# Patient Record
Sex: FEMALE | Race: WHITE | NOT HISPANIC OR LATINO | Employment: OTHER | URBAN - METROPOLITAN AREA
[De-identification: names, ages, dates, MRNs, and addresses within clinical notes are randomized per-mention and may not be internally consistent; named-entity substitution may affect disease eponyms.]

---

## 2017-05-04 ENCOUNTER — TRANSCRIBE ORDERS (OUTPATIENT)
Dept: ADMINISTRATIVE | Facility: HOSPITAL | Age: 67
End: 2017-05-04

## 2017-05-04 DIAGNOSIS — N63.10 LUMP OF RIGHT BREAST: Primary | ICD-10-CM

## 2017-05-18 ENCOUNTER — APPOINTMENT (OUTPATIENT)
Dept: MAMMOGRAPHY | Facility: HOSPITAL | Age: 67
End: 2017-05-18

## 2017-05-24 ENCOUNTER — TRANSCRIBE ORDERS (OUTPATIENT)
Dept: MAMMOGRAPHY | Facility: HOSPITAL | Age: 67
End: 2017-05-24

## 2017-05-24 ENCOUNTER — HOSPITAL ENCOUNTER (OUTPATIENT)
Dept: MAMMOGRAPHY | Facility: HOSPITAL | Age: 67
Discharge: HOME | End: 2017-05-24
Admitting: INTERNAL MEDICINE

## 2017-05-24 ENCOUNTER — HOSPITAL ENCOUNTER (OUTPATIENT)
Dept: ULTRASOUND IMAGING | Facility: HOSPITAL | Age: 67
Discharge: HOME | End: 2017-05-24

## 2017-05-24 DIAGNOSIS — N63.10 LUMP OF RIGHT BREAST: ICD-10-CM

## 2017-05-24 DIAGNOSIS — R92.8 ABNORMAL MAMMOGRAM: Primary | ICD-10-CM

## 2017-05-24 PROCEDURE — G0279 TOMOSYNTHESIS, MAMMO: HCPCS

## 2017-05-24 PROCEDURE — 76641 ULTRASOUND BREAST COMPLETE: CPT

## 2017-05-24 PROCEDURE — G0204 DX MAMMO INCL CAD BI: HCPCS

## 2017-05-24 PROCEDURE — 77066 DX MAMMO INCL CAD BI: CPT | Performed by: RADIOLOGY

## 2017-05-24 PROCEDURE — 76641 ULTRASOUND BREAST COMPLETE: CPT | Performed by: RADIOLOGY

## 2017-05-24 PROCEDURE — 77062 BREAST TOMOSYNTHESIS BI: CPT | Performed by: RADIOLOGY

## 2017-05-25 ENCOUNTER — HOSPITAL ENCOUNTER (OUTPATIENT)
Dept: MAMMOGRAPHY | Facility: HOSPITAL | Age: 67
Discharge: HOME | End: 2017-05-25

## 2017-05-25 ENCOUNTER — HOSPITAL ENCOUNTER (OUTPATIENT)
Dept: ULTRASOUND IMAGING | Facility: HOSPITAL | Age: 67
Discharge: HOME | End: 2017-05-25

## 2017-05-25 ENCOUNTER — HOSPITAL ENCOUNTER (OUTPATIENT)
Dept: ULTRASOUND IMAGING | Facility: HOSPITAL | Age: 67
Discharge: HOME | End: 2017-05-25
Admitting: RADIOLOGY

## 2017-05-25 DIAGNOSIS — R92.8 ABNORMAL MAMMOGRAM: ICD-10-CM

## 2017-05-25 PROCEDURE — 10022 US GUIDED FINE NEEDLE ASPIRATION BREAST: CPT | Performed by: RADIOLOGY

## 2017-05-25 PROCEDURE — 88305 TISSUE EXAM BY PATHOLOGIST: CPT | Performed by: RADIOLOGY

## 2017-05-25 PROCEDURE — 19083 BX BREAST 1ST LESION US IMAG: CPT | Performed by: RADIOLOGY

## 2017-05-25 PROCEDURE — 88360 TUMOR IMMUNOHISTOCHEM/MANUAL: CPT | Performed by: RADIOLOGY

## 2017-05-25 PROCEDURE — 76942 ECHO GUIDE FOR BIOPSY: CPT

## 2017-05-25 PROCEDURE — 77065 DX MAMMO INCL CAD UNI: CPT | Performed by: RADIOLOGY

## 2017-05-25 PROCEDURE — 88342 IMHCHEM/IMCYTCHM 1ST ANTB: CPT | Performed by: RADIOLOGY

## 2017-05-25 RX ORDER — LIDOCAINE HYDROCHLORIDE AND EPINEPHRINE 10; 10 MG/ML; UG/ML
10 INJECTION, SOLUTION INFILTRATION; PERINEURAL ONCE
Status: COMPLETED | OUTPATIENT
Start: 2017-05-25 | End: 2017-05-25

## 2017-05-25 RX ORDER — LIDOCAINE HYDROCHLORIDE 10 MG/ML
5 INJECTION, SOLUTION INFILTRATION; PERINEURAL ONCE
Status: COMPLETED | OUTPATIENT
Start: 2017-05-25 | End: 2017-05-25

## 2017-05-25 RX ADMIN — LIDOCAINE HYDROCHLORIDE,EPINEPHRINE BITARTRATE 5 ML: 10; .01 INJECTION, SOLUTION INFILTRATION; PERINEURAL at 15:47

## 2017-05-25 RX ADMIN — LIDOCAINE HYDROCHLORIDE 5 ML: 10 INJECTION, SOLUTION INFILTRATION; PERINEURAL at 15:47

## 2017-05-25 RX ADMIN — LIDOCAINE HYDROCHLORIDE 2 ML: 10 INJECTION, SOLUTION INFILTRATION; PERINEURAL at 15:51

## 2017-05-30 LAB
LAB AP CASE REPORT: NORMAL
Lab: NORMAL
PATH REPORT.FINAL DX SPEC: NORMAL

## 2017-06-05 ENCOUNTER — TELEPHONE (OUTPATIENT)
Dept: MAMMOGRAPHY | Facility: HOSPITAL | Age: 67
End: 2017-06-05

## 2017-06-05 NOTE — TELEPHONE ENCOUNTER
06.05.17 @ 1530: PCP office notified pathology returned as cancer & patient will be notified. Pathology results and recommendation given to pt. Verbalizes understanding. Denies discomfort. Denies any signs and symptoms of infection.Patient desires Dr GHADA Kovacs for surgical consult. Patient notified of appointment with Dr Kovacs on 06.09.17 @ 3430. Told to bring photo ID, insurance cards & list of current medications. Patient was encouraged to call back with any questions or concerns. Patient verbalized understanding. Breast cancer information packet offered and accepted.

## 2017-06-05 NOTE — TELEPHONE ENCOUNTER
06.05.17 @ 1108: Pt was called on three occassions (05.31.17 @ 1705; 06.02.17 @ 1430; 06.05.17 @ 1105), messages were left each time but no calls were returned.  PCP was notified of positive path report on 05.31.17. Provider was notified on 06.05.17 @ 1108 pt has not been returning calls to notify her of path results and appt with Azucena 06.09.17 @ 1115 ( I spoke with Sarika). Pt's emergency contact was left a message for pt to return call. Dr Barroso is aware.

## 2017-06-09 ENCOUNTER — DOCUMENTATION (OUTPATIENT)
Dept: OTHER | Facility: HOSPITAL | Age: 67
End: 2017-06-09
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
## 2017-06-13 ENCOUNTER — EDUCATION (OUTPATIENT)
Dept: ONCOLOGY | Facility: HOSPITAL | Age: 67
End: 2017-06-13

## 2017-06-13 ENCOUNTER — APPOINTMENT (OUTPATIENT)
Dept: LAB | Facility: HOSPITAL | Age: 67
End: 2017-06-13

## 2017-06-13 ENCOUNTER — DOCUMENTATION (OUTPATIENT)
Dept: SOCIAL WORK | Facility: HOSPITAL | Age: 67
End: 2017-06-13

## 2017-06-13 ENCOUNTER — TELEPHONE (OUTPATIENT)
Dept: ONCOLOGY | Facility: CLINIC | Age: 67
End: 2017-06-13

## 2017-06-13 ENCOUNTER — CONSULT (OUTPATIENT)
Dept: ONCOLOGY | Facility: CLINIC | Age: 67
End: 2017-06-13

## 2017-06-13 VITALS
HEART RATE: 96 BPM | BODY MASS INDEX: 22.53 KG/M2 | HEIGHT: 64 IN | RESPIRATION RATE: 16 BRPM | SYSTOLIC BLOOD PRESSURE: 220 MMHG | WEIGHT: 132 LBS | DIASTOLIC BLOOD PRESSURE: 96 MMHG | TEMPERATURE: 97.5 F

## 2017-06-13 DIAGNOSIS — C50.811 MALIGNANT NEOPLASM OF OVERLAPPING SITES OF RIGHT FEMALE BREAST (HCC): Primary | ICD-10-CM

## 2017-06-13 LAB
ALBUMIN SERPL-MCNC: 4.7 G/DL (ref 3.2–4.8)
ALBUMIN/GLOB SERPL: 1.3 G/DL (ref 1.5–2.5)
ALP SERPL-CCNC: 77 U/L (ref 25–100)
ALT SERPL W P-5'-P-CCNC: 49 U/L (ref 7–40)
ANION GAP SERPL CALCULATED.3IONS-SCNC: 4 MMOL/L (ref 3–11)
AST SERPL-CCNC: 43 U/L (ref 0–33)
BILIRUB SERPL-MCNC: 0.4 MG/DL (ref 0.3–1.2)
BUN BLD-MCNC: 15 MG/DL (ref 9–23)
BUN/CREAT SERPL: 25 (ref 7–25)
CALCIUM SPEC-SCNC: 10 MG/DL (ref 8.7–10.4)
CHLORIDE SERPL-SCNC: 107 MMOL/L (ref 99–109)
CO2 SERPL-SCNC: 29 MMOL/L (ref 20–31)
CREAT BLD-MCNC: 0.6 MG/DL (ref 0.6–1.3)
ERYTHROCYTE [DISTWIDTH] IN BLOOD BY AUTOMATED COUNT: 12.9 % (ref 11.3–14.5)
GFR SERPL CREATININE-BSD FRML MDRD: 101 ML/MIN/1.73
GLOBULIN UR ELPH-MCNC: 3.7 GM/DL
GLUCOSE BLD-MCNC: 98 MG/DL (ref 70–100)
HCT VFR BLD AUTO: 41.7 % (ref 34.5–44)
HGB BLD-MCNC: 13.5 G/DL (ref 11.5–15.5)
LYMPHOCYTES # BLD AUTO: 1.5 10*3/MM3 (ref 0.6–4.8)
LYMPHOCYTES NFR BLD AUTO: 29.8 % (ref 24–44)
MCH RBC QN AUTO: 29 PG (ref 27–31)
MCHC RBC AUTO-ENTMCNC: 32.3 G/DL (ref 32–36)
MCV RBC AUTO: 89.8 FL (ref 80–99)
MONOCYTES # BLD AUTO: 0.3 10*3/MM3 (ref 0–1)
MONOCYTES NFR BLD AUTO: 5.8 % (ref 0–12)
NEUTROPHILS # BLD AUTO: 3.2 10*3/MM3 (ref 1.5–8.3)
NEUTROPHILS NFR BLD AUTO: 64.4 % (ref 41–71)
PLATELET # BLD AUTO: 279 10*3/MM3 (ref 150–450)
PMV BLD AUTO: 7.2 FL (ref 6–12)
POTASSIUM BLD-SCNC: 4 MMOL/L (ref 3.5–5.5)
PROT SERPL-MCNC: 8.4 G/DL (ref 5.7–8.2)
RBC # BLD AUTO: 4.65 10*6/MM3 (ref 3.89–5.14)
SODIUM BLD-SCNC: 140 MMOL/L (ref 132–146)
WBC NRBC COR # BLD: 5 10*3/MM3 (ref 3.5–10.8)

## 2017-06-13 PROCEDURE — 36415 COLL VENOUS BLD VENIPUNCTURE: CPT | Performed by: INTERNAL MEDICINE

## 2017-06-13 PROCEDURE — 85025 COMPLETE CBC W/AUTO DIFF WBC: CPT | Performed by: INTERNAL MEDICINE

## 2017-06-13 PROCEDURE — 80053 COMPREHEN METABOLIC PANEL: CPT | Performed by: INTERNAL MEDICINE

## 2017-06-13 PROCEDURE — 99205 OFFICE O/P NEW HI 60 MIN: CPT | Performed by: INTERNAL MEDICINE

## 2017-06-13 RX ORDER — DEXAMETHASONE 4 MG/1
TABLET ORAL
Qty: 12 TABLET | Refills: 5 | Status: SHIPPED | OUTPATIENT
Start: 2017-06-13 | End: 2017-06-20

## 2017-06-13 RX ORDER — ONDANSETRON HYDROCHLORIDE 8 MG/1
8 TABLET, FILM COATED ORAL 3 TIMES DAILY PRN
Qty: 30 TABLET | Refills: 5 | Status: SHIPPED | OUTPATIENT
Start: 2017-06-13 | End: 2017-06-20
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
## 2017-06-13 NOTE — PLAN OF CARE
Outpatient Infusion • [address] [facility] • [address] • [place] [id number] • [phone]      CHEMOTHERAPY EDUCATION SHEET    NAME:  Brianna Urrutia      : 1950           DATE: 17    Booklets Given: Chemotherapy and You [x]  Eating Hints [x]    Sexuality/Fertility Books []     Chemotherapy/Biotherapy Education Sheets: (list all that apply)  Docetaxel, Carboplatin, Trastuzumab, Pertuzumab                                                                                                                                                                 Chemotherapy Regimen:  Docetaxel + Carboplatin + Trastuzumab + Pertuzumab every 21 days    TOPICS EDUCATION PROVIDED EDUCATION REINFORCED COMMENTS   ANEMIA:  role of RBC, cause, s/s, ways to manage, role of transfusion [x] [] Discussed role of RBC and possible fatigue   THROMBOCYTOPENIA:  role of platelet, cause, s/s, ways to prevent bleeding, things to avoid, when to seek help [x] [] Discussed role of platelets and increased risk of bleeding/bruising and to call MD if bleeding does not stop   NEUTROPENIA:  role of WBC, cause, infection precautions, s/s of infection, when to call MD [x] [] Discussed role of WBC and increased risk of infection. Discussed importance of handwashing and to contact physician with fever of >100.4F   NUTRITION & APPETITE CHANGES:  importance of maintaining healthy diet & weight, ways to manage to improve intake, dietary consult, exercise regimen [x] [] Discussed importance of hydration (drinking 8 8oz glasses of water per day), maintaining a good nutrition, and exercise for energy    DIARRHEA:  causes, s/s of dehydration, ways to manage, dietary changes, when to call MD [x] [] Discussed possible diarrhea and availability of Imodium OTC and to call physician if >4-5 times/day   CONSTIPATION:  causes, ways to manage, dietary changes, when to call MD [x] [] Discussed possible constipation and availability of Docusate and Miralax OTC  and to call MD if don't have a bowel movement in 24 hours   NAUSEA & VOMITING:  cause, use of antiemetics, dietary changes, when to call MD [x] [] Discussed possible nausea and vomiting and use of Ondansetron after 1-2 days if needed and to call physician if vomiting >4-5 times/day   MOUTH SORES:  causes, oral care, ways to manage [x] [] Discussed possible mouth sores and how to prevent with water, salt, and baking soda rinse and possible magic mouthwash prescription if needed   ALOPECIA:  cause, ways to manage, resources [x] [] Discussed possible hair thinning, slower hair growth, and hair loss   INFERTILITY & SEXUALITY:  causes, fertility preservation options, sexuality changes, ways to manage, importance of birth control [x] [] Discussed importance of use of contraception to prevent transfer of chemo to partner   NERVOUS SYSTEM CHANGES:  causes, s/s, neuropathies, cognitive changes, ways to manage [x] [] Discussed cause and s/sx of possible peripheral neuropathy including tingling and numbing of fingers and possible prescription if needed   PAIN:  causes, ways to manage [] [] ????   SKIN & NAIL CHANGES:  cause, s/s, ways to manage [x] [] Discussed importance of use of sunscreen and possible lines forming on nails   ORGAN TOXICITIES:  cause, s/s, need for diagnostic tests, labs, when to notify MD [x] [] Discussed risk for liver, renal, and cardio toxicity but that MD will be monitoring labs.   SURVIVORSHIP:  distress, distress assessment, secondary malignancies, early/late effects, follow-up, social issues, social support [] []    HOME CARE:  use of spill kits, storing of PO chemo, how to manage bodily fluids [] []    MISCELLANEOUS:  drug interactions, administration, vesicant, et [x] [] Discussed risk for metallic taste and ototoxicity. Discussed risk for edema and  flu-like syndrome (fever, chills, muscle aches). Discussed risk for infusion-related reaction (chest pain, shortness of breath), what to do if it  occurs, and importance of pre-meds.      Referrals:        Notes:   Patient stated understanding of information and was told to contact the clinic if she had further questions.

## 2017-06-13 NOTE — TELEPHONE ENCOUNTER
Spoke with pharmacist at [facility]. Due to insurance preference, verbal order given, ok to change zofran to ODT.

## 2017-06-13 NOTE — TELEPHONE ENCOUNTER
----- Message from Luz Maria Nur sent at 6/13/2017 10:38 AM EDT -----  Regarding: kiki saldanas script  Please call [facility] pharmacy concerning this patients script  For ondansetron 8mg    The person calling said it needs to be 40 T tabs.    [facility]

## 2017-06-15 ENCOUNTER — TRANSCRIBE ORDERS (OUTPATIENT)
Dept: GENERAL RADIOLOGY | Facility: HOSPITAL | Age: 67
End: 2017-06-15

## 2017-06-15 DIAGNOSIS — C50.411 MALIGNANT NEOPLASM OF UPPER-OUTER QUADRANT OF RIGHT FEMALE BREAST (HCC): Primary | ICD-10-CM

## 2017-06-16 ENCOUNTER — HOSPITAL ENCOUNTER (OUTPATIENT)
Dept: CARDIOLOGY | Facility: HOSPITAL | Age: 67
Discharge: HOME | End: 2017-06-16
Attending: INTERNAL MEDICINE | Admitting: INTERNAL MEDICINE

## 2017-06-16 ENCOUNTER — APPOINTMENT (OUTPATIENT)
Dept: PET IMAGING | Facility: HOSPITAL | Age: 67
End: 2017-06-16
Attending: INTERNAL MEDICINE

## 2017-06-16 VITALS — WEIGHT: 132 LBS | HEIGHT: 64 IN | BODY MASS INDEX: 22.53 KG/M2

## 2017-06-16 DIAGNOSIS — C50.811 MALIGNANT NEOPLASM OF OVERLAPPING SITES OF RIGHT FEMALE BREAST (HCC): ICD-10-CM

## 2017-06-16 DIAGNOSIS — C50.811 MALIGNANT NEOPLASM OF OVERLAPPING SITES OF RIGHT FEMALE BREAST (HCC): Primary | ICD-10-CM

## 2017-06-16 LAB
BH CV ECHO MEAS - AO MAX PG (FULL): 0.61 MMHG
BH CV ECHO MEAS - AO MAX PG: 9.5 MMHG
BH CV ECHO MEAS - AO MEAN PG (FULL): 0.17 MMHG
BH CV ECHO MEAS - AO MEAN PG: 5.3 MMHG
BH CV ECHO MEAS - AO V2 MAX: 153.8 CM/SEC
BH CV ECHO MEAS - AO V2 MEAN: 109.7 CM/SEC
BH CV ECHO MEAS - AO V2 VTI: 25.8 CM
BH CV ECHO MEAS - AVA(I,A): 2.1 CM^2
BH CV ECHO MEAS - AVA(I,D): 2.1 CM^2
BH CV ECHO MEAS - AVA(V,A): 2 CM^2
BH CV ECHO MEAS - AVA(V,D): 2 CM^2
BH CV ECHO MEAS - BSA(HAYCOCK): 1.6 M^2
BH CV ECHO MEAS - BSA: 1.6 M^2
BH CV ECHO MEAS - BZI_BMI: 22.7 KILOGRAMS/M^2
BH CV ECHO MEAS - BZI_METRIC_HEIGHT: 162.6 CM
BH CV ECHO MEAS - BZI_METRIC_WEIGHT: 59.9 KG
BH CV ECHO MEAS - CONTRAST EF (2CH): 67.4 ML/M^2
BH CV ECHO MEAS - CONTRAST EF 4CH: 66 ML/M^2
BH CV ECHO MEAS - EDV(CUBED): 44.9 ML
BH CV ECHO MEAS - EDV(MOD-SP2): 46 ML
BH CV ECHO MEAS - EDV(MOD-SP4): 50 ML
BH CV ECHO MEAS - EDV(TEICH): 52.8 ML
BH CV ECHO MEAS - EF(CUBED): 87.1 %
BH CV ECHO MEAS - EF(MOD-SP2): 67.4 %
BH CV ECHO MEAS - EF(MOD-SP4): 66 %
BH CV ECHO MEAS - EF(TEICH): 81.7 %
BH CV ECHO MEAS - ESV(CUBED): 5.8 ML
BH CV ECHO MEAS - ESV(MOD-SP2): 15 ML
BH CV ECHO MEAS - ESV(MOD-SP4): 17 ML
BH CV ECHO MEAS - ESV(TEICH): 9.7 ML
BH CV ECHO MEAS - FS: 49.5 %
BH CV ECHO MEAS - IVS/LVPW: 1.1
BH CV ECHO MEAS - IVSD: 0.94 CM
BH CV ECHO MEAS - LA DIMENSION: 2.9 CM
BH CV ECHO MEAS - LAT PEAK E' VEL: 8.9 CM/SEC
BH CV ECHO MEAS - LV DIASTOLIC VOL/BSA (35-75): 30.5 ML/M^2
BH CV ECHO MEAS - LV MASS(C)D: 90.5 GRAMS
BH CV ECHO MEAS - LV MASS(C)DI: 55.2 GRAMS/M^2
BH CV ECHO MEAS - LV MAX PG: 8.9 MMHG
BH CV ECHO MEAS - LV MEAN PG: 5.1 MMHG
BH CV ECHO MEAS - LV SYSTOLIC VOL/BSA (12-30): 10.4 ML/M^2
BH CV ECHO MEAS - LV V1 MAX: 148.8 CM/SEC
BH CV ECHO MEAS - LV V1 MEAN: 107.3 CM/SEC
BH CV ECHO MEAS - LV V1 VTI: 25.8 CM
BH CV ECHO MEAS - LVIDD: 3.6 CM
BH CV ECHO MEAS - LVIDS: 1.8 CM
BH CV ECHO MEAS - LVLD AP2: 6.8 CM
BH CV ECHO MEAS - LVLD AP4: 7 CM
BH CV ECHO MEAS - LVLS AP2: 5.5 CM
BH CV ECHO MEAS - LVLS AP4: 5.9 CM
BH CV ECHO MEAS - LVOT AREA (M): 2 CM^2
BH CV ECHO MEAS - LVOT AREA: 2.1 CM^2
BH CV ECHO MEAS - LVOT DIAM: 1.6 CM
BH CV ECHO MEAS - LVPWD: 0.85 CM
BH CV ECHO MEAS - MED PEAK E' VEL: 6.6 CM/SEC
BH CV ECHO MEAS - MV A MAX VEL: 81.2 CM/SEC
BH CV ECHO MEAS - MV DEC TIME: 0.28 SEC
BH CV ECHO MEAS - MV E MAX VEL: 62 CM/SEC
BH CV ECHO MEAS - MV E/A: 0.76
BH CV ECHO MEAS - PA ACC SLOPE: 977.7 CM/SEC^2
BH CV ECHO MEAS - PA ACC TIME: 0.13 SEC
BH CV ECHO MEAS - PA MAX PG: 6.5 MMHG
BH CV ECHO MEAS - PA PR(ACCEL): 22 MMHG
BH CV ECHO MEAS - PA V2 MAX: 126.8 CM/SEC
BH CV ECHO MEAS - PULM DIAS VEL: 35.1 CM/SEC
BH CV ECHO MEAS - PULM S/D: 2.2
BH CV ECHO MEAS - PULM SYS VEL: 76.2 CM/SEC
BH CV ECHO MEAS - RVDD: 2.4 CM
BH CV ECHO MEAS - SI(CUBED): 23.9 ML/M^2
BH CV ECHO MEAS - SI(LVOT): 32.4 ML/M^2
BH CV ECHO MEAS - SI(MOD-SP2): 18.9 ML/M^2
BH CV ECHO MEAS - SI(MOD-SP4): 20.1 ML/M^2
BH CV ECHO MEAS - SI(TEICH): 26.3 ML/M^2
BH CV ECHO MEAS - SV(CUBED): 39.1 ML
BH CV ECHO MEAS - SV(LVOT): 53.2 ML
BH CV ECHO MEAS - SV(MOD-SP2): 31 ML
BH CV ECHO MEAS - SV(MOD-SP4): 33 ML
BH CV ECHO MEAS - SV(TEICH): 43.2 ML
BH CV ECHO MEAS - TAPSE (>1.6): 2.4 CM2
BH CV VAS BP LEFT ARM: NORMAL MMHG
BH CV XLRA - RV BASE: 2.6 CM
BH CV XLRA - RV LENGTH: 5.5 CM
BH CV XLRA - RV MID: 2.4 CM
BH CV XLRA - TDI S': 15.7 CM/SEC
E/E' RATIO: 10.6
LEFT ATRIUM VOLUME INDEX: 13.4 ML/M2
LEFT ATRIUM VOLUME: 22 CM3
LV EF 2D ECHO EST: 70 %

## 2017-06-16 PROCEDURE — 93306 TTE W/DOPPLER COMPLETE: CPT | Performed by: INTERNAL MEDICINE

## 2017-06-16 PROCEDURE — 93306 TTE W/DOPPLER COMPLETE: CPT

## 2017-06-19 ENCOUNTER — APPOINTMENT (OUTPATIENT)
Dept: PET IMAGING | Facility: HOSPITAL | Age: 67
End: 2017-06-19
Attending: INTERNAL MEDICINE

## 2017-06-19 ENCOUNTER — HOSPITAL ENCOUNTER (OUTPATIENT)
Dept: CT IMAGING | Facility: HOSPITAL | Age: 67
Discharge: HOME | End: 2017-06-19
Attending: INTERNAL MEDICINE | Admitting: INTERNAL MEDICINE

## 2017-06-19 DIAGNOSIS — C50.811 MALIGNANT NEOPLASM OF OVERLAPPING SITES OF RIGHT FEMALE BREAST (HCC): ICD-10-CM

## 2017-06-19 PROCEDURE — 74176 CT ABD & PELVIS W/O CONTRAST: CPT

## 2017-06-19 PROCEDURE — 71250 CT THORAX DX C-: CPT

## 2017-06-20 ENCOUNTER — OFFICE VISIT (OUTPATIENT)
Dept: ONCOLOGY | Facility: CLINIC | Age: 67
End: 2017-06-20

## 2017-06-20 ENCOUNTER — HOSPITAL ENCOUNTER (OUTPATIENT)
Dept: NUCLEAR MEDICINE | Facility: HOSPITAL | Age: 67
Discharge: HOME | End: 2017-06-20
Attending: INTERNAL MEDICINE

## 2017-06-20 ENCOUNTER — APPOINTMENT (OUTPATIENT)
Dept: PET IMAGING | Facility: HOSPITAL | Age: 67
End: 2017-06-20
Attending: INTERNAL MEDICINE

## 2017-06-20 VITALS
SYSTOLIC BLOOD PRESSURE: 192 MMHG | WEIGHT: 130 LBS | RESPIRATION RATE: 16 BRPM | TEMPERATURE: 97.6 F | HEIGHT: 64 IN | BODY MASS INDEX: 22.2 KG/M2 | DIASTOLIC BLOOD PRESSURE: 81 MMHG | HEART RATE: 96 BPM

## 2017-06-20 DIAGNOSIS — C50.811 MALIGNANT NEOPLASM OF OVERLAPPING SITES OF RIGHT FEMALE BREAST (HCC): ICD-10-CM

## 2017-06-20 DIAGNOSIS — C50.811 MALIGNANT NEOPLASM OF OVERLAPPING SITES OF RIGHT FEMALE BREAST (HCC): Primary | ICD-10-CM

## 2017-06-20 PROCEDURE — 78306 BONE IMAGING WHOLE BODY: CPT

## 2017-06-20 PROCEDURE — 0 TECHNETIUM MEDRONATE KIT: Performed by: INTERNAL MEDICINE

## 2017-06-20 PROCEDURE — 99214 OFFICE O/P EST MOD 30 MIN: CPT | Performed by: INTERNAL MEDICINE

## 2017-06-20 PROCEDURE — A9503 TC99M MEDRONATE: HCPCS | Performed by: INTERNAL MEDICINE

## 2017-06-20 RX ORDER — LOSARTAN POTASSIUM 25 MG/1
TABLET ORAL
COMMUNITY
Start: 2017-06-13 | End: 2017-07-19 | Stop reason: SDUPTHER

## 2017-06-20 RX ORDER — TC 99M MEDRONATE 20 MG/10ML
26.5 INJECTION, POWDER, LYOPHILIZED, FOR SOLUTION INTRAVENOUS
Status: COMPLETED | OUTPATIENT
Start: 2017-06-20 | End: 2017-06-20

## 2017-06-20 RX ORDER — ONDANSETRON HYDROCHLORIDE 8 MG/1
8 TABLET, FILM COATED ORAL 3 TIMES DAILY PRN
Qty: 30 TABLET | Refills: 5 | Status: SHIPPED | OUTPATIENT
Start: 2017-06-20 | End: 2017-10-10

## 2017-06-20 RX ADMIN — Medication 26.5 MILLICURIE: at 11:15

## 2017-06-20 NOTE — PROGRESS NOTES
"      PROBLEM LIST:  1. [id number] ductal carcinoma of the right breast, ER negative [place] negative HER-2 positive.   A) patient presented with 1 year of increasing mass in the right breast with pain. Biopsy on 5/25/2017 showed a grade 2 invasive ductal carcinoma, ER negative, HER-2 positive by IHC. FNA of a right axillary lymph node showed metastatic cancer.  2. Hypertension  3. Hepatitis C, untreated  4. Kidney stones    Subjective     HISTORY OF PRESENT ILLNESS:   Brianna Urrutia returns for follow-up.   She is here to review her scan results.  She has not yet completed her bone scan which she will have later today.  She says she is still feeling very uncertain about chemotherapy.  She is nervous about what will happen if she gets very sick with treatment.  She says she lives alone and really doesn't have anybody she can call to help her.  She wonders if she can add drugs one at a time so she has a chance to see how they affect her.    She reports noticing a significant improvement how she feels when she started taking blood pressure medication.    Past Medical History, Past Surgical History, Social History, Family History have been reviewed and are without significant changes except as mentioned.    Review of Systems   A comprehensive 14 point review of systems was performed and was negative except as mentioned.    Medications:  The current medication list was reviewed in the EMR    ALLERGIES:    Allergies   Allergen Reactions   • Iodinated Diagnostic Agents Other (See Comments)     Patient states she has swelling and pain of joints for days following injection   • Penicillins        Objective      BP (!) 192/81 Comment: LUE  Pulse 96  Temp 97.6 °F (36.4 °C) (Temporal Artery )   Resp 16  Ht 64\" (162.6 cm)  Wt 130 lb (59 kg)  BMI 22.31 kg/m2     Performance Status: 0    General: well appearing female in no acute distress  Neuro: alert and oriented  HEENT: sclera anicteric, oropharynx clear  Skin: no rashes, " lesions, bruising, or petechiae  Psych: mood and affect appropriate      Imaging: I personally reviewed the CT images of the chest abdomen and pelvis as well as the bone scan.  There is no evidence of distant metastatic disease.      Assessment/Plan   Brianna Urrutia is a 66 y.o. year old female with stage III HER-2 positive ER negative breast cancer who returns for follow-up.  We reviewed her scan results.  There is no clear evidence of distant metastatic disease.  I do still recommend neoadjuvant chemotherapy.  She is at this point not willing to consider multiagent chemotherapy.  We discussed an alternative treatment with Taxol, Herceptin, and Perjeta.  This can still be a treatment that leads to a response and tumor shrinkage, although it may not be quite as potent as the 4 drug regimen.  I think weekly Taxol may be better tolerated, and it is true that given her living situation and lack of social support that this regimen would be more realistic.  We will go ahead and schedule her to start this treatment next week.  I'll ask one of the pharmacists contact her by phone to review side effects of Taxol in detail.    I will plan to see her back again in one week.                  Visit time was 25 minutes, greater than 50% spent in counseling      Delicia Ibarra MD  [facility]    6/20/2017          CC:

## 2017-06-21 ENCOUNTER — TELEPHONE (OUTPATIENT)
Dept: ONCOLOGY | Facility: CLINIC | Age: 67
End: 2017-06-21

## 2017-06-21 NOTE — TELEPHONE ENCOUNTER
Called pt to let her know bone scan does not show evidence of metastatic disease.      She is planning on coming for treatment next week, but might need to move it to Wednesday.  She will let us know.

## 2017-06-27 ENCOUNTER — APPOINTMENT (OUTPATIENT)
Dept: ONCOLOGY | Facility: HOSPITAL | Age: 67
End: 2017-06-27

## 2017-06-28 ENCOUNTER — DOCUMENTATION (OUTPATIENT)
Dept: NUTRITION | Facility: HOSPITAL | Age: 67
End: 2017-06-28

## 2017-06-28 ENCOUNTER — OFFICE VISIT (OUTPATIENT)
Dept: ONCOLOGY | Facility: CLINIC | Age: 67
End: 2017-06-28

## 2017-06-28 ENCOUNTER — DOCUMENTATION (OUTPATIENT)
Dept: SOCIAL WORK | Facility: HOSPITAL | Age: 67
End: 2017-06-28

## 2017-06-28 ENCOUNTER — INFUSION (OUTPATIENT)
Dept: ONCOLOGY | Facility: HOSPITAL | Age: 67
End: 2017-06-28

## 2017-06-28 ENCOUNTER — EDUCATION (OUTPATIENT)
Dept: ONCOLOGY | Facility: HOSPITAL | Age: 67
End: 2017-06-28

## 2017-06-28 VITALS
TEMPERATURE: 97.3 F | DIASTOLIC BLOOD PRESSURE: 96 MMHG | WEIGHT: 129 LBS | BODY MASS INDEX: 22.02 KG/M2 | HEART RATE: 107 BPM | HEIGHT: 64 IN | SYSTOLIC BLOOD PRESSURE: 198 MMHG | RESPIRATION RATE: 16 BRPM

## 2017-06-28 VITALS — DIASTOLIC BLOOD PRESSURE: 78 MMHG | HEART RATE: 84 BPM | SYSTOLIC BLOOD PRESSURE: 139 MMHG

## 2017-06-28 DIAGNOSIS — C50.811 MALIGNANT NEOPLASM OF OVERLAPPING SITES OF RIGHT FEMALE BREAST (HCC): Primary | ICD-10-CM

## 2017-06-28 LAB
ALBUMIN SERPL-MCNC: 4.3 G/DL (ref 3.2–4.8)
ALBUMIN/GLOB SERPL: 1.2 G/DL (ref 1.5–2.5)
ALP SERPL-CCNC: 80 U/L (ref 25–100)
ALT SERPL W P-5'-P-CCNC: 51 U/L (ref 7–40)
ANION GAP SERPL CALCULATED.3IONS-SCNC: 8 MMOL/L (ref 3–11)
AST SERPL-CCNC: 44 U/L (ref 0–33)
BILIRUB SERPL-MCNC: 0.5 MG/DL (ref 0.3–1.2)
BUN BLD-MCNC: 10 MG/DL (ref 9–23)
BUN/CREAT SERPL: 20 (ref 7–25)
CALCIUM SPEC-SCNC: 9.5 MG/DL (ref 8.7–10.4)
CHLORIDE SERPL-SCNC: 105 MMOL/L (ref 99–109)
CO2 SERPL-SCNC: 28 MMOL/L (ref 20–31)
CREAT BLD-MCNC: 0.5 MG/DL (ref 0.6–1.3)
ERYTHROCYTE [DISTWIDTH] IN BLOOD BY AUTOMATED COUNT: 12.6 % (ref 11.3–14.5)
GFR SERPL CREATININE-BSD FRML MDRD: 124 ML/MIN/1.73
GLOBULIN UR ELPH-MCNC: 3.7 GM/DL
GLUCOSE BLD-MCNC: 98 MG/DL (ref 70–100)
HCT VFR BLD AUTO: 39.7 % (ref 34.5–44)
HGB BLD-MCNC: 12.9 G/DL (ref 11.5–15.5)
LYMPHOCYTES # BLD AUTO: 1.5 10*3/MM3 (ref 0.6–4.8)
LYMPHOCYTES NFR BLD AUTO: 29.2 % (ref 24–44)
MCH RBC QN AUTO: 29 PG (ref 27–31)
MCHC RBC AUTO-ENTMCNC: 32.5 G/DL (ref 32–36)
MCV RBC AUTO: 89.4 FL (ref 80–99)
MONOCYTES # BLD AUTO: 0.4 10*3/MM3 (ref 0–1)
MONOCYTES NFR BLD AUTO: 7.8 % (ref 0–12)
NEUTROPHILS # BLD AUTO: 3.3 10*3/MM3 (ref 1.5–8.3)
NEUTROPHILS NFR BLD AUTO: 63 % (ref 41–71)
PLATELET # BLD AUTO: 294 10*3/MM3 (ref 150–450)
PMV BLD AUTO: 7 FL (ref 6–12)
POTASSIUM BLD-SCNC: 3.6 MMOL/L (ref 3.5–5.5)
PROT SERPL-MCNC: 8 G/DL (ref 5.7–8.2)
RBC # BLD AUTO: 4.45 10*6/MM3 (ref 3.89–5.14)
SODIUM BLD-SCNC: 141 MMOL/L (ref 132–146)
WBC NRBC COR # BLD: 5.2 10*3/MM3 (ref 3.5–10.8)

## 2017-06-28 PROCEDURE — 96413 CHEMO IV INFUSION 1 HR: CPT

## 2017-06-28 PROCEDURE — 99215 OFFICE O/P EST HI 40 MIN: CPT | Performed by: INTERNAL MEDICINE

## 2017-06-28 PROCEDURE — 80053 COMPREHEN METABOLIC PANEL: CPT

## 2017-06-28 PROCEDURE — 25010000002 LORAZEPAM PER 2 MG: Performed by: INTERNAL MEDICINE

## 2017-06-28 PROCEDURE — 85025 COMPLETE CBC W/AUTO DIFF WBC: CPT

## 2017-06-28 PROCEDURE — 25010000002 TRASTUZUMAB PER 10 MG: Performed by: INTERNAL MEDICINE

## 2017-06-28 PROCEDURE — 36415 COLL VENOUS BLD VENIPUNCTURE: CPT

## 2017-06-28 PROCEDURE — 96415 CHEMO IV INFUSION ADDL HR: CPT

## 2017-06-28 PROCEDURE — 96375 TX/PRO/DX INJ NEW DRUG ADDON: CPT

## 2017-06-28 RX ORDER — LORAZEPAM 2 MG/ML
1 INJECTION INTRAMUSCULAR ONCE
Status: CANCELLED
Start: 2017-06-28 | End: 2017-06-28

## 2017-06-28 RX ORDER — SODIUM CHLORIDE 9 MG/ML
250 INJECTION, SOLUTION INTRAVENOUS ONCE
Status: CANCELLED | OUTPATIENT
Start: 2017-06-28

## 2017-06-28 RX ORDER — FAMOTIDINE 10 MG/ML
20 INJECTION, SOLUTION INTRAVENOUS ONCE
Status: CANCELLED | OUTPATIENT
Start: 2017-06-28

## 2017-06-28 RX ORDER — LOSARTAN POTASSIUM 25 MG/1
25 TABLET ORAL ONCE
Status: COMPLETED | OUTPATIENT
Start: 2017-06-28 | End: 2017-06-28

## 2017-06-28 RX ORDER — SODIUM CHLORIDE 9 MG/ML
250 INJECTION, SOLUTION INTRAVENOUS ONCE
Status: CANCELLED | OUTPATIENT
Start: 2017-07-04

## 2017-06-28 RX ORDER — SODIUM CHLORIDE 9 MG/ML
250 INJECTION, SOLUTION INTRAVENOUS ONCE
Status: COMPLETED | OUTPATIENT
Start: 2017-06-28 | End: 2017-06-28

## 2017-06-28 RX ORDER — LOSARTAN POTASSIUM 25 MG/1
25 TABLET ORAL ONCE
Status: CANCELLED
Start: 2017-06-28 | End: 2017-06-28

## 2017-06-28 RX ORDER — FAMOTIDINE 10 MG/ML
20 INJECTION, SOLUTION INTRAVENOUS ONCE
Status: DISCONTINUED | OUTPATIENT
Start: 2017-06-28 | End: 2017-06-28 | Stop reason: HOSPADM

## 2017-06-28 RX ORDER — FAMOTIDINE 10 MG/ML
20 INJECTION, SOLUTION INTRAVENOUS ONCE
Status: CANCELLED | OUTPATIENT
Start: 2017-07-04

## 2017-06-28 RX ORDER — LOSARTAN POTASSIUM 50 MG/1
50 TABLET ORAL DAILY
Qty: 30 TABLET | Refills: 1 | OUTPATIENT
Start: 2017-06-28 | End: 2017-12-12

## 2017-06-28 RX ORDER — LORAZEPAM 2 MG/ML
1 INJECTION INTRAMUSCULAR ONCE
Status: COMPLETED | OUTPATIENT
Start: 2017-06-28 | End: 2017-06-28

## 2017-06-28 RX ORDER — FAMOTIDINE 10 MG/ML
20 INJECTION, SOLUTION INTRAVENOUS ONCE
Status: CANCELLED | OUTPATIENT
Start: 2017-07-11

## 2017-06-28 RX ORDER — SODIUM CHLORIDE 9 MG/ML
250 INJECTION, SOLUTION INTRAVENOUS ONCE
Status: CANCELLED | OUTPATIENT
Start: 2017-07-11

## 2017-06-28 RX ADMIN — TRASTUZUMAB 470 MG: KIT at 11:02

## 2017-06-28 RX ADMIN — SODIUM CHLORIDE 250 ML: 9 INJECTION, SOLUTION INTRAVENOUS at 11:00

## 2017-06-28 RX ADMIN — LOSARTAN POTASSIUM 25 MG: 25 TABLET, FILM COATED ORAL at 10:54

## 2017-06-28 RX ADMIN — LORAZEPAM 1 MG: 2 INJECTION INTRAMUSCULAR; INTRAVENOUS at 10:55

## 2017-06-28 NOTE — PROGRESS NOTES
Oncology Nutrition Screening    Patient Name:  Brianna Urrutia  YOB: 1950  MRN: [id number]  Date:  06/28/17  Physician:  Dr. Ibarra    Type of Cancer Treatment:   Chemotherapy:  Taxol(D1,8,15)/Herceptin(D1)/Perjetta(D1)-every 21 days x 6 cycles    Patient Active Problem List   Diagnosis   • Malignant neoplasm of overlapping sites of right female breast       Current Outpatient Prescriptions   Medication Sig Dispense Refill   • losartan (COZAAR) 25 MG tablet      • losartan (COZAAR) 50 MG tablet Take 1 tablet by mouth Daily. 30 tablet 1   • ondansetron (ZOFRAN) 8 MG tablet Take 1 tablet by mouth 3 (Three) Times a Day As Needed for Nausea or Vomiting. 30 tablet 5   • TraMADol HCl 50 MG tablet dispersible Take 0.5 tablets by mouth As Needed.     • Unable to find 1 each 1 (One) Time. [place]       No current facility-administered medications for this visit.      Facility-Administered Medications Ordered in Other Visits   Medication Dose Route Frequency Provider Last Rate Last Dose   • dexamethasone (DECADRON) 12 mg in sodium chloride 0.9 % IVPB  12 mg Intravenous Once Delicia Ibarra MD       • diphenhydrAMINE (BENADRYL) 50 mg in sodium chloride 0.9 % 50 mL IVPB  50 mg Intravenous Once Delicia Ibarra MD       • famotidine (PEPCID) injection 20 mg  20 mg Intravenous Once Delicia Ibarra MD       • sodium chloride 0.9 % infusion 250 mL  250 mL Intravenous Once Delicia Ibarra MD       • trastuzumab (HERCEPTIN) 470 mg in sodium chloride 0.9 % 250 mL chemo IVPB  8 mg/kg (Treatment Plan Recorded) Intravenous Once Delicia Ibarra MD   470 mg at 06/28/17 1102       Glycemic Risk:   n/a    Weight:   Height: 64 inches  Weight: 129 lbs.  Usual Body Weight: same lbs.   BMI: 22.1  Normal  Weight has been stable    Oral Food Intake:  Regular Diet - No Restrictions  [place]-herbal oral supplement    Hydration Status:   How many 8 ounce glass of water of fluid do you drink per day?  She reports trying to drink more water  "recently.    Enteral Feeding:   n/a    Nutrition Symptoms:   No Problems with Eating    Activity:   Not assessed at time of visit     reports that she has been smoking Cigarettes.  She has never used smokeless tobacco. She reports that she uses illicit drugs, including Marijuana. She reports that she does not drink alcohol.    Evaluation of Nutritional Risk:   Patient is not at nutritional risk at this time, but nutritional services offered for education.  Met with patient during her initial chemotherapy infusion appointment.  Patient's chart reviewed and initial nutritional screening completed as above.  Patient reports eating a fairly healthy diet consisting of fruits, vegetables, nuts, seeds, legumes, lean meats and limited dairy intake.  She reports leading an active lifestyle but has been limited recently due to her broken foot.    Discussed the importance of good nutrition during her treatment course focusing on adequate calorie, protein, nutrient and fluid intake.  Encouraged her to be consuming smaller more frequent meals/snacks throughout the day.  Emphasized the importance of protein and its role in the diet; reviewed high protein foods; and recommended she have a protein source at each meal/snack.  Also emphasized the importance of hydration; reviewed hydrating fluid options; and recommended she have at least 64 ounces daily.  Encouraged her to continue eating a heavily plant based diet with a wide variety of fruits and vegetables, nuts, seeds, legumes, and lean meats.    Answered her questions and she voiced understanding of information discussed.  Provided and reviewed \"Nutritional Considerations in Breast Cancer\".  RD's contact information provided and encouraged her to call if questions arise.  Will follow up as needed.    Electronically signed by:  Jazmin Mueller RD  11:05 AM  "

## 2017-06-28 NOTE — PLAN OF CARE
Outpatient Infusion • [address] [facility] • [address] • [place] [id number] • [phone]      CHEMOTHERAPY EDUCATION SHEET    NAME:  Brianna Urrutia      : 1950           DATE: 17    Booklets Given: Chemotherapy and You []  Eating Hints []    Sexuality/Fertility Books []     Chemotherapy/Biotherapy Education Sheets: (list all that apply)  Pertuzumab                                                                                                                                                                Chemotherapy Regimen: Paclitaxel + Trastuzumab + Pertuzumab    **Patient specifically asked for information on Pertuzumab, no education was provided on other medications as there was discussion with Dr. Ibarra early this morning**    TOPICS EDUCATION PROVIDED EDUCATION REINFORCED COMMENTS   ANEMIA:  role of RBC, cause, s/s, ways to manage, role of transfusion [] []    THROMBOCYTOPENIA:  role of platelet, cause, s/s, ways to prevent bleeding, things to avoid, when to seek help [] []    NEUTROPENIA:  role of WBC, cause, infection precautions, s/s of infection, when to call MD [] []    NUTRITION & APPETITE CHANGES:  importance of maintaining healthy diet & weight, ways to manage to improve intake, dietary consult, exercise regimen [] []    DIARRHEA:  causes, s/s of dehydration, ways to manage, dietary changes, when to call MD [x] [] Patient inquired about diarrhea after looking at the Pertuzumab handout, told her occurrence was patient specific, but yes it's a possibility with Pertuzumab   CONSTIPATION:  causes, ways to manage, dietary changes, when to call MD [] []    NAUSEA & VOMITING:  cause, use of antiemetics, dietary changes, when to call MD [] []    MOUTH SORES:  causes, oral care, ways to manage [] []    ALOPECIA:  cause, ways to manage, resources [x] [] Patient asked if hair loss would really occur, told her it's patient specific and depends on the regimen. At the time of education she was  not sure of what treatment she wanted to receive today   INFERTILITY & SEXUALITY:  causes, fertility preservation options, sexuality changes, ways to manage, importance of birth control [] []    NERVOUS SYSTEM CHANGES:  causes, s/s, neuropathies, cognitive changes, ways to manage [] []    PAIN:  causes, ways to manage [] [] ????   SKIN & NAIL CHANGES:  cause, s/s, ways to manage [] []    ORGAN TOXICITIES:  cause, s/s, need for diagnostic tests, labs, when to notify MD [x] [] Mentioned potential for cardiotoxicity as that was the reason for the echo, patient immediately expressed concerns that she was already having heart issues. Refrained from addressing further as she began to question Pertuzumab because she didn't think she was getting that today.   SURVIVORSHIP:  distress, distress assessment, secondary malignancies, early/late effects, follow-up, social issues, social support [] []    HOME CARE:  use of spill kits, storing of PO chemo, how to manage bodily fluids [] []    MISCELLANEOUS:  drug interactions, administration, vesicant, et [] []      Referrals:        **Patient specifically asked for information on Pertuzumab, no education was provided on other medications as there was discussion with Dr. Ibarra early this morning**    Notes:   Patient was not willing to listen to education, she brought up questions as she flipped through the Pertuzumab handout from [facility]. When I arrived she didn't think she was getting Pertuzumab today. Nurse called Dr. Ibarra to come and address situation.
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
## 2017-06-28 NOTE — PROGRESS NOTES
"      PROBLEM LIST:  1. [id number] ductal carcinoma of the right breast, ER negative [place] negative HER-2 positive.   A) patient presented with 1 year of increasing mass in the right breast with pain. Biopsy on 5/25/2017 showed a grade 2 invasive ductal carcinoma, ER negative, HER-2 positive by IHC. FNA of a right axillary lymph node showed metastatic cancer.  CT c/a/p and bone scan 6/10/17 showed no evidence of metastatic disease, but did show right breast mass and bulky right axillary adenopathy.    2. Hypertension  3. Hepatitis C, untreated  4. Kidney stones    Subjective     HISTORY OF PRESENT ILLNESS:   Brianna Urrutia returns for follow-up.   She is scheduled to begin chemotherapy today.  She still has many concerns about potential side effects of chemotherapy.  She is interested in having her targeted therapy without chemotherapy.  She also remains interested in doing proton therapy to shrink the tumor.  She has been putting hemp oil and turmeric on her skin and says she can feel it sinking into the tumor and working immediately.  She thinks the tumor has decreased in size.  She has started taking a supplement called turkey tail.    Past Medical History, Past Surgical History, Social History, Family History have been reviewed and are without significant changes except as mentioned.    Review of Systems   A comprehensive 14 point review of systems was performed and was negative except as mentioned.    Medications:  The current medication list was reviewed in the EMR    ALLERGIES:    Allergies   Allergen Reactions   • Iodinated Diagnostic Agents Other (See Comments)     Patient states she has swelling and pain of joints for days following injection   • Penicillins        Objective      Temp 97.3 °F (36.3 °C) (Temporal Artery )   Resp 16  Ht 64\" (162.6 cm)  Wt 129 lb (58.5 kg)  BMI 22.14 kg/m2     Performance Status: 0    General: well appearing female in no acute distress  Neuro: alert and oriented  HEENT: sclera " anicteric, oropharynx clear  Lymphatics:No cervical or supraclavicular adenopathy. In the right axilla there is a palpable lymph node about 2 cm in size  Breast: Right breast has a firm movable mass extending over both upper quadrants and down to the nipple. There is skin involvement  Cardiovascular: regular rate and rhythm, no murmurs  Lungs: clear to auscultation bilaterally  Abdomen: soft, nontender, nondistended.  No palpable organomegaly  Extremeties: no lower extremity edema  Skin: no rashes, lesions, bruising, or petechiae  Psych: mood and affect appropriate        Assessment/Plan   Brianna Urrutia is a 66 y.o. year old female with stage III HER-2 positive ER negative breast cancer who returns for follow-up.  She has declined TCHP due to concerns of how she would tolerate chemotherapy and is scheduled to begin THP today.  I did recommend port placement but she prefers to begin with peripheral IV for now.   We discussed potential side effects of Taxol including infusion reaction, nausea, myelosuppression, fatigue, hair loss, body aches, and neuropathy.  We discussed that with the neuropathy it is occasionally necessary to reduce or omit doses if it is severe or progressive.    We had a 45 minute discussion of the rationale for giving chemotherapy in this setting.  I explained that while shrinking the tumor is a goal of treatment, the primary goal of chemotherapy is to try to prevent death from breast cancer.  Based on her tumor size and lymph node involvement, it is quite likely that there is micrometastatic disease in other parts of her body, and that this is life-threatening.  While local therapy such as surgery and radiation may remove the primary tumor which is currently painful, it does not address ultimately the risk of dying from breast cancer.  We also discussed that targeted therapies such as Herceptin and Perjeta are most effective when given in combination with chemotherapy.  While multiple  chemotherapy drugs may give us the most benefit, I think that single agent Taxol along with Herceptin and Perjeta is a reasonable compromise.    Her blood pressure remains elevated and uncontrolled.  I will give her an extra dose of 25 mg of losartan today and we will send a prescription for 50 mg of losartan daily to her pharmacy.  I recommended that she follow-up with Dr. Akbar in the near future to continue management of this.      I will plan to see her back in 2 weeks.                  Visit time was 45 minutes, greater than 50% spent in counseling      Delicia Ibarra MD  [facility] Hematology and Oncology    6/28/2017          CC:

## 2017-06-29 ENCOUNTER — TELEPHONE (OUTPATIENT)
Dept: ONCOLOGY | Facility: CLINIC | Age: 67
End: 2017-06-29

## 2017-06-29 NOTE — TELEPHONE ENCOUNTER
Patient is scheduled for a deep cleaning at her dentist office. She said they would probably recommend antibiotics after the procedure. I told her this should be fine, but I will double check with Dr Ibarra in the morning and call her back.

## 2017-06-29 NOTE — TELEPHONE ENCOUNTER
----- Message from Sergio Salcido sent at 6/29/2017 10:30 AM EDT -----  Regarding: Fred Perez question about dental procedure and chemo  Contact: [phone]  Patient called and had a question. Has dentist appointment tomorrow to do a deep pocket procedure to reduce the bacteria in her body. Patient wants to know if she can do the dental appointment and also if she can take antibiotics on top of chemo treatment.

## 2017-06-30 ENCOUNTER — TELEPHONE (OUTPATIENT)
Dept: ONCOLOGY | Facility: CLINIC | Age: 67
End: 2017-06-30

## 2017-07-05 ENCOUNTER — INFUSION (OUTPATIENT)
Dept: ONCOLOGY | Facility: HOSPITAL | Age: 67
End: 2017-07-05

## 2017-07-05 ENCOUNTER — EDUCATION (OUTPATIENT)
Dept: ONCOLOGY | Facility: HOSPITAL | Age: 67
End: 2017-07-05

## 2017-07-05 VITALS
DIASTOLIC BLOOD PRESSURE: 95 MMHG | HEART RATE: 87 BPM | SYSTOLIC BLOOD PRESSURE: 148 MMHG | HEIGHT: 64 IN | TEMPERATURE: 98.4 F | BODY MASS INDEX: 21.85 KG/M2 | RESPIRATION RATE: 16 BRPM | WEIGHT: 128 LBS

## 2017-07-05 DIAGNOSIS — C50.811 MALIGNANT NEOPLASM OF OVERLAPPING SITES OF RIGHT FEMALE BREAST (HCC): ICD-10-CM

## 2017-07-05 PROCEDURE — G0463 HOSPITAL OUTPT CLINIC VISIT: HCPCS

## 2017-07-05 NOTE — PLAN OF CARE
Outpatient Infusion • [address] [facility] • [address] • [place] [id number] • [phone]      CHEMOTHERAPY EDUCATION SHEET    NAME:  Brianna Urrutia      : 1950           DATE: 17    Booklets Given: Chemotherapy and You []  Eating Hints []    Sexuality/Fertility Books []     Chemotherapy/Biotherapy Education Sheets: (list all that apply)    Paclitaxel (another copy)                                                                                                            Chemotherapy Regimen: Plan was for Paclitaxel + Trastuzumab + Pertuzumab, however patient has only received one administration of Trastuzumab. After today's discussion she would like to receive Trastuzumab + Pertuzumab every 3 weeks       Pharmacy was consulted to speak with patient about Paclitaxel.    TOPICS EDUCATION PROVIDED EDUCATION REINFORCED COMMENTS   ANEMIA:  role of RBC, cause, s/s, ways to manage, role of transfusion [] []    THROMBOCYTOPENIA:  role of platelet, cause, s/s, ways to prevent bleeding, things to avoid, when to seek help [] []    NEUTROPENIA:  role of WBC, cause, infection precautions, s/s of infection, when to call MD [x] [] Discussed increased risk for infection with Paclitaxel    NUTRITION & APPETITE CHANGES:  importance of maintaining healthy diet & weight, ways to manage to improve intake, dietary consult, exercise regimen [] []    DIARRHEA:  causes, s/s of dehydration, ways to manage, dietary changes, when to call MD [x] [] Patient asked about side effect of Trastuzumab and Pertuzumab together, we talked about the the most common side effect is diarrhea   CONSTIPATION:  causes, ways to manage, dietary changes, when to call MD [] []    NAUSEA & VOMITING:  cause, use of antiemetics, dietary changes, when to call MD [] []    MOUTH SORES:  causes, oral care, ways to manage [] []    ALOPECIA:  cause, ways to manage, resources [x] [] Discussed the potential for hair thinning/loss with Paclitaxel. Told  patient that wouldn't be as large of a concern for Trastuzumab and Pertuzumab   INFERTILITY & SEXUALITY:  causes, fertility preservation options, sexuality changes, ways to manage, importance of birth control [] []    NERVOUS SYSTEM CHANGES:  causes, s/s, neuropathies, cognitive changes, ways to manage [x] [] Discussed potential with paclitaxel   PAIN:  causes, ways to manage [] [] ????   SKIN & NAIL CHANGES:  cause, s/s, ways to manage [] []    ORGAN TOXICITIES:  cause, s/s, need for diagnostic tests, labs, when to notify MD [] []    SURVIVORSHIP:  distress, distress assessment, secondary malignancies, early/late effects, follow-up, social issues, social support [] []    HOME CARE:  use of spill kits, storing of PO chemo, how to manage bodily fluids [] []    MISCELLANEOUS:  drug interactions, administration, vesicant, et [] []      Referrals:       Notes:   Patient wanted to know more about all her possible options. She only wants targeted therapy and was confused as to why she was scheduled to receive Paclitaxel today. Addie and ADELA spoke with her about Paclitaxel side effects, but Addie also addressed patient concerns and answered many questions. Patient has seen tv commercials and has read a lot of information online. She was asking about Trastuzumab and Lapatinib combination therapy and tumor shrinkage - we addressed that this was only in stage IV breast cancer and she is stage III. She was also asking about Ibrance (Palbociclib) which she has seen on TV and we addressed that it is for HR+ patients, which she is not. Addie also spoke about first line therapies being TCHP or THP because they're backed by clinical evidence and she explained the importance of clinical trials in treatment decisions. She talked about how Trastuzumab and Pertuzumab work in combination with one another and patient agreed as of today to proceed with Trastuzumab and Pertuzumab therapy every 3 weeks. Patient does not believe in  chemotherapy and has been taking tumeric capsules and rubbing tumeric oil onto her tumor. Also explained insurance coverage for these regimens.

## 2017-07-12 ENCOUNTER — APPOINTMENT (OUTPATIENT)
Dept: ONCOLOGY | Facility: HOSPITAL | Age: 67
End: 2017-07-12

## 2017-07-12 ENCOUNTER — OFFICE VISIT (OUTPATIENT)
Dept: ONCOLOGY | Facility: CLINIC | Age: 67
End: 2017-07-12

## 2017-07-12 ENCOUNTER — HOSPITAL ENCOUNTER (OUTPATIENT)
Dept: GENERAL RADIOLOGY | Facility: HOSPITAL | Age: 67
Discharge: HOME | End: 2017-07-12
Admitting: INTERNAL MEDICINE

## 2017-07-12 VITALS
DIASTOLIC BLOOD PRESSURE: 88 MMHG | SYSTOLIC BLOOD PRESSURE: 143 MMHG | BODY MASS INDEX: 21.68 KG/M2 | WEIGHT: 127 LBS | HEART RATE: 88 BPM | RESPIRATION RATE: 16 BRPM | TEMPERATURE: 97.4 F | HEIGHT: 64 IN

## 2017-07-12 DIAGNOSIS — C50.811 MALIGNANT NEOPLASM OF OVERLAPPING SITES OF RIGHT FEMALE BREAST (HCC): Primary | ICD-10-CM

## 2017-07-12 PROCEDURE — 73110 X-RAY EXAM OF WRIST: CPT

## 2017-07-12 PROCEDURE — 99214 OFFICE O/P EST MOD 30 MIN: CPT | Performed by: INTERNAL MEDICINE

## 2017-07-12 RX ORDER — SODIUM CHLORIDE 9 MG/ML
250 INJECTION, SOLUTION INTRAVENOUS ONCE
Status: CANCELLED | OUTPATIENT
Start: 2017-07-19

## 2017-07-12 RX ORDER — FAMOTIDINE 10 MG/ML
20 INJECTION, SOLUTION INTRAVENOUS ONCE
Status: CANCELLED | OUTPATIENT
Start: 2017-07-19

## 2017-07-12 RX ORDER — SODIUM CHLORIDE 9 MG/ML
250 INJECTION, SOLUTION INTRAVENOUS ONCE
Status: CANCELLED | OUTPATIENT
Start: 2017-07-25

## 2017-07-12 RX ORDER — FAMOTIDINE 10 MG/ML
20 INJECTION, SOLUTION INTRAVENOUS ONCE
Status: CANCELLED | OUTPATIENT
Start: 2017-08-01

## 2017-07-12 RX ORDER — SODIUM CHLORIDE 9 MG/ML
250 INJECTION, SOLUTION INTRAVENOUS ONCE
Status: CANCELLED | OUTPATIENT
Start: 2017-08-01

## 2017-07-12 RX ORDER — FAMOTIDINE 10 MG/ML
20 INJECTION, SOLUTION INTRAVENOUS ONCE
Status: CANCELLED | OUTPATIENT
Start: 2017-07-25
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
## 2017-07-13 ENCOUNTER — TELEPHONE (OUTPATIENT)
Dept: ONCOLOGY | Facility: CLINIC | Age: 67
End: 2017-07-13

## 2017-07-13 NOTE — TELEPHONE ENCOUNTER
Called and spoke with patient. Let her know the xray shows no wrist fracture. She said it is still hurting. Also let her know Dr. Ibarra spoke with Dr. Vivian Kovacs, and Dr. Kovacs's office may contact her to be seen for repeat evaluation to consider surgery.

## 2017-07-17 ENCOUNTER — APPOINTMENT (OUTPATIENT)
Dept: ONCOLOGY | Facility: HOSPITAL | Age: 67
End: 2017-07-17

## 2017-07-18 ENCOUNTER — TELEPHONE (OUTPATIENT)
Dept: ONCOLOGY | Facility: CLINIC | Age: 67
End: 2017-07-18

## 2017-07-18 NOTE — TELEPHONE ENCOUNTER
Called pt to address her questions she sent via [employer].  Discussed that immunotherapy drugs are being investiagated in breast cancer, but so far as not as effective as current standard treatments for her2 positive cancer.  Also discussed that herceptin does involve the immune system in leading to cancer cell death.  Discussed that breast surgery should remove the biopsy clip from her breast if that is causing symptoms.  She is scheduled to meet with radiation oncology to discuss cyberknife treatment, which would not be considered a standard treatment for this situation.

## 2017-07-19 ENCOUNTER — APPOINTMENT (OUTPATIENT)
Dept: ONCOLOGY | Facility: HOSPITAL | Age: 67
End: 2017-07-19

## 2017-07-19 ENCOUNTER — INFUSION (OUTPATIENT)
Dept: ONCOLOGY | Facility: HOSPITAL | Age: 67
End: 2017-07-19

## 2017-07-19 VITALS
TEMPERATURE: 98 F | WEIGHT: 128 LBS | SYSTOLIC BLOOD PRESSURE: 169 MMHG | HEART RATE: 76 BPM | DIASTOLIC BLOOD PRESSURE: 86 MMHG | HEIGHT: 64 IN | RESPIRATION RATE: 16 BRPM | BODY MASS INDEX: 21.85 KG/M2

## 2017-07-19 DIAGNOSIS — C50.811 MALIGNANT NEOPLASM OF OVERLAPPING SITES OF RIGHT FEMALE BREAST (HCC): Primary | ICD-10-CM

## 2017-07-19 LAB
ALBUMIN SERPL-MCNC: 4.4 G/DL (ref 3.2–4.8)
ALBUMIN/GLOB SERPL: 1.3 G/DL (ref 1.5–2.5)
ALP SERPL-CCNC: 80 U/L (ref 25–100)
ALT SERPL W P-5'-P-CCNC: 49 U/L (ref 7–40)
ANION GAP SERPL CALCULATED.3IONS-SCNC: 5 MMOL/L (ref 3–11)
AST SERPL-CCNC: 42 U/L (ref 0–33)
BILIRUB SERPL-MCNC: 0.6 MG/DL (ref 0.3–1.2)
BUN BLD-MCNC: 16 MG/DL (ref 9–23)
BUN/CREAT SERPL: 26.7 (ref 7–25)
CALCIUM SPEC-SCNC: 9.7 MG/DL (ref 8.7–10.4)
CHLORIDE SERPL-SCNC: 106 MMOL/L (ref 99–109)
CO2 SERPL-SCNC: 27 MMOL/L (ref 20–31)
CREAT BLD-MCNC: 0.6 MG/DL (ref 0.6–1.3)
ERYTHROCYTE [DISTWIDTH] IN BLOOD BY AUTOMATED COUNT: 12.5 % (ref 11.3–14.5)
GFR SERPL CREATININE-BSD FRML MDRD: 100 ML/MIN/1.73
GLOBULIN UR ELPH-MCNC: 3.4 GM/DL
GLUCOSE BLD-MCNC: 94 MG/DL (ref 70–100)
HCT VFR BLD AUTO: 41 % (ref 34.5–44)
HGB BLD-MCNC: 13.6 G/DL (ref 11.5–15.5)
LYMPHOCYTES # BLD AUTO: 1.4 10*3/MM3 (ref 0.6–4.8)
LYMPHOCYTES NFR BLD AUTO: 25.4 % (ref 24–44)
MCH RBC QN AUTO: 29.4 PG (ref 27–31)
MCHC RBC AUTO-ENTMCNC: 33.1 G/DL (ref 32–36)
MCV RBC AUTO: 88.8 FL (ref 80–99)
MONOCYTES # BLD AUTO: 0.3 10*3/MM3 (ref 0–1)
MONOCYTES NFR BLD AUTO: 5.3 % (ref 0–12)
NEUTROPHILS # BLD AUTO: 4 10*3/MM3 (ref 1.5–8.3)
NEUTROPHILS NFR BLD AUTO: 69.3 % (ref 41–71)
PLATELET # BLD AUTO: 290 10*3/MM3 (ref 150–450)
PMV BLD AUTO: 7.5 FL (ref 6–12)
POTASSIUM BLD-SCNC: 3.6 MMOL/L (ref 3.5–5.5)
PROT SERPL-MCNC: 7.8 G/DL (ref 5.7–8.2)
RBC # BLD AUTO: 4.62 10*6/MM3 (ref 3.89–5.14)
SODIUM BLD-SCNC: 138 MMOL/L (ref 132–146)
WBC NRBC COR # BLD: 5.7 10*3/MM3 (ref 3.5–10.8)

## 2017-07-19 PROCEDURE — 85025 COMPLETE CBC W/AUTO DIFF WBC: CPT

## 2017-07-19 PROCEDURE — 80053 COMPREHEN METABOLIC PANEL: CPT

## 2017-07-19 PROCEDURE — 36415 COLL VENOUS BLD VENIPUNCTURE: CPT

## 2017-07-19 PROCEDURE — 96413 CHEMO IV INFUSION 1 HR: CPT

## 2017-07-19 PROCEDURE — 25010000002 TRASTUZUMAB PER 10 MG: Performed by: INTERNAL MEDICINE

## 2017-07-19 RX ADMIN — TRASTUZUMAB 350 MG: 150 INJECTION, POWDER, LYOPHILIZED, FOR SOLUTION INTRAVENOUS at 10:29

## 2017-07-31 ENCOUNTER — HOSPITAL ENCOUNTER (OUTPATIENT)
Dept: RADIATION ONCOLOGY | Facility: HOSPITAL | Age: 67
Setting detail: RADIATION/ONCOLOGY SERIES
Discharge: HOME | End: 2017-07-31

## 2017-07-31 ENCOUNTER — OFFICE VISIT (OUTPATIENT)
Dept: RADIATION ONCOLOGY | Facility: HOSPITAL | Age: 67
End: 2017-07-31

## 2017-07-31 VITALS
BODY MASS INDEX: 22.64 KG/M2 | SYSTOLIC BLOOD PRESSURE: 139 MMHG | DIASTOLIC BLOOD PRESSURE: 80 MMHG | HEART RATE: 86 BPM | WEIGHT: 127.8 LBS | HEIGHT: 63 IN | TEMPERATURE: 97.7 F

## 2017-07-31 DIAGNOSIS — C50.411 MALIGNANT NEOPLASM OF UPPER-OUTER QUADRANT OF RIGHT FEMALE BREAST (HCC): Primary | ICD-10-CM

## 2017-07-31 PROCEDURE — G0463 HOSPITAL OUTPT CLINIC VISIT: HCPCS

## 2017-07-31 RX ORDER — METOPROLOL SUCCINATE 25 MG/1
TABLET, EXTENDED RELEASE ORAL
COMMUNITY
Start: 2017-07-21 | End: 2017-12-12
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
## 2017-08-02 ENCOUNTER — OFFICE VISIT (OUTPATIENT)
Dept: ONCOLOGY | Facility: CLINIC | Age: 67
End: 2017-08-02

## 2017-08-02 VITALS
HEART RATE: 93 BPM | TEMPERATURE: 97.2 F | SYSTOLIC BLOOD PRESSURE: 145 MMHG | HEIGHT: 63 IN | RESPIRATION RATE: 16 BRPM | WEIGHT: 128 LBS | BODY MASS INDEX: 22.68 KG/M2 | DIASTOLIC BLOOD PRESSURE: 78 MMHG

## 2017-08-02 DIAGNOSIS — C50.811 MALIGNANT NEOPLASM OF OVERLAPPING SITES OF RIGHT FEMALE BREAST (HCC): Primary | ICD-10-CM

## 2017-08-02 DIAGNOSIS — C50.811 MALIGNANT NEOPLASM OF OVERLAPPING SITES OF RIGHT FEMALE BREAST (HCC): ICD-10-CM

## 2017-08-02 DIAGNOSIS — C50.411 MALIGNANT NEOPLASM OF UPPER-OUTER QUADRANT OF RIGHT FEMALE BREAST (HCC): ICD-10-CM

## 2017-08-02 PROCEDURE — 99215 OFFICE O/P EST HI 40 MIN: CPT | Performed by: INTERNAL MEDICINE

## 2017-08-02 RX ORDER — SODIUM CHLORIDE 9 MG/ML
250 INJECTION, SOLUTION INTRAVENOUS ONCE
Status: CANCELLED | OUTPATIENT
Start: 2017-08-23

## 2017-08-02 RX ORDER — FAMOTIDINE 10 MG/ML
20 INJECTION, SOLUTION INTRAVENOUS ONCE
Status: CANCELLED | OUTPATIENT
Start: 2017-08-16

## 2017-08-02 RX ORDER — SODIUM CHLORIDE 9 MG/ML
250 INJECTION, SOLUTION INTRAVENOUS ONCE
Status: CANCELLED | OUTPATIENT
Start: 2017-08-09

## 2017-08-02 RX ORDER — SODIUM CHLORIDE 9 MG/ML
250 INJECTION, SOLUTION INTRAVENOUS ONCE
Status: CANCELLED | OUTPATIENT
Start: 2017-08-16

## 2017-08-02 RX ORDER — FAMOTIDINE 10 MG/ML
20 INJECTION, SOLUTION INTRAVENOUS ONCE
Status: CANCELLED | OUTPATIENT
Start: 2017-08-23

## 2017-08-02 RX ORDER — FAMOTIDINE 10 MG/ML
20 INJECTION, SOLUTION INTRAVENOUS ONCE
Status: CANCELLED | OUTPATIENT
Start: 2017-08-09

## 2017-08-02 NOTE — PROGRESS NOTES
"      PROBLEM LIST:  1. [id number] ductal carcinoma of the right breast, ER negative VT negative HER-2 positive.   A) patient presented with 1 year of increasing mass in the right breast with pain. Biopsy on 5/25/2017 showed a grade 2 invasive ductal carcinoma, ER negative, HER-2 positive by IHC. FNA of a right axillary lymph node showed metastatic cancer.  CT c/a/p and bone scan 6/10/17 showed no evidence of metastatic disease, but did show right breast mass and bulky right axillary adenopathy.    2. Hypertension  3. Hepatitis C, untreated  4. Kidney stones    Subjective     HISTORY OF PRESENT ILLNESS:   Brianna Urrutia returns for follow-up.  Since she was last here she met with Dr. Gema Edmonds to discuss radiation treatment.  She was specifically interested in CyberKnife.  Dr. Edmonds discussed CyberKnife treatment as well as the standard treatment of postoperative external beam radiation.  Mrs. Urrutia is also having discussions with Dr. Kovacs and Dr. Guillen regarding mastectomy.  There is no date for surgery set at this point.  She continues to receive Herceptin only and has refused Perjeta and Taxol.  She reports that she feels that the tumor is shrinking.    Past Medical History, Past Surgical History, Social History, Family History have been reviewed and are without significant changes except as mentioned.    Review of Systems   A comprehensive 14 point review of systems was performed and was negative except as mentioned.    Medications:  The current medication list was reviewed in the EMR    ALLERGIES:    Allergies   Allergen Reactions   • Iodinated Diagnostic Agents Other (See Comments)     Patient states she has swelling and pain of joints for days following injection   • Penicillins        Objective      /78 Comment: LUE  Pulse 93  Temp 97.2 °F (36.2 °C) (Temporal Artery )   Resp 16  Ht 62.75\" (159.4 cm)  Wt 128 lb (58.1 kg)  BMI 22.86 kg/m2     Performance Status: 0    General: well appearing female " in no acute distress  Neuro: alert and oriented  HEENT: sclera anicteric, oropharynx clear  Lymphatics:No cervical or supraclavicular adenopathy. In the right axilla there is a palpable lymph node about 2 cm in size  Breast: Right breast has a firm movable mass extending over both upper quadrants and down to the nipple. There is skin involvement.  The skin involvement has progressed since her prior exam, but there has been no significant change in the tumor size.  Extremeties: no lower extremity edema  Skin: no rashes, lesions, bruising, or petechiae  Psych: mood and affect appropriate        Assessment/Plan   Brianna Urrutia is a 67 y.o. year old female with stage III HER-2 positive ER negative breast cancer who returns for follow-up.  We had a long discussion about systemic therapy for her cancer.  I emphasized that although her cancer is high risk, it still is potentially curable and the treatment that I have recommended for her is with the goal of cure.  She has a lot of questions about other therapeutic options, specifically involving Tykerb or immunotherapy.  I explained to her that while these are interesting and potentially beneficial treatments, in her situation the best proven treatment of Herceptin, Perjeta, in combination with chemotherapy.  There are certainly treatments that have not been fully tested, but I do not think that it is appropriate to recommend or treat her with potentially inferior options.  We also discussed that many of the drugs and treatments that she is interested in are typically used in the setting of metastatic or incurable disease.  After 2 doses of Herceptin only it appears to me that her tumor has progressed, although she feels that there has been some improvement.  I have repeatedly recommended giving HER-2 targeted drugs in combination with chemotherapy, as I think this is likely to give us the best response.  She is not interested in receiving any chemotherapy treatment.   There may be clinical trials for neoadjuvant HER-2 targeted therapy at other hospitals.  She would like to be seen for a second opinion.  We will refer her to the [facility].    Hypertension: Improved.        F/u in 4 weeks.                  Visit time was 45 minutes, greater than 50% spent in counseling      Delicia Ibarra MD  [facility] Hematology and Oncology    8/2/2017          CC:

## 2017-08-09 ENCOUNTER — INFUSION (OUTPATIENT)
Dept: ONCOLOGY | Facility: HOSPITAL | Age: 67
End: 2017-08-09

## 2017-08-09 VITALS
DIASTOLIC BLOOD PRESSURE: 91 MMHG | BODY MASS INDEX: 23.21 KG/M2 | WEIGHT: 131 LBS | RESPIRATION RATE: 16 BRPM | HEART RATE: 93 BPM | SYSTOLIC BLOOD PRESSURE: 167 MMHG | HEIGHT: 63 IN | TEMPERATURE: 97.9 F

## 2017-08-09 DIAGNOSIS — C50.411 MALIGNANT NEOPLASM OF UPPER-OUTER QUADRANT OF RIGHT FEMALE BREAST (HCC): ICD-10-CM

## 2017-08-09 DIAGNOSIS — C50.811 MALIGNANT NEOPLASM OF OVERLAPPING SITES OF RIGHT FEMALE BREAST (HCC): Primary | ICD-10-CM

## 2017-08-09 LAB
ERYTHROCYTE [DISTWIDTH] IN BLOOD BY AUTOMATED COUNT: 12.2 % (ref 11.3–14.5)
HCT VFR BLD AUTO: 38 % (ref 34.5–44)
HGB BLD-MCNC: 12.4 G/DL (ref 11.5–15.5)
LYMPHOCYTES # BLD AUTO: 1.6 10*3/MM3 (ref 0.6–4.8)
LYMPHOCYTES NFR BLD AUTO: 31.4 % (ref 24–44)
MCH RBC QN AUTO: 29 PG (ref 27–31)
MCHC RBC AUTO-ENTMCNC: 32.7 G/DL (ref 32–36)
MCV RBC AUTO: 88.8 FL (ref 80–99)
MONOCYTES # BLD AUTO: 0.5 10*3/MM3 (ref 0–1)
MONOCYTES NFR BLD AUTO: 8.7 % (ref 0–12)
NEUTROPHILS # BLD AUTO: 3.1 10*3/MM3 (ref 1.5–8.3)
NEUTROPHILS NFR BLD AUTO: 59.9 % (ref 41–71)
PLATELET # BLD AUTO: 268 10*3/MM3 (ref 150–450)
PMV BLD AUTO: 7.4 FL (ref 6–12)
RBC # BLD AUTO: 4.28 10*6/MM3 (ref 3.89–5.14)
WBC NRBC COR # BLD: 5.2 10*3/MM3 (ref 3.5–10.8)

## 2017-08-09 PROCEDURE — 36415 COLL VENOUS BLD VENIPUNCTURE: CPT

## 2017-08-09 PROCEDURE — 96413 CHEMO IV INFUSION 1 HR: CPT

## 2017-08-09 PROCEDURE — 85025 COMPLETE CBC W/AUTO DIFF WBC: CPT

## 2017-08-09 PROCEDURE — 25010000002 TRASTUZUMAB PER 10 MG: Performed by: INTERNAL MEDICINE

## 2017-08-09 RX ADMIN — TRASTUZUMAB 350 MG: 150 INJECTION, POWDER, LYOPHILIZED, FOR SOLUTION INTRAVENOUS at 10:07

## 2017-08-30 ENCOUNTER — OFFICE VISIT (OUTPATIENT)
Dept: ONCOLOGY | Facility: CLINIC | Age: 67
End: 2017-08-30

## 2017-08-30 ENCOUNTER — LAB (OUTPATIENT)
Dept: LAB | Facility: HOSPITAL | Age: 67
End: 2017-08-30

## 2017-08-30 ENCOUNTER — APPOINTMENT (OUTPATIENT)
Dept: ONCOLOGY | Facility: HOSPITAL | Age: 67
End: 2017-08-30

## 2017-08-30 VITALS
HEIGHT: 63 IN | SYSTOLIC BLOOD PRESSURE: 150 MMHG | TEMPERATURE: 97.7 F | WEIGHT: 131 LBS | BODY MASS INDEX: 23.21 KG/M2 | RESPIRATION RATE: 16 BRPM | DIASTOLIC BLOOD PRESSURE: 100 MMHG | HEART RATE: 110 BPM

## 2017-08-30 DIAGNOSIS — C50.811 MALIGNANT NEOPLASM OF OVERLAPPING SITES OF RIGHT FEMALE BREAST (HCC): ICD-10-CM

## 2017-08-30 DIAGNOSIS — C50.811 MALIGNANT NEOPLASM OF OVERLAPPING SITES OF RIGHT FEMALE BREAST (HCC): Primary | ICD-10-CM

## 2017-08-30 LAB
ALBUMIN SERPL-MCNC: 4.2 G/DL (ref 3.2–4.8)
ALBUMIN/GLOB SERPL: 1.1 G/DL (ref 1.5–2.5)
ALP SERPL-CCNC: 95 U/L (ref 25–100)
ALT SERPL W P-5'-P-CCNC: 59 U/L (ref 7–40)
ANION GAP SERPL CALCULATED.3IONS-SCNC: 7 MMOL/L (ref 3–11)
AST SERPL-CCNC: 43 U/L (ref 0–33)
BILIRUB SERPL-MCNC: 0.5 MG/DL (ref 0.3–1.2)
BUN BLD-MCNC: 15 MG/DL (ref 9–23)
BUN/CREAT SERPL: 25 (ref 7–25)
CALCIUM SPEC-SCNC: 9.5 MG/DL (ref 8.7–10.4)
CHLORIDE SERPL-SCNC: 102 MMOL/L (ref 99–109)
CO2 SERPL-SCNC: 28 MMOL/L (ref 20–31)
CREAT BLD-MCNC: 0.6 MG/DL (ref 0.6–1.3)
ERYTHROCYTE [DISTWIDTH] IN BLOOD BY AUTOMATED COUNT: 12.8 % (ref 11.3–14.5)
GFR SERPL CREATININE-BSD FRML MDRD: 100 ML/MIN/1.73
GLOBULIN UR ELPH-MCNC: 3.7 GM/DL
GLUCOSE BLD-MCNC: 129 MG/DL (ref 70–100)
HCT VFR BLD AUTO: 41.8 % (ref 34.5–44)
HGB BLD-MCNC: 13.9 G/DL (ref 11.5–15.5)
LYMPHOCYTES # BLD AUTO: 2 10*3/MM3 (ref 0.6–4.8)
LYMPHOCYTES NFR BLD AUTO: 32.5 % (ref 24–44)
MCH RBC QN AUTO: 29.6 PG (ref 27–31)
MCHC RBC AUTO-ENTMCNC: 33.3 G/DL (ref 32–36)
MCV RBC AUTO: 88.8 FL (ref 80–99)
MONOCYTES # BLD AUTO: 0.4 10*3/MM3 (ref 0–1)
MONOCYTES NFR BLD AUTO: 6.7 % (ref 0–12)
NEUTROPHILS # BLD AUTO: 3.7 10*3/MM3 (ref 1.5–8.3)
NEUTROPHILS NFR BLD AUTO: 60.8 % (ref 41–71)
PLATELET # BLD AUTO: 339 10*3/MM3 (ref 150–450)
PMV BLD AUTO: 7.2 FL (ref 6–12)
POTASSIUM BLD-SCNC: 4 MMOL/L (ref 3.5–5.5)
PROT SERPL-MCNC: 7.9 G/DL (ref 5.7–8.2)
RBC # BLD AUTO: 4.7 10*6/MM3 (ref 3.89–5.14)
SODIUM BLD-SCNC: 137 MMOL/L (ref 132–146)
WBC NRBC COR # BLD: 6.1 10*3/MM3 (ref 3.5–10.8)

## 2017-08-30 PROCEDURE — 36415 COLL VENOUS BLD VENIPUNCTURE: CPT

## 2017-08-30 PROCEDURE — 80053 COMPREHEN METABOLIC PANEL: CPT | Performed by: INTERNAL MEDICINE

## 2017-08-30 PROCEDURE — 85025 COMPLETE CBC W/AUTO DIFF WBC: CPT

## 2017-08-30 PROCEDURE — 99214 OFFICE O/P EST MOD 30 MIN: CPT | Performed by: INTERNAL MEDICINE

## 2017-08-30 RX ORDER — FAMOTIDINE 10 MG/ML
20 INJECTION, SOLUTION INTRAVENOUS ONCE
Status: CANCELLED | OUTPATIENT
Start: 2017-09-13

## 2017-08-30 RX ORDER — FAMOTIDINE 10 MG/ML
20 INJECTION, SOLUTION INTRAVENOUS ONCE
Status: CANCELLED | OUTPATIENT
Start: 2017-08-30

## 2017-08-30 RX ORDER — SODIUM CHLORIDE 9 MG/ML
250 INJECTION, SOLUTION INTRAVENOUS ONCE
Status: CANCELLED | OUTPATIENT
Start: 2017-09-13

## 2017-08-30 RX ORDER — SODIUM CHLORIDE 9 MG/ML
250 INJECTION, SOLUTION INTRAVENOUS ONCE
Status: CANCELLED | OUTPATIENT
Start: 2017-08-30

## 2017-08-30 RX ORDER — SODIUM CHLORIDE 9 MG/ML
250 INJECTION, SOLUTION INTRAVENOUS ONCE
Status: CANCELLED | OUTPATIENT
Start: 2017-09-06

## 2017-08-30 RX ORDER — FAMOTIDINE 10 MG/ML
20 INJECTION, SOLUTION INTRAVENOUS ONCE
Status: CANCELLED | OUTPATIENT
Start: 2017-09-06

## 2017-08-30 NOTE — PROGRESS NOTES
"      PROBLEM LIST:  1. [id number] ductal carcinoma of the right breast, ER negative GA negative HER-2 positive.   A) patient presented with 1 year of increasing mass in the right breast with pain. Biopsy on 5/25/2017 showed a grade 2 invasive ductal carcinoma, ER negative, HER-2 positive by IHC. FNA of a right axillary lymph node showed metastatic cancer.  CT c/a/p and bone scan 6/10/17 showed no evidence of metastatic disease, but did show right breast mass and bulky right axillary adenopathy.    2. Hypertension  3. Hepatitis C, untreated  4. Kidney stones    Subjective     HISTORY OF PRESENT ILLNESS:   Brianna Urrutia returns for follow-up.  She is having a lot of pain in her right arm.  For the past few weeks she has had increasing pain and swelling.  The pain is in her wrist but radiating up to the shoulder.  She feels angry that nobody told her that this would happen.  She says that when she receives Herceptin and she can feel the tumor dying, and she thinks that the tumor has decreased in size.  She is having occasional drainage from the tumor.  She feels tired for a day or 2 after receiving Herceptin.    Past Medical History, Past Surgical History, Social History, Family History have been reviewed and are without significant changes except as mentioned.    Review of Systems   A comprehensive 14 point review of systems was performed and was negative except as mentioned.    Medications:  The current medication list was reviewed in the EMR    ALLERGIES:    Allergies   Allergen Reactions   • Iodinated Diagnostic Agents Other (See Comments)     Patient states she has swelling and pain of joints for days following injection   • Penicillins        Objective      /100 Comment: LUE  Pulse 110  Temp 97.7 °F (36.5 °C) (Temporal Artery )   Resp 16  Ht 63\" (160 cm)  Wt 131 lb (59.4 kg)  BMI 23.21 kg/m2     Performance Status: 0    General: well appearing female in no acute distress  Neuro: alert and " oriented  HEENT: sclera anicteric, oropharynx clear  Lymphatics:No cervical or supraclavicular adenopathy. Right axillary tiffany mass about 3 cm, firm  Breast: Right breast has a firm movable mass extending over both upper quadrants and down to the nipple. There is skin involvement with some new open sores.  Extremeties: no lower extremity edema  Skin: no rashes, lesions, bruising, or petechiae  Psych: mood and affect appropriate        Assessment/Plan   Brianna Urrutia is a 67 y.o. year old female with stage III HER-2 positive ER negative breast cancer who returns for follow-up.  She has received Herceptin only, and she continues to refuse any chemotherapy drugs.  She now has developed symptomatic lymphedema.  We discussed that this is related to impaired lymph drainage secondary to the malignant lymph nodes in her axilla.  I think the best way to manage this would be to try to shrink her tumor with effective treatment.  We have discussed on multiple occasions that the best proven treatment for this situation is HER-2 targeted drugs and accommodation with chemotherapy.  I reviewed with her the chemotherapy drugs that have been shown to work in this situation including Taxol, Taxotere, carboplatin, Adriamycin, and cyclophosphamide.  While there have been clinical trials using lapatinib in combination with Herceptin, this is not a standard or approved therapy and I would not recommend it for her situation.  She continues to be hopeful that she can find a doctor who will prescribe this medication for her.  I think she would benefit from a second opinion.  We had previously referred her to the [facility] at her request.  Now however she is not sure she will be able to make the trip.  I will set her up to see Dr. Rosi Torres at  for a second opinion.    Follow-up will be scheduled based on the results of that appointment.  She continues to be forward with plans for mastectomy and reconstruction.                   Visit time was 30 minutes, greater than 50% spent in counseling      Delicia Ibarra MD  [facility] Hematology and Oncology    8/30/2017          CC:

## 2017-09-01 ENCOUNTER — APPOINTMENT (OUTPATIENT)
Dept: ONCOLOGY | Facility: HOSPITAL | Age: 67
End: 2017-09-01

## 2017-09-05 ENCOUNTER — HOSPITAL ENCOUNTER (OUTPATIENT)
Dept: PHYSICAL THERAPY | Facility: HOSPITAL | Age: 67
Setting detail: THERAPIES SERIES
Discharge: HOME | End: 2017-09-05
Attending: INTERNAL MEDICINE

## 2017-09-05 DIAGNOSIS — M79.601 RIGHT ARM PAIN: Primary | ICD-10-CM

## 2017-09-05 DIAGNOSIS — C50.811 MALIGNANT NEOPLASM OF OVERLAPPING SITES OF RIGHT FEMALE BREAST (HCC): ICD-10-CM

## 2017-09-05 DIAGNOSIS — C50.411 MALIGNANT NEOPLASM OF UPPER-OUTER QUADRANT OF RIGHT FEMALE BREAST (HCC): ICD-10-CM

## 2017-09-05 PROCEDURE — G8985 CARRY GOAL STATUS: HCPCS

## 2017-09-05 PROCEDURE — 97162 PT EVAL MOD COMPLEX 30 MIN: CPT

## 2017-09-05 PROCEDURE — G8984 CARRY CURRENT STATUS: HCPCS
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
## 2017-09-06 ENCOUNTER — APPOINTMENT (OUTPATIENT)
Dept: ONCOLOGY | Facility: HOSPITAL | Age: 67
End: 2017-09-06

## 2017-09-08 ENCOUNTER — TRANSCRIBE ORDERS (OUTPATIENT)
Dept: ADMINISTRATIVE | Facility: HOSPITAL | Age: 67
End: 2017-09-08

## 2017-09-08 DIAGNOSIS — C50.411 MALIGNANT NEOPLASM OF UPPER-OUTER QUADRANT OF RIGHT FEMALE BREAST (HCC): Primary | ICD-10-CM

## 2017-09-12 ENCOUNTER — HOSPITAL ENCOUNTER (OUTPATIENT)
Dept: PHYSICAL THERAPY | Facility: HOSPITAL | Age: 67
Setting detail: THERAPIES SERIES
Discharge: HOME | End: 2017-09-12
Attending: INTERNAL MEDICINE

## 2017-09-12 ENCOUNTER — HOSPITAL ENCOUNTER (OUTPATIENT)
Dept: CT IMAGING | Facility: HOSPITAL | Age: 67
Discharge: HOME | End: 2017-09-12
Attending: SURGERY | Admitting: SURGERY

## 2017-09-12 DIAGNOSIS — C50.411 MALIGNANT NEOPLASM OF UPPER-OUTER QUADRANT OF RIGHT FEMALE BREAST (HCC): ICD-10-CM

## 2017-09-12 DIAGNOSIS — M79.601 RIGHT ARM PAIN: Primary | ICD-10-CM

## 2017-09-12 DIAGNOSIS — I89.0 LYMPHEDEMA OF RIGHT UPPER EXTREMITY: ICD-10-CM

## 2017-09-12 PROCEDURE — 71250 CT THORAX DX C-: CPT

## 2017-09-12 PROCEDURE — 97110 THERAPEUTIC EXERCISES: CPT

## 2017-09-13 NOTE — THERAPY TREATMENT NOTE
"    Outpatient Physical Therapy Lymphedema Treatment Note  [facility]     Patient Name: Brianna Urrutia  : 1950  MRN: [id number]  Today's Date: 2017      Visit Date: 2017    Visit Dx:    ICD-10-CM ICD-9-CM   1. Right arm pain M79.601 729.5   2. Lymphedema of right upper extremity I89.0 457.1       Patient Active Problem List   Diagnosis   • Malignant neoplasm of overlapping sites of right female breast   • Malignant neoplasm of upper-outer quadrant of right female breast              Lymphedema       17 1400          Subjective Comments    Subjective Comments Brianna says she has been wearing compressogrip until past couple of days starting bothering her with increased discomfort and itching.  She had consult with surgeon and may see someone else (unclear of patient request or surgeon recommendation).  She also has appt. this week at  for regarding further treatment options.  Continues to have a lot of pain in shoulder and arm.   -LF      RUE Quick Girth (cm)    Axilla 29.5 cm  -LF      Mid upper arm 26.3 cm  -LF      Elbow 24 cm  -LF      Mid forearm 25 cm  -LF      Wrist crease 16 cm  -LF      Manual Lymphatic Drainage    Manual Lymphatic Drainage Comments Reviewed diaphragmatic breathing.  Also gave suggestions for AAROM but patient stated \"I'm doing all that already\".  Discussed that MLD to RUE is contraindicated at this time until she has further treatment of her cancer.  Can treat with comfort measures including compression garment, and ROM type exercises to help with pain and maintain functional movement .  Will send referral for compression garment to [facility] to assist with getting compression garment.    -LF      Compression/Skin Care    Compression/Skin Care bandaging  -LF      Bandage Layers cotton elastic stocking- single layer (comment size)   provided 3.5\" compressogrip hand to elbow, 5\" elbow to axill  -LF      Compression/Skin Care Comments provided larger " size compressogrip to see if more comfortable while still providing mild compression.   -LF        User Key  (r) = Recorded By, (t) = Taken By, (c) = Cosigned By    Initials Name Provider Type    LESLI Elder PT Physical Therapist                  PT Assessment/Plan       09/12/17 1400       PT Assessment    Assessment Comments Limited treatment as mentioned above.  Patient in process of pursuing further cancer treatment options and MLD relative contraindication at this time.  Not very receptive to education in regards to exercise, etc. to help with pain control and improve/maintain functional ROM.   Will wait until her oncology appointment this week to determine follow-up care from P.T.  standpoint.   Could consider MLD as palliative measure to help with pain control, but without expectation of significant reduction in edema (due to impaired lymph pathways).  In meantime, referral sent to [place] [facility] to see if they can assist in getting her a compression garment.  -LF     PT Plan    PT Plan Comments cont. per above  -LF       User Key  (r) = Recorded By, (t) = Taken By, (c) = Cosigned By    Initials Name Provider Type    LESIL Elder PT Physical Therapist                     Exercises       09/12/17 1400          Subjective Comments    Subjective Comments Brianna says she has been wearing compressogrip until past couple of days starting bothering her with increased discomfort and itching.  She had consult with surgeon and may see someone else (unclear of patient request or surgeon recommendation).  She also has appt. this week at  for regarding further treatment options.  Continues to have a lot of pain in shoulder and arm.   -LF      Subjective Pain    Able to rate subjective pain? yes  -LF      Pre-Treatment Pain Level 8  -LF      Post-Treatment Pain Level 8  -LF        User Key  (r) = Recorded By, (t) = Taken By, (c) = Cosigned By    Initials Name Provider Type    LESLI Elder PT  Physical Therapist                                      Time Calculation:   Start Time: 1410  Total Timed Code Minutes- PT: 20 minute(s)     Therapy Charges for Today     Code Description Service Date Service Provider Modifiers Qty    [id number]  PT THER PROC EA 15 MIN 9/12/2017 Luann Elder, PT GP 1                    Luann Elder, PT  9/13/2017

## 2017-09-15 LAB
CYTO UR: NORMAL
LAB AP CASE REPORT: NORMAL
LAB AP CLINICAL INFORMATION: NORMAL
LAB AP DIAGNOSIS COMMENT: NORMAL
LAB AP SPECIAL STAINS: NORMAL
Lab: NORMAL
PATH REPORT.ADDENDUM SPEC: NORMAL
PATH REPORT.FINAL DX SPEC: NORMAL
PATH REPORT.GROSS SPEC: NORMAL

## 2017-09-28 ENCOUNTER — HOSPITAL ENCOUNTER (OUTPATIENT)
Dept: RADIATION ONCOLOGY | Facility: HOSPITAL | Age: 67
Setting detail: RADIATION/ONCOLOGY SERIES
End: 2017-09-28

## 2017-09-29 ENCOUNTER — APPOINTMENT (OUTPATIENT)
Dept: RADIATION ONCOLOGY | Facility: HOSPITAL | Age: 67
End: 2017-09-29

## 2017-10-02 ENCOUNTER — OFFICE VISIT (OUTPATIENT)
Dept: RADIATION ONCOLOGY | Facility: HOSPITAL | Age: 67
End: 2017-10-02

## 2017-10-02 ENCOUNTER — HOSPITAL ENCOUNTER (OUTPATIENT)
Dept: RADIATION ONCOLOGY | Facility: HOSPITAL | Age: 67
Discharge: HOME | End: 2017-10-02

## 2017-10-02 VITALS
TEMPERATURE: 98 F | OXYGEN SATURATION: 98 % | SYSTOLIC BLOOD PRESSURE: 161 MMHG | BODY MASS INDEX: 22.75 KG/M2 | DIASTOLIC BLOOD PRESSURE: 84 MMHG | RESPIRATION RATE: 16 BRPM | HEIGHT: 63 IN | WEIGHT: 128.4 LBS | HEART RATE: 95 BPM

## 2017-10-02 DIAGNOSIS — C50.411 MALIGNANT NEOPLASM OF UPPER-OUTER QUADRANT OF RIGHT BREAST IN FEMALE, ESTROGEN RECEPTOR NEGATIVE (HCC): Primary | ICD-10-CM

## 2017-10-02 DIAGNOSIS — Z17.1 MALIGNANT NEOPLASM OF UPPER-OUTER QUADRANT OF RIGHT BREAST IN FEMALE, ESTROGEN RECEPTOR NEGATIVE (HCC): Primary | ICD-10-CM

## 2017-10-02 PROCEDURE — G0463 HOSPITAL OUTPT CLINIC VISIT: HCPCS
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
## 2017-10-04 ENCOUNTER — HOSPITAL ENCOUNTER (OUTPATIENT)
Dept: RADIATION ONCOLOGY | Facility: HOSPITAL | Age: 67
Discharge: HOME | End: 2017-10-04

## 2017-10-05 ENCOUNTER — HOSPITAL ENCOUNTER (OUTPATIENT)
Dept: RADIATION ONCOLOGY | Facility: HOSPITAL | Age: 67
Discharge: HOME | End: 2017-10-05

## 2017-10-06 ENCOUNTER — HOSPITAL ENCOUNTER (OUTPATIENT)
Dept: RADIATION ONCOLOGY | Facility: HOSPITAL | Age: 67
Discharge: HOME | End: 2017-10-06

## 2017-10-09 ENCOUNTER — HOSPITAL ENCOUNTER (OUTPATIENT)
Dept: RADIATION ONCOLOGY | Facility: HOSPITAL | Age: 67
Discharge: HOME | End: 2017-10-09

## 2017-10-10 ENCOUNTER — HOSPITAL ENCOUNTER (OUTPATIENT)
Dept: RADIATION ONCOLOGY | Facility: HOSPITAL | Age: 67
Discharge: HOME | End: 2017-10-10

## 2017-10-10 ENCOUNTER — DOCUMENTATION (OUTPATIENT)
Dept: RADIATION ONCOLOGY | Facility: HOSPITAL | Age: 67
End: 2017-10-10

## 2017-10-10 VITALS — BODY MASS INDEX: 22.55 KG/M2 | WEIGHT: 127.3 LBS

## 2017-10-10 NOTE — PROGRESS NOTES
After seeing pt. and her son at status check, she seems to understand the need for chemo.  She and her son will need to see if Dr. Ibarra will see her back.  If she choses to not stay in this practice then Dr. Victoria at  might be an option, since she has seen him for a second opinion already.  I tried to contact the pt.and there was not an answer, so I left a message to contact me.

## 2017-10-10 NOTE — PROGRESS NOTES
Mrs. Urrutia was accompanied by her son today for her radiation treatment.  This is the first time he has been here with his mother.  Mrs. Urrutia has seen Dr. Ibarra, Dr. Kovacs, and  Dr. Guillen  multiple times regarding her treatment.  She eventually settled on radiotherapy after the tumor has broken through the skin and she has painful lymphedema from enlarged axillary nodes.  Her son wants her to revisit chemotherapy.  My nurse and I spent a long time with them reviewing her past appointments and recommendations.  He can return with her on a Tuesday if she can get an appointment for a second opinion with another medical oncologist.

## 2017-10-11 ENCOUNTER — HOSPITAL ENCOUNTER (OUTPATIENT)
Dept: RADIATION ONCOLOGY | Facility: HOSPITAL | Age: 67
Discharge: HOME | End: 2017-10-11

## 2017-10-12 ENCOUNTER — HOSPITAL ENCOUNTER (OUTPATIENT)
Dept: RADIATION ONCOLOGY | Facility: HOSPITAL | Age: 67
Discharge: HOME | End: 2017-10-12

## 2017-10-16 ENCOUNTER — HOSPITAL ENCOUNTER (OUTPATIENT)
Dept: RADIATION ONCOLOGY | Facility: HOSPITAL | Age: 67
Discharge: HOME | End: 2017-10-16

## 2017-10-17 ENCOUNTER — HOSPITAL ENCOUNTER (OUTPATIENT)
Dept: RADIATION ONCOLOGY | Facility: HOSPITAL | Age: 67
Discharge: HOME | End: 2017-10-17

## 2017-10-17 VITALS — BODY MASS INDEX: 22.46 KG/M2 | WEIGHT: 126.8 LBS

## 2017-10-19 ENCOUNTER — HOSPITAL ENCOUNTER (OUTPATIENT)
Dept: RADIATION ONCOLOGY | Facility: HOSPITAL | Age: 67
Discharge: HOME | End: 2017-10-19

## 2017-10-19 DIAGNOSIS — C50.411 MALIGNANT NEOPLASM OF UPPER-OUTER QUADRANT OF RIGHT BREAST IN FEMALE, ESTROGEN RECEPTOR POSITIVE (HCC): Primary | ICD-10-CM

## 2017-10-19 DIAGNOSIS — Z17.0 MALIGNANT NEOPLASM OF UPPER-OUTER QUADRANT OF RIGHT BREAST IN FEMALE, ESTROGEN RECEPTOR POSITIVE (HCC): Primary | ICD-10-CM

## 2017-10-19 NOTE — PROGRESS NOTES
Pt presented to the clinic tearful and anxious.  She had complaints of discomfort in her arm with lymphedema and her inability to do anything around her house.  She states her house is a mess because she can not do anything due to her arm.  I discussed with Dr. Edmonds and it was recommended for patient to go to physical therapy and to speak with Heidy Escalera.  I called Lianne in PT and she stated she would work the patient in tomorrow and would give her a call.  I also reached out to Heidy and she said she would be happy to see patient tomorrow after her treatment.  I explained all of this information to Ms. Urrutia and she verbalized understanding.  I also reiterated to Ms. Urrutia the importance of keeping her phone close and answering her phone to receive and appointment from PT.

## 2017-10-20 ENCOUNTER — DOCUMENTATION (OUTPATIENT)
Dept: SOCIAL WORK | Facility: HOSPITAL | Age: 67
End: 2017-10-20

## 2017-10-20 ENCOUNTER — HOSPITAL ENCOUNTER (OUTPATIENT)
Dept: RADIATION ONCOLOGY | Facility: HOSPITAL | Age: 67
Discharge: HOME | End: 2017-10-20

## 2017-10-20 NOTE — PROGRESS NOTES
SW met with pt after radiation treatment at pt request to provide assistance with home and financial needs.  Pt states that she is in a significant amount of pain from her lymphedema in her arm.  She states that it is becoming increasingly difficult to care for herself and to do her activities of daily living, cleaning, etc.  SW referred pt to [facility] for financial assistance to help her with her car taxes that she owes ($150) and [employer] for assistance with her utility bills.  SW referred pt to the program [employer], to provide help for her in her home with her cleaning duties.  SW encouraged pt to reach out to her son and her friends to ask for help also.  LORENA discussed the possibility of home health becoming involved if pt qualifies for this service.  Pt is going to call her primary doctor about this.  SW provided support to pt and will be available for ongoing support and resource needs.

## 2017-10-23 ENCOUNTER — HOSPITAL ENCOUNTER (OUTPATIENT)
Dept: RADIATION ONCOLOGY | Facility: HOSPITAL | Age: 67
Discharge: HOME | End: 2017-10-23

## 2017-10-24 ENCOUNTER — DOCUMENTATION (OUTPATIENT)
Dept: RADIATION ONCOLOGY | Facility: HOSPITAL | Age: 67
End: 2017-10-24

## 2017-10-24 ENCOUNTER — HOSPITAL ENCOUNTER (OUTPATIENT)
Dept: RADIATION ONCOLOGY | Facility: HOSPITAL | Age: 67
Discharge: HOME | End: 2017-10-24

## 2017-10-24 NOTE — PROGRESS NOTES
Ms. Urrutia was seen for status check today in Radiation Oncology.  She is having a good response to treatment.  The skin around the tumor is irritated from tape.  The tumor has decreased in size but is still oozing blood.  She has less lymphedema but still uncomfortable.  Will get x-ray of arm and shoulder if she wants.  She had a prior fall.    Will continue treatment as planned.

## 2017-10-31 ENCOUNTER — TELEPHONE (OUTPATIENT)
Dept: RADIATION ONCOLOGY | Facility: HOSPITAL | Age: 67
End: 2017-10-31

## 2017-10-31 NOTE — TELEPHONE ENCOUNTER
"Pt called and cancelled appointment again today and asked that I call her.  Pt has missed 8 treatments and 6 of those consecutively.  I called patient and she began complaining about the lymphedema in her arm.  She stated it was \"dissapating\" and that her body was unable to handle the toxins.  She stated she could not get out of the bed and that she was suffering from nausea and was taking Zofran and hadn't eaten in several days. Pt continuously to talk about s&s that were irrelevant to radiation treatments. I began to explain to patient that patient how her symptoms were not from the radiation and that it was important for her to come for treatment, she immediately cut me off and began screaming at me, \"Love, I can not get out of the bed. I feel like you do not like me! Why are you so mean to me? I demand Dr. Edmonds call me. I have a right to speak to my dr.\"  I apologized for her perception of the conversation and told her yes she did have a right to speak to Dr. Edmonds, but she needed to come to the clinic. Pt stated she would be here tomorrow.    I made Dr. Edmonds aware of the conversation.  I have also requested Heidy Escalera be present for pts appointment tomorrow.  "

## 2017-10-31 NOTE — TELEPHONE ENCOUNTER
"Returned call to Mrs. Urrutia - who has misses many treatments.  She feels like body is \"processing toxins and making her sick.\"  I'll see her tomorrow at her treatment time so if she wants treatment she can get it.  She was appreciative of the call.  "

## 2017-11-01 ENCOUNTER — HOSPITAL ENCOUNTER (OUTPATIENT)
Dept: GENERAL RADIOLOGY | Facility: HOSPITAL | Age: 67
End: 2017-11-01
Attending: RADIOLOGY

## 2017-11-01 ENCOUNTER — HOSPITAL ENCOUNTER (OUTPATIENT)
Dept: GENERAL RADIOLOGY | Facility: HOSPITAL | Age: 67
Discharge: HOME | End: 2017-11-01
Attending: RADIOLOGY | Admitting: RADIOLOGY

## 2017-11-01 ENCOUNTER — DOCUMENTATION (OUTPATIENT)
Dept: RADIATION ONCOLOGY | Facility: HOSPITAL | Age: 67
End: 2017-11-01

## 2017-11-01 ENCOUNTER — TELEPHONE (OUTPATIENT)
Dept: RADIATION ONCOLOGY | Facility: HOSPITAL | Age: 67
End: 2017-11-01

## 2017-11-01 ENCOUNTER — HOSPITAL ENCOUNTER (OUTPATIENT)
Dept: GENERAL RADIOLOGY | Facility: HOSPITAL | Age: 67
Discharge: HOME | End: 2017-11-01

## 2017-11-01 ENCOUNTER — HOSPITAL ENCOUNTER (OUTPATIENT)
Dept: RADIATION ONCOLOGY | Facility: HOSPITAL | Age: 67
Setting detail: RADIATION/ONCOLOGY SERIES
Discharge: HOME | End: 2017-11-01

## 2017-11-01 DIAGNOSIS — C50.411 MALIGNANT NEOPLASM OF UPPER-OUTER QUADRANT OF RIGHT BREAST IN FEMALE, ESTROGEN RECEPTOR NEGATIVE (HCC): Primary | ICD-10-CM
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
## 2017-11-01 DIAGNOSIS — Z17.1 MALIGNANT NEOPLASM OF UPPER-OUTER QUADRANT OF RIGHT BREAST IN FEMALE, ESTROGEN RECEPTOR NEGATIVE (HCC): Primary | ICD-10-CM

## 2017-11-01 DIAGNOSIS — C50.411 MALIGNANT NEOPLASM OF UPPER-OUTER QUADRANT OF RIGHT BREAST IN FEMALE, ESTROGEN RECEPTOR NEGATIVE (HCC): ICD-10-CM

## 2017-11-01 DIAGNOSIS — Z17.1 MALIGNANT NEOPLASM OF UPPER-OUTER QUADRANT OF RIGHT BREAST IN FEMALE, ESTROGEN RECEPTOR NEGATIVE (HCC): ICD-10-CM

## 2017-11-01 PROCEDURE — 73110 X-RAY EXAM OF WRIST: CPT

## 2017-11-01 PROCEDURE — 73060 X-RAY EXAM OF HUMERUS: CPT

## 2017-11-01 RX ORDER — PROMETHAZINE HYDROCHLORIDE 25 MG/1
12.5 TABLET ORAL EVERY 6 HOURS PRN
Qty: 30 TABLET | Refills: 0 | Status: SHIPPED | OUTPATIENT
Start: 2017-11-01 | End: 2018-06-25

## 2017-11-02 ENCOUNTER — HOSPITAL ENCOUNTER (OUTPATIENT)
Dept: RADIATION ONCOLOGY | Facility: HOSPITAL | Age: 67
Discharge: HOME | End: 2017-11-02

## 2017-11-02 ENCOUNTER — DOCUMENTATION (OUTPATIENT)
Dept: SOCIAL WORK | Facility: HOSPITAL | Age: 67
End: 2017-11-02
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
## 2017-11-02 NOTE — PROGRESS NOTES
"SW met with pt on 11/1/17 during her appointment with Dr. Edmonds to provide support and assist in identifying any obstacles for pt in regards to her compliance with her care recommendations.  Pt became very agitated and visibly upset.  Pt stated \"I feel like all of you are mad at me\" and \"I don't think you could possibly understand how it feels to be in this much pain\".  SW provided support to pt and attempted to verbalize suggestions for completion of her radiation treatments.  Pt states that she cannot lay on the table for the required amount of time.  Pt friend, Deena, accompanied her.  SW discussed various community resources that may be available for pt.  Pt declines Meals on Wheels at this time.  Pt has started receiving cleaning services and is thankful for this help.  SW encouraged pt to take one day at a time and to strongly think about her role in this in order to complete her prescribed radiation treatments.  Pt verbalized that the treatments are in fact helping her breast.  SW available for ongoing support and resource needs.   "

## 2017-11-03 ENCOUNTER — HOSPITAL ENCOUNTER (OUTPATIENT)
Dept: RADIATION ONCOLOGY | Facility: HOSPITAL | Age: 67
Discharge: HOME | End: 2017-11-03

## 2017-11-06 ENCOUNTER — HOSPITAL ENCOUNTER (OUTPATIENT)
Dept: RADIATION ONCOLOGY | Facility: HOSPITAL | Age: 67
Discharge: HOME | End: 2017-11-06

## 2017-11-07 ENCOUNTER — HOSPITAL ENCOUNTER (OUTPATIENT)
Dept: RADIATION ONCOLOGY | Facility: HOSPITAL | Age: 67
Discharge: HOME | End: 2017-11-07

## 2017-11-07 VITALS — WEIGHT: 122.1 LBS | BODY MASS INDEX: 21.63 KG/M2

## 2017-11-07 DIAGNOSIS — C50.411 MALIGNANT NEOPLASM OF UPPER-OUTER QUADRANT OF RIGHT FEMALE BREAST, UNSPECIFIED ESTROGEN RECEPTOR STATUS (HCC): Primary | ICD-10-CM

## 2017-11-08 ENCOUNTER — HOSPITAL ENCOUNTER (OUTPATIENT)
Dept: RADIATION ONCOLOGY | Facility: HOSPITAL | Age: 67
Discharge: HOME | End: 2017-11-08

## 2017-11-09 ENCOUNTER — HOSPITAL ENCOUNTER (OUTPATIENT)
Dept: RADIATION ONCOLOGY | Facility: HOSPITAL | Age: 67
Discharge: HOME | End: 2017-11-09

## 2017-11-10 ENCOUNTER — HOSPITAL ENCOUNTER (OUTPATIENT)
Dept: RADIATION ONCOLOGY | Facility: HOSPITAL | Age: 67
Discharge: HOME | End: 2017-11-10

## 2017-11-13 ENCOUNTER — HOSPITAL ENCOUNTER (OUTPATIENT)
Dept: RADIATION ONCOLOGY | Facility: HOSPITAL | Age: 67
Discharge: HOME | End: 2017-11-13

## 2017-11-14 ENCOUNTER — HOSPITAL ENCOUNTER (OUTPATIENT)
Dept: RADIATION ONCOLOGY | Facility: HOSPITAL | Age: 67
Discharge: HOME | End: 2017-11-14

## 2017-11-14 VITALS — BODY MASS INDEX: 21.7 KG/M2 | WEIGHT: 122.5 LBS

## 2017-11-15 ENCOUNTER — DOCUMENTATION (OUTPATIENT)
Dept: NUTRITION | Facility: HOSPITAL | Age: 67
End: 2017-11-15

## 2017-11-15 ENCOUNTER — HOSPITAL ENCOUNTER (OUTPATIENT)
Dept: RADIATION ONCOLOGY | Facility: HOSPITAL | Age: 67
Discharge: HOME | End: 2017-11-15

## 2017-11-15 PROCEDURE — 77336 RADIATION PHYSICS CONSULT: CPT | Performed by: RADIOLOGY

## 2017-11-15 NOTE — PROGRESS NOTES
[facility] Nutrition    Weight 122.5 lbs / approximate 5 lbs weight loss past month    Patient seen during radiation status checks.  She voices that she is doing well and denies nutritional issues.  Discussed possible fatigue with radiation treatment and focus on high protein with adequate fluid intake.  Patient shortened consultation by changing the subject and not focusing on items of discussion.  Will continue to follow as indicated.

## 2017-11-16 ENCOUNTER — HOSPITAL ENCOUNTER (OUTPATIENT)
Dept: RADIATION ONCOLOGY | Facility: HOSPITAL | Age: 67
Discharge: HOME | End: 2017-11-16

## 2017-11-17 ENCOUNTER — HOSPITAL ENCOUNTER (OUTPATIENT)
Dept: RADIATION ONCOLOGY | Facility: HOSPITAL | Age: 67
Discharge: HOME | End: 2017-11-17

## 2017-11-20 ENCOUNTER — HOSPITAL ENCOUNTER (OUTPATIENT)
Dept: RADIATION ONCOLOGY | Facility: HOSPITAL | Age: 67
Discharge: HOME | End: 2017-11-20

## 2017-11-20 PROCEDURE — 77412 RADIATION TX DELIVERY LVL 3: CPT | Performed by: RADIOLOGY

## 2017-11-20 PROCEDURE — 77334 RADIATION TREATMENT AID(S): CPT | Performed by: RADIOLOGY

## 2017-11-20 PROCEDURE — 77307 TELETHX ISODOSE PLAN CPLX: CPT | Performed by: RADIOLOGY

## 2017-11-21 ENCOUNTER — HOSPITAL ENCOUNTER (OUTPATIENT)
Dept: RADIATION ONCOLOGY | Facility: HOSPITAL | Age: 67
Discharge: HOME | End: 2017-11-21

## 2017-11-21 VITALS — WEIGHT: 125 LBS | BODY MASS INDEX: 22.14 KG/M2

## 2017-11-21 PROCEDURE — 77280 THER RAD SIMULAJ FIELD SMPL: CPT | Performed by: RADIOLOGY

## 2017-11-21 PROCEDURE — 77412 RADIATION TX DELIVERY LVL 3: CPT | Performed by: RADIOLOGY

## 2017-11-22 ENCOUNTER — HOSPITAL ENCOUNTER (OUTPATIENT)
Dept: RADIATION ONCOLOGY | Facility: HOSPITAL | Age: 67
Discharge: HOME | End: 2017-11-22

## 2017-11-22 ENCOUNTER — HOSPITAL ENCOUNTER (OUTPATIENT)
Dept: PHYSICAL THERAPY | Facility: HOSPITAL | Age: 67
Setting detail: THERAPIES SERIES
Discharge: HOME | End: 2017-11-22
Attending: INTERNAL MEDICINE

## 2017-11-22 DIAGNOSIS — M79.601 RIGHT ARM PAIN: ICD-10-CM

## 2017-11-22 DIAGNOSIS — I89.0 LYMPHEDEMA OF RIGHT UPPER EXTREMITY: Primary | ICD-10-CM

## 2017-11-22 PROCEDURE — 77336 RADIATION PHYSICS CONSULT: CPT | Performed by: RADIOLOGY

## 2017-11-22 PROCEDURE — 97164 PT RE-EVAL EST PLAN CARE: CPT | Performed by: PHYSICAL THERAPIST

## 2017-11-22 PROCEDURE — 77412 RADIATION TX DELIVERY LVL 3: CPT | Performed by: RADIOLOGY

## 2017-11-22 PROCEDURE — G8984 CARRY CURRENT STATUS: HCPCS | Performed by: PHYSICAL THERAPIST

## 2017-11-22 PROCEDURE — G8985 CARRY GOAL STATUS: HCPCS | Performed by: PHYSICAL THERAPIST
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
## 2017-11-27 ENCOUNTER — HOSPITAL ENCOUNTER (OUTPATIENT)
Dept: RADIATION ONCOLOGY | Facility: HOSPITAL | Age: 67
Discharge: HOME | End: 2017-11-27

## 2017-11-27 ENCOUNTER — HOSPITAL ENCOUNTER (OUTPATIENT)
Dept: PHYSICAL THERAPY | Facility: HOSPITAL | Age: 67
Setting detail: THERAPIES SERIES
Discharge: HOME | End: 2017-11-27
Attending: INTERNAL MEDICINE

## 2017-11-27 DIAGNOSIS — M79.601 RIGHT ARM PAIN: Primary | ICD-10-CM

## 2017-11-27 DIAGNOSIS — I89.0 LYMPHEDEMA OF RIGHT UPPER EXTREMITY: ICD-10-CM

## 2017-11-27 PROCEDURE — 97140 MANUAL THERAPY 1/> REGIONS: CPT | Performed by: PHYSICAL THERAPIST

## 2017-11-27 PROCEDURE — 77412 RADIATION TX DELIVERY LVL 3: CPT | Performed by: RADIOLOGY

## 2017-11-27 NOTE — THERAPY TREATMENT NOTE
"    Outpatient Physical Therapy Lymphedema Treatment Note  [facility]     Patient Name: Brianna Urrutia  : 1950  MRN: [id number]  Today's Date: 2017        Visit Date: 2017    Visit Dx:    ICD-10-CM ICD-9-CM   1. Right arm pain M79.601 729.5   2. Lymphedema of right upper extremity I89.0 457.1       Patient Active Problem List   Diagnosis   • Malignant neoplasm of overlapping sites of right female breast   • Malignant neoplasm of upper-outer quadrant of right female breast              Lymphedema       17 1235          Subjective Comments    Subjective Comments Pt apologized for being 5 minutes late as her ride was late. States she has been visualizing new lymph flow patterns to try to help her body better process the fluid. States she is also trying epsom salt, apple cider vinegar soaks to \"detox her lymph system.\"   -MW      Lymphedema Assessment    Lymphedema Classification --  -MW      Radiation Therapy Received --  -MW      Infections or Cellulitis? --  -MW      Subjective Pain    Able to rate subjective pain? yes  -MW      Pre-Treatment Pain Level 8  -MW      Post-Treatment Pain Level 6  -MW      Lymphedema Edema Assessment    Ptting Edema Category By severity  -MW      Pitting Edema Moderate  -MW      Edema Assessment Comment Soft edema in upper arm and hand; forearm with firm edema but softer than previous visit   -      Skin Changes/Observations    Location/Assessment Upper Quadrant  -MW      Upper Extremity Conditions right:;intact;clean  -MW      Upper Quadrant Conditions right:;dry;crust;scab(s);other (comment)   crusts/ scabs/ scaly at turmor site; rad markers   -      Upper Quadrant Color/Pigment right:;hyperpigmented;other (comment)   color changes consistent with radiation therapy  -      Upper Quadrant Skin Details erythema around turmor site; \"boost tx\"   -MW      Lymphedema Sensation    Lymphedema Sensation Reports RUE:;numbness;tingling  -      Lymphedema " "Sensation Comments \"it goes sort of numb as you massage there (elbow)   -MW      Manual Lymphatic Drainage    Manual Lymphatic Drainage initial sequence;opened regional lymph nodes;opened anastamoses;extremity treatment  -MW      Initial Sequence supraclavicular;shoulder collectors  -MW      Supraclavicular right;left  -MW      Shoulder Collectors right;left  -MW      Opened Regional Lymph Nodes inguinal  -MW      Inguinal right  -MW      Opened Anastamoses axillo-inguinal  -MW      Axillo-Inguinal right  -MW      Extremity Treatment MLD to full limb;extremity treatment focus on  -MW      MLD to Full Limb RUE   -MW      Extremity Treatment Focus On forearm, wrist & hand   -MW      Compression/Skin Care    Compression/Skin Care wrapping location;bandaging  -MW      Wrapping Location upper extremity  -MW      Wrapping Location UE right:;fingers to axilla  -MW      Wrapping Comments pt to don after radiation therapy   -MW      Bandage Layers --  -MW        User Key  (r) = Recorded By, (t) = Taken By, (c) = Cosigned By    Initials Name Provider Type    MW Na Bergeron, PT Physical Therapist                              PT Assessment/Plan       11/27/17 1235       PT Assessment    Functional Limitations Performance in self-care ADL;Limitation in home management  -MW     Impairments Pain;Impaired lymphatic circulation;Edema;Joint mobility;Muscle strength;Impaired flexibility  -MW     Assessment Comments Pt returns with only glove on, but upper arm softer and smaller, forearm also softer. Only brief MLD this visit due to schedule conflict. Cont at tolerated.   -MW     Rehab Potential Fair  -MW     Patient/caregiver participated in establishment of treatment plan and goals Yes  -MW     Patient would benefit from skilled therapy intervention Yes  -MW     PT Plan    PT Frequency 2x/week  -MW     Physical Therapy Interventions (Optional Details) manual lymphatic drainage;manual therapy techniques;ROM (Range of " Motion);strengthening;stretching;taping;patient/family education;home exercise program  -MW     PT Plan Comments Cont MLD with gentle compression; consider ASTYM and / or Kinesiotape after completion of radiation therapy as additional options for pain reduction and soft tissue remodeling.   -MW       User Key  (r) = Recorded By, (t) = Taken By, (c) = Cosigned By    Initials Name Provider Type    FRAN Bergeron PT Physical Therapist                       PT OP Goals       11/27/17 1630       Time Calculation    PT Goal Re-Cert Due Date 02/22/18  -MW       User Key  (r) = Recorded By, (t) = Taken By, (c) = Cosigned By    Initials Name Provider Type    FRAN Bergeron PT Physical Therapist                Therapy Education       11/27/17 1235          Therapy Education    Education Details cautioned pt to not do hot water soaks as hot water may increase swelling; warm soaks are ok.   -MW      Given Symptoms/condition management  -MW      Program Reinforced  -MW      How Provided Verbal  -MW      Provided to Patient  -MW      Level of Understanding Verbalized  -MW        User Key  (r) = Recorded By, (t) = Taken By, (c) = Cosigned By    Initials Name Provider Type    FRAN Bergeron PT Physical Therapist                Time Calculation:   Start Time: 1235  Total Timed Code Minutes- PT: 25 minute(s)     Therapy Charges for Today     Code Description Service Date Service Provider Modifiers Qty    [id number] HC PT MANUAL THERAPY EA 15 MIN 11/27/2017 Na Bergeron PT GP 2                    Na Bergeron PT  11/27/2017

## 2017-11-28 ENCOUNTER — HOSPITAL ENCOUNTER (OUTPATIENT)
Dept: RADIATION ONCOLOGY | Facility: HOSPITAL | Age: 67
Discharge: HOME | End: 2017-11-28

## 2017-11-28 VITALS — BODY MASS INDEX: 21.89 KG/M2 | WEIGHT: 123.6 LBS

## 2017-11-28 PROCEDURE — 77412 RADIATION TX DELIVERY LVL 3: CPT | Performed by: RADIOLOGY

## 2017-11-29 ENCOUNTER — HOSPITAL ENCOUNTER (OUTPATIENT)
Dept: RADIATION ONCOLOGY | Facility: HOSPITAL | Age: 67
Discharge: HOME | End: 2017-11-29

## 2017-11-29 PROCEDURE — 77412 RADIATION TX DELIVERY LVL 3: CPT | Performed by: RADIOLOGY

## 2017-11-30 ENCOUNTER — HOSPITAL ENCOUNTER (OUTPATIENT)
Dept: RADIATION ONCOLOGY | Facility: HOSPITAL | Age: 67
Discharge: HOME | End: 2017-11-30

## 2017-11-30 ENCOUNTER — HOSPITAL ENCOUNTER (OUTPATIENT)
Dept: PHYSICAL THERAPY | Facility: HOSPITAL | Age: 67
Setting detail: THERAPIES SERIES
Discharge: HOME | End: 2017-11-30
Attending: INTERNAL MEDICINE

## 2017-11-30 DIAGNOSIS — I89.0 LYMPHEDEMA OF RIGHT UPPER EXTREMITY: ICD-10-CM

## 2017-11-30 DIAGNOSIS — M79.601 RIGHT ARM PAIN: Primary | ICD-10-CM

## 2017-11-30 PROCEDURE — 97140 MANUAL THERAPY 1/> REGIONS: CPT | Performed by: PHYSICAL THERAPIST

## 2017-11-30 PROCEDURE — 77412 RADIATION TX DELIVERY LVL 3: CPT | Performed by: RADIOLOGY

## 2017-12-05 ENCOUNTER — HOSPITAL ENCOUNTER (OUTPATIENT)
Dept: PHYSICAL THERAPY | Facility: HOSPITAL | Age: 67
Setting detail: THERAPIES SERIES
Discharge: HOME | End: 2017-12-05
Attending: INTERNAL MEDICINE

## 2017-12-05 DIAGNOSIS — M79.601 RIGHT ARM PAIN: Primary | ICD-10-CM

## 2017-12-05 DIAGNOSIS — I89.0 LYMPHEDEMA OF RIGHT UPPER EXTREMITY: ICD-10-CM

## 2017-12-05 DIAGNOSIS — L90.5 SCAR CONDITIONS AND FIBROSIS OF SKIN: ICD-10-CM

## 2017-12-05 PROCEDURE — 97140 MANUAL THERAPY 1/> REGIONS: CPT | Performed by: PHYSICAL THERAPIST

## 2017-12-05 NOTE — THERAPY TREATMENT NOTE
Outpatient Physical Therapy Lymphedema Treatment Note  [facility]     Patient Name: Brianna Urrutia  : 1950  MRN: [id number]  Today's Date: 2017        Visit Date: 2017    Visit Dx:    ICD-10-CM ICD-9-CM   1. Right arm pain M79.601 729.5   2. Lymphedema of right upper extremity I89.0 457.1   3. Scar conditions and fibrosis of skin L90.5 709.2       Patient Active Problem List   Diagnosis   • Malignant neoplasm of overlapping sites of right female breast   • Malignant neoplasm of upper-outer quadrant of right female breast              Lymphedema       17 1400          Subjective Comments    Subjective Comments Pt reports that she thinks she lost her glove and sling; but may have taken it off when she was getting gas.   -MW      Subjective Pain    Able to rate subjective pain? yes  -MW      Pre-Treatment Pain Level 6  -MW      Post-Treatment Pain Level 7  -MW      Subjective Pain Comment posterior upper arm   -MW      Lymphedema Edema Assessment    Ptting Edema Category By severity  -MW      Pitting Edema Moderate;Mild  -MW      Edema Assessment Comment upper arm soft with mild edema, forearm with moderate firm edema, hand with soft moderate edema   -MW      Skin Changes/Observations    Location/Assessment Upper Quadrant  -MW      Upper Extremity Conditions right:;intact;clean  -MW      Upper Quadrant Conditions right:;dry;crust;scab(s);other (comment)   crusts/ scabs/ scaly at turmor site; rad markers   -MW      Upper Quadrant Color/Pigment right:;hyperpigmented;other (comment)   color changes consistent with radiation therapy  -MW      Upper Quadrant Skin Details brown/ hyperpigmented on posterior aspect as well from radiation therapy; skin dry and intact   -MW      Skin Observations Comment Skin with mild erythema in radiation fields but no open areas   -MW      Lymphedema Sensation    Lymphedema Sensation Reports --  -MW      Manual Lymphatic Drainage    Manual Lymphatic Drainage  initial sequence;opened regional lymph nodes;opened anastamoses;extremity treatment;astym  -MW      Initial Sequence supraclavicular;shoulder collectors  -MW      Supraclavicular right;left  -MW      Shoulder Collectors right;left  -MW      Opened Regional Lymph Nodes inguinal  -MW      Inguinal right  -MW      Opened Anastamoses axillo-inguinal  -MW      Axillo-Inguinal right  -MW      Extremity Treatment MLD to full limb;extremity treatment focus on  -MW      MLD to Full Limb RUE   -MW      Astym UE sequence;anterior-lateral chest;upper traps;other astym  -MW      Anterior-Lateral Chest right  -MW      Anterior-Lateral Chest Comment only working superior to radiation field: Evaluator and localizer to superior anterior lateral Rt chest wall; wrapping around into lateral trunk / axilla. Mild to moderate soft tissue disruptions in superior chest and into axilla; moderate to fine disruptions into medial upper arm. Positioned in shoulder flexion/ABD for open axilla work. Focused in central axilla then posterior aspect. Moderate soft tissue disruptions.     -MW      Upper Traps right  -MW      Upper Traps Comment Completed in LSL at end of session; fine soft tissue disruptions through out area.   -MW      UE Sequence right  -MW      UE Sequence Comment Evaluator and localizer to full arm; isolator at wrist / hand and lateral epicondyle. Mixed rough and fine disruptions throughout arm, rough along extensor mm groups.  Modified positioning for triceps and posterior axilla/ teres mm groups. Fine to rough soft tissue disruptions.    -MW      Manual Lymphatic Drainage Comments MLD post ASTYM   -MW      Compression/Skin Care    Compression/Skin Care wrapping location;bandaging  -MW      Wrapping Location upper extremity  -MW      Wrapping Location UE right:;fingers to axilla  -MW      Wrapping Comments 3.5 to hand/ forearm doubled at hand; sz 4 mid forearm to axilla, doubled on forearm   -MW      Bandage Layers cotton elastic  "stocking- single layer (comment size);cotton elastic stocking- double layer (comment size)  -      Compression/Skin Care Comments lost glove; pt to look again as no more free gloves available her in size   -        User Key  (r) = Recorded By, (t) = Taken By, (c) = Cosigned By    Initials Name Provider Type    FRAN Bergeron, PT Physical Therapist                              PT Assessment/Plan       12/05/17 1400       PT Assessment    Functional Limitations Performance in self-care ADL;Limitation in home management  -     Impairments Pain;Impaired lymphatic circulation;Edema;Joint mobility;Muscle strength;Impaired flexibility  -     Assessment Comments Pt returns with \"compression\" in place but states it is for a calf (she used it before she had PT but doesn't know where the other ones are). Skin is dry but remains intact. Extended area for ASTYM into axilla and superior chest to promote tissue remodeling and improved lymphflow. Arm softening with MLD post ASTYM. Long term management may be problematic if pt is unable to keep track of garments.   -     Rehab Potential Fair  -     Patient/caregiver participated in establishment of treatment plan and goals Yes  -     Patient would benefit from skilled therapy intervention Yes  -MW     PT Plan    PT Frequency 2x/week  -     Physical Therapy Interventions (Optional Details) manual lymphatic drainage;manual therapy techniques;ROM (Range of Motion);strengthening;stretching;taping;patient/family education;home exercise program  -     PT Plan Comments Cont MLD/ ASTYM / STM; add gentle ROM HEP   -       User Key  (r) = Recorded By, (t) = Taken By, (c) = Cosigned By    Initials Name Provider Type    FRAN Bergeron, PT Physical Therapist                                 PT OP Goals       12/05/17 1816       Time Calculation    PT Goal Re-Cert Due Date 02/22/18  -       User Key  (r) = Recorded By, (t) = Taken By, (c) = Cosigned By    Initials " Name Provider Type    FRAN Bergeron PT Physical Therapist                Therapy Education       12/05/17 1400          Therapy Education    Given Symptoms/condition management;Edema management  -MW      Program Reinforced  -MW      How Provided Verbal  -MW      Provided to Patient  -MW      Level of Understanding Verbalized  -MW        User Key  (r) = Recorded By, (t) = Taken By, (c) = Cosigned By    Initials Name Provider Type    FRAN Bergeron PT Physical Therapist                Time Calculation:   Start Time: 1400  Total Timed Code Minutes- PT: 55 minute(s)     Therapy Charges for Today     Code Description Service Date Service Provider Modifiers Qty    82246647297  PT MANUAL THERAPY EA 15 MIN 12/5/2017 Na Bergeron, PT GP 4                    Na Bergeron PT  12/5/2017

## 2017-12-07 ENCOUNTER — HOSPITAL ENCOUNTER (OUTPATIENT)
Dept: PHYSICAL THERAPY | Facility: HOSPITAL | Age: 67
Setting detail: THERAPIES SERIES
Discharge: HOME | End: 2017-12-07
Attending: INTERNAL MEDICINE

## 2017-12-07 DIAGNOSIS — M79.601 RIGHT ARM PAIN: Primary | ICD-10-CM

## 2017-12-07 DIAGNOSIS — I89.0 LYMPHEDEMA OF RIGHT UPPER EXTREMITY: ICD-10-CM

## 2017-12-07 DIAGNOSIS — L90.5 SCAR CONDITIONS AND FIBROSIS OF SKIN: ICD-10-CM

## 2017-12-07 PROCEDURE — 97140 MANUAL THERAPY 1/> REGIONS: CPT | Performed by: PHYSICAL THERAPIST

## 2017-12-07 NOTE — THERAPY TREATMENT NOTE
Outpatient Physical Therapy Lymphedema Treatment Note  [facility]     Patient Name: Brianna Urrutia  : 1950  MRN: [id number]  Today's Date: 2017        Visit Date: 2017    Visit Dx:    ICD-10-CM ICD-9-CM   1. Right arm pain M79.601 729.5   2. Lymphedema of right upper extremity I89.0 457.1   3. Scar conditions and fibrosis of skin L90.5 709.2       Patient Active Problem List   Diagnosis   • Malignant neoplasm of overlapping sites of right female breast   • Malignant neoplasm of upper-outer quadrant of right female breast              Lymphedema       17 1500          Subjective Comments    Subjective Comments Pt reports massage Monday wasn't what she was hoping for; not enough mm work on Lt body.   -MW      Subjective Pain    Able to rate subjective pain? yes  -MW      Pre-Treatment Pain Level 4  -MW      Post-Treatment Pain Level 6  -MW      Subjective Pain Comment hand/ little finger; posterior arm. Chest was burning last night.   -MW      Lymphedema Edema Assessment    Ptting Edema Category By severity  -MW      Pitting Edema Moderate;Mild  -MW      Edema Assessment Comment shoulder with trace edema; upper arm with mild, distal arm and forearm with moderate firm edema. Hand with soft mild to mod   -MW      Skin Changes/Observations    Location/Assessment Upper Quadrant  -MW      Upper Extremity Conditions right:;intact;clean  -MW      Upper Quadrant Conditions right:;dry;crust;scab(s);other (comment)   crusts/ scabs/ scaly at turmor site; rad markers   -MW      Upper Quadrant Color/Pigment right:;hyperpigmented;other (comment)   color changes consistent with radiation therapy  -MW      Upper Quadrant Skin Details Posterior aspect with dry, brown skin (typical radiation changes). Chest with brown to red/ erythemic areas in radiation field, skin very dry; central tumor area with thick dry crust   -MW      Manual Lymphatic Drainage    Manual Lymphatic Drainage initial sequence;opened  regional lymph nodes;opened anastamoses;extremity treatment;astym  -MW      Initial Sequence supraclavicular;shoulder collectors  -MW      Supraclavicular right;left  -MW      Shoulder Collectors right;left  -MW      Opened Regional Lymph Nodes inguinal  -MW      Inguinal right  -MW      Opened Anastamoses axillo-inguinal  -MW      Axillo-Inguinal right  -MW      Extremity Treatment MLD to full limb;extremity treatment focus on  -MW      MLD to Full Limb RUE   -MW      Extremity Treatment Focus On elbow/ forearm   -MW      Astym UE sequence;anterior-lateral chest;upper traps;other astym  -MW      Anterior-Lateral Chest right  -MW      Anterior-Lateral Chest Comment In supine only working superior to radiation field: Evaluator and localizer to superior anterior lateral Rt chest wall; wrapping around into lateral trunk / axilla. Mild to moderate soft tissue disruptions in superior chest and into axilla; moderate to fine disruptions into medial upper arm. Positioned in shoulder flexion/ABD for open axilla work. Focused in central axilla then posterior aspect. Moderate soft tissue disruptions.     -MW      Upper Traps right  -MW      Upper Traps Comment Initiated tx in sitting for UT: Evaluator and localizer to UT / scapular region, fine to rough soft tissue disruptions noted through out.   -MW      UE Sequence right  -MW      UE Sequence Comment Evaluator and localizer to full arm. Mixed rough and fine disruptions throughout arm, rough along extensor mm groups.  Modified positioning for triceps and posterior axilla/ teres mm groups. Fine to rough soft tissue disruptions. Also extra strokes along ulnar process with isolator.   -MW      Manual Lymphatic Drainage Comments Partial ASTYM tx; then [employer] rep in to complete intake data and run demo. Then finished ASTYM, and ended with MLD.   -MW      Compression/Skin Care    Compression/Skin Care wrapping location;bandaging  -MW      Wrapping Location upper extremity  -MW       Wrapping Location UE right:;fingers to axilla  -      Wrapping Comments pt requested to rest arm now. Will apply wraps later; left upper arm at home.   -      Bandage Layers cotton elastic stocking- single layer (comment size);cotton elastic stocking- double layer (comment size)  -        User Key  (r) = Recorded By, (t) = Taken By, (c) = Cosigned By    Initials Name Provider Type    FRAN Bergeron PT Physical Therapist                              PT Assessment/Plan       12/07/17 1500       PT Assessment    Functional Limitations Performance in self-care ADL;Limitation in home management  -     Impairments Pain;Impaired lymphatic circulation;Edema;Joint mobility;Muscle strength;Impaired flexibility  -     Assessment Comments Pt returns with only hand and forearm compression in place; unable to find glove and could not get upper arm compression in place. Expect home lymph pump to assist with edema reduction to be able to progress to definative compression sleeve and glove, but difficult to make progress as pt limited in ability to keep compression in place and to replace for skin care. Pain seems to be decreasing. MD agreeable to OT consult, will set up OT when consult received/ as schedule allows.   -     Rehab Potential Fair  -     Patient/caregiver participated in establishment of treatment plan and goals Yes  -     Patient would benefit from skilled therapy intervention Yes  -MW     PT Plan    PT Frequency 2x/week  -     Physical Therapy Interventions (Optional Details) manual lymphatic drainage;manual therapy techniques;ROM (Range of Motion);strengthening;stretching;taping;patient/family education;home exercise program  -     PT Plan Comments Cont MLD/ ASTYM / STM; add gentle ROM HEP   -       User Key  (r) = Recorded By, (t) = Taken By, (c) = Cosigned By    Initials Name Provider Type    FRAN Bergeron PT Physical Therapist                                 PT OP Goals        12/07/17 1801       Time Calculation    PT Goal Re-Cert Due Date 02/22/18  -MW       User Key  (r) = Recorded By, (t) = Taken By, (c) = Cosigned By    Initials Name Provider Type    FRAN Bergeron PT Physical Therapist                Therapy Education       12/07/17 1500          Therapy Education    Given Symptoms/condition management;Edema management  -MW      Program Reinforced  -MW      How Provided Verbal  -MW      Provided to Patient  -MW      Level of Understanding Verbalized  -MW        User Key  (r) = Recorded By, (t) = Taken By, (c) = Cosigned By    Initials Name Provider Type    FRAN Bergeron PT Physical Therapist                Time Calculation:   Start Time: 1500  Total Timed Code Minutes- PT: 60 minute(s)     Therapy Charges for Today     Code Description Service Date Service Provider Modifiers Qty    10410688158 HC PT MANUAL THERAPY EA 15 MIN 12/7/2017 Na Bergeron, MAT GP 4                    Na Bergeron PT  12/7/2017

## 2017-12-11 ENCOUNTER — APPOINTMENT (OUTPATIENT)
Dept: GENERAL RADIOLOGY | Facility: HOSPITAL | Age: 67
End: 2017-12-11

## 2017-12-11 ENCOUNTER — HOSPITAL ENCOUNTER (EMERGENCY)
Facility: HOSPITAL | Age: 67
Discharge: HOME | End: 2017-12-11
Attending: EMERGENCY MEDICINE | Admitting: EMERGENCY MEDICINE

## 2017-12-11 ENCOUNTER — HOSPITAL ENCOUNTER (OUTPATIENT)
Dept: PHYSICAL THERAPY | Facility: HOSPITAL | Age: 67
Setting detail: THERAPIES SERIES
Discharge: HOME | End: 2017-12-11
Attending: INTERNAL MEDICINE

## 2017-12-11 VITALS
SYSTOLIC BLOOD PRESSURE: 138 MMHG | DIASTOLIC BLOOD PRESSURE: 94 MMHG | HEIGHT: 64 IN | BODY MASS INDEX: 20.49 KG/M2 | HEART RATE: 89 BPM | RESPIRATION RATE: 16 BRPM | OXYGEN SATURATION: 97 % | WEIGHT: 120 LBS | TEMPERATURE: 98.5 F

## 2017-12-11 DIAGNOSIS — M79.601 RIGHT ARM PAIN: Primary | ICD-10-CM

## 2017-12-11 DIAGNOSIS — E87.6 HYPOKALEMIA: Primary | ICD-10-CM

## 2017-12-11 DIAGNOSIS — L90.5 SCAR CONDITIONS AND FIBROSIS OF SKIN: ICD-10-CM

## 2017-12-11 DIAGNOSIS — I89.0 LYMPHEDEMA OF RIGHT UPPER EXTREMITY: ICD-10-CM

## 2017-12-11 DIAGNOSIS — C50.811 MALIGNANT NEOPLASM OF OVERLAPPING SITES OF RIGHT FEMALE BREAST, UNSPECIFIED ESTROGEN RECEPTOR STATUS (HCC): ICD-10-CM

## 2017-12-11 DIAGNOSIS — I10 ESSENTIAL HYPERTENSION: ICD-10-CM

## 2017-12-11 DIAGNOSIS — S20.212A RIB CONTUSION, LEFT, INITIAL ENCOUNTER: ICD-10-CM

## 2017-12-11 DIAGNOSIS — I89.0 LYMPHEDEMA OF ARM: ICD-10-CM

## 2017-12-11 DIAGNOSIS — R53.83 OTHER FATIGUE: ICD-10-CM

## 2017-12-11 LAB
ALBUMIN SERPL-MCNC: 4.2 G/DL (ref 3.2–4.8)
ALBUMIN/GLOB SERPL: 1.3 G/DL (ref 1.5–2.5)
ALP SERPL-CCNC: 67 U/L (ref 25–100)
ALT SERPL W P-5'-P-CCNC: 45 U/L (ref 7–40)
ANION GAP SERPL CALCULATED.3IONS-SCNC: 8 MMOL/L (ref 3–11)
AST SERPL-CCNC: 40 U/L (ref 0–33)
BACTERIA UR QL AUTO: ABNORMAL /HPF
BASOPHILS # BLD AUTO: 0.01 10*3/MM3 (ref 0–0.2)
BASOPHILS NFR BLD AUTO: 0.3 % (ref 0–1)
BILIRUB SERPL-MCNC: 0.6 MG/DL (ref 0.3–1.2)
BILIRUB UR QL STRIP: NEGATIVE
BNP SERPL-MCNC: 58 PG/ML (ref 0–100)
BUN BLD-MCNC: 9 MG/DL (ref 9–23)
BUN/CREAT SERPL: 15 (ref 7–25)
CALCIUM SPEC-SCNC: 9 MG/DL (ref 8.7–10.4)
CHLORIDE SERPL-SCNC: 101 MMOL/L (ref 99–109)
CLARITY UR: ABNORMAL
CO2 SERPL-SCNC: 30 MMOL/L (ref 20–31)
COLOR UR: YELLOW
CREAT BLD-MCNC: 0.6 MG/DL (ref 0.6–1.3)
DEPRECATED RDW RBC AUTO: 41.7 FL (ref 37–54)
EOSINOPHIL # BLD AUTO: 0.05 10*3/MM3 (ref 0–0.3)
EOSINOPHIL NFR BLD AUTO: 1.4 % (ref 0–3)
ERYTHROCYTE [DISTWIDTH] IN BLOOD BY AUTOMATED COUNT: 12.8 % (ref 11.3–14.5)
GFR SERPL CREATININE-BSD FRML MDRD: 100 ML/MIN/1.73
GLOBULIN UR ELPH-MCNC: 3.2 GM/DL
GLUCOSE BLD-MCNC: 85 MG/DL (ref 70–100)
GLUCOSE UR STRIP-MCNC: NEGATIVE MG/DL
HCT VFR BLD AUTO: 39.7 % (ref 34.5–44)
HGB BLD-MCNC: 13.2 G/DL (ref 11.5–15.5)
HGB UR QL STRIP.AUTO: NEGATIVE
HOLD SPECIMEN: NORMAL
HOLD SPECIMEN: NORMAL
HYALINE CASTS UR QL AUTO: ABNORMAL /LPF
IMM GRANULOCYTES # BLD: 0.01 10*3/MM3 (ref 0–0.03)
IMM GRANULOCYTES NFR BLD: 0.3 % (ref 0–0.6)
KETONES UR QL STRIP: NEGATIVE
LEUKOCYTE ESTERASE UR QL STRIP.AUTO: NEGATIVE
LYMPHOCYTES # BLD AUTO: 1.08 10*3/MM3 (ref 0.6–4.8)
LYMPHOCYTES NFR BLD AUTO: 29.3 % (ref 24–44)
MAGNESIUM SERPL-MCNC: 1.9 MG/DL (ref 1.3–2.7)
MCH RBC QN AUTO: 29.9 PG (ref 27–31)
MCHC RBC AUTO-ENTMCNC: 33.2 G/DL (ref 32–36)
MCV RBC AUTO: 89.8 FL (ref 80–99)
MONOCYTES # BLD AUTO: 0.39 10*3/MM3 (ref 0–1)
MONOCYTES NFR BLD AUTO: 10.6 % (ref 0–12)
NEUTROPHILS # BLD AUTO: 2.14 10*3/MM3 (ref 1.5–8.3)
NEUTROPHILS NFR BLD AUTO: 58.1 % (ref 41–71)
NITRITE UR QL STRIP: NEGATIVE
PH UR STRIP.AUTO: 7 [PH] (ref 5–8)
PLATELET # BLD AUTO: 197 10*3/MM3 (ref 150–450)
PMV BLD AUTO: 8.8 FL (ref 6–12)
POTASSIUM BLD-SCNC: 3.2 MMOL/L (ref 3.5–5.5)
PROT SERPL-MCNC: 7.4 G/DL (ref 5.7–8.2)
PROT UR QL STRIP: NEGATIVE
RBC # BLD AUTO: 4.42 10*6/MM3 (ref 3.89–5.14)
RBC # UR: ABNORMAL /HPF
REF LAB TEST METHOD: ABNORMAL
SODIUM BLD-SCNC: 139 MMOL/L (ref 132–146)
SP GR UR STRIP: 1.01 (ref 1–1.03)
SQUAMOUS #/AREA URNS HPF: ABNORMAL /HPF
TROPONIN I SERPL-MCNC: 0 NG/ML (ref 0–0.07)
TROPONIN I SERPL-MCNC: 0 NG/ML (ref 0–0.07)
UROBILINOGEN UR QL STRIP: ABNORMAL
WBC NRBC COR # BLD: 3.68 10*3/MM3 (ref 3.5–10.8)
WBC UR QL AUTO: ABNORMAL /HPF
WHOLE BLOOD HOLD SPECIMEN: NORMAL
WHOLE BLOOD HOLD SPECIMEN: NORMAL

## 2017-12-11 PROCEDURE — 97140 MANUAL THERAPY 1/> REGIONS: CPT | Performed by: PHYSICAL THERAPIST

## 2017-12-11 PROCEDURE — 81001 URINALYSIS AUTO W/SCOPE: CPT

## 2017-12-11 PROCEDURE — 85025 COMPLETE CBC W/AUTO DIFF WBC: CPT

## 2017-12-11 PROCEDURE — 71100 X-RAY EXAM RIBS UNI 2 VIEWS: CPT

## 2017-12-11 PROCEDURE — 80053 COMPREHEN METABOLIC PANEL: CPT

## 2017-12-11 PROCEDURE — 99284 EMERGENCY DEPT VISIT MOD MDM: CPT

## 2017-12-11 PROCEDURE — 93005 ELECTROCARDIOGRAM TRACING: CPT | Performed by: PHYSICIAN ASSISTANT

## 2017-12-11 PROCEDURE — 83880 ASSAY OF NATRIURETIC PEPTIDE: CPT | Performed by: PHYSICIAN ASSISTANT

## 2017-12-11 PROCEDURE — 71010 HC CHEST PA OR AP: CPT

## 2017-12-11 PROCEDURE — 84484 ASSAY OF TROPONIN QUANT: CPT

## 2017-12-11 PROCEDURE — 93005 ELECTROCARDIOGRAM TRACING: CPT

## 2017-12-11 PROCEDURE — 83735 ASSAY OF MAGNESIUM: CPT

## 2017-12-11 RX ORDER — SODIUM CHLORIDE 0.9 % (FLUSH) 0.9 %
10 SYRINGE (ML) INJECTION AS NEEDED
Status: DISCONTINUED | OUTPATIENT
Start: 2017-12-11 | End: 2017-12-11 | Stop reason: HOSPADM

## 2017-12-11 RX ORDER — POTASSIUM CHLORIDE 750 MG/1
20 CAPSULE, EXTENDED RELEASE ORAL ONCE
Status: COMPLETED | OUTPATIENT
Start: 2017-12-11 | End: 2017-12-11

## 2017-12-11 RX ADMIN — POTASSIUM CHLORIDE 20 MEQ: 750 CAPSULE, EXTENDED RELEASE ORAL at 16:11

## 2017-12-11 NOTE — PROGRESS NOTES
Continued Stay Note  [facility]     Patient Name: Brianna Urrutia  MRN: [id number]  Today's Date: 12/11/2017    Admit Date: 12/11/2017          Discharge Plan       12/11/17 3209    Case Management/Social Work Plan    Plan home    Patient/Family In Agreement With Plan yes    Additional Comments CM spoke with pt at bedside per request. Pt had asked about food resources and SW has provided information to CM and CM delivered to pt's bedside. While discussing home meal options and resources with pt she also reported she is having trouble paying her electtric and rent bills this month. Per  instructions CM instructed pt to contact  the [employer] or [employer] [facility] for assistance with bills. Pt verbalized understanding and reports she will call them. Pt denies further needs at this time.              Discharge Codes     None            Yanelis Ballesteros
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
## 2017-12-11 NOTE — THERAPY TREATMENT NOTE
Outpatient Physical Therapy Lymphedema Treatment Note  [facility]     Patient Name: Brianna Urrutia  : 1950  MRN: [id number]  Today's Date: 2017        Visit Date: 2017    Visit Dx:    ICD-10-CM ICD-9-CM   1. Right arm pain M79.601 729.5   2. Lymphedema of right upper extremity I89.0 457.1   3. Scar conditions and fibrosis of skin L90.5 709.2       Patient Active Problem List   Diagnosis   • Malignant neoplasm of overlapping sites of right female breast   • Malignant neoplasm of upper-outer quadrant of right female breast              Lymphedema       17 1100          Subjective Comments    Subjective Comments Pt reports she hasn't been feeling well, noted that her diastolic BP numbers have been in the 90's for about 4 days; she states she has been taking her BP meds as prescribed. States generally doesn't feel well. Asking to go to the ED after her PT treatment   -MW      Subjective Pain    Able to rate subjective pain? yes  -MW      Pre-Treatment Pain Level 5  -MW      Post-Treatment Pain Level 5  -MW      Subjective Pain Comment posterior Rt arm   -MW      Lymphedema Edema Assessment    Ptting Edema Category By severity  -MW      Pitting Edema Moderate;Mild  -MW      Edema Assessment Comment mild to moderate edema in axilla and lateral trunk adjacent to breast; mild to moderate edema in upper arm, moderate firm edema in forearm.   -MW      Skin Changes/Observations    Location/Assessment Upper Quadrant  -MW      Upper Extremity Conditions right:;intact;clean  -MW      Upper Quadrant Conditions right:;dry;crust;other (comment)   scaly at turmor site;scabs absent; dry flaky skin   -MW      Upper Quadrant Color/Pigment right:;hyperpigmented;other (comment)   color changes consistent with radiation therapy  -MW      Upper Quadrant Skin Details Posterior aspect with dry, brown skin (typical radiation changes). Chest with brown to red/ erythemic areas in radiation field, skin very dry;  central tumor area with thick dry crust   -MW      Skin Observations Comment erythema at turmor site less bright; healing   -MW      Manual Lymphatic Drainage    Manual Lymphatic Drainage initial sequence;opened regional lymph nodes;opened anastamoses;extremity treatment;astym  -MW      Initial Sequence supraclavicular;shoulder collectors  -MW      Supraclavicular right;left  -MW      Shoulder Collectors right;left  -MW      Opened Regional Lymph Nodes inguinal  -MW      Inguinal right  -MW      Opened Anastamoses axillo-inguinal  -MW      Axillo-Inguinal right  -MW      Extremity Treatment MLD to full limb;extremity treatment focus on  -MW      MLD to Full Limb RUE  -MW      Extremity Treatment Focus On forearm, wrist, hand   -MW      Astym UE sequence;anterior-lateral chest;upper traps;other astym  -MW      Anterior-Lateral Chest right  -MW      Anterior-Lateral Chest Comment In supine working superior to radiation field: Evaluator and localizer to superior anterior lateral Rt chest wall; wrapping around into lateral trunk / axilla. Mild to moderate course soft tissue disruptions in superior chest. Positioned in shoulder flexion/ABD for open axilla work. Moderate fine to course soft tissue disruptions in axilla, mild finer disruptions in medial upper arm.     -MW      Upper Traps right  -MW      Upper Traps Comment Initiated tx in sitting for UT: Evaluator and localizer to UT / scapular region wrapping into posterior-lateral trunk; fine to rough soft tissue disruptions noted through out.   -MW      UE Sequence right  -MW      UE Sequence Comment Evaluator and localizer to full arm. Mixed course and fine disruptions throughout arm, more course along extensor mm groups and in forearm.  Modified positioning for triceps and posterior axilla/ teres mm groups. Fine to course soft tissue disruptions in posterior upperarm.  -MW      Manual Lymphatic Drainage Comments MLD post ASTYM / STM   -MW      Compression/Skin Care     Compression/Skin Care wrapping location;bandaging  -      Wrapping Location upper extremity  -MW      Wrapping Location UE right:;fingers to axilla  -MW      Wrapping Comments 3.5 to hand/ forearm doubled at hand; sz 4 mid forearm to axilla, doubled on forearm   -MW      Bandage Layers cotton elastic stocking- single layer (comment size);cotton elastic stocking- double layer (comment size)  -        User Key  (r) = Recorded By, (t) = Taken By, (c) = Cosigned By    Initials Name Provider Type    FRAN Bergeron, PT Physical Therapist                              PT Assessment/Plan       12/11/17 1100       PT Assessment    Functional Limitations Performance in self-care ADL;Limitation in home management  -     Impairments Pain;Impaired lymphatic circulation;Edema;Joint mobility;Muscle strength;Impaired flexibility  -     Assessment Comments Pt returns with full arm compressogrip in place; but still with moderate to severe edema in forearm. Gradual improvement in chest wall skin with decrease in erythema and less dry scabs/ crusts. Arm softer post treatment, but needs to transition to more compression but difficult as pt lives alone. Concerned about diastolic BP (checked in sitting and supine with both diastolics >90mmHg) so assisted pt to ED for further evaluation/ care.   -MW     Rehab Potential Fair  -MW     Patient/caregiver participated in establishment of treatment plan and goals Yes  -MW     Patient would benefit from skilled therapy intervention Yes  -MW     PT Plan    PT Frequency 2x/week  -     Physical Therapy Interventions (Optional Details) manual lymphatic drainage;manual therapy techniques;ROM (Range of Motion);strengthening;stretching;taping;patient/family education;home exercise program  -     PT Plan Comments Cont MLD/ ASTYM / STM; add gentle ROM HEP   -       User Key  (r) = Recorded By, (t) = Taken By, (c) = Cosigned By    Initials Name Provider Type    FRAN Bergeron, PT  Physical Therapist                           PT OP Goals       12/11/17 1647       Time Calculation    PT Goal Re-Cert Due Date 02/22/18  -MW       User Key  (r) = Recorded By, (t) = Taken By, (c) = Cosigned By    Initials Name Provider Type    FRAN Bergeron PT Physical Therapist                Therapy Education       12/11/17 1100          Therapy Education    Education Details Cont compression use as tolerated; several hours on with 1 hour off is better than nothing for many hours.   -MW      Given Symptoms/condition management;Edema management  -MW      Program Reinforced  -MW      How Provided Verbal  -MW      Provided to Patient  -MW      Level of Understanding Verbalized  -MW        User Key  (r) = Recorded By, (t) = Taken By, (c) = Cosigned By    Initials Name Provider Type    FRAN Bergeron PT Physical Therapist                Time Calculation:   Start Time: 1100  Total Timed Code Minutes- PT: 55 minute(s)     Therapy Charges for Today     Code Description Service Date Service Provider Modifiers Qty    98742820877 HC PT MANUAL THERAPY EA 15 MIN 12/11/2017 Na Bergeron, PT GP 4                    Na Bergeron PT  12/11/2017

## 2017-12-11 NOTE — DISCHARGE INSTRUCTIONS
Increase your dietary potassium intake.  Start taking your blood pressure medication at bedtime.  Continue to check a.m. blood pressures for the next week.  Follow-up with Dr. Akbar for recheck this week - call tomorrow to schedule appointment.  Return to the emergency department immediately if any change or worsening of symptoms.

## 2017-12-14 ENCOUNTER — HOSPITAL ENCOUNTER (OUTPATIENT)
Dept: PHYSICAL THERAPY | Facility: HOSPITAL | Age: 67
Setting detail: THERAPIES SERIES
Discharge: HOME | End: 2017-12-14
Attending: INTERNAL MEDICINE

## 2017-12-14 DIAGNOSIS — L90.5 SCAR CONDITIONS AND FIBROSIS OF SKIN: ICD-10-CM

## 2017-12-14 DIAGNOSIS — M79.601 RIGHT ARM PAIN: Primary | ICD-10-CM

## 2017-12-14 DIAGNOSIS — I89.0 LYMPHEDEMA OF RIGHT UPPER EXTREMITY: ICD-10-CM

## 2017-12-14 PROCEDURE — G8985 CARRY GOAL STATUS: HCPCS | Performed by: PHYSICAL THERAPIST

## 2017-12-14 PROCEDURE — 97140 MANUAL THERAPY 1/> REGIONS: CPT | Performed by: PHYSICAL THERAPIST

## 2017-12-14 PROCEDURE — G8984 CARRY CURRENT STATUS: HCPCS | Performed by: PHYSICAL THERAPIST

## 2017-12-14 NOTE — THERAPY PROGRESS REPORT/RE-CERT
Outpatient Physical Therapy Lymphedema Progress Note  [facility]     Patient Name: Brianna Urrutia  : 1950  MRN: [id number]  Today's Date: 2017        Visit Date: 2017    Visit Dx:    ICD-10-CM ICD-9-CM   1. Right arm pain M79.601 729.5   2. Lymphedema of right upper extremity I89.0 457.1   3. Scar conditions and fibrosis of skin L90.5 709.2       Patient Active Problem List   Diagnosis   • Malignant neoplasm of overlapping sites of right female breast   • Malignant neoplasm of upper-outer quadrant of right female breast              Lymphedema       17 1100             Subjective Comments Pt reports her breast area was more sore after last visit and is still sore. Also unsure of glove options; as she cannot find the original one.   -MW       Able to rate subjective pain? yes  -MW    Pre-Treatment Pain Level 5  -MW    Post-Treatment Pain Level 5  -MW    Subjective Pain Comment Rt hand, wrist, arm   -MW       Ptting Edema Category By severity  -MW    Pitting Edema Moderate;Mild  -MW    Edema Assessment Comment mild to moderate edema in axilla and lateral trunk adjacent to breast; mild to moderate edema in upper arm, moderate firm edema in forearm.   -MW       Location/Assessment Upper Quadrant  -MW    Upper Extremity Conditions right:;intact;clean  -MW    Upper Quadrant Conditions right:;dry;crust;other (comment)   scaly at turmor site; dry flaky skin   -MW    Upper Quadrant Color/Pigment right:;hyperpigmented;other (comment)   color changes consistent with radiation therapy  -MW    Upper Quadrant Skin Details Posterior aspect with color changes consistent with radiation, but much less dry than last visit. Chest with brown to red/ erythemic areas in radiation field, skin dry and flaky; central tumor area with min to mod dry crust.  -MW       Measurement Type(s) Quick Girth  -MW    Quick Girth Areas Upper extremities   pt arrived with bare RUE; compression off > 12 hours   -MW        Axilla 27.8 cm  -MW    Mid upper arm 27.3 cm  -MW    Elbow 25.5 cm  -MW    Mid forearm 22.8 cm  -MW    Wrist crease 17 cm  -MW    Web space 18.8 cm  -MW    Met-heads 18.2 cm  -MW       Manual Lymphatic Drainage initial sequence;opened regional lymph nodes;opened anastamoses;extremity treatment;astym  -MW    Initial Sequence supraclavicular;shoulder collectors  -MW    Supraclavicular right;left  -MW    Shoulder Collectors right;left  -MW    Opened Regional Lymph Nodes inguinal;axillary  -MW    Axillary left  -MW    Inguinal right  -MW    Opened Anastamoses axillo-inguinal;anterior axillo-axillary  -MW    Anterior Axillo-Axillary moving Rt to left   -MW    Axillo-Inguinal right  -MW    Extremity Treatment MLD to full limb;extremity treatment focus on  -MW    MLD to Full Limb RUE   -MW    Extremity Treatment Focus On medial upper arm, elbow, forearm and hand   -MW    Astym UE sequence;anterior-lateral chest;upper traps;other astym  -MW    Anterior-Lateral Chest right  -MW    Anterior-Lateral Chest Comment In supine working superior to radiation field: Evaluator and localizer to superior anterior lateral Rt chest wall. Mild to moderate course soft tissue disruptions in superior chest. Positioned in shoulder flexion/ABD for open axilla work. Moderate fine to course soft tissue disruptions in axilla, mild finer disruptions in medial upper arm.     -MW    Upper Traps right  -MW    Upper Traps Comment Initiated tx in sitting for UT: Evaluator and localizer to UT / scapular region wrapping into posterior-lateral trunk; fine soft tissue disruptions noted through out; tender posterior axillary fold.   -MW    UE Sequence right  -MW    UE Sequence Comment Evaluator and localizer to full arm. Mixed course and fine disruptions throughout arm, more course along extensor mm groups and in forearm.  Tender medial forearm and upperarm.   -MW    Manual Lymphatic Drainage Comments MLD post ASTYM / STM   -MW       Compression/Skin Care  wrapping location;bandaging  -    Wrapping Location upper extremity  -MW    Wrapping Location UE right:;fingers to axilla  -    Wrapping Comments KT medical glove; sz 4 compressogrip wrist to axilla, doubled on forearm   -MW    Bandage Layers cotton elastic stocking- single layer (comment size);cotton elastic stocking- double layer (comment size)  -      User Key  (r) = Recorded By, (t) = Taken By, (c) = Cosigned By    Initials Name Provider Type     Na Bergeron, PT Physical Therapist                              PT Assessment/Plan       12/14/17 1100          Functional Limitations Performance in self-care ADL;Limitation in home management  -MW    Impairments Pain;Impaired lymphatic circulation;Edema;Joint mobility;Muscle strength;Impaired flexibility  -    Assessment Comments Pt has been seen for 6 PT visits of MLD and ASTYM to Rt upper quarter for treatment of her 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 She continues to report RUE pain, numbness and tingling. She continues to display limited ROM due to pain and swelling as well as significant edema in the RT elbow, forearm and hand. She has difficulty with self MLD and use of compression due to limited support (lives alone) and limited tolerance to compression. Expect improvement in function with continued PT and as she continues to recover from her radiation therapy.   -MW    Rehab Potential Fair  -MW    Patient/caregiver participated in establishment of treatment plan and goals Yes  -MW    Patient would benefit from skilled therapy intervention Yes  -MW       PT Frequency 2x/week  -MW    Predicted Duration of Therapy Intervention (days/wks) 8 visits   -MW    Planned CPT's? PT MANUAL THERAPY EA 15 MIN: 86313;PT THER PROC EA 15 MIN: 08142  -MW    Physical Therapy Interventions (Optional Details) manual lymphatic drainage;manual therapy techniques;ROM (Range of Motion);strengthening;stretching;taping;patient/family  "education;home exercise program  -    PT Plan Comments Cont MLD/ ASTYM / STM; add gentle ROM HEP   -MW      User Key  (r) = Recorded By, (t) = Taken By, (c) = Cosigned By    Initials Name Provider Type    FRAN Bergeron PT Physical Therapist                           PT OP Goals       12/14/17 1100    PT Short Term Goals    STG Date to Achieve 12/20/17  -MW    STG 1 Patient to report 25% improvement in RUE pain  -MW    STG 1 Progress Partially Met  -MW    STG 2 RUE circumferential measurement reduction by >/= 2 cm to promote decrease in pain and improved function.   -MW    STG 2 Progress Ongoing  -MW    STG 3 Pt independent with basic home lymph massage to promote fluid movement and decrease in pain.   -MW    STG 3 Progress Ongoing;Partially Met  -MW    STG 3 Progress Comments Able to perform some self massage but then Lt hand \"goes numb\"   -MW    Long Term Goals    LTG 1 Patient able to actively raise  degrees or better to improve ability to complete daily activities including fixing her hair  -MW    LTG 1 Progress Ongoing  -MW    LTG 2 Patient to be measured and fit with compression sleeve  -    LTG 2 Progress Ongoing  -MW    LTG 3 Improved DASH score by 15 points to demonstrate improved function  /return to PLOF.   -MW    LTG 3 Progress Ongoing  -    Time Calculation    PT Goal Re-Cert Due Date 02/22/18  -      User Key  (r) = Recorded By, (t) = Taken By, (c) = Cosigned By    Initials Name Provider Type    FRAN Bergeron PT Physical Therapist          Therapy Education  Education Details: Compression on as much as possible; sling only when out in public places/ prn.   Given: Symptoms/condition management, Edema management  Program: Reinforced  How Provided: Verbal  Provided to: Patient  Level of Understanding: Verbalized    Outcome Measure Options: Disabilities of the Arm, Shoulder, and Hand (DASH)  DASH  Open a tight or new jar.: Unable  Write: Unable  Turn a key: Unable  Prepare a " meal: Severe Difficulty  Push open a heavy door: Severe Difficulty  Place an object on a shelf above your head: Unable  Do heavy household chores (e.g., wash walls, wash floors): Unable  Garden or do yard work: Unable  Make a bed: Unable  Carry a shopping bag or briefcase: Unable  Carry a heavy object (over 10 lbs): Unable  Change a lightbulb overhead: Unable  Wash or blow dry your hair: Severe Difficulty  Wash your back: Unable  Put on a pullover sweater: Severe Difficulty  Use a knife to cut food: Unable  Recreational activities in which require little effort (e.g., cardplaying, knitting, etc.): Unable  Recreational activities in which you take some force or impact through your arm, should or hand (e.g. golf, hammering, tennis, etc.): Unable  Recreational Activities in which you move your arm freely (e.g., frisbee, badminton, etc.): Unable  Manage transportation needs (getting from one place to another): Moderate Difficulty  During the past week, to what extent has your arm, shoulder, or hand problem interfered with your normal social activites with family, friends, neighbors or groups?: Quite a bit  During the past week, were you limited in your work or other regular daily activities as a result of your arm, shoulder or hand problem?: Unable  Arm, Shoulder, or hand pain: Extreme  Arm, shoulder or hand pain when you performed any specific activity: Extreme  Tingling (pins and needles) in your arm, shoulder, or hand: Extreme  Weakness in your arm, shoulder or hand: Extreme  Stiffness in your arm, shoulder or hand: Extreme  During the past week, how much difficulty have you had sleeping because of the pain in your arm, shoulder or hand?: Severe Difficulty  I feel less capable, less confident or less useful because of my arm, shoulder or hand problem: Agree  DASH Sum : 136  Number of Questions Answered: 29  DASH Score: 92.24         Time Calculation:   Start Time: 1100  Total Timed Code Minutes- PT: 55 minute(s)      Therapy Charges for Today     Code Description Service Date Service Provider Modifiers Qty    72892884244 HC PT CARRY MOV HAND OBJ CURRENT 12/14/2017 Na Bergeron PT GP, CM 1    19872292510 HC PT CARRY MOV HAND OBJ PROJECTED 12/14/2017 Na Bergeron PT GP, CL 1    60919433484 HC PT MANUAL THERAPY EA 15 MIN 12/14/2017 Na Bergeron PT GP 4          PT G-Codes  PT Professional Judgement Used?: Yes (Due to pain and swelling, as well as weakness in ; pt remains severe limited in RUE function. An OT consult has been requested to assist with hand function. )  Outcome Measure Options: Disabilities of the Arm, Shoulder, and Hand (DASH)  Score: 136 or 92% impaired  Functional Limitation: Carrying, moving and handling objects  Carrying, Moving and Handling Objects Current Status (): At least 80 percent but less than 100 percent impaired, limited or restricted  Carrying, Moving and Handling Objects Goal Status (): At least 60 percent but less than 80 percent impaired, limited or restricted         Na Bergeron PT  12/14/2017

## 2017-12-19 ENCOUNTER — HOSPITAL ENCOUNTER (OUTPATIENT)
Dept: PHYSICAL THERAPY | Facility: HOSPITAL | Age: 67
Setting detail: THERAPIES SERIES
Discharge: HOME | End: 2017-12-19
Attending: INTERNAL MEDICINE

## 2017-12-19 DIAGNOSIS — L90.5 SCAR CONDITIONS AND FIBROSIS OF SKIN: ICD-10-CM

## 2017-12-19 DIAGNOSIS — C50.811 MALIGNANT NEOPLASM OF OVERLAPPING SITES OF RIGHT FEMALE BREAST, UNSPECIFIED ESTROGEN RECEPTOR STATUS (HCC): Primary | ICD-10-CM

## 2017-12-19 DIAGNOSIS — I89.0 LYMPHEDEMA OF RIGHT UPPER EXTREMITY: ICD-10-CM

## 2017-12-19 DIAGNOSIS — M79.601 RIGHT ARM PAIN: Primary | ICD-10-CM

## 2017-12-19 PROCEDURE — 97140 MANUAL THERAPY 1/> REGIONS: CPT | Performed by: PHYSICAL THERAPIST

## 2017-12-19 NOTE — THERAPY TREATMENT NOTE
"    Outpatient Physical Therapy Lymphedema Treatment Note  [facility]     Patient Name: Brianna Urrutia  : 1950  MRN: [id number]  Today's Date: 2017        Visit Date: 2017    Visit Dx:    ICD-10-CM ICD-9-CM   1. Right arm pain M79.601 729.5   2. Lymphedema of right upper extremity I89.0 457.1   3. Scar conditions and fibrosis of skin L90.5 709.2       Patient Active Problem List   Diagnosis   • Malignant neoplasm of overlapping sites of right female breast   • Malignant neoplasm of upper-outer quadrant of right female breast              Lymphedema       17 1210          Subjective Comments    Subjective Comments Pt reports that she saw her chiropracter this morning for an adjustment; hips and lt shoulder feel better.   -MW      Subjective Pain    Able to rate subjective pain? yes  -MW      Pre-Treatment Pain Level 4  -MW      Post-Treatment Pain Level 4  -MW      Subjective Pain Comment Rt pinky finger, posterior upper arm / axilla   -MW      Lymphedema Edema Assessment    Ptting Edema Category By severity  -MW      Pitting Edema Moderate  -MW      Edema Assessment Comment firm edema elbow/ soft in upper arm and hand; mild soft in lateral trunk/ ribs   -MW      Skin Changes/Observations    Location/Assessment Upper Quadrant  -MW      Upper Extremity Conditions right:;intact;clean  -MW      Upper Quadrant Conditions right:;dry  -MW      Upper Quadrant Color/Pigment right:;hyperpigmented;other (comment)   color changes consistent with radiation therapy  -MW      Upper Quadrant Skin Details Rt posterior shoulder brown/ color changes consistent with rad therapy also dry; chest dry intact; scabs/ crusts have sloughed off of tumor site - pink new epithelial tissue at original turmor site   -MW      Skin Observations Comment only wearing glove as \"shirt sleeve is too tight to also wear sleeve\"  -MW      Lymphedema Measurements    Measurement Type(s) --  -MW      Quick Girth Areas --  -MW      " Manual Lymphatic Drainage    Manual Lymphatic Drainage initial sequence;opened regional lymph nodes;opened anastamoses;extremity treatment;astym  -MW      Initial Sequence supraclavicular;shoulder collectors  -MW      Supraclavicular right;left  -MW      Shoulder Collectors right;left  -MW      Opened Regional Lymph Nodes inguinal;axillary  -MW      Axillary left  -MW      Inguinal right  -MW      Opened Anastamoses axillo-inguinal;anterior axillo-axillary  -MW      Anterior Axillo-Axillary moving Rt to left   -MW      Axillo-Inguinal right  -MW      Extremity Treatment MLD to full limb;extremity treatment focus on  -MW      MLD to Full Limb RUE   -MW      Extremity Treatment Focus On elbow/ forearm fullness; reworked AIA and AAA several times   -MW      Astym UE sequence;anterior-lateral chest;upper traps;other astym  -MW      Anterior-Lateral Chest right  -MW      Anterior-Lateral Chest Comment In supine working superior to radiation field: Evaluator and localizer to superior anterior lateral Rt chest wall. Mild to moderate course soft tissue disruptions in superior chest. Positioned in shoulder flexion/ABD for open axilla work. Moderate fine to course soft tissue disruptions in axilla, mild finer disruptions in medial upper arm.      -MW      Upper Traps right  -MW      Upper Traps Comment Initiated tx in sitting for UT: Evaluator and localizer to UT / scapular region wrapping into posterior-lateral trunk; minimal fine soft tissue disruptions noted through out.   -MW      UE Sequence right  -MW      UE Sequence Comment Evaluator and localizer to full arm. Mixed course and fine disruptions throughout arm, more course along extensor mm groups and in forearm.  Tender medial forearm and upperarm.   -MW      Manual Lymphatic Drainage Comments MLD post ASTYM / STM   -MW      Compression/Skin Care    Compression/Skin Care wrapping location;bandaging  -MW      Wrapping Location upper extremity  -MW      Wrapping Location  UE right:;hand  -MW      Wrapping Comments KT medical glove only  -MW      Bandage Layers --  -MW        User Key  (r) = Recorded By, (t) = Taken By, (c) = Cosigned By    Initials Name Provider Type    FRAN Bergeron, PT Physical Therapist                              PT Assessment/Plan       12/19/17 1210       PT Assessment    Functional Limitations Performance in self-care ADL;Limitation in home management  -MW     Impairments Pain;Impaired lymphatic circulation;Edema;Joint mobility;Muscle strength;Impaired flexibility  -MW     Assessment Comments Pt returns with no UE compression in place but arm seems stable. Less soft tissue disruptions in chest; skin at original turmor site continues to heal well. Added HEP for shoulder ROM to decrease scar contraction.   -MW     Rehab Potential Fair  -MW     Patient/caregiver participated in establishment of treatment plan and goals Yes  -MW     Patient would benefit from skilled therapy intervention Yes  -MW     PT Plan    PT Frequency 2x/week  -MW     Physical Therapy Interventions (Optional Details) manual lymphatic drainage;manual therapy techniques;ROM (Range of Motion);strengthening;stretching;taping;patient/family education;home exercise program  -MW     PT Plan Comments Cont MLD/ ASTYM / STM; cont to progress ROM HEP   -MW       User Key  (r) = Recorded By, (t) = Taken By, (c) = Cosigned By    Initials Name Provider Type    FRAN Bergeron, PT Physical Therapist                     Exercises       12/19/17 1210          Subjective Comments    Subjective Comments Pt reports that she saw her chiropracter this morning for an adjustment; hips and lt shoulder feel better.   -MW      Subjective Pain    Able to rate subjective pain? yes  -MW      Pre-Treatment Pain Level 4  -MW      Post-Treatment Pain Level 4  -MW      Subjective Pain Comment Rt pinky finger, posterior upper arm / axilla   -MW      Exercise 1    Exercise Name 1 AAROM windowpane flexion   -MW       Cueing 1 Demo;Verbal;Tactile  -MW      Reps 1 3  -MW      Additional Comments supine; encouraged pt to try in sitting or standing at home   -MW      Exercise 2    Exercise Name 2 AAROM shoulder horizontal ABD/ADD (rock the baby)  -MW      Cueing 2 Demo;Verbal  -MW      Reps 2 3  -MW      Additional Comments supine   -MW        User Key  (r) = Recorded By, (t) = Taken By, (c) = Cosigned By    Initials Name Provider Type    FRAN Bergeron PT Physical Therapist                       Manual Rx (last 36 hours)      Manual Treatments       12/19/17 1210          Manual Rx 1    Manual Rx 1 Location Rt chest / axilla   -MW      Manual Rx 1 Type STM, tissue bending, and fascial stretches   -MW      Manual Rx 1 Grade very gentle   -MW      Manual Rx 1 Duration 8  -MW        User Key  (r) = Recorded By, (t) = Taken By, (c) = Cosigned By    Initials Name Provider Type    FRAN Bergeron PT Physical Therapist                PT OP Goals       12/19/17 1652       Time Calculation    PT Goal Re-Cert Due Date 02/22/18  -MW       User Key  (r) = Recorded By, (t) = Taken By, (c) = Cosigned By    Initials Name Provider Type    FRAN Bergeron PT Physical Therapist          Therapy Education  Education Details: AAROM   Given: HEP, Symptoms/condition management  Program: Progressed  How Provided: Verbal, Demonstration  Provided to: Patient  Level of Understanding: Verbalized, Teach back education performed              Time Calculation:   Start Time: 1210  Total Timed Code Minutes- PT: 50 minute(s)     Therapy Charges for Today     Code Description Service Date Service Provider Modifiers Qty    [id number] HC PT MANUAL THERAPY EA 15 MIN 12/19/2017 Na Bergeron PT GP 3                    Na Bergeron PT  12/19/2017

## 2017-12-20 ENCOUNTER — OFFICE VISIT (OUTPATIENT)
Dept: PSYCHIATRY | Facility: CLINIC | Age: 67
End: 2017-12-20

## 2017-12-20 DIAGNOSIS — F41.9 ANXIETY DISORDER, UNSPECIFIED TYPE: Primary | ICD-10-CM

## 2017-12-20 DIAGNOSIS — F51.05 INSOMNIA DUE TO MENTAL CONDITION: ICD-10-CM

## 2017-12-20 PROCEDURE — 90792 PSYCH DIAG EVAL W/MED SRVCS: CPT | Performed by: NURSE PRACTITIONER

## 2017-12-20 RX ORDER — MIRTAZAPINE 15 MG/1
TABLET, FILM COATED ORAL
Qty: 30 TABLET | Refills: 2 | Status: SHIPPED | OUTPATIENT
Start: 2017-12-20 | End: 2018-02-28 | Stop reason: SDDI
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
## 2017-12-28 ENCOUNTER — HOSPITAL ENCOUNTER (OUTPATIENT)
Dept: PHYSICAL THERAPY | Facility: HOSPITAL | Age: 67
Setting detail: THERAPIES SERIES
Discharge: HOME | End: 2017-12-28
Attending: INTERNAL MEDICINE

## 2017-12-28 ENCOUNTER — HOSPITAL ENCOUNTER (OUTPATIENT)
Dept: OCCUPATIONAL THERAPY | Facility: HOSPITAL | Age: 67
Setting detail: THERAPIES SERIES
Discharge: HOME | End: 2017-12-28
Attending: INTERNAL MEDICINE

## 2017-12-28 DIAGNOSIS — I89.0 LYMPHEDEMA OF RIGHT UPPER EXTREMITY: ICD-10-CM

## 2017-12-28 DIAGNOSIS — Z74.09 IMPAIRED MOBILITY AND ADLS: Primary | ICD-10-CM

## 2017-12-28 DIAGNOSIS — M25.611 DECREASED ROM OF RIGHT SHOULDER: ICD-10-CM

## 2017-12-28 DIAGNOSIS — Z78.9 IMPAIRED MOBILITY AND ADLS: Primary | ICD-10-CM

## 2017-12-28 DIAGNOSIS — M79.601 RIGHT ARM PAIN: Primary | ICD-10-CM

## 2017-12-28 DIAGNOSIS — R29.898 DECREASED GRIP STRENGTH OF RIGHT HAND: ICD-10-CM

## 2017-12-28 DIAGNOSIS — L90.5 SCAR CONDITIONS AND FIBROSIS OF SKIN: ICD-10-CM

## 2017-12-28 DIAGNOSIS — M25.621 DECREASED ROM OF RIGHT ELBOW: ICD-10-CM

## 2017-12-28 DIAGNOSIS — R27.8 DECREASED COORDINATION: ICD-10-CM

## 2017-12-28 PROCEDURE — 97166 OT EVAL MOD COMPLEX 45 MIN: CPT | Performed by: OCCUPATIONAL THERAPIST

## 2017-12-28 PROCEDURE — G8984 CARRY CURRENT STATUS: HCPCS | Performed by: OCCUPATIONAL THERAPIST

## 2017-12-28 PROCEDURE — 97140 MANUAL THERAPY 1/> REGIONS: CPT | Performed by: PHYSICAL THERAPIST

## 2017-12-28 PROCEDURE — G8985 CARRY GOAL STATUS: HCPCS | Performed by: OCCUPATIONAL THERAPIST
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
## 2017-12-28 NOTE — THERAPY TREATMENT NOTE
Outpatient Physical Therapy Lymphedema Treatment Note  [facility]     Patient Name: Brianna Urrutia  : 1950  MRN: [id number]  Today's Date: 2017        Visit Date: 2017    Visit Dx:    ICD-10-CM ICD-9-CM   1. Right arm pain M79.601 729.5   2. Lymphedema of right upper extremity I89.0 457.1   3. Scar conditions and fibrosis of skin L90.5 709.2       Patient Active Problem List   Diagnosis   • Malignant neoplasm of overlapping sites of right female breast   • Malignant neoplasm of upper-outer quadrant of right female breast              Lymphedema       17 0810          Subjective Comments    Subjective Comments Pt reports driving a lot over  holiday; arm more sore and tired as she had to bring her luggage out of her 2nd floor apartment; as well as in and out of the car.   -MW      Subjective Pain    Able to rate subjective pain? yes  -MW      Pre-Treatment Pain Level 5  -MW      Post-Treatment Pain Level 4  -MW      Subjective Pain Comment elbow, wrist, pinky finger   -MW      Lymphedema Edema Assessment    Ptting Edema Category By severity  -MW      Pitting Edema Moderate  -MW      Edema Assessment Comment edema soft in upper arm, firm around elbow   -MW      Skin Changes/Observations    Location/Assessment Upper Quadrant  -MW      Upper Extremity Conditions right:;intact;clean  -MW      Upper Quadrant Conditions right:;other (comment)   intact; radiation area smooth, pink   -MW      Upper Quadrant Color/Pigment right:;hyperpigmented;other (comment)   color changes consistent with radiation therapy  -MW      Upper Quadrant Skin Details Posterior shoulder/ scapular area intact, only trace evidence of prior radiation therapy. Chest area healed; smooth all residual crusts have sloughed off. Scar area is pink/ red and firm but no heat or evidence of infection.   -MW      Skin Observations Comment only wearing glove.   -MW      Lymphedema Measurements    Measurement Type(s) Quick  Girth  -MW      Quick Girth Areas Upper extremities  -MW      RUE Quick Girth (cm)    Axilla 28.2 cm  -MW      Mid upper arm 27.5 cm  -MW      Elbow 25.7 cm  -MW      Mid forearm 24.3 cm  -MW      Wrist crease 16.5 cm  -MW      Web space 18.4 cm  -MW      Met-heads 17.8 cm  -MW      Manual Lymphatic Drainage    Manual Lymphatic Drainage initial sequence;opened regional lymph nodes;opened anastamoses;extremity treatment;astym  -MW      Initial Sequence supraclavicular;shoulder collectors  -MW      Supraclavicular right;left  -MW      Shoulder Collectors right;left  -MW      Opened Regional Lymph Nodes inguinal;axillary  -MW      Axillary left  -MW      Inguinal right  -MW      Opened Anastamoses axillo-inguinal;anterior axillo-axillary  -MW      Anterior Axillo-Axillary moving Rt to left   -MW      Axillo-Inguinal right  -MW      Extremity Treatment MLD to full limb;extremity treatment focus on  -MW      MLD to Full Limb RUE  -MW      Extremity Treatment Focus On elbow/ forearm fullness; reworked AIA and AAA several times   -MW      Astym UE sequence;anterior-lateral chest;upper traps;other astym  -MW      Anterior-Lateral Chest right  -MW      Anterior-Lateral Chest Comment In supine working superior to radiation field: Evaluator and localizer to superior anterior lateral Rt chest wall. Mild course to fine soft tissue disruptions in superior chest. Positioned in shoulder flexion/ABD for open axilla work. Moderate to minimal fine soft tissue disruptions in axilla, mild finer disruptions in medial upper arm.      -MW      Upper Traps right  -MW      Upper Traps Comment Initiated tx in sitting for UT: Evaluator and localizer to UT / scapular region wrapping into posterior-lateral trunk; minimal fine soft tissue disruptions noted through out. Extra strokes at teres mm area/ posterior axilla.   -MW      UE Sequence right  -MW      UE Sequence Comment Evaluator and localizer to full arm. Mixed course and fine disruptions  throughout ar; min course to fine disruptions along extensor mm groups and in forearm.   -MW      Manual Lymphatic Drainage Comments MLD post ASTYM / STM   -MW      Compression/Skin Care    Compression/Skin Care wrapping location;bandaging  -MW      Wrapping Location upper extremity  -      Wrapping Location UE right:;hand  -MW      Compression/Skin Care Comments no compression post PT as has OT eval at 9am  -        User Key  (r) = Recorded By, (t) = Taken By, (c) = Cosigned By    Initials Name Provider Type    FRAN Bergeron PT Physical Therapist                              PT Assessment/Plan       12/28/17 0810       PT Assessment    Functional Limitations Performance in self-care ADL;Limitation in home management  -     Impairments Pain;Impaired lymphatic circulation;Edema;Joint mobility;Muscle strength;Impaired flexibility  -     Assessment Comments Pt reports using her BioTab pump over the holiday weekend; feels good. Arm measurements stable as pt has been pumping but not use compression. Skin healing well post radiation therapy. Less soft tissue disruptions noted during ASTYM this visit. Pt beginning OT this date; will collaborate/ coordinate HEP with OT as needed.   -MW     Rehab Potential Fair  -MW     Patient/caregiver participated in establishment of treatment plan and goals Yes  -MW     Patient would benefit from skilled therapy intervention Yes  -MW     PT Plan    PT Frequency 2x/week  -MW     Physical Therapy Interventions (Optional Details) manual lymphatic drainage;manual therapy techniques;ROM (Range of Motion);strengthening;stretching;taping;patient/family education;home exercise program  -     PT Plan Comments Cont MLD/ ASTYM / STM; cont to progress ROM HEP   -       User Key  (r) = Recorded By, (t) = Taken By, (c) = Cosigned By    Initials Name Provider Type    FRAN Bergeron, PT Physical Therapist                Manual Rx (last 36 hours)      Manual Treatments        12/28/17 0810          Manual Rx 1    Manual Rx 1 Location Rt chest / axilla   -MW      Manual Rx 1 Type STM, tissue bending, and fascial stretches   -MW      Manual Rx 1 Grade very gentle   -MW      Manual Rx 1 Duration 8  -MW        User Key  (r) = Recorded By, (t) = Taken By, (c) = Cosigned By    Initials Name Provider Type    FRAN Bergeron PT Physical Therapist                PT OP Goals       12/28/17 1316       Time Calculation    PT Goal Re-Cert Due Date 02/22/18  -MW       User Key  (r) = Recorded By, (t) = Taken By, (c) = Cosigned By    Initials Name Provider Type    FRAN Bergeron PT Physical Therapist                         Time Calculation:   Start Time: 0810  Total Timed Code Minutes- PT: 50 minute(s)     Therapy Charges for Today     Code Description Service Date Service Provider Modifiers Qty    25724530096 HC PT MANUAL THERAPY EA 15 MIN 12/28/2017 Na Bergeron, PT GP 3                    Na Bergeron PT  12/28/2017

## 2018-01-01 ENCOUNTER — HOSPITAL ENCOUNTER (OUTPATIENT)
Dept: PHYSICAL THERAPY | Facility: HOSPITAL | Age: 68
Setting detail: THERAPIES SERIES
Discharge: HOME | End: 2018-08-23
Attending: INTERNAL MEDICINE

## 2018-01-01 ENCOUNTER — OFFICE VISIT (OUTPATIENT)
Dept: FAMILY MEDICINE CLINIC | Facility: CLINIC | Age: 68
End: 2018-01-01

## 2018-01-01 ENCOUNTER — HOSPITAL ENCOUNTER (OUTPATIENT)
Dept: PHYSICAL THERAPY | Facility: HOSPITAL | Age: 68
Setting detail: THERAPIES SERIES
Discharge: HOME | End: 2018-10-29
Attending: INTERNAL MEDICINE

## 2018-01-01 ENCOUNTER — APPOINTMENT (OUTPATIENT)
Dept: PHYSICAL THERAPY | Facility: HOSPITAL | Age: 68
End: 2018-01-01
Attending: INTERNAL MEDICINE

## 2018-01-01 ENCOUNTER — HOSPITAL ENCOUNTER (OUTPATIENT)
Dept: PHYSICAL THERAPY | Facility: HOSPITAL | Age: 68
Setting detail: THERAPIES SERIES
Discharge: HOME | End: 2018-11-12
Attending: INTERNAL MEDICINE

## 2018-01-01 ENCOUNTER — OFFICE VISIT (OUTPATIENT)
Dept: RADIATION ONCOLOGY | Facility: HOSPITAL | Age: 68
End: 2018-01-01

## 2018-01-01 ENCOUNTER — HOSPITAL ENCOUNTER (OUTPATIENT)
Dept: RADIATION ONCOLOGY | Facility: HOSPITAL | Age: 68
Discharge: HOME | End: 2018-10-03

## 2018-01-01 ENCOUNTER — TELEPHONE (OUTPATIENT)
Dept: FAMILY MEDICINE CLINIC | Facility: CLINIC | Age: 68
End: 2018-01-01

## 2018-01-01 ENCOUNTER — HOSPITAL ENCOUNTER (OUTPATIENT)
Dept: PHYSICAL THERAPY | Facility: HOSPITAL | Age: 68
Setting detail: THERAPIES SERIES
Discharge: HOME | End: 2018-11-19
Attending: INTERNAL MEDICINE

## 2018-01-01 ENCOUNTER — APPOINTMENT (OUTPATIENT)
Dept: MRI IMAGING | Facility: HOSPITAL | Age: 68
End: 2018-01-01
Attending: RADIOLOGY

## 2018-01-01 ENCOUNTER — HOSPITAL ENCOUNTER (OUTPATIENT)
Dept: PHYSICAL THERAPY | Facility: HOSPITAL | Age: 68
Setting detail: THERAPIES SERIES
Discharge: HOME | End: 2018-12-20
Attending: INTERNAL MEDICINE

## 2018-01-01 ENCOUNTER — HOSPITAL ENCOUNTER (OUTPATIENT)
Dept: RADIATION ONCOLOGY | Facility: HOSPITAL | Age: 68
Discharge: HOME | End: 2018-10-09

## 2018-01-01 ENCOUNTER — HOSPITAL ENCOUNTER (OUTPATIENT)
Dept: RADIATION ONCOLOGY | Facility: HOSPITAL | Age: 68
Discharge: HOME | End: 2018-10-18

## 2018-01-01 ENCOUNTER — HOSPITAL ENCOUNTER (OUTPATIENT)
Dept: RADIATION ONCOLOGY | Facility: HOSPITAL | Age: 68
Discharge: HOME | End: 2018-09-27

## 2018-01-01 ENCOUNTER — HOSPITAL ENCOUNTER (OUTPATIENT)
Dept: RADIATION ONCOLOGY | Facility: HOSPITAL | Age: 68
Discharge: HOME | End: 2018-10-17

## 2018-01-01 ENCOUNTER — DOCUMENTATION (OUTPATIENT)
Dept: SOCIAL WORK | Facility: HOSPITAL | Age: 68
End: 2018-01-01

## 2018-01-01 ENCOUNTER — HOSPITAL ENCOUNTER (OUTPATIENT)
Dept: RADIATION ONCOLOGY | Facility: HOSPITAL | Age: 68
Setting detail: RADIATION/ONCOLOGY SERIES
Discharge: HOME | End: 2018-11-07

## 2018-01-01 ENCOUNTER — HOSPITAL ENCOUNTER (OUTPATIENT)
Dept: RADIATION ONCOLOGY | Facility: HOSPITAL | Age: 68
Discharge: HOME | End: 2018-10-02

## 2018-01-01 ENCOUNTER — DOCUMENTATION (OUTPATIENT)
Dept: RADIATION ONCOLOGY | Facility: HOSPITAL | Age: 68
End: 2018-01-01

## 2018-01-01 ENCOUNTER — OFFICE VISIT (OUTPATIENT)
Dept: NEUROSURGERY | Facility: CLINIC | Age: 68
End: 2018-01-01

## 2018-01-01 ENCOUNTER — TELEPHONE (OUTPATIENT)
Dept: GASTROENTEROLOGY | Facility: CLINIC | Age: 68
End: 2018-01-01

## 2018-01-01 ENCOUNTER — HOSPITAL ENCOUNTER (OUTPATIENT)
Dept: PHYSICAL THERAPY | Facility: HOSPITAL | Age: 68
Setting detail: THERAPIES SERIES
Discharge: HOME | End: 2018-08-16
Attending: INTERNAL MEDICINE

## 2018-01-01 ENCOUNTER — HOSPITAL ENCOUNTER (OUTPATIENT)
Dept: NUCLEAR MEDICINE | Facility: HOSPITAL | Age: 68
Discharge: HOME | End: 2018-09-18
Attending: NEUROLOGICAL SURGERY

## 2018-01-01 ENCOUNTER — OFFICE VISIT (OUTPATIENT)
Dept: GASTROENTEROLOGY | Facility: CLINIC | Age: 68
End: 2018-01-01

## 2018-01-01 ENCOUNTER — HOSPITAL ENCOUNTER (OUTPATIENT)
Dept: PHYSICAL THERAPY | Facility: HOSPITAL | Age: 68
Setting detail: THERAPIES SERIES
Discharge: HOME | End: 2018-10-25
Attending: INTERNAL MEDICINE

## 2018-01-01 ENCOUNTER — DOCUMENTATION (OUTPATIENT)
Dept: NEUROSURGERY | Facility: CLINIC | Age: 68
End: 2018-01-01

## 2018-01-01 ENCOUNTER — HOSPITAL ENCOUNTER (OUTPATIENT)
Dept: ULTRASOUND IMAGING | Facility: HOSPITAL | Age: 68
Discharge: HOME | End: 2018-09-12
Admitting: NURSE PRACTITIONER

## 2018-01-01 ENCOUNTER — LAB (OUTPATIENT)
Dept: LAB | Facility: HOSPITAL | Age: 68
End: 2018-01-01

## 2018-01-01 ENCOUNTER — OFFICE VISIT (OUTPATIENT)
Dept: ORTHOPEDIC SURGERY | Facility: CLINIC | Age: 68
End: 2018-01-01

## 2018-01-01 ENCOUNTER — HOSPITAL ENCOUNTER (OUTPATIENT)
Dept: RADIATION ONCOLOGY | Facility: HOSPITAL | Age: 68
Discharge: HOME | End: 2018-10-04

## 2018-01-01 ENCOUNTER — HOSPITAL ENCOUNTER (OUTPATIENT)
Dept: RADIATION ONCOLOGY | Facility: HOSPITAL | Age: 68
Discharge: HOME | End: 2018-09-28

## 2018-01-01 ENCOUNTER — APPOINTMENT (OUTPATIENT)
Dept: GENERAL RADIOLOGY | Facility: HOSPITAL | Age: 68
End: 2018-01-01

## 2018-01-01 ENCOUNTER — HOSPITAL ENCOUNTER (EMERGENCY)
Facility: HOSPITAL | Age: 68
Discharge: HOME | End: 2018-11-23
Attending: EMERGENCY MEDICINE | Admitting: EMERGENCY MEDICINE

## 2018-01-01 ENCOUNTER — HOSPITAL ENCOUNTER (OUTPATIENT)
Dept: RADIATION ONCOLOGY | Facility: HOSPITAL | Age: 68
Discharge: HOME | End: 2018-09-12

## 2018-01-01 ENCOUNTER — HOSPITAL ENCOUNTER (OUTPATIENT)
Dept: PHYSICAL THERAPY | Facility: HOSPITAL | Age: 68
Setting detail: THERAPIES SERIES
Discharge: HOME | End: 2018-09-11
Attending: INTERNAL MEDICINE

## 2018-01-01 ENCOUNTER — HOSPITAL ENCOUNTER (OUTPATIENT)
Dept: PHYSICAL THERAPY | Facility: HOSPITAL | Age: 68
Setting detail: THERAPIES SERIES
Discharge: HOME | End: 2018-09-20
Attending: INTERNAL MEDICINE

## 2018-01-01 ENCOUNTER — HOSPITAL ENCOUNTER (OUTPATIENT)
Dept: RADIATION ONCOLOGY | Facility: HOSPITAL | Age: 68
Discharge: HOME | End: 2018-09-25

## 2018-01-01 ENCOUNTER — HOSPITAL ENCOUNTER (OUTPATIENT)
Dept: PHYSICAL THERAPY | Facility: HOSPITAL | Age: 68
Setting detail: THERAPIES SERIES
Discharge: HOME | End: 2018-09-18
Attending: INTERNAL MEDICINE

## 2018-01-01 ENCOUNTER — DOCUMENTATION (OUTPATIENT)
Dept: PHYSICAL THERAPY | Facility: HOSPITAL | Age: 68
End: 2018-01-01

## 2018-01-01 ENCOUNTER — HOSPITAL ENCOUNTER (OUTPATIENT)
Dept: RADIATION ONCOLOGY | Facility: HOSPITAL | Age: 68
Discharge: HOME | End: 2018-09-26

## 2018-01-01 ENCOUNTER — HOSPITAL ENCOUNTER (OUTPATIENT)
Dept: PHYSICAL THERAPY | Facility: HOSPITAL | Age: 68
Setting detail: THERAPIES SERIES
Discharge: HOME | End: 2018-09-13
Attending: INTERNAL MEDICINE

## 2018-01-01 ENCOUNTER — HOSPITAL ENCOUNTER (OUTPATIENT)
Dept: PHYSICAL THERAPY | Facility: HOSPITAL | Age: 68
Setting detail: THERAPIES SERIES
Discharge: HOME | End: 2018-10-18
Attending: INTERNAL MEDICINE

## 2018-01-01 ENCOUNTER — TELEPHONE (OUTPATIENT)
Dept: RADIATION ONCOLOGY | Facility: HOSPITAL | Age: 68
End: 2018-01-01

## 2018-01-01 ENCOUNTER — TELEPHONE (OUTPATIENT)
Dept: SOCIAL WORK | Facility: HOSPITAL | Age: 68
End: 2018-01-01

## 2018-01-01 ENCOUNTER — HOSPITAL ENCOUNTER (OUTPATIENT)
Dept: PHYSICAL THERAPY | Facility: HOSPITAL | Age: 68
Setting detail: THERAPIES SERIES
Discharge: HOME | End: 2018-08-30
Attending: INTERNAL MEDICINE

## 2018-01-01 ENCOUNTER — HOSPITAL ENCOUNTER (OUTPATIENT)
Dept: RADIATION ONCOLOGY | Facility: HOSPITAL | Age: 68
Setting detail: RADIATION/ONCOLOGY SERIES
Discharge: HOME | End: 2018-10-01

## 2018-01-01 ENCOUNTER — HOSPITAL ENCOUNTER (OUTPATIENT)
Dept: RADIATION ONCOLOGY | Facility: HOSPITAL | Age: 68
Setting detail: RADIATION/ONCOLOGY SERIES
Discharge: HOME | End: 2018-09-11

## 2018-01-01 ENCOUNTER — HOSPITAL ENCOUNTER (OUTPATIENT)
Dept: RADIATION ONCOLOGY | Facility: HOSPITAL | Age: 68
Discharge: HOME | End: 2018-09-24

## 2018-01-01 ENCOUNTER — HOSPITAL ENCOUNTER (OUTPATIENT)
Dept: PHYSICAL THERAPY | Facility: HOSPITAL | Age: 68
Setting detail: THERAPIES SERIES
Discharge: HOME | End: 2018-11-08
Attending: INTERNAL MEDICINE

## 2018-01-01 ENCOUNTER — HOSPITAL ENCOUNTER (OUTPATIENT)
Dept: MRI IMAGING | Facility: HOSPITAL | Age: 68
Discharge: HOME | End: 2018-09-06
Attending: NEUROLOGICAL SURGERY | Admitting: NEUROLOGICAL SURGERY

## 2018-01-01 ENCOUNTER — HOSPITAL ENCOUNTER (EMERGENCY)
Facility: HOSPITAL | Age: 68
Discharge: HOME | End: 2018-11-21
Attending: EMERGENCY MEDICINE | Admitting: EMERGENCY MEDICINE

## 2018-01-01 ENCOUNTER — HOSPITAL ENCOUNTER (OUTPATIENT)
Dept: RADIATION ONCOLOGY | Facility: HOSPITAL | Age: 68
Discharge: HOME | End: 2018-10-01

## 2018-01-01 ENCOUNTER — HOSPITAL ENCOUNTER (OUTPATIENT)
Dept: PHYSICAL THERAPY | Facility: HOSPITAL | Age: 68
Setting detail: THERAPIES SERIES
Discharge: HOME | End: 2018-08-20
Attending: INTERNAL MEDICINE

## 2018-01-01 VITALS
HEIGHT: 64 IN | TEMPERATURE: 97.7 F | WEIGHT: 116.4 LBS | SYSTOLIC BLOOD PRESSURE: 160 MMHG | DIASTOLIC BLOOD PRESSURE: 100 MMHG | BODY MASS INDEX: 19.87 KG/M2

## 2018-01-01 VITALS
HEIGHT: 64 IN | WEIGHT: 116 LBS | HEART RATE: 94 BPM | OXYGEN SATURATION: 98 % | TEMPERATURE: 99 F | SYSTOLIC BLOOD PRESSURE: 178 MMHG | BODY MASS INDEX: 19.81 KG/M2 | DIASTOLIC BLOOD PRESSURE: 83 MMHG | RESPIRATION RATE: 18 BRPM

## 2018-01-01 VITALS
BODY MASS INDEX: 19.46 KG/M2 | WEIGHT: 114 LBS | TEMPERATURE: 97 F | HEIGHT: 64 IN | SYSTOLIC BLOOD PRESSURE: 152 MMHG | DIASTOLIC BLOOD PRESSURE: 88 MMHG

## 2018-01-01 VITALS
HEART RATE: 101 BPM | SYSTOLIC BLOOD PRESSURE: 144 MMHG | DIASTOLIC BLOOD PRESSURE: 68 MMHG | BODY MASS INDEX: 20.32 KG/M2 | WEIGHT: 119 LBS | HEIGHT: 64 IN | TEMPERATURE: 98.3 F | OXYGEN SATURATION: 99 %

## 2018-01-01 VITALS
RESPIRATION RATE: 16 BRPM | WEIGHT: 113.6 LBS | BODY MASS INDEX: 19.39 KG/M2 | TEMPERATURE: 98.8 F | DIASTOLIC BLOOD PRESSURE: 76 MMHG | HEART RATE: 116 BPM | HEIGHT: 64 IN | SYSTOLIC BLOOD PRESSURE: 144 MMHG

## 2018-01-01 VITALS
WEIGHT: 115 LBS | BODY MASS INDEX: 19.63 KG/M2 | SYSTOLIC BLOOD PRESSURE: 134 MMHG | HEIGHT: 64 IN | TEMPERATURE: 97.8 F | DIASTOLIC BLOOD PRESSURE: 76 MMHG | HEART RATE: 89 BPM | OXYGEN SATURATION: 99 %

## 2018-01-01 VITALS
TEMPERATURE: 97.3 F | DIASTOLIC BLOOD PRESSURE: 66 MMHG | WEIGHT: 115.5 LBS | HEART RATE: 103 BPM | HEIGHT: 64 IN | OXYGEN SATURATION: 97 % | SYSTOLIC BLOOD PRESSURE: 116 MMHG | RESPIRATION RATE: 18 BRPM | BODY MASS INDEX: 19.72 KG/M2

## 2018-01-01 VITALS
WEIGHT: 120 LBS | HEART RATE: 108 BPM | DIASTOLIC BLOOD PRESSURE: 62 MMHG | SYSTOLIC BLOOD PRESSURE: 107 MMHG | HEIGHT: 64 IN | TEMPERATURE: 98.7 F | BODY MASS INDEX: 20.49 KG/M2 | RESPIRATION RATE: 18 BRPM | OXYGEN SATURATION: 97 %

## 2018-01-01 VITALS — WEIGHT: 119.05 LBS | HEART RATE: 105 BPM | OXYGEN SATURATION: 99 % | HEIGHT: 64 IN | BODY MASS INDEX: 20.32 KG/M2

## 2018-01-01 VITALS
BODY MASS INDEX: 19.81 KG/M2 | WEIGHT: 116 LBS | SYSTOLIC BLOOD PRESSURE: 138 MMHG | DIASTOLIC BLOOD PRESSURE: 76 MMHG | OXYGEN SATURATION: 99 % | HEIGHT: 64 IN | HEART RATE: 89 BPM | TEMPERATURE: 98.1 F

## 2018-01-01 VITALS — HEIGHT: 64 IN | HEART RATE: 97 BPM | WEIGHT: 121.25 LBS | BODY MASS INDEX: 20.7 KG/M2 | OXYGEN SATURATION: 99 %

## 2018-01-01 VITALS — BODY MASS INDEX: 20.7 KG/M2 | WEIGHT: 121.25 LBS | HEIGHT: 64 IN | HEART RATE: 96 BPM | OXYGEN SATURATION: 99 %

## 2018-01-01 VITALS
WEIGHT: 117.6 LBS | HEART RATE: 110 BPM | DIASTOLIC BLOOD PRESSURE: 81 MMHG | BODY MASS INDEX: 20.08 KG/M2 | RESPIRATION RATE: 16 BRPM | SYSTOLIC BLOOD PRESSURE: 134 MMHG | TEMPERATURE: 98.2 F | HEIGHT: 64 IN

## 2018-01-01 VITALS — WEIGHT: 112.6 LBS | BODY MASS INDEX: 19.32 KG/M2

## 2018-01-01 VITALS — BODY MASS INDEX: 19.33 KG/M2 | WEIGHT: 112.7 LBS

## 2018-01-01 DIAGNOSIS — L90.5 SCAR CONDITIONS AND FIBROSIS OF SKIN: Primary | ICD-10-CM

## 2018-01-01 DIAGNOSIS — Z92.3 HISTORY OF RADIATION THERAPY: Primary | ICD-10-CM

## 2018-01-01 DIAGNOSIS — Z92.3 HISTORY OF RADIATION THERAPY: ICD-10-CM

## 2018-01-01 DIAGNOSIS — M79.601 RIGHT ARM PAIN: ICD-10-CM

## 2018-01-01 DIAGNOSIS — Z51.81 ENCOUNTER FOR THERAPEUTIC DRUG MONITORING: ICD-10-CM

## 2018-01-01 DIAGNOSIS — I89.0 LYMPHEDEMA OF RIGHT UPPER EXTREMITY: ICD-10-CM

## 2018-01-01 DIAGNOSIS — N64.4 MASTODYNIA OF RIGHT BREAST: ICD-10-CM

## 2018-01-01 DIAGNOSIS — B18.2 HEP C W/O COMA, CHRONIC (HCC): Primary | ICD-10-CM

## 2018-01-01 DIAGNOSIS — M25.511 ACUTE PAIN OF RIGHT SHOULDER: ICD-10-CM
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
## 2018-01-01 DIAGNOSIS — I89.0 LYMPHEDEMA OF RIGHT ARM: ICD-10-CM

## 2018-01-01 DIAGNOSIS — S80.01XA CONTUSION OF RIGHT KNEE, INITIAL ENCOUNTER: ICD-10-CM

## 2018-01-01 DIAGNOSIS — Z11.59 NEED FOR HEPATITIS B SCREENING TEST: ICD-10-CM

## 2018-01-01 DIAGNOSIS — G62.9 NEUROPATHY: ICD-10-CM

## 2018-01-01 DIAGNOSIS — C50.811 MALIGNANT NEOPLASM OF OVERLAPPING SITES OF RIGHT FEMALE BREAST, UNSPECIFIED ESTROGEN RECEPTOR STATUS (HCC): Primary | ICD-10-CM

## 2018-01-01 DIAGNOSIS — G89.29 CHRONIC PAIN OF RIGHT UPPER EXTREMITY: Primary | ICD-10-CM

## 2018-01-01 DIAGNOSIS — G54.0 BRACHIAL PLEXOPATHY: ICD-10-CM

## 2018-01-01 DIAGNOSIS — S42.214D CLOSED NONDISPLACED FRACTURE OF SURGICAL NECK OF RIGHT HUMERUS WITH ROUTINE HEALING, UNSPECIFIED FRACTURE MORPHOLOGY, SUBSEQUENT ENCOUNTER: ICD-10-CM

## 2018-01-01 DIAGNOSIS — M25.511 ACUTE PAIN OF RIGHT SHOULDER: Primary | ICD-10-CM

## 2018-01-01 DIAGNOSIS — Z09 FRACTURE FOLLOW-UP: Primary | ICD-10-CM

## 2018-01-01 DIAGNOSIS — G89.4 CHRONIC PAIN SYNDROME: ICD-10-CM

## 2018-01-01 DIAGNOSIS — C50.811 MALIGNANT NEOPLASM OF OVERLAPPING SITES OF RIGHT FEMALE BREAST, UNSPECIFIED ESTROGEN RECEPTOR STATUS (HCC): ICD-10-CM

## 2018-01-01 DIAGNOSIS — S42.211G CLOSED DISPLACED FRACTURE OF SURGICAL NECK OF RIGHT HUMERUS WITH DELAYED HEALING, UNSPECIFIED FRACTURE MORPHOLOGY, SUBSEQUENT ENCOUNTER: Primary | ICD-10-CM

## 2018-01-01 DIAGNOSIS — S42.291D CLOSED 3-PART FRACTURE OF PROXIMAL END OF RIGHT HUMERUS WITH ROUTINE HEALING, SUBSEQUENT ENCOUNTER: ICD-10-CM

## 2018-01-01 DIAGNOSIS — M54.2 CERVICALGIA: ICD-10-CM

## 2018-01-01 DIAGNOSIS — B18.2 HEP C W/O COMA, CHRONIC (HCC): ICD-10-CM

## 2018-01-01 DIAGNOSIS — Z11.4 ENCOUNTER FOR SCREENING FOR HIV: ICD-10-CM

## 2018-01-01 DIAGNOSIS — M50.30 DEGENERATIVE DISC DISEASE, CERVICAL: ICD-10-CM

## 2018-01-01 DIAGNOSIS — H81.10 BENIGN PAROXYSMAL POSITIONAL VERTIGO, UNSPECIFIED LATERALITY: ICD-10-CM

## 2018-01-01 DIAGNOSIS — F11.90 CHRONIC, CONTINUOUS USE OF OPIOIDS: ICD-10-CM

## 2018-01-01 DIAGNOSIS — C50.411 MALIGNANT NEOPLASM OF UPPER-OUTER QUADRANT OF RIGHT FEMALE BREAST, UNSPECIFIED ESTROGEN RECEPTOR STATUS (HCC): ICD-10-CM

## 2018-01-01 DIAGNOSIS — S42.291A CLOSED 3-PART FRACTURE OF PROXIMAL END OF RIGHT HUMERUS, INITIAL ENCOUNTER: Primary | ICD-10-CM

## 2018-01-01 DIAGNOSIS — S42.214A CLOSED NONDISPLACED FRACTURE OF SURGICAL NECK OF RIGHT HUMERUS, UNSPECIFIED FRACTURE MORPHOLOGY, INITIAL ENCOUNTER: ICD-10-CM

## 2018-01-01 DIAGNOSIS — C79.51 BONE METASTASES: Primary | ICD-10-CM

## 2018-01-01 DIAGNOSIS — M50.30 DDD (DEGENERATIVE DISC DISEASE), CERVICAL: ICD-10-CM

## 2018-01-01 DIAGNOSIS — C79.51 MALIGNANT NEOPLASM METASTATIC TO BONE (HCC): Primary | ICD-10-CM

## 2018-01-01 DIAGNOSIS — R74.8 LIVER ENZYME ELEVATION: ICD-10-CM

## 2018-01-01 DIAGNOSIS — Z76.0 MEDICATION REFILL: Primary | ICD-10-CM

## 2018-01-01 DIAGNOSIS — I10 ESSENTIAL HYPERTENSION: ICD-10-CM

## 2018-01-01 DIAGNOSIS — M79.601 PAIN OF RIGHT UPPER EXTREMITY: ICD-10-CM

## 2018-01-01 DIAGNOSIS — F41.1 GAD (GENERALIZED ANXIETY DISORDER): ICD-10-CM

## 2018-01-01 DIAGNOSIS — C79.51 BONE METASTASES: ICD-10-CM

## 2018-01-01 DIAGNOSIS — I89.0 LYMPHEDEMA OF RIGHT UPPER EXTREMITY: Primary | ICD-10-CM

## 2018-01-01 DIAGNOSIS — B37.0 ORAL CANDIDIASIS: ICD-10-CM

## 2018-01-01 DIAGNOSIS — M79.601 CHRONIC PAIN OF RIGHT UPPER EXTREMITY: Primary | ICD-10-CM

## 2018-01-01 LAB
AMPHET+METHAMPHET UR QL: NEGATIVE
AMPHETAMINES UR QL: NEGATIVE
BARBITURATES UR QL SCN: NEGATIVE
BENZODIAZ UR QL SCN: NEGATIVE
BUPRENORPHINE SERPL-MCNC: NEGATIVE NG/ML
CANNABINOIDS SERPL QL: POSITIVE
COCAINE UR QL: NEGATIVE
HAV AB SER QL IA: NEGATIVE
HAV IGM SERPL QL IA: NORMAL
HBV CORE AB SER DONR QL IA: NEGATIVE
HBV SURFACE AB SER RIA-ACNC: NORMAL
HBV SURFACE AG SERPL QL IA: NORMAL
HCV GENTYP SERPL NAA+PROBE: NORMAL
HIV1+2 AB SER QL: NORMAL
Lab: NORMAL
METHADONE UR QL SCN: NEGATIVE
OPIATES UR QL: NEGATIVE
OXYCODONE UR QL SCN: NEGATIVE
PCP UR QL SCN: NEGATIVE
PROPOXYPH UR QL: NEGATIVE
TRICYCLICS UR QL SCN: NEGATIVE

## 2018-01-01 PROCEDURE — 99213 OFFICE O/P EST LOW 20 MIN: CPT | Performed by: PHYSICIAN ASSISTANT

## 2018-01-01 PROCEDURE — 77387 GUIDANCE FOR RADJ TX DLVR: CPT | Performed by: RADIOLOGY

## 2018-01-01 PROCEDURE — 97162 PT EVAL MOD COMPLEX 30 MIN: CPT

## 2018-01-01 PROCEDURE — A9503 TC99M MEDRONATE: HCPCS | Performed by: NEUROLOGICAL SURGERY

## 2018-01-01 PROCEDURE — 97140 MANUAL THERAPY 1/> REGIONS: CPT | Performed by: PHYSICAL THERAPIST

## 2018-01-01 PROCEDURE — 97140 MANUAL THERAPY 1/> REGIONS: CPT

## 2018-01-01 PROCEDURE — 0 TECHNETIUM MEDRONATE KIT: Performed by: NEUROLOGICAL SURGERY

## 2018-01-01 PROCEDURE — 77412 RADIATION TX DELIVERY LVL 3: CPT | Performed by: RADIOLOGY

## 2018-01-01 PROCEDURE — 77370 RADIATION PHYSICS CONSULT: CPT | Performed by: RADIOLOGY

## 2018-01-01 PROCEDURE — 87340 HEPATITIS B SURFACE AG IA: CPT

## 2018-01-01 PROCEDURE — G0463 HOSPITAL OUTPT CLINIC VISIT: HCPCS

## 2018-01-01 PROCEDURE — G8985 CARRY GOAL STATUS: HCPCS

## 2018-01-01 PROCEDURE — 80306 DRUG TEST PRSMV INSTRMNT: CPT

## 2018-01-01 PROCEDURE — 99213 OFFICE O/P EST LOW 20 MIN: CPT | Performed by: NEUROLOGICAL SURGERY

## 2018-01-01 PROCEDURE — 96374 THER/PROPH/DIAG INJ IV PUSH: CPT

## 2018-01-01 PROCEDURE — 25010000002 KETOROLAC TROMETHAMINE PER 15 MG: Performed by: EMERGENCY MEDICINE

## 2018-01-01 PROCEDURE — G8984 CARRY CURRENT STATUS: HCPCS | Performed by: PHYSICAL THERAPIST

## 2018-01-01 PROCEDURE — G8985 CARRY GOAL STATUS: HCPCS | Performed by: PHYSICAL THERAPIST

## 2018-01-01 PROCEDURE — 77307 TELETHX ISODOSE PLAN CPLX: CPT | Performed by: RADIOLOGY

## 2018-01-01 PROCEDURE — 77334 RADIATION TREATMENT AID(S): CPT | Performed by: RADIOLOGY

## 2018-01-01 PROCEDURE — 76705 ECHO EXAM OF ABDOMEN: CPT

## 2018-01-01 PROCEDURE — 99283 EMERGENCY DEPT VISIT LOW MDM: CPT

## 2018-01-01 PROCEDURE — 99284 EMERGENCY DEPT VISIT MOD MDM: CPT

## 2018-01-01 PROCEDURE — 77336 RADIATION PHYSICS CONSULT: CPT | Performed by: RADIOLOGY

## 2018-01-01 PROCEDURE — 86706 HEP B SURFACE ANTIBODY: CPT

## 2018-01-01 PROCEDURE — 99204 OFFICE O/P NEW MOD 45 MIN: CPT | Performed by: ORTHOPAEDIC SURGERY

## 2018-01-01 PROCEDURE — 77290 THER RAD SIMULAJ FIELD CPLX: CPT | Performed by: RADIOLOGY

## 2018-01-01 PROCEDURE — 77280 THER RAD SIMULAJ FIELD SMPL: CPT | Performed by: RADIOLOGY

## 2018-01-01 PROCEDURE — 99214 OFFICE O/P EST MOD 30 MIN: CPT | Performed by: NURSE PRACTITIONER

## 2018-01-01 PROCEDURE — 99214 OFFICE O/P EST MOD 30 MIN: CPT | Performed by: PHYSICIAN ASSISTANT

## 2018-01-01 PROCEDURE — G8984 CARRY CURRENT STATUS: HCPCS

## 2018-01-01 PROCEDURE — 86709 HEPATITIS A IGM ANTIBODY: CPT

## 2018-01-01 PROCEDURE — 87902 NFCT AGT GNTYP ALYS HEP C: CPT

## 2018-01-01 PROCEDURE — 72141 MRI NECK SPINE W/O DYE: CPT

## 2018-01-01 PROCEDURE — 73030 X-RAY EXAM OF SHOULDER: CPT

## 2018-01-01 PROCEDURE — 86704 HEP B CORE ANTIBODY TOTAL: CPT

## 2018-01-01 PROCEDURE — 78306 BONE IMAGING WHOLE BODY: CPT

## 2018-01-01 PROCEDURE — G0432 EIA HIV-1/HIV-2 SCREEN: HCPCS

## 2018-01-01 PROCEDURE — 86708 HEPATITIS A ANTIBODY: CPT

## 2018-01-01 PROCEDURE — 36415 COLL VENOUS BLD VENIPUNCTURE: CPT

## 2018-01-01 PROCEDURE — 73560 X-RAY EXAM OF KNEE 1 OR 2: CPT

## 2018-01-01 RX ORDER — HYDROCODONE BITARTRATE AND ACETAMINOPHEN 7.5; 325 MG/1; MG/1
1 TABLET ORAL EVERY 8 HOURS PRN
Qty: 60 TABLET | Refills: 0 | OUTPATIENT
Start: 2018-01-01 | End: 2018-01-01 | Stop reason: HOSPADM

## 2018-01-01 RX ORDER — OXYCODONE AND ACETAMINOPHEN 10; 325 MG/1; MG/1
1 TABLET ORAL EVERY 6 HOURS PRN
Qty: 12 TABLET | Refills: 0 | OUTPATIENT
Start: 2018-01-01 | End: 2018-01-01

## 2018-01-01 RX ORDER — HYDROCODONE BITARTRATE AND ACETAMINOPHEN 7.5; 325 MG/1; MG/1
1 TABLET ORAL ONCE
Status: DISCONTINUED | OUTPATIENT
Start: 2018-01-01 | End: 2018-01-01

## 2018-01-01 RX ORDER — KETOROLAC TROMETHAMINE 15 MG/ML
10 INJECTION, SOLUTION INTRAMUSCULAR; INTRAVENOUS ONCE
Status: COMPLETED | OUTPATIENT
Start: 2018-01-01 | End: 2018-01-01

## 2018-01-01 RX ORDER — ONDANSETRON 8 MG/1
8 TABLET, ORALLY DISINTEGRATING ORAL EVERY 8 HOURS PRN
Qty: 12 TABLET | Refills: 2 | Status: SHIPPED | OUTPATIENT
Start: 2018-01-01 | End: 2018-01-01 | Stop reason: SDUPTHER

## 2018-01-01 RX ORDER — HYDROCODONE BITARTRATE AND ACETAMINOPHEN 7.5; 325 MG/1; MG/1
1 TABLET ORAL EVERY 6 HOURS PRN
COMMUNITY
End: 2019-01-01

## 2018-01-01 RX ORDER — OXYCODONE AND ACETAMINOPHEN 10; 325 MG/1; MG/1
1 TABLET ORAL EVERY 6 HOURS PRN
Qty: 12 TABLET | Refills: 0 | Status: SHIPPED | OUTPATIENT
Start: 2018-01-01 | End: 2018-01-01

## 2018-01-01 RX ORDER — NAPROXEN 375 MG/1
375 TABLET ORAL 2 TIMES DAILY PRN
Qty: 20 TABLET | Refills: 0 | OUTPATIENT
Start: 2018-01-01 | End: 2019-01-01

## 2018-01-01 RX ORDER — NITROFURANTOIN 25; 75 MG/1; MG/1
100 CAPSULE ORAL 2 TIMES DAILY
Qty: 14 CAPSULE | Refills: 0 | Status: SHIPPED | OUTPATIENT
Start: 2018-01-01 | End: 2018-01-01 | Stop reason: ALTCHOICE

## 2018-01-01 RX ORDER — TRAMADOL HYDROCHLORIDE 50 MG/1
TABLET ORAL
COMMUNITY
Start: 2018-01-01 | End: 2018-01-01 | Stop reason: HOSPADM

## 2018-01-01 RX ORDER — MECLIZINE HYDROCHLORIDE 25 MG/1
25 TABLET ORAL 3 TIMES DAILY PRN
Qty: 15 TABLET | Refills: 1 | Status: SHIPPED | OUTPATIENT
Start: 2018-01-01 | End: 2018-01-01

## 2018-01-01 RX ORDER — LOSARTAN POTASSIUM 100 MG/1
100 TABLET ORAL DAILY
Qty: 30 TABLET | Refills: 5 | Status: SHIPPED | OUTPATIENT
Start: 2018-01-01 | End: 2018-01-01 | Stop reason: SDUPTHER

## 2018-01-01 RX ORDER — OXYCODONE AND ACETAMINOPHEN 10; 325 MG/1; MG/1
1 TABLET ORAL ONCE
Status: COMPLETED | OUTPATIENT
Start: 2018-01-01 | End: 2018-01-01

## 2018-01-01 RX ORDER — ONDANSETRON 8 MG/1
8 TABLET, ORALLY DISINTEGRATING ORAL EVERY 8 HOURS PRN
Qty: 21 TABLET | Refills: 11 | Status: SHIPPED | OUTPATIENT
Start: 2018-01-01

## 2018-01-01 RX ORDER — LOSARTAN POTASSIUM 25 MG/1
25 TABLET ORAL DAILY
Qty: 30 TABLET | Refills: 5 | Status: SHIPPED | OUTPATIENT
Start: 2018-01-01 | End: 2018-01-01 | Stop reason: DRUGHIGH

## 2018-01-01 RX ORDER — TC 99M MEDRONATE 20 MG/10ML
26.1 INJECTION, POWDER, LYOPHILIZED, FOR SOLUTION INTRAVENOUS
Status: COMPLETED | OUTPATIENT
Start: 2018-01-01 | End: 2018-01-01

## 2018-01-01 RX ORDER — LOSARTAN POTASSIUM 100 MG/1
100 TABLET ORAL DAILY
Qty: 30 TABLET | Refills: 5 | Status: SHIPPED | OUTPATIENT
Start: 2018-01-01

## 2018-01-01 RX ADMIN — OXYCODONE HYDROCHLORIDE AND ACETAMINOPHEN 1 TABLET: 10; 325 TABLET ORAL at 18:18

## 2018-01-01 RX ADMIN — KETOROLAC TROMETHAMINE 10 MG: 15 INJECTION, SOLUTION INTRAMUSCULAR; INTRAVENOUS at 18:18

## 2018-01-01 RX ADMIN — Medication 26.1 MILLICURIE: at 09:00

## 2018-01-02 ENCOUNTER — HOSPITAL ENCOUNTER (OUTPATIENT)
Dept: PHYSICAL THERAPY | Facility: HOSPITAL | Age: 68
Setting detail: THERAPIES SERIES
Discharge: HOME | End: 2018-01-02
Attending: INTERNAL MEDICINE

## 2018-01-02 ENCOUNTER — HOSPITAL ENCOUNTER (OUTPATIENT)
Dept: OCCUPATIONAL THERAPY | Facility: HOSPITAL | Age: 68
Setting detail: THERAPIES SERIES
Discharge: HOME | End: 2018-01-02
Attending: INTERNAL MEDICINE

## 2018-01-02 DIAGNOSIS — M25.611 DECREASED ROM OF RIGHT SHOULDER: ICD-10-CM

## 2018-01-02 DIAGNOSIS — I89.0 LYMPHEDEMA OF RIGHT UPPER EXTREMITY: ICD-10-CM

## 2018-01-02 DIAGNOSIS — L90.5 SCAR CONDITIONS AND FIBROSIS OF SKIN: ICD-10-CM

## 2018-01-02 DIAGNOSIS — M25.621 DECREASED ROM OF RIGHT ELBOW: ICD-10-CM

## 2018-01-02 DIAGNOSIS — M79.601 RIGHT ARM PAIN: Primary | ICD-10-CM

## 2018-01-02 DIAGNOSIS — R29.898 DECREASED GRIP STRENGTH OF RIGHT HAND: ICD-10-CM

## 2018-01-02 DIAGNOSIS — R27.8 DECREASED COORDINATION: ICD-10-CM

## 2018-01-02 DIAGNOSIS — Z78.9 IMPAIRED MOBILITY AND ADLS: Primary | ICD-10-CM

## 2018-01-02 DIAGNOSIS — Z74.09 IMPAIRED MOBILITY AND ADLS: Primary | ICD-10-CM

## 2018-01-02 PROCEDURE — 97530 THERAPEUTIC ACTIVITIES: CPT | Performed by: OCCUPATIONAL THERAPIST

## 2018-01-02 PROCEDURE — 97140 MANUAL THERAPY 1/> REGIONS: CPT | Performed by: PHYSICAL THERAPIST

## 2018-01-02 NOTE — THERAPY TREATMENT NOTE
"    Outpatient Physical Therapy Lymphedema Treatment Note  [facility]     Patient Name: Brianna Urrutia  : 1950  MRN: [id number]  Today's Date: 2018        Visit Date: 2018    Visit Dx:    ICD-10-CM ICD-9-CM   1. Right arm pain M79.601 729.5   2. Lymphedema of right upper extremity I89.0 457.1   3. Scar conditions and fibrosis of skin L90.5 709.2       Patient Active Problem List   Diagnosis   • Malignant neoplasm of overlapping sites of right female breast   • Malignant neoplasm of upper-outer quadrant of right female breast              Lymphedema       18 1000          Subjective Comments    Subjective Comments Pt reports arm was doing well over the weekend; but when she slammed the door the glass started to fall and she caught it and may have aggrevated her arm. Not sure where the glove is again.   -MW      Subjective Pain    Able to rate subjective pain? yes  -MW      Pre-Treatment Pain Level 7  -MW      Post-Treatment Pain Level 5  -MW      Subjective Pain Comment RUE; some areas \"numb\" after ASTYM and MLD   -MW      Lymphedema Edema Assessment    Ptting Edema Category By severity  -MW      Pitting Edema Moderate  -MW      Skin Changes/Observations    Location/Assessment Upper Quadrant  -MW      Upper Extremity Conditions right:;intact;clean  -MW      Upper Quadrant Conditions right:;other (comment)   intact; radiation area smooth, pink   -MW      Upper Quadrant Color/Pigment right:;hyperpigmented;other (comment)   color changes consistent with radiation therapy  -MW      Skin Observations Comment no compression   -MW      Lymphedema Measurements    Measurement Type(s) --  -MW      Quick Girth Areas --  -MW      Manual Lymphatic Drainage    Manual Lymphatic Drainage initial sequence;opened regional lymph nodes;opened anastamoses;extremity treatment;astym  -MW      Initial Sequence supraclavicular;shoulder collectors  -MW      Supraclavicular right;left  -MW      Shoulder Collectors " right;left  -MW      Opened Regional Lymph Nodes inguinal;axillary  -MW      Axillary left  -MW      Inguinal right  -MW      Opened Anastamoses axillo-inguinal;anterior axillo-axillary  -MW      Anterior Axillo-Axillary moving Rt to left   -MW      Axillo-Inguinal right  -MW      Extremity Treatment MLD to full limb;extremity treatment focus on  -MW      MLD to Full Limb RUE   -MW      Astym UE sequence;anterior-lateral chest;upper traps;other astym  -MW      Anterior-Lateral Chest right  -MW      Anterior-Lateral Chest Comment In supine working superior to radiation field: Evaluator and localizer to superior anterior lateral Rt chest wall. Mild course to fine soft tissue disruptions in superior chest. Worked into lateral trunk ribs/ adjacent to breast with localizer and isolator. Moderate fine to course soft tissue disruptions.   -MW      Upper Traps right  -MW      Upper Traps Comment Initiated tx in sitting for UT: Evaluator and localizer to UT / scapular region wrapping into posterior-lateral trunk; minimal fine soft tissue disruptions noted through out. Extra strokes at teres mm area/ posterior axilla.   -MW      UE Sequence right  -MW      UE Sequence Comment Evaluator and localizer to full arm; isolator in hand. Mixed course and fine disruptions throughout; min course to fine disruptions along extensor mm groups and in forearm.   -MW      Manual Lymphatic Drainage Comments MLD post ASTYM / STM   -MW      Compression/Skin Care    Compression/Skin Care wrapping location;bandaging  -MW      Wrapping Location upper extremity  -MW      Wrapping Location UE right:;hand to axilla  -MW      Bandage Layers cotton elastic stocking- single layer (comment size);cotton elastic stocking- double layer (comment size)   size 3 wrist/ hand, size 4 arm; both doubled distally   -MW        User Key  (r) = Recorded By, (t) = Taken By, (c) = Cosigned By    Initials Name Provider Type    FRAN Bergeron, PT Physical Therapist  "                             PT Assessment/Plan       01/02/18 1100       PT Assessment    Functional Limitations Performance in self-care ADL;Limitation in home management  -     Impairments Pain;Impaired lymphatic circulation;Edema;Joint mobility;Muscle strength;Impaired flexibility  -     Assessment Comments RUE with moderate edema throughout as pt has been without compression for more than 24 hours; glove is lost \"in the house.\" Edema softening and decreasing with MLD; reissued compressogrip for hand and arm. Soft tissue disruptions decreasing with ASTYM; less course areas.   -     Rehab Potential Fair  -     Patient/caregiver participated in establishment of treatment plan and goals Yes  -MW     Patient would benefit from skilled therapy intervention Yes  -MW     PT Plan    PT Frequency 2x/week  -     Physical Therapy Interventions (Optional Details) manual lymphatic drainage;manual therapy techniques;ROM (Range of Motion);strengthening;stretching;taping;patient/family education;home exercise program  -     PT Plan Comments Cont MLD/ ASTYM / STM; cont to progress ROM HEP   -       User Key  (r) = Recorded By, (t) = Taken By, (c) = Cosigned By    Initials Name Provider Type    FRAN Bergeron, PT Physical Therapist                             PT OP Goals       01/02/18 1300       Time Calculation    PT Goal Re-Cert Due Date 02/22/18  -       User Key  (r) = Recorded By, (t) = Taken By, (c) = Cosigned By    Initials Name Provider Type    FRAN Bergeron, PT Physical Therapist          Therapy Education  Education Details: Cont to pump 1-2 x daily; compression on as much as possible   Given: HEP, Symptoms/condition management, Edema management  Program: Progressed  How Provided: Verbal  Provided to: Patient  Level of Understanding: Verbalized              Time Calculation:   Start Time: 1100  Total Timed Code Minutes- PT: 55 minute(s)     Therapy Charges for Today     Code Description " Service Date Service Provider Modifiers Qty    [id number]  PT MANUAL THERAPY EA 15 MIN 1/2/2018 Na Bergeron, PT GP 4                    Na Bergeron, PT  1/2/2018

## 2018-01-02 NOTE — THERAPY TREATMENT NOTE
"Outpatient Occupational Therapy Hand Treatment Note  [facility]     Patient Name: Brianna Urrutia  : 1950  MRN: [id number]  Today's Date: 2018         Visit Date: 2018  Patient Active Problem List   Diagnosis   • Malignant neoplasm of overlapping sites of right female breast   • Malignant neoplasm of upper-outer quadrant of right female breast         Visit Dx:    ICD-10-CM ICD-9-CM   1. Impaired mobility and ADLs Z74.09 799.89   2. Decreased  strength of right hand R29.898 729.89   3. Decreased ROM of right elbow M25.621 719.52   4. Decreased ROM of right shoulder M25.611 719.51   5. Decreased coordination R27.9 781.3                   Lymphedema       18 1000          Subjective Comments    Subjective Comments Pt reports arm was doing well over the weekend; but when she slammed the door the glass started to fall and she caught it and may have aggrevated her arm. Not sure where the glove is again.   -MW      Subjective Pain    Able to rate subjective pain? yes  -MW      Pre-Treatment Pain Level 7  -MW      Post-Treatment Pain Level 5  -MW      Subjective Pain Comment RUE; some areas \"numb\" after ASTYM and MLD   -MW      Lymphedema Edema Assessment    Ptting Edema Category By severity  -MW      Pitting Edema Moderate  -MW      Skin Changes/Observations    Location/Assessment Upper Quadrant  -MW      Upper Extremity Conditions right:;intact;clean  -MW      Upper Quadrant Conditions right:;other (comment)   intact; radiation area smooth, pink   -MW      Upper Quadrant Color/Pigment right:;hyperpigmented;other (comment)   color changes consistent with radiation therapy  -MW      Skin Observations Comment no compression   -MW      Lymphedema Measurements    Measurement Type(s) --  -MW      Quick Girth Areas --  -MW      Manual Lymphatic Drainage    Manual Lymphatic Drainage initial sequence;opened regional lymph nodes;opened anastamoses;extremity treatment;astym  -MW      Initial Sequence " supraclavicular;shoulder collectors  -MW      Supraclavicular right;left  -MW      Shoulder Collectors right;left  -MW      Opened Regional Lymph Nodes inguinal;axillary  -MW      Axillary left  -MW      Inguinal right  -MW      Opened Anastamoses axillo-inguinal;anterior axillo-axillary  -MW      Anterior Axillo-Axillary moving Rt to left   -MW      Axillo-Inguinal right  -MW      Extremity Treatment MLD to full limb;extremity treatment focus on  -MW      MLD to Full Limb RUE   -MW      Astym UE sequence;anterior-lateral chest;upper traps;other astym  -MW      Anterior-Lateral Chest right  -MW      Anterior-Lateral Chest Comment In supine working superior to radiation field: Evaluator and localizer to superior anterior lateral Rt chest wall. Mild course to fine soft tissue disruptions in superior chest. Worked into lateral trunk ribs/ adjacent to breast with localizer and isolator. Moderate fine to course soft tissue disruptions.   -MW      Upper Traps right  -MW      Upper Traps Comment Initiated tx in sitting for UT: Evaluator and localizer to UT / scapular region wrapping into posterior-lateral trunk; minimal fine soft tissue disruptions noted through out. Extra strokes at teres mm area/ posterior axilla.   -MW      UE Sequence right  -MW      UE Sequence Comment Evaluator and localizer to full arm; isolator in hand. Mixed course and fine disruptions throughout; min course to fine disruptions along extensor mm groups and in forearm.   -MW      Manual Lymphatic Drainage Comments MLD post ASTYM / STM   -MW      Compression/Skin Care    Compression/Skin Care wrapping location;bandaging  -MW      Wrapping Location upper extremity  -MW      Wrapping Location UE right:;hand to axilla  -MW      Bandage Layers cotton elastic stocking- single layer (comment size);cotton elastic stocking- double layer (comment size)   size 3 wrist/ hand, size 4 arm; both doubled distally   -MW        User Key  (r) = Recorded By, (t) =  "Taken By, (c) = Cosigned By    Initials Name Provider Type    FRAN Bergeron, PT Physical Therapist                  OT Assessment/Plan       01/02/18 1714       OT Assessment    Assessment Comments Pt reports moderate to significant pain with all RUE movement despite gentle ROM with avoiding strengthening tasks at this time. No re-positioning techniques improved pain. Pt able to take rest breaks then continue with exercises. Pt with improved digit mobility, now able to complete opposition of digits I-III and nearly to IV. Improved tolerance to shoulder flexion also.   -ST     OT Plan    OT Plan Comments continue with skilled intervention 2x/week as tolerated by pt   -ST       User Key  (r) = Recorded By, (t) = Taken By, (c) = Cosigned By    Initials Name Provider Type    ST Eva Waldron, OTR Occupational Therapist                    OT Exercises       01/02/18 1100          Subjective Comments    Subjective Comments Pt states, \"I'm hurting pretty bad today...I tried doing a jigsaw puzzle yesterday and maybe that made it wose..but look at me moving these fingers\"  -ST      Subjective Pain    Able to rate subjective pain? yes  -ST      Pre-Treatment Pain Level 6  -ST      Post-Treatment Pain Level 8  -ST      Exercise 1    Exercise Name 1 refer to written HEPs for reference   -ST      Exercise 2    Exercise Name 2 scap retractions and elevation/depression  -ST      Sets 2 1  -ST      Reps 2 10  -ST      Exercise 3    Exercise Name 3 R shoulder pendulum exercises, counterclockwise, clockwise, forward/backward   -ST      Sets 3 2  -ST      Reps 3 10  -ST      Exercise 4    Exercise Name 4 shoulder f/e on wash cloth supported on table surface, gentle stretch  -ST      Sets 4 1  -ST      Reps 4 10  -ST      Exercise 5    Exercise Name 5 wrist f/e  -ST      Sets 5 2  -ST      Reps 5 10  -ST      Exercise 6    Exercise Name 6 pronation/supination  -ST      Sets 6 2  -ST      Reps 6 10  -ST      Exercise 7    " Exercise Name 7 elbow f/e  -ST      Sets 7 2  -ST      Reps 7 10  -ST      Exercise 8    Exercise Name 8 retrieving varying sized small objects from flat surface using digits I/II, I/III, I/IV R hand for improving pincer grasp; unable to complete with digit V d/t weakness   -ST      Exercise 9    Exercise Name 9 several therapeutic rest breaks d/t pain b/t variations   -ST        User Key  (r) = Recorded By, (t) = Taken By, (c) = Cosigned By    Initials Name Provider Type    ST Eva Waldron, OTR Occupational Therapist                    OT Goals       01/02/18 1720 01/02/18 1000    OT Short Term Goals    STG Date to Achieve  01/25/18  -ST    STG 1  Pt will be min A with bilateral hand strengthening HEP by 4 wks to improve functional engagement in daily tasks.  -ST    STG 1 Progress  New  -ST    STG 2  Pt will be min A with bilateral FMC HEP by 4 wks to improve functional engagement in daily tasks.   -ST    STG 2 Progress  New  -ST    STG 3  Pt will be min A with RUE ROM HEP's by 4 wks to improve functional engagement in daily tasks.  -ST    STG 3 Progress  New  -ST    Long Term Goals    LTG Date to Achieve  02/22/18  -ST    LTG 1  Pt will improve R shoulder flexion by 5 degrees by d/c to improve performance in ADL/IADL tasks.  -ST    LTG 1 Progress  New  -ST    LTG 2  Pt will improve ROM in each digit by 5 degrees by d/c to increase independence in daily ADL/IADL performance tasks.   -ST    LTG 2 Progress  New  -ST    LTG 3  Pt will increase R Box and Blocks score by 5 block by d/c to demonstrate increased FMC accuracy and speed for ADL/IADL performance.  -ST    LTG 3 Progress  New  -ST    Time Calculation    OT Goal Re-Cert Due Date 03/22/18  -ST       User Key  (r) = Recorded By, (t) = Taken By, (c) = Cosigned By    Initials Name Provider Type    ADRIANA Porter Occupational Therapist          Therapy Education  Given: HEP  Program: New  How Provided: Verbal, Demonstration, Written  Provided to:  Patient  Level of Understanding: Teach back education performed, Demonstrated, Verbalized               Time Calculation:   OT Start Time: 1000  Total Timed Code Minutes- OT: 55 minute(s)     Therapy Charges for Today     Code Description Service Date Service Provider Modifiers Qty    [id number]  OT THERAPEUTIC ACT EA 15 MIN 1/2/2018 Eva Waldron, OTR GO 4                  Eva Waldron, OTR  1/2/2018

## 2018-01-04 ENCOUNTER — HOSPITAL ENCOUNTER (OUTPATIENT)
Dept: OCCUPATIONAL THERAPY | Facility: HOSPITAL | Age: 68
Setting detail: THERAPIES SERIES
Discharge: HOME | End: 2018-01-04
Attending: INTERNAL MEDICINE

## 2018-01-04 ENCOUNTER — OFFICE VISIT (OUTPATIENT)
Dept: RADIATION ONCOLOGY | Facility: HOSPITAL | Age: 68
End: 2018-01-04

## 2018-01-04 ENCOUNTER — HOSPITAL ENCOUNTER (OUTPATIENT)
Dept: RADIATION ONCOLOGY | Facility: HOSPITAL | Age: 68
Setting detail: RADIATION/ONCOLOGY SERIES
Discharge: HOME | End: 2018-01-04

## 2018-01-04 ENCOUNTER — HOSPITAL ENCOUNTER (OUTPATIENT)
Dept: PHYSICAL THERAPY | Facility: HOSPITAL | Age: 68
Setting detail: THERAPIES SERIES
Discharge: HOME | End: 2018-01-04
Attending: INTERNAL MEDICINE

## 2018-01-04 VITALS
HEIGHT: 64 IN | WEIGHT: 123 LBS | BODY MASS INDEX: 21 KG/M2 | DIASTOLIC BLOOD PRESSURE: 85 MMHG | TEMPERATURE: 98 F | RESPIRATION RATE: 16 BRPM | SYSTOLIC BLOOD PRESSURE: 166 MMHG | HEART RATE: 90 BPM

## 2018-01-04 DIAGNOSIS — Z74.09 IMPAIRED MOBILITY AND ADLS: Primary | ICD-10-CM

## 2018-01-04 DIAGNOSIS — I89.0 LYMPHEDEMA OF RIGHT UPPER EXTREMITY: ICD-10-CM

## 2018-01-04 DIAGNOSIS — M79.601 RIGHT ARM PAIN: Primary | ICD-10-CM

## 2018-01-04 DIAGNOSIS — L90.5 SCAR CONDITIONS AND FIBROSIS OF SKIN: ICD-10-CM

## 2018-01-04 DIAGNOSIS — M25.611 DECREASED ROM OF RIGHT SHOULDER: ICD-10-CM

## 2018-01-04 DIAGNOSIS — M25.621 DECREASED ROM OF RIGHT ELBOW: ICD-10-CM

## 2018-01-04 DIAGNOSIS — C50.811 MALIGNANT NEOPLASM OF OVERLAPPING SITES OF RIGHT FEMALE BREAST, UNSPECIFIED ESTROGEN RECEPTOR STATUS (HCC): Primary | ICD-10-CM

## 2018-01-04 DIAGNOSIS — R27.8 DECREASED COORDINATION: ICD-10-CM

## 2018-01-04 DIAGNOSIS — R29.898 DECREASED GRIP STRENGTH OF RIGHT HAND: ICD-10-CM

## 2018-01-04 DIAGNOSIS — Z78.9 IMPAIRED MOBILITY AND ADLS: Primary | ICD-10-CM

## 2018-01-04 PROCEDURE — 97140 MANUAL THERAPY 1/> REGIONS: CPT | Performed by: PHYSICAL THERAPIST

## 2018-01-04 PROCEDURE — G0463 HOSPITAL OUTPT CLINIC VISIT: HCPCS

## 2018-01-04 PROCEDURE — 97530 THERAPEUTIC ACTIVITIES: CPT | Performed by: OCCUPATIONAL THERAPIST

## 2018-01-04 NOTE — THERAPY TREATMENT NOTE
Outpatient Physical Therapy Lymphedema Treatment Note  [facility]     Patient Name: Brianna Urrutia  : 1950  MRN: [id number]  Today's Date: 2018        Visit Date: 2018    Visit Dx:    ICD-10-CM ICD-9-CM   1. Right arm pain M79.601 729.5   2. Lymphedema of right upper extremity I89.0 457.1   3. Scar conditions and fibrosis of skin L90.5 709.2       Patient Active Problem List   Diagnosis   • Malignant neoplasm of overlapping sites of right female breast   • Malignant neoplasm of upper-outer quadrant of right female breast              Lymphedema       18 1100          Subjective Comments    Subjective Comments Pt reports arm was all rippled from wrap sliding down, etc. Wants to get a sleeve and glove from [place] [facility].   -MW      Subjective Pain    Able to rate subjective pain? yes  -MW      Pre-Treatment Pain Level 7  -MW      Post-Treatment Pain Level 6  -MW      Subjective Pain Comment breast/ axilla; elbow and hand   -MW      Lymphedema Edema Assessment    Ptting Edema Category By severity  -MW      Pitting Edema Moderate  -MW      Edema Assessment Comment edema firm proximal and distal to elbow, soft at wrist and hand.   -MW      Skin Changes/Observations    Location/Assessment Upper Quadrant  -MW      Upper Extremity Conditions right:;intact;clean  -MW      Upper Quadrant Conditions right:;other (comment)   intact; radiation area smooth, pink   -MW      Upper Quadrant Color/Pigment right:;hyperpigmented;other (comment)   color changes consistent with radiation therapy  -MW      Upper Quadrant Skin Details Tumor scar pink/ red, firm; s/s of overt infection.  -MW      Manual Lymphatic Drainage    Manual Lymphatic Drainage initial sequence;opened regional lymph nodes;opened anastamoses;extremity treatment;astym  -MW      Initial Sequence supraclavicular;shoulder collectors  -MW      Supraclavicular right;left  -MW      Shoulder Collectors right;left  -MW      Opened Regional Lymph  Nodes inguinal  -MW      Axillary --  -MW      Inguinal right  -MW      Opened Anastamoses axillo-inguinal;anterior axillo-axillary  -MW      Axillo-Inguinal right  -MW      Extremity Treatment MLD to full limb;extremity treatment focus on  -MW      MLD to Full Limb RUE   -MW      Extremity Treatment Focus On elbow/ forearm fullness; reworked AIA and AAA several times   -MW      Astym UE sequence;anterior-lateral chest;upper traps;other astym  -MW      Anterior-Lateral Chest right  -MW      Anterior-Lateral Chest Comment In supine working superior to radiation field: Evaluator and localizer to superior anterior lateral Rt chest wall. Mild course to fine soft tissue disruptions in superior chest. Worked into lateral trunk ribs/ adjacent to breast with localizer and isolator. Moderate fine to course soft tissue disruptions.   -MW      Upper Traps right  -MW      Upper Traps Comment Finished tx in sitting for UT: Evaluator and localizer to UT / scapular region wrapping into posterior-lateral trunk; minimal fine soft tissue disruptions noted through out; slightly rougher at posterior axillar fold. Extra strokes at teres mm area/ posterior axilla.   -MW      UE Sequence right  -MW      UE Sequence Comment Evaluator and localizer to full arm. Mixed course and fine disruptions throughout; especially along extensor mm groups also tender there.   -MW      Manual Lymphatic Drainage Comments MLD post ASTYM / STM   -MW      Compression/Skin Care    Compression/Skin Care wrapping location;bandaging  -MW      Wrapping Location upper extremity  -MW      Wrapping Location UE right:;hand to axilla  -MW      Bandage Layers cotton elastic stocking- single layer (comment size);cotton elastic stocking- double layer (comment size)   size 3 wrist/ hand, size 4 arm; sz 4 doubled distally   -MW      Compression/Skin Care Comments faxed measurements to [place] [facility] to check availability of arm sleeve / glove for pt   -MW        User Key   (r) = Recorded By, (t) = Taken By, (c) = Cosigned By    Initials Name Provider Type    FRAN Bergeron PT Physical Therapist                              PT Assessment/Plan       01/04/18 1100       PT Assessment    Functional Limitations Performance in self-care ADL;Limitation in home management  -     Impairments Pain;Impaired lymphatic circulation;Edema;Joint mobility;Muscle strength;Impaired flexibility  -     Assessment Comments RUE with firm edema, stable to slightly increased as pt was not wearing arm compression since yesterday. Pt hopeful to get a sleeve and glove today, [id number] arm is not yet reduced as pt is unable to consistently keep compression in place. Agreed to sleeve placement to see if she is able to wear sleeve daily and decrease arm edema with pump and sleeve use. Cont ASTYM and MLD for soft tissue mobility and pain management.   -MW     Rehab Potential Fair  -MW     Patient/caregiver participated in establishment of treatment plan and goals Yes  -MW     Patient would benefit from skilled therapy intervention Yes  -MW     PT Plan    PT Frequency 2x/week  -     Physical Therapy Interventions (Optional Details) manual lymphatic drainage;manual therapy techniques;ROM (Range of Motion);strengthening;stretching;taping;patient/family education;home exercise program  -     PT Plan Comments Cont MLD/ ASTYM / STM; cont to progress ROM HEP; ck measurements for sleeve effectiveness   -       User Key  (r) = Recorded By, (t) = Taken By, (c) = Cosigned By    Initials Name Provider Type    FRAN Bergeron PT Physical Therapist                      Manual Rx (last 36 hours)      Manual Treatments       01/04/18 1100          Manual Rx 1    Manual Rx 1 Location Rt chest / axilla and posterior axillary fold   -MW      Manual Rx 1 Type STM, tissue bending, and fascial stretches   -      Manual Rx 1 Grade very gentle   -MW      Manual Rx 1 Duration 8  -MW        User Key  (r) = Recorded By,  (t) = Taken By, (c) = Cosigned By    Initials Name Provider Type    FRAN Bergeron, PT Physical Therapist                PT OP Goals       01/04/18 1545       Time Calculation    PT Goal Re-Cert Due Date 02/22/18  -       User Key  (r) = Recorded By, (t) = Taken By, (c) = Cosigned By    Initials Name Provider Type    FRAN Bergeron PT Physical Therapist          Therapy Education  Education Details: Compression sleeve for day time use; keep track of sleeve and glove. Cont to pump daily to BID.   Given: Edema management, Symptoms/condition management  Program: Progressed  How Provided: Verbal  Provided to: Patient  Level of Understanding: Verbalized              Time Calculation:   Start Time: 1100  Total Timed Code Minutes- PT: 45 minute(s)     Therapy Charges for Today     Code Description Service Date Service Provider Modifiers Qty    [id number] HC PT MANUAL THERAPY EA 15 MIN 1/4/2018 Na Bergeron, PT GP 3                    Na Bergeron PT  1/4/2018
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
## 2018-01-04 NOTE — THERAPY TREATMENT NOTE
"Outpatient Occupational Therapy Hand Treatment Note  [facility]     Patient Name: Brianna Urrutia  : 1950  MRN: [id number]  Today's Date: 2018         Visit Date: 2018  Patient Active Problem List   Diagnosis   • Malignant neoplasm of overlapping sites of right female breast   • Malignant neoplasm of upper-outer quadrant of right female breast         Visit Dx:    ICD-10-CM ICD-9-CM   1. Impaired mobility and ADLs Z74.09 799.89   2. Decreased  strength of right hand R29.898 729.89   3. Decreased ROM of right elbow M25.621 719.52   4. Decreased ROM of right shoulder M25.611 719.51   5. Decreased coordination R27.9 781.3                         OT Assessment/Plan       18 1525       OT Assessment    Assessment Comments Pt continues to have pain with all movements despite varying positions. Pt with increased lympedema in hand with compressogrip wrap. PT to address during lymph session. Pt did however exhibit improve grasp and dexterity as seen with HW and manipulating tweezers   -ST     OT Plan    OT Plan Comments continue with POC as est.   -ST       User Key  (r) = Recorded By, (t) = Taken By, (c) = Cosigned By    Initials Name Provider Type    ST Eva Waldron, OTR Occupational Therapist                    OT Exercises       18 1000          Subjective Comments    Subjective Comments Pt states, \"I just have so much going on in my personal life....then this arm, I mean, what am I supposed to do?\"  -ST      Subjective Pain    Able to rate subjective pain? yes  -ST      Pre-Treatment Pain Level 8  -ST      Post-Treatment Pain Level 8  -ST      Exercise 1    Exercise Name 1 completed individual digit and entire hand squeeze/release on ball to improve grasp/ for fxnl tasks  -ST      Sets 1 2  -ST      Reps 1 10  -ST      Exercise 2    Exercise Name 2 shoulder f/e on table surface with scap retractions  -ST      Sets 2 2  -ST      Reps 2 10  -ST      Exercise 3    Exercise Name 3 " grasp using pad-pad on tweezers to  and place pieces of rice in container--pt able to complete 5 pieces before fatigue set in and dropping tweezers   -ST      Exercise 4    Exercise Name 4 HW'ing activity using lg barrel pen w/ modified grasp pattern to person signing and writing name   -ST      Exercise 5    Exercise Name 5 pronation/supination alternating with wrist f/e  -ST      Sets 5 4  -ST      Reps 5 10  -ST        User Key  (r) = Recorded By, (t) = Taken By, (c) = Cosigned By    Initials Name Provider Type    ST Eva Waldron, OTR Occupational Therapist                    OT Goals       01/04/18 1530 01/04/18 1000    OT Short Term Goals    STG Date to Achieve  01/25/18  -ST    STG 1  Pt will be min A with bilateral hand strengthening HEP by 4 wks to improve functional engagement in daily tasks.  -ST    STG 1 Progress  New  -ST    STG 2  Pt will be min A with bilateral FMC HEP by 4 wks to improve functional engagement in daily tasks.   -ST    STG 2 Progress  New  -ST    STG 3  Pt will be min A with RUE ROM HEP's by 4 wks to improve functional engagement in daily tasks.  -ST    STG 3 Progress  New  -ST    Long Term Goals    LTG Date to Achieve  02/22/18  -ST    LTG 1  Pt will improve R shoulder flexion by 5 degrees by d/c to improve performance in ADL/IADL tasks.  -ST    LTG 1 Progress  New  -ST    LTG 2  Pt will improve ROM in each digit by 5 degrees by d/c to increase independence in daily ADL/IADL performance tasks.   -ST    LTG 2 Progress  New  -ST    LTG 3  Pt will increase R Box and Blocks score by 5 block by d/c to demonstrate increased FMC accuracy and speed for ADL/IADL performance.  -ST    LTG 3 Progress  New  -ST    Time Calculation    OT Goal Re-Cert Due Date 03/22/18  -ST       User Key  (r) = Recorded By, (t) = Taken By, (c) = Cosigned By    Initials Name Provider Type    ADRIANA Porter Occupational Therapist          Therapy Education  Given: HEP  Program: Reinforced  How  Provided: Verbal, Demonstration  Provided to: Patient  Level of Understanding: Teach back education performed, Verbalized, Demonstrated               Time Calculation:   OT Start Time: 1000  Total Timed Code Minutes- OT: 53 minute(s)     Therapy Charges for Today     Code Description Service Date Service Provider Modifiers Qty    17146545941  OT THERAPEUTIC ACT EA 15 MIN 1/4/2018 Eva Waldron, OTR GO 4                  Eva Waldron OTR  1/4/2018

## 2018-01-08 ENCOUNTER — HOSPITAL ENCOUNTER (OUTPATIENT)
Dept: PHYSICAL THERAPY | Facility: HOSPITAL | Age: 68
Setting detail: THERAPIES SERIES
Discharge: HOME | End: 2018-01-08
Attending: INTERNAL MEDICINE

## 2018-01-08 DIAGNOSIS — L90.5 SCAR CONDITIONS AND FIBROSIS OF SKIN: ICD-10-CM

## 2018-01-08 DIAGNOSIS — I89.0 LYMPHEDEMA OF RIGHT UPPER EXTREMITY: ICD-10-CM

## 2018-01-08 DIAGNOSIS — M79.601 RIGHT ARM PAIN: Primary | ICD-10-CM

## 2018-01-08 PROCEDURE — 97140 MANUAL THERAPY 1/> REGIONS: CPT | Performed by: PHYSICAL THERAPIST

## 2018-01-08 NOTE — THERAPY TREATMENT NOTE
Outpatient Physical Therapy Lymphedema Treatment Note  [facility]     Patient Name: Brianna Urrutia  : 1950  MRN: [id number]  Today's Date: 2018        Visit Date: 2018    Visit Dx:    ICD-10-CM ICD-9-CM   1. Right arm pain M79.601 729.5   2. Lymphedema of right upper extremity I89.0 457.1   3. Scar conditions and fibrosis of skin L90.5 709.2       Patient Active Problem List   Diagnosis   • Malignant neoplasm of overlapping sites of right female breast   • Malignant neoplasm of upper-outer quadrant of right female breast              Lymphedema       18 1305             Subjective Comments Reports rough night due to dog being sick, but overall arm feels better with sleeve and glove. Trying to get help with utilitiy bills   -MW       Able to rate subjective pain? yes  -MW    Pre-Treatment Pain Level 6  -MW    Post-Treatment Pain Level 5  -MW    Subjective Pain Comment pin point area in superior breast; pinky finger   -MW       Ptting Edema Category By severity  -MW    Pitting Edema Moderate  -MW    Edema Assessment Comment edema softer with sleeve in place; still firm around elbow   -MW       Location/Assessment Upper Quadrant  -MW    Upper Extremity Conditions right:;intact;clean  -MW    Upper Quadrant Conditions right:;other (comment)   intact; radiation area smooth, pink   -MW    Upper Quadrant Color/Pigment right:;hyperpigmented;other (comment)   color changes consistent with radiation therapy  -MW    Upper Quadrant Skin Details Tumor scar pink/ purple; no erythema or s/s of infx   -MW       Manual Lymphatic Drainage initial sequence;opened regional lymph nodes;opened anastamoses;extremity treatment;astym  -MW    Initial Sequence supraclavicular;shoulder collectors  -MW    Supraclavicular right;left  -MW    Shoulder Collectors right;left  -MW    Opened Regional Lymph Nodes inguinal  -MW    Inguinal right  -MW    Opened Anastamoses axillo-inguinal;anterior axillo-axillary  -MW     Anterior Axillo-Axillary moving Rt to left   -MW    Axillo-Inguinal right  -MW    Extremity Treatment MLD to full limb;extremity treatment focus on  -MW    MLD to Full Limb RUE  -MW    Extremity Treatment Focus On elbow/ forearm fullness; reworked AIA and AAA several times   -MW    Astym UE sequence;anterior-lateral chest;upper traps;other astym  -MW    Anterior-Lateral Chest right  -MW    Anterior-Lateral Chest Comment In supine working superior to radiation field: Evaluator and localizer to superior anterior lateral Rt chest wall. Min fine soft tissue disruptions in superior chest. Continued ASTYM into lateral trunk ribs/ adjacent to breast with localizer and isolator. Moderate fine soft tissue disruptions.   -MW    Upper Traps right  -MW    Upper Traps Comment Initiated tx in sitting for UT: Evaluator and localizer to UT / scapular region wrapping into posterior-lateral trunk; minimal fine soft tissue disruptions noted through out. Extra strokes at teres mm area/ posterior axilla. Also worked deltoid and triceps area in sitting using Evaluator and localizer. Min fine soft tissue disruptions throughout triceps.    -MW    UE Sequence right  -MW    UE Sequence Comment Evaluator and localizer to full arm. Mixed course and fine disruptions throughout; especially along extensor mm groups. Extra strokes in hand, and around elbow / radial head areas.   -MW    Manual Lymphatic Drainage Comments MLD post ASTYM / STM   -MW       Compression/Skin Care wrapping location;bandaging  -MW    Wrapping Location upper extremity  -MW    Wrapping Location UE right:;hand to axilla  -MW    Wrapping Comments donned arm sleeve and glove   -MW    Bandage Layers --  -MW      User Key  (r) = Recorded By, (t) = Taken By, (c) = Cosigned By    Initials Name Provider Type    FRAN Bergeron, PT Physical Therapist                              PT Assessment/Plan       01/08/18 1305       PT Assessment    Functional Limitations Performance in  self-care ADL;Limitation in home management  -     Impairments Pain;Impaired lymphatic circulation;Edema;Joint mobility;Muscle strength;Impaired flexibility  -     Assessment Comments RUE with mild improvement in tissue texture of forearm with compression sleeve use. Chest/ breast tissue continues to recover from radiation therapy (posterior trunk WNL); however, pt notes new area of tenderness (area less than 2 mm of palpable firm tissue that is tender, Dr. Edmonds aware). ASTYM continues to indicate soft tissue disruptions through out chest, axilla and UE, though slowly decreasing in roughness.   -     Rehab Potential Fair  -MW     Patient/caregiver participated in establishment of treatment plan and goals Yes  -MW     Patient would benefit from skilled therapy intervention Yes  -MW     PT Plan    PT Frequency 2x/week  -     Physical Therapy Interventions (Optional Details) manual lymphatic drainage;manual therapy techniques;ROM (Range of Motion);strengthening;stretching;taping;patient/family education;home exercise program  -     PT Plan Comments Cont ASTYM / STM, MLD; ck arm measurement next visit. Progress HEP as able.   -       User Key  (r) = Recorded By, (t) = Taken By, (c) = Cosigned By    Initials Name Provider Type    FRAN Bergeron, PT Physical Therapist                       PT OP Goals       01/08/18 1441       Time Calculation    PT Goal Re-Cert Due Date 02/22/18  -       User Key  (r) = Recorded By, (t) = Taken By, (c) = Cosigned By    Initials Name Provider Type    FRAN Bergeron, PT Physical Therapist          Therapy Education  Education Details: Cont Juzo arm sleeve during day; consider light weight sleeves for nighttime support.   Given: Edema management, Symptoms/condition management  Program: Progressed  How Provided: Verbal  Provided to: Patient  Level of Understanding: Verbalized              Time Calculation:   Start Time: 1305  Total Timed Code Minutes- PT: 55  minute(s)     Therapy Charges for Today     Code Description Service Date Service Provider Modifiers Qty    64792335186 HC PT MANUAL THERAPY EA 15 MIN 1/8/2018 Na Bergeron, PT GP 4                    Na Bergeron, PT  1/8/2018

## 2018-01-09 ENCOUNTER — TELEPHONE (OUTPATIENT)
Dept: SOCIAL WORK | Facility: HOSPITAL | Age: 68
End: 2018-01-09

## 2018-01-10 ENCOUNTER — OFFICE VISIT (OUTPATIENT)
Dept: PSYCHIATRY | Facility: CLINIC | Age: 68
End: 2018-01-10

## 2018-01-10 VITALS — HEART RATE: 85 BPM | DIASTOLIC BLOOD PRESSURE: 85 MMHG | SYSTOLIC BLOOD PRESSURE: 130 MMHG

## 2018-01-10 DIAGNOSIS — F51.05 INSOMNIA DUE TO MENTAL CONDITION: ICD-10-CM

## 2018-01-10 DIAGNOSIS — F41.1 GENERALIZED ANXIETY DISORDER: Primary | ICD-10-CM

## 2018-01-10 DIAGNOSIS — F90.2 ATTENTION DEFICIT HYPERACTIVITY DISORDER, COMBINED TYPE: ICD-10-CM

## 2018-01-10 PROCEDURE — 90838 PSYTX W PT W E/M 60 MIN: CPT | Performed by: NURSE PRACTITIONER

## 2018-01-10 PROCEDURE — 99213 OFFICE O/P EST LOW 20 MIN: CPT | Performed by: NURSE PRACTITIONER

## 2018-01-10 RX ORDER — METHYLPHENIDATE HYDROCHLORIDE 10 MG/1
TABLET ORAL
Qty: 60 TABLET | Refills: 0
Start: 2018-01-10 | End: 2018-01-17
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
## 2018-01-11 ENCOUNTER — HOSPITAL ENCOUNTER (OUTPATIENT)
Dept: PHYSICAL THERAPY | Facility: HOSPITAL | Age: 68
Setting detail: THERAPIES SERIES
Discharge: HOME | End: 2018-01-11
Attending: INTERNAL MEDICINE

## 2018-01-11 ENCOUNTER — HOSPITAL ENCOUNTER (OUTPATIENT)
Dept: OCCUPATIONAL THERAPY | Facility: HOSPITAL | Age: 68
Setting detail: THERAPIES SERIES
Discharge: HOME | End: 2018-01-11
Attending: INTERNAL MEDICINE

## 2018-01-11 DIAGNOSIS — M25.611 DECREASED ROM OF RIGHT SHOULDER: ICD-10-CM

## 2018-01-11 DIAGNOSIS — Z74.09 IMPAIRED MOBILITY AND ADLS: Primary | ICD-10-CM

## 2018-01-11 DIAGNOSIS — R29.898 DECREASED GRIP STRENGTH OF RIGHT HAND: ICD-10-CM

## 2018-01-11 DIAGNOSIS — I89.0 LYMPHEDEMA OF RIGHT UPPER EXTREMITY: ICD-10-CM

## 2018-01-11 DIAGNOSIS — R27.8 DECREASED COORDINATION: ICD-10-CM

## 2018-01-11 DIAGNOSIS — L90.5 SCAR CONDITIONS AND FIBROSIS OF SKIN: ICD-10-CM

## 2018-01-11 DIAGNOSIS — M25.621 DECREASED ROM OF RIGHT ELBOW: ICD-10-CM

## 2018-01-11 DIAGNOSIS — M79.601 RIGHT ARM PAIN: Primary | ICD-10-CM

## 2018-01-11 DIAGNOSIS — Z78.9 IMPAIRED MOBILITY AND ADLS: Primary | ICD-10-CM

## 2018-01-11 PROCEDURE — 97535 SELF CARE MNGMENT TRAINING: CPT | Performed by: OCCUPATIONAL THERAPIST

## 2018-01-11 PROCEDURE — 97530 THERAPEUTIC ACTIVITIES: CPT | Performed by: OCCUPATIONAL THERAPIST

## 2018-01-11 PROCEDURE — G8985 CARRY GOAL STATUS: HCPCS | Performed by: PHYSICAL THERAPIST

## 2018-01-11 PROCEDURE — G8984 CARRY CURRENT STATUS: HCPCS | Performed by: PHYSICAL THERAPIST

## 2018-01-11 PROCEDURE — 97140 MANUAL THERAPY 1/> REGIONS: CPT | Performed by: PHYSICAL THERAPIST

## 2018-01-11 NOTE — THERAPY PROGRESS REPORT/RE-CERT
Outpatient Physical Therapy Lymphedema Progress Note  [facility]     Patient Name: Brianna Urrutia  : 1950  MRN: [id number]  Today's Date: 2018        Visit Date: 2018    Visit Dx:    ICD-10-CM ICD-9-CM   1. Right arm pain M79.601 729.5   2. Lymphedema of right upper extremity I89.0 457.1   3. Scar conditions and fibrosis of skin L90.5 709.2       Patient Active Problem List   Diagnosis   • Malignant neoplasm of overlapping sites of right female breast   • Malignant neoplasm of upper-outer quadrant of right female breast              Lymphedema       18 1100             Subjective Comments Pt reports very sleepy today; took tramadol during OT due to increased pain. Some better now  -MW       Able to rate subjective pain? yes  -MW    Pre-Treatment Pain Level 6  -MW    Post-Treatment Pain Level 5  -MW    Subjective Pain Comment pinky finger   -MW       Ptting Edema Category By severity  -MW    Pitting Edema Moderate  -MW       Location/Assessment Upper Quadrant  -MW    Upper Extremity Conditions right:;intact;clean  -MW    Upper Quadrant Conditions right:;other (comment)   intact; radiation area smooth, pink   -MW    Upper Quadrant Color/Pigment right:;hyperpigmented;other (comment)   color changes consistent with radiation therapy  -MW       Measurement Type(s) Quick Girth  -MW    Quick Girth Areas Upper extremities  -MW       Axilla 28 cm  -MW    Mid upper arm 27.4 cm  -MW    Elbow 25.4 cm  -MW    Mid forearm 23.1 cm  -MW    Wrist crease 16.6 cm  -MW    Web space 17.8 cm  -MW    Met-heads 17.8 cm  -MW       Manual Lymphatic Drainage initial sequence;opened regional lymph nodes;opened anastamoses;extremity treatment;astym  -MW    Initial Sequence supraclavicular;shoulder collectors  -MW    Supraclavicular right;left  -MW    Shoulder Collectors right;left  -MW    Opened Regional Lymph Nodes inguinal  -MW    Inguinal right  -MW    Opened Anastamoses axillo-inguinal;anterior axillo-axillary   -MW    Anterior Axillo-Axillary moving Rt to left   -MW    Axillo-Inguinal right  -MW    Extremity Treatment MLD to full limb;extremity treatment focus on  -MW    MLD to Full Limb RUE  -MW    Extremity Treatment Focus On elbow, medial arm   -MW    Astym UE sequence;anterior-lateral chest;upper traps;other astym  -MW    Anterior-Lateral Chest right  -MW    Anterior-Lateral Chest Comment In supine working superior to radiation field: Evaluator and localizer to superior anterior lateral Rt chest wall. Min fine soft tissue disruptions in superior chest. Continued ASTYM into lateral trunk ribs/ adjacent to breast with localizer and isolator. Moderate fine soft tissue disruptions.   -MW    Upper Traps right  -MW    Upper Traps Comment Initiated tx in sitting for UT: Evaluator and localizer to UT / scapular region wrapping into posterior-lateral trunk; minimal fine soft tissue disruptions noted through out. Extra strokes at teres mm area/ posterior axilla. Also worked deltoid and triceps area in sitting using Evaluator and localizer. Min fine soft tissue disruptions throughout triceps.    -MW    UE Sequence right  -MW    UE Sequence Comment Evaluator and localizer to full arm. Mixed course and fine disruptions throughout; especially along extensor mm groups. Extra strokes in hand, and around elbow / radial head areas.   -MW    Manual Lymphatic Drainage Comments MLD post ASTYM / STM   -MW       Compression/Skin Care wrapping location;bandaging  -MW    Wrapping Location upper extremity  -MW    Wrapping Location UE right:;hand to axilla  -MW    Wrapping Comments donned arm sleeve and glove   -MW      User Key  (r) = Recorded By, (t) = Taken By, (c) = Cosigned By    Initials Name Provider Type    FRAN Bergeron PT Physical Therapist                  PT Ortho       01/11/18 1100    Myotomal Screen- Upper Quarter Clearing     Right:;2 (Poor)   Right hand: able to close hand 25% better, lacks approx 10%   -MW    Right  "Shoulder    Flexion AROM Deficit 0-80; Passively to 155, resting on pillow in supine   -      User Key  (r) = Recorded By, (t) = Taken By, (c) = Cosigned By    Initials Name Provider Type    MW Na Bergeron, PT Physical Therapist                        PT Assessment/Plan       01/11/18 1100       PT Assessment    Functional Limitations Performance in self-care ADL;Limitation in home management  -     Impairments Pain;Impaired lymphatic circulation;Edema;Joint mobility;Muscle strength;Impaired flexibility  -     Assessment Comments Pt has been seen for 11 PT visits of MLD and ASTYM to Rt upper quarter for treatment of her secondary lymphedema s/p radiation therapy to BrCA. She continues to report RUE pain, numbness and tingling. She demonstrates improved shoulder, forearm, wrist and hand ROM, but remains limited overall. She reports able to fold clothes and do a bit more cooking; but then has reports of increased swelling and pain. Her skin continues to recover well from radiation therapy, with no visible changes on posterior trunk, axilla with faint \"tan\", tumor site with intact skin but thick pink/red / purple scarring. Soft tissue disruptions continue to decrease throughout Rt upper quarter. She has been fitting with a compression sleeve and glove per her request though the edema remains greater than 2 cm at forearm and upper arm areas. She has been issued a home compression pump to also assist with edema management. Cont PT POC to continue to progress towards ROM and functional goals.   -MW     Rehab Potential Fair  -MW     Patient/caregiver participated in establishment of treatment plan and goals Yes  -MW     Patient would benefit from skilled therapy intervention Yes  -MW     PT Plan    PT Frequency 2x/week  -MW     Predicted Duration of Therapy Intervention (days/wks) 4 weeks/ 8 visits   -MW     Planned CPT's? PT MANUAL THERAPY EA 15 MIN: 98732;PT THER PROC EA 15 MIN: 81203  -MW     Physical Therapy " Interventions (Optional Details) manual lymphatic drainage;manual therapy techniques;ROM (Range of Motion);strengthening;stretching;taping;patient/family education;home exercise program  -MW     PT Plan Comments Cont ASTYM/ STM, MLD; complete DASH next visit. Review HEP    -MW       User Key  (r) = Recorded By, (t) = Taken By, (c) = Cosigned By    Initials Name Provider Type    FRAN Bergeron PT Physical Therapist                           PT OP Goals       01/11/18 1100    PT Short Term Goals    STG Date to Achieve 12/20/17  -MW    STG 1 Patient to report 25% improvement in RUE pain  -MW    STG 1 Progress Partially Met  -MW    STG 1 Progress Comments As pain decreases pt increases activity and has continued pain.   -MW    STG 2 RUE circumferential measurement reduction by >/= 2 cm to promote decrease in pain and improved function.   -MW    STG 2 Progress Ongoing  -MW    STG 3 Pt independent with basic home lymph massage to promote fluid movement and decrease in pain.   -MW    STG 3 Progress Met  -MW    STG 3 Progress Comments has BioTab pump; reports uses daily or prn   -MW    Long Term Goals    LTG 1 Patient able to actively raise  degrees or better to improve ability to complete daily activities including fixing her hair  -MW    LTG 1 Progress Ongoing;Progressing  -MW    LTG 2 Patient to be measured and fit with compression sleeve  -MW    LTG 2 Progress Met  -MW    LTG 3 Improved DASH score by 15 points to demonstrate improved function  /return to PLOF.   -MW    LTG 3 Progress Ongoing;Progressing  -MW    LTG 3 Progress Comments Also recieving OT services for improved hand function.   -MW    Time Calculation    PT Goal Re-Cert Due Date 02/22/18  -MW      User Key  (r) = Recorded By, (t) = Taken By, (c) = Cosigned By    Initials Name Provider Type    FRAN Bergeron PT Physical Therapist          Therapy Education  Education Details: Cont self care; compression pump daily, sleeve and glove as many  hours as possible   Given: Edema management, Symptoms/condition management  Program: Reinforced  How Provided: Verbal  Provided to: Patient  Level of Understanding: Verbalized              Time Calculation:   Start Time: 1100  Total Timed Code Minutes- PT: 60 minute(s)     Therapy Charges for Today     Code Description Service Date Service Provider Modifiers Qty    57529146854 HC PT CARRY MOV HAND OBJ CURRENT 1/11/2018 Na Bergeron, PT GP, CL 1    04296924166 HC PT CARRY MOV HAND OBJ PROJECTED 1/11/2018 Na Bergeron, PT GP, CL 1    [id number] HC PT MANUAL THERAPY EA 15 MIN 1/11/2018 Na Bergeron, PT GP 4          PT G-Codes  PT Professional Judgement Used?: Yes (Pt reports increased functional activities at home. )  Functional Limitation: Carrying, moving and handling objects  Carrying, Moving and Handling Objects Current Status (): At least 60 percent but less than 80 percent impaired, limited or restricted  Carrying, Moving and Handling Objects Goal Status (): At least 60 percent but less than 80 percent impaired, limited or restricted         Na Bergeron, PT  1/11/2018

## 2018-01-11 NOTE — THERAPY TREATMENT NOTE
Outpatient Occupational Therapy Hand Treatment Note   [place]     Patient Name: Brianna Urrutia  : 1950  MRN: [id number]  Today's Date: 2018         Visit Date: 2018  Patient Active Problem List   Diagnosis   • Malignant neoplasm of overlapping sites of right female breast   • Malignant neoplasm of upper-outer quadrant of right female breast         Visit Dx:    ICD-10-CM ICD-9-CM   1. Impaired mobility and ADLs Z74.09 799.89   2. Decreased  strength of right hand R29.898 729.89   3. Decreased ROM of right elbow M25.621 719.52   4. Decreased ROM of right shoulder M25.611 719.51   5. Decreased coordination R27.9 781.3                   Lymphedema       18 1100          Subjective Comments    Subjective Comments Pt reports very sleepy today; took tramadol during OT due to increased pain. Some better now  -MW      Subjective Pain    Able to rate subjective pain? yes  -MW      Pre-Treatment Pain Level 6  -MW      Post-Treatment Pain Level 5  -MW      Subjective Pain Comment pinky finger   -MW      Lymphedema Edema Assessment    Ptting Edema Category By severity  -MW      Pitting Edema Moderate  -MW      Skin Changes/Observations    Location/Assessment Upper Quadrant  -MW      Upper Extremity Conditions right:;intact;clean  -MW      Upper Quadrant Conditions right:;other (comment)   intact; radiation area smooth, pink   -MW      Upper Quadrant Color/Pigment right:;hyperpigmented;other (comment)   color changes consistent with radiation therapy  -MW      Lymphedema Measurements    Measurement Type(s) Quick Girth  -MW      Quick Girth Areas Upper extremities  -MW      RUE Quick Girth (cm)    Axilla 28 cm  -MW      Mid upper arm 27.4 cm  -MW      Elbow 25.4 cm  -MW      Mid forearm 23.1 cm  -MW      Wrist crease 16.6 cm  -MW      Web space 17.8 cm  -MW      Met-heads 17.8 cm  -MW      Manual Lymphatic Drainage    Manual Lymphatic Drainage initial sequence;opened regional lymph nodes;opened  anastamoses;extremity treatment;astym  -MW      Initial Sequence supraclavicular;shoulder collectors  -MW      Supraclavicular right;left  -MW      Shoulder Collectors right;left  -MW      Opened Regional Lymph Nodes inguinal  -MW      Inguinal right  -MW      Opened Anastamoses axillo-inguinal;anterior axillo-axillary  -MW      Anterior Axillo-Axillary moving Rt to left   -MW      Axillo-Inguinal right  -MW      Extremity Treatment MLD to full limb;extremity treatment focus on  -MW      MLD to Full Limb RUE  -MW      Extremity Treatment Focus On elbow, medial arm   -MW      Astym UE sequence;anterior-lateral chest;upper traps;other astym  -MW      Anterior-Lateral Chest right  -MW      Anterior-Lateral Chest Comment In supine working superior to radiation field: Evaluator and localizer to superior anterior lateral Rt chest wall. Min fine soft tissue disruptions in superior chest. Continued ASTYM into lateral trunk ribs/ adjacent to breast with localizer and isolator. Moderate fine soft tissue disruptions.   -MW      Upper Traps right  -MW      Upper Traps Comment Initiated tx in sitting for UT: Evaluator and localizer to UT / scapular region wrapping into posterior-lateral trunk; minimal fine soft tissue disruptions noted through out. Extra strokes at teres mm area/ posterior axilla. Also worked deltoid and triceps area in sitting using Evaluator and localizer. Min fine soft tissue disruptions throughout triceps.    -MW      UE Sequence right  -MW      UE Sequence Comment Evaluator and localizer to full arm. Mixed course and fine disruptions throughout; especially along extensor mm groups. Extra strokes in hand, and around elbow / radial head areas.   -MW      Manual Lymphatic Drainage Comments MLD post ASTYM / STM   -MW      Compression/Skin Care    Compression/Skin Care wrapping location;bandaging  -MW      Wrapping Location upper extremity  -MW      Wrapping Location UE right:;hand to axilla  -MW      Wrapping  "Comments donned arm sleeve and glove   -        User Key  (r) = Recorded By, (t) = Taken By, (c) = Cosigned By    Initials Name Provider Type    FRAN Bergeron, PT Physical Therapist                  OT Assessment/Plan       01/11/18 1600 01/11/18 1100    OT Assessment    Assessment Comments Pt continues to have pain from neck to fingertips, changing area depending on task. Therapeutic rest breaks required to finish activities. Improved ability to complete dynamic grasp patterns as seen with tweezer work and manipulating resistive bands. Pt with extreme weakness throughout hand, however keith. in digits IV and V.   -ST     OT Plan    Predicted Duration of Therapy Intervention (days/wks)  4 weeks/ 8 visits   -    OT Plan Comments continue POC as est.   -ST       User Key  (r) = Recorded By, (t) = Taken By, (c) = Cosigned By    Initials Name Provider Type     Eva Waldron, OTR Occupational Therapist    FRAN Bergeron, PT Physical Therapist                    OT Exercises       01/11/18 1005          Subjective Comments    Subjective Comments Pt states, \"I folded a lot of laundry the other day....now I'm paying for it\"  -ST      Subjective Pain    Able to rate subjective pain? yes  -ST      Pre-Treatment Pain Level 6  -ST      Post-Treatment Pain Level 8  -ST      Exercise 1    Exercise Name 1 wrist f/e, pronation/supination, elbow f/e  -ST      Sets 1 2  -ST      Reps 1 10  -ST      Exercise 2    Exercise Name 2 shoulder f/e on table surface with scap retractions  -ST      Sets 2 2  -ST      Reps 2 10  -ST      Exercise 3    Exercise Name 3 neck stretches laterally to R/L to aid in decrease tension in trap  -ST      Sets 3 2  -ST      Reps 3 10  -ST      Exercise 4    Exercise Name 4 digit extension from flat table surface, individually  -ST      Sets 4 1  -ST      Reps 4 10  -ST      Exercise 5    Exercise Name 5 digit abd/add, individually, hand flat on table (requiring PROM/AAROM then able to " progress to AROM)  -ST      Sets 5 1  -ST      Reps 5 10  -ST      Exercise 6    Exercise Name 6 using tweezers to address dynamic grasp to place/retrieve and manipulate resistive bands on pegs using R hand  -ST      Time (Minutes) 6 10  -ST      Exercise 7    Exercise Name 7 using spoon, first with lateral grasp then with dynamic grasp to retrieve varying sized items from bin and place on plate to address coordination, dexterity, and accuracy in relation to feeding and HW  -ST      Time (Minutes) 7 10  -ST      Exercise 8    Exercise Name 8 NuStep, no resistance to address gentle shoulder and elbow f/e  -ST      Time (Minutes) 8 3  -ST        User Key  (r) = Recorded By, (t) = Taken By, (c) = Cosigned By    Initials Name Provider Type    ADRIANA Porter Occupational Therapist                    OT Goals       01/11/18 1556       Time Calculation    OT Goal Re-Cert Due Date 03/22/18  -ST       User Key  (r) = Recorded By, (t) = Taken By, (c) = Cosigned By    Initials Name Provider Type    ADRIANA Porter Occupational Therapist          Therapy Education  Given: Symptoms/condition management  Program: Progressed  How Provided: Verbal, Demonstration  Provided to: Patient  Level of Understanding: Teach back education performed, Verbalized, Demonstrated               Time Calculation:   OT Start Time: 1005  Total Timed Code Minutes- OT: 53 minute(s)     Therapy Charges for Today     Code Description Service Date Service Provider Modifiers Qty    79997311875  OT THERAPEUTIC ACT EA 15 MIN 1/11/2018 ADRIANA Ayala GO 3    [id number]  OT SELF CARE/MGMT/TRAIN EA 15 MIN 1/11/2018 ADRIANA Ayala GO 1                  ADRIANA Saldana  1/11/2018

## 2018-01-16 ENCOUNTER — HOSPITAL ENCOUNTER (OUTPATIENT)
Dept: PHYSICAL THERAPY | Facility: HOSPITAL | Age: 68
Setting detail: THERAPIES SERIES
Discharge: HOME | End: 2018-01-16
Attending: INTERNAL MEDICINE

## 2018-01-16 ENCOUNTER — HOSPITAL ENCOUNTER (OUTPATIENT)
Dept: OCCUPATIONAL THERAPY | Facility: HOSPITAL | Age: 68
Discharge: HOME | End: 2018-01-16

## 2018-01-16 ENCOUNTER — TELEPHONE (OUTPATIENT)
Dept: SOCIAL WORK | Facility: HOSPITAL | Age: 68
End: 2018-01-16

## 2018-01-16 DIAGNOSIS — M79.601 RIGHT ARM PAIN: Primary | ICD-10-CM

## 2018-01-16 DIAGNOSIS — M25.611 DECREASED ROM OF RIGHT SHOULDER: ICD-10-CM

## 2018-01-16 DIAGNOSIS — L90.5 SCAR CONDITIONS AND FIBROSIS OF SKIN: ICD-10-CM

## 2018-01-16 DIAGNOSIS — I89.0 LYMPHEDEMA OF RIGHT UPPER EXTREMITY: ICD-10-CM

## 2018-01-16 DIAGNOSIS — Z74.09 IMPAIRED MOBILITY AND ADLS: Primary | ICD-10-CM

## 2018-01-16 DIAGNOSIS — R27.8 DECREASED COORDINATION: ICD-10-CM

## 2018-01-16 DIAGNOSIS — M25.621 DECREASED ROM OF RIGHT ELBOW: ICD-10-CM

## 2018-01-16 DIAGNOSIS — Z78.9 IMPAIRED MOBILITY AND ADLS: Primary | ICD-10-CM

## 2018-01-16 DIAGNOSIS — R29.898 DECREASED GRIP STRENGTH OF RIGHT HAND: ICD-10-CM

## 2018-01-16 PROCEDURE — 97140 MANUAL THERAPY 1/> REGIONS: CPT | Performed by: PHYSICAL THERAPIST

## 2018-01-16 PROCEDURE — 97530 THERAPEUTIC ACTIVITIES: CPT | Performed by: OCCUPATIONAL THERAPIST

## 2018-01-16 PROCEDURE — 97140 MANUAL THERAPY 1/> REGIONS: CPT | Performed by: OCCUPATIONAL THERAPIST

## 2018-01-16 NOTE — THERAPY TREATMENT NOTE
"Outpatient Occupational Therapy Hand Treatment Note  [facility]     Patient Name: Brianna Urrutia  : 1950  MRN: [id number]  Today's Date: 2018         Visit Date: 2018  Patient Active Problem List   Diagnosis   • Malignant neoplasm of overlapping sites of right female breast   • Malignant neoplasm of upper-outer quadrant of right female breast         Visit Dx:    ICD-10-CM ICD-9-CM   1. Impaired mobility and ADLs Z74.09 799.89   2. Decreased  strength of right hand R29.898 729.89   3. Decreased ROM of right elbow M25.621 719.52   4. Decreased ROM of right shoulder M25.611 719.51   5. Decreased coordination R27.9 781.3                         OT Assessment/Plan       18 1151       OT Assessment    Assessment Comments Pt with improved pain and tension in axilla region s/p stretching and palpatations with mobilizing, with increased ROM in all jts  -ST     OT Plan    OT Plan Comments continue POC as est.   -ST       User Key  (r) = Recorded By, (t) = Taken By, (c) = Cosigned By    Initials Name Provider Type    ST Eva Waldron, OTR Occupational Therapist                    OT Exercises       18 1012          Subjective Comments    Subjective Comments Pt states, \"I've been trying to use my right arm more in every day activities\"  -ST      Subjective Pain    Able to rate subjective pain? yes  -ST      Pre-Treatment Pain Level 4  -ST      Post-Treatment Pain Level 5  -ST      Exercise 1    Exercise Name 1 wrist f/e, pronation/supination, elbow f/e  -ST      Sets 1 2  -ST      Reps 1 10  -ST      Exercise 2    Exercise Name 2 shoulder f/e, abd/adduction on table surface with scap retractions and right arm with sustained stretch slightly off table surface; OT providing gentle palpatations   -ST      Exercise 3    Exercise Name 3 neck stretches laterally to R/L to aid in decrease tension in trap  -ST      Sets 3 2  -ST      Reps 3 10  -ST      Exercise 4    Exercise Name 4 digit " opposition, able to complete to 1st-4th digits  -ST      Sets 4 1  -ST      Reps 4 10  -ST      Exercise 5    Exercise Name 5 shoulder elevation/depression, focus on depression with additional squeeze   -ST      Sets 5 1  -ST      Reps 5 10  -ST      Exercise 6    Exercise Name 6 NuStep for warm-up and stretching of elbow and shoulder f/e, 5 mins, 0 resistance   -ST      Time (Minutes) 6 5  -ST        User Key  (r) = Recorded By, (t) = Taken By, (c) = Cosigned By    Initials Name Provider Type    ST Eva Waldron, OTR Occupational Therapist                    OT Goals       01/16/18 1127 01/16/18 1012    OT Short Term Goals    STG Date to Achieve  01/25/18  -ST    STG 1  Pt will be min A with bilateral hand strengthening HEP by 4 wks to improve functional engagement in daily tasks.  -ST    STG 1 Progress  New  -ST    STG 2  Pt will be min A with bilateral FMC HEP by 4 wks to improve functional engagement in daily tasks.   -ST    STG 2 Progress  New  -ST    STG 3  Pt will be min A with RUE ROM HEP's by 4 wks to improve functional engagement in daily tasks.  -ST    STG 3 Progress  New  -ST    Long Term Goals    LTG Date to Achieve  02/22/18  -ST    LTG 1  Pt will improve R shoulder flexion by 5 degrees by d/c to improve performance in ADL/IADL tasks.  -ST    LTG 1 Progress  New  -ST    LTG 2  Pt will improve ROM in each digit by 5 degrees by d/c to increase independence in daily ADL/IADL performance tasks.   -ST    LTG 2 Progress  New  -ST    LTG 3  Pt will increase R Box and Blocks score by 5 block by d/c to demonstrate increased FMC accuracy and speed for ADL/IADL performance.  -ST    LTG 3 Progress  New  -ST    Time Calculation    OT Goal Re-Cert Due Date 03/22/18  -ST       User Key  (r) = Recorded By, (t) = Taken By, (c) = Cosigned By    Initials Name Provider Type    ST Eva Waldron OTR Occupational Therapist          Therapy Education  Given: HEP  Program: New  How Provided: Verbal, Demonstration,  Written  Provided to: Patient  Level of Understanding: Teach back education performed, Verbalized, Demonstrated               Time Calculation:   OT Start Time: 1012  Total Timed Code Minutes- OT: 48 minute(s)     Therapy Charges for Today     Code Description Service Date Service Provider Modifiers Qty    48397808023  OT MANUAL THERAPY EA 15 MIN 1/16/2018 Eva Waldron OTR GO 1    [id number]  OT THERAPEUTIC ACT EA 15 MIN 1/16/2018 ADRIANA Ayala GO 2                  ADRIANA Saldana  1/16/2018

## 2018-01-16 NOTE — TELEPHONE ENCOUNTER
SW received notification from [facility] in [employer] that they will pay $100 towards pt electric bill. This will help pt have enough money to pay the balance of it.  SW available for ongoing support and resource needs.

## 2018-01-16 NOTE — THERAPY TREATMENT NOTE
"    Outpatient Physical Therapy Lymphedema Treatment Note  [facility]     Patient Name: Brianna Urrutia  : 1950  MRN: [id number]  Today's Date: 2018        Visit Date: 2018    Visit Dx:    ICD-10-CM ICD-9-CM   1. Right arm pain M79.601 729.5   2. Lymphedema of right upper extremity I89.0 457.1   3. Scar conditions and fibrosis of skin L90.5 709.2       Patient Active Problem List   Diagnosis   • Malignant neoplasm of overlapping sites of right female breast   • Malignant neoplasm of upper-outer quadrant of right female breast              Lymphedema       18 1100             Subjective Comments Pt reports OT got this ready for you; worked in this area (anterior shoulder / axillary fold). Couldn't get the arm sleeve on this morning as it was \"too tight\" so wore \"compression sleeve\" sports sleeve.   -MW       Able to rate subjective pain? yes  -MW    Pre-Treatment Pain Level 6  -MW    Post-Treatment Pain Level 5  -MW    Subjective Pain Comment anterior shoulder/ axilla; pinky finger/ elbow   -MW       Ptting Edema Category By severity  -MW    Pitting Edema Moderate  -MW       Location/Assessment Upper Quadrant  -MW    Upper Extremity Conditions right:;intact;clean  -MW    Upper Quadrant Conditions right:;other (comment);intact;clean  -MW    Upper Quadrant Color/Pigment right:;hyperpigmented;other (comment);red;purple   scar tight; purple/ red/ pink   -MW       Measurement Type(s) --  -MW    Quick Girth Areas --  -MW       Manual Lymphatic Drainage initial sequence;opened regional lymph nodes;opened anastamoses;extremity treatment;astym  -MW    Initial Sequence supraclavicular;shoulder collectors  -MW    Supraclavicular right;left  -MW    Shoulder Collectors right;left  -MW    Opened Regional Lymph Nodes inguinal  -MW    Inguinal right  -MW    Opened Anastamoses axillo-inguinal;anterior axillo-axillary  -MW    Anterior Axillo-Axillary moving Rt to left   -MW    Axillo-Inguinal right  -MW    " Extremity Treatment MLD to full limb;extremity treatment focus on  -MW    MLD to Full Limb RUE  -MW    Extremity Treatment Focus On elbow; axilla and lateral trunk   -MW    Astym UE sequence;anterior-lateral chest;upper traps;other astym  -MW    Anterior-Lateral Chest right  -MW    Anterior-Lateral Chest Comment In supine working superior to radiation field: Evaluator and localizer to superior anterior lateral Rt chest wall. Min fine soft tissue disruptions in superior chest. Continued ASTYM into lateral trunk ribs/ adjacent to breast with localizer and isolator. Moderate fine soft tissue disruptions.   -MW    Upper Traps right  -MW    Upper Traps Comment Initiated tx in sitting for UT: Evaluator and localizer to UT / scapular region wrapping into posterior-lateral trunk; minimal fine soft tissue disruptions noted through out. Extra strokes at teres mm area/ posterior axilla. Also worked deltoid and triceps area in sitting using Evaluator and localizer. Min fine soft tissue disruptions throughout triceps. Tender over posterior axilla and into triceps.   -MW    UE Sequence right  -MW    UE Sequence Comment Evaluator and localizer to full arm. Mixed course and fine disruptions throughout; especially along extensor mm groups. Extra strokes around elbow / radial head area, and wrist.   -MW    Manual Lymphatic Drainage Comments MLD post ASTYM / STM   -MW       Compression/Skin Care wrapping location;bandaging  -MW    Wrapping Location upper extremity  -MW    Wrapping Location UE right:;hand to axilla  -MW    Wrapping Comments assisted pt to don sports sleeve (forgot Juzo sleeve) and glove   -MW      User Key  (r) = Recorded By, (t) = Taken By, (c) = Cosigned By    Initials Name Provider Type    MW Na Bergeron, PT Physical Therapist                              PT Assessment/Plan       01/16/18 1100       PT Assessment    Functional Limitations Performance in self-care ADL;Limitation in home management  -MW      "Impairments Pain;Impaired lymphatic circulation;Edema;Joint mobility;Muscle strength;Impaired flexibility  -MW     Assessment Comments Pt reports increased use of RUE or \"using it more but gently\" over the weekend. Arm with firm edema especially around elbow region; moderate softening with MLD but still with significant congestion. Overall soft tissue disruptions seem to be smoothing out.   -MW     Rehab Potential Fair  -MW     Patient/caregiver participated in establishment of treatment plan and goals Yes  -MW     Patient would benefit from skilled therapy intervention Yes  -MW     PT Plan    PT Frequency 2x/week  -MW     Physical Therapy Interventions (Optional Details) manual lymphatic drainage;manual therapy techniques;ROM (Range of Motion);strengthening;stretching;taping;patient/family education;home exercise program  -MW     PT Plan Comments Cont ASTYM/ STM, MLD; complete DASH next visit.  -MW       User Key  (r) = Recorded By, (t) = Taken By, (c) = Cosigned By    Initials Name Provider Type    FRAN Bergeron, PT Physical Therapist                     Exercises       01/16/18 1100          Subjective Comments    Subjective Comments Pt reports OT got this ready for you; worked in this area (anterior shoulder / axillary fold). Couldn't get the arm sleeve on this morning as it was \"too tight\" so wore \"compression sleeve\" sports sleeve.   -MW      Subjective Pain    Able to rate subjective pain? yes  -MW      Pre-Treatment Pain Level 6  -MW      Post-Treatment Pain Level 5  -MW      Subjective Pain Comment anterior shoulder/ axilla; pinky finger/ elbow   -MW      Exercise 1    Exercise Name 1 RT median nerve tension  -MW      Cueing 1 Tactile;Verbal  -MW      Reps 1 2  -MW      Additional Comments supine; various angle very gentle   -MW        User Key  (r) = Recorded By, (t) = Taken By, (c) = Cosigned By    Initials Name Provider Type    FRAN Bergeron, PT Physical Therapist                       Manual " Rx (last 36 hours)      Manual Treatments       01/16/18 1100          Manual Rx 1    Manual Rx 1 Location Rt chest / axilla and posterior axillary fold   -MW      Manual Rx 1 Type STM, tissue bending, and fascial stretches   -MW      Manual Rx 1 Grade very gentle   -MW      Manual Rx 1 Duration 8  -MW        User Key  (r) = Recorded By, (t) = Taken By, (c) = Cosigned By    Initials Name Provider Type    FRAN Bergeron PT Physical Therapist                PT OP Goals       01/16/18 1543       Time Calculation    PT Goal Re-Cert Due Date 02/22/18  -MW       User Key  (r) = Recorded By, (t) = Taken By, (c) = Cosigned By    Initials Name Provider Type    FRAN Bergeron PT Physical Therapist          Therapy Education  Education Details: continue pump use, compression sleeve use especially after pumping to ease donning  Given: Edema management, Symptoms/condition management  Program: Reinforced  How Provided: Verbal  Provided to: Patient  Level of Understanding: Verbalized              Time Calculation:   Start Time: 1100  Total Timed Code Minutes- PT: 55 minute(s)     Therapy Charges for Today     Code Description Service Date Service Provider Modifiers Qty    69409197149  PT MANUAL THERAPY EA 15 MIN 1/16/2018 Na Bergeron, PT GP 4                    Na Bergeron PT  1/16/2018

## 2018-01-17 ENCOUNTER — OFFICE VISIT (OUTPATIENT)
Dept: PSYCHIATRY | Facility: CLINIC | Age: 68
End: 2018-01-17

## 2018-01-17 DIAGNOSIS — F39 MOOD DISORDER (HCC): ICD-10-CM

## 2018-01-17 DIAGNOSIS — F51.05 INSOMNIA DUE TO MENTAL CONDITION: ICD-10-CM

## 2018-01-17 DIAGNOSIS — F41.1 GENERALIZED ANXIETY DISORDER: Primary | ICD-10-CM

## 2018-01-17 PROCEDURE — 99213 OFFICE O/P EST LOW 20 MIN: CPT | Performed by: NURSE PRACTITIONER

## 2018-01-17 PROCEDURE — 90836 PSYTX W PT W E/M 45 MIN: CPT | Performed by: NURSE PRACTITIONER

## 2018-01-17 RX ORDER — LOSARTAN POTASSIUM 25 MG/1
25 TABLET ORAL DAILY
COMMUNITY
End: 2018-01-01 | Stop reason: SDUPTHER

## 2018-01-17 NOTE — PROGRESS NOTES
"    Reagan Urrutia is a 67 y.o. female who is here today for medication management follow up.    Chief Complaint: Diagnoses and all orders for this visit:    Generalized anxiety disorder    Insomnia due to mental condition    Mood disorder        History of Present Illness Patient presents by herself for therapy and f/u med management. She discussed her living arrangements, financial strain she is trying to get used to. Different residents in the apt building she lives in and is in role as den mother or \"mother earth\". She feels physically stronger, reports being mentally calmer, and feels more focused. She is sleeping without medications, she is cooking and spending time with \"Lucas and Joe\" two men in her building platonic relationships. She has a dog. Patient states last year was very difficult for her and she sees a light now with strengthening and adjusting to her poverty. She reports she is learning humility. She feels safe, she has heat and electricity but couldn't afford her phone bill. 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 Patient is calm discussing it. She reports getting a lot out of coming to therapy \"because I can just talk out my worries and concerns\". She reports not taking the ritalin because she decided she didn't want  to be on medication however she did fill it. Asked pt to bring next visit for disposal. She is sleeping without mirtazapine but likes idea of having it in case she can't sleep. Denies racing thoughts, however she still has tangential thinking.  Denies adverse effects from medications. Still has pain from lymphedema in right arm hand, wears pressure sleeve. Denies depression. She reports having a grateful heart.     The following portions of the patient's history were reviewed and updated as appropriate: allergies, current medications, " past family history, past medical history, past social history, past surgical history and problem list.    Review of Systemsdenies fever, cough, s/s’s of infection, denies GI/ problems, denies new medical issues     Objective   Physical Exam  There were no vitals taken for this visit.    Allergies   Allergen Reactions   • Iodinated Diagnostic Agents Other (See Comments)     Patient states she has swelling and pain of joints for days following injection   • Penicillins        Current Medications:   Current Outpatient Prescriptions   Medication Sig Dispense Refill   • losartan (COZAAR) 25 MG tablet Take 25 mg by mouth Daily.     • mirtazapine (REMERON) 15 MG tablet Take 1/2-1 tablet as needed for sleep 30 tablet 2   • promethazine (PHENERGAN) 25 MG tablet Take 0.5 tablets by mouth Every 6 (Six) Hours As Needed for Nausea or Vomiting. 30 tablet 0   • TraMADol HCl 50 MG tablet dispersible Take 0.5 tablets by mouth As Needed.     • Unable to find 1 each 1 (One) Time. [place]       No current facility-administered medications for this visit.      Appearance: appropriate  Hygiene:   good  Cooperation:  Cooperative  Eye Contact:  Good  Psychomotor Behavior:  Appropriate  Mood:  within normal limits  Affect:  Appropriate  Hopelessness: Denies  Speech:  Normal  Thought Process:  tangential   Thought Content:  Normal  Suicidal:  None  Homicidal:  None  Hallucinations:  None  Delusion:  None  Memory:  Intact  Orientation:  Person, Place, Time and Situation  Reliability:  fair  Insight:  Fair  Judgement:  Fair  Impulse Control:  Fair  Estimated Intelligence: average range   Physical/Medical Issues:  No     MONICA REVIEWED on 12/6/2017 NO RED FLAGS      Assessment/Plan   Diagnoses and all orders for this visit:    Generalized anxiety disorder    Insomnia due to mental condition    Mood disorder    med management 15 minutes, 45 minutes face to face therapy     Patient calmer today, less stress in life, reports improved  sleep, focus and concentration, she still has tangential processing, no delusions observed or paranoia or AVH.     Discontinue ritalin not using, didn't try it. Discussed bringing bottle in next visit in two weeks  Not utilizing mirtazapine, she reports sleeping well with out it but likes knowing it is there if she has difficulty.   Appetite improvement  Wants to be off all medication if possible other than B/P medication Losartan     Allowed patient to freely discuss issues without interruption or judgment. Provided safe, confidential environment to facilitate the development of positive therapeutic relationship and encourage open, honest communication. Assisted patient in identifying risk factors which would indicate the need for higher level of care including thoughts to harm self or others and/or self-harming behavior and encouraged patient to contact this office, call 911, or present to the nearest emergency room should any of these events occur. Discussed crisis intervention services and means to access.  Patient adamantly and convincingly denies current suicidal or homicidal ideation or perceptual disturbance.  ·     We discussed risks, benefits, and side effects of the above medications and the patient was agreeable with the plan. Patient was educated on the importance of compliance with treatment and follow-up appointments.   Controlled substance prescriptions are either  printed off for patient, telephoned in  or ordered through [facility] by this provider  Instructed to call for questions or concerns and return early if necessary. Crisis plan reviewed including going to the Emergency department.    Return in about 2 weeks (around 1/31/2018).         Please note that portions of this note were completed with a voice recognition program.  Efforts were made to edit dictation, but occasionally words are mistranscribed.

## 2018-01-22 ENCOUNTER — HOSPITAL ENCOUNTER (OUTPATIENT)
Dept: PHYSICAL THERAPY | Facility: HOSPITAL | Age: 68
Setting detail: THERAPIES SERIES
Discharge: HOME | End: 2018-01-22
Attending: INTERNAL MEDICINE

## 2018-01-22 ENCOUNTER — HOSPITAL ENCOUNTER (OUTPATIENT)
Dept: OCCUPATIONAL THERAPY | Facility: HOSPITAL | Age: 68
Setting detail: THERAPIES SERIES
Discharge: HOME | End: 2018-01-22
Attending: INTERNAL MEDICINE

## 2018-01-22 DIAGNOSIS — R27.8 DECREASED COORDINATION: ICD-10-CM

## 2018-01-22 DIAGNOSIS — L90.5 SCAR CONDITIONS AND FIBROSIS OF SKIN: ICD-10-CM

## 2018-01-22 DIAGNOSIS — Z78.9 IMPAIRED MOBILITY AND ADLS: Primary | ICD-10-CM

## 2018-01-22 DIAGNOSIS — M25.621 DECREASED ROM OF RIGHT ELBOW: ICD-10-CM

## 2018-01-22 DIAGNOSIS — R29.898 DECREASED GRIP STRENGTH OF RIGHT HAND: ICD-10-CM

## 2018-01-22 DIAGNOSIS — M25.611 DECREASED ROM OF RIGHT SHOULDER: ICD-10-CM

## 2018-01-22 DIAGNOSIS — M79.601 RIGHT ARM PAIN: Primary | ICD-10-CM

## 2018-01-22 DIAGNOSIS — I89.0 LYMPHEDEMA OF RIGHT UPPER EXTREMITY: ICD-10-CM

## 2018-01-22 DIAGNOSIS — Z74.09 IMPAIRED MOBILITY AND ADLS: Primary | ICD-10-CM

## 2018-01-22 PROCEDURE — 97140 MANUAL THERAPY 1/> REGIONS: CPT | Performed by: PHYSICAL THERAPIST

## 2018-01-22 PROCEDURE — 97530 THERAPEUTIC ACTIVITIES: CPT | Performed by: OCCUPATIONAL THERAPIST

## 2018-01-22 PROCEDURE — G8984 CARRY CURRENT STATUS: HCPCS | Performed by: OCCUPATIONAL THERAPIST

## 2018-01-22 PROCEDURE — G8985 CARRY GOAL STATUS: HCPCS | Performed by: OCCUPATIONAL THERAPIST
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
## 2018-01-23 NOTE — THERAPY TREATMENT NOTE
Outpatient Physical Therapy Lymphedema Treatment Note  [facility]     Patient Name: Brianna Urrutia  : 1950  MRN: [id number]  Today's Date: 2018        Visit Date: 2018    Visit Dx:    ICD-10-CM ICD-9-CM   1. Right arm pain M79.601 729.5   2. Lymphedema of right upper extremity I89.0 457.1   3. Scar conditions and fibrosis of skin L90.5 709.2       Patient Active Problem List   Diagnosis   • Malignant neoplasm of overlapping sites of right female breast   • Malignant neoplasm of upper-outer quadrant of right female breast              Lymphedema       18 1115          Subjective Comments    Subjective Comments Pt complains of tightness and pain across chest; feels like she's been lifting heavy weights.   -MW      Subjective Pain    Able to rate subjective pain? yes  -MW      Pre-Treatment Pain Level 7  -MW      Post-Treatment Pain Level 7  -MW      Subjective Pain Comment chest; pinky, wrist and elbow  -MW      Lymphedema Edema Assessment    Ptting Edema Category By severity  -MW      Pitting Edema Moderate  -MW      Edema Assessment Comment arm softer; hand visibly decreased edema   -MW      Skin Changes/Observations    Location/Assessment Upper Quadrant  -MW      Upper Extremity Conditions right:;intact;clean  -MW      Upper Quadrant Conditions right:;other (comment);intact;clean  -MW      Upper Quadrant Color/Pigment right:;hyperpigmented;other (comment);red   scar tight; red/ pink   -MW      Manual Lymphatic Drainage    Manual Lymphatic Drainage initial sequence;opened regional lymph nodes;opened anastamoses;extremity treatment;astym  -MW      Initial Sequence supraclavicular;shoulder collectors  -MW      Supraclavicular right;left  -MW      Shoulder Collectors right;left  -MW      Opened Regional Lymph Nodes inguinal  -MW      Inguinal right  -MW      Opened Anastamoses axillo-inguinal;anterior axillo-axillary  -MW      Anterior Axillo-Axillary moving Rt to left   -MW       Axillo-Inguinal right  -MW      Extremity Treatment MLD to full limb;extremity treatment focus on  -MW      MLD to Full Limb RUE   -MW      Astym UE sequence;anterior-lateral chest;upper traps;other astym  -MW      Anterior-Lateral Chest right  -MW      Anterior-Lateral Chest Comment In supine working superior to radiation field: Evaluator and localizer to superior anterior lateral Rt chest wall. Min fine soft tissue disruptions in superior chest. Continued ASTYM into lateral trunk ribs/ adjacent to breast with localizer and isolator. Moderate fine soft tissue disruptions. Continued ASTYM over sternum with localizer. Also extra strokes around / star burst at tumor scar.   -MW      Upper Traps right  -MW      Upper Traps Comment Initiated tx in sitting for UT: Evaluator and localizer to UT / scapular region wrapping into posterior-lateral trunk; minimal fine soft tissue disruptions noted through out. Extra strokes at teres mm area/ posterior axilla. Also worked deltoid and triceps area in sitting using Evaluator and localizer. Min fine soft tissue disruptions throughout triceps.    -MW      UE Sequence right  -MW      UE Sequence Comment Evaluator and localizer to full arm. Mixed course and fine disruptions throughout; especially along extensor mm groups. Extra strokes around elbow / radial head area, and wrist.   -MW      Manual Lymphatic Drainage Comments MLD post ASTYM / STM   -MW      Compression/Skin Care    Compression/Skin Care wrapping location;bandaging  -MW      Wrapping Location upper extremity  -MW      Wrapping Location UE right:;hand to axilla  -MW      Wrapping Comments assisted pt to don arm sleeve and glove   -MW      Compression/Skin Care Comments kinesiotape to chest with anchor at Lt and 2 fingers over Rt chest; Kinesiotape over posterior aspect same set up. Half width KT to pinky with anchor at wrist.   -MW        User Key  (r) = Recorded By, (t) = Taken By, (c) = Cosigned By    Initials Name  Provider Type     Na Bergeron, PT Physical Therapist             Hand Therapy (last 24 hours)      Hand Eval       01/22/18 1000          Right Hand Strength - Pinch (lbs)    Lateral 2 lbs  -ST      Left Hand Strength - Pinch (lbs)    Lateral 17 lbs  -ST        User Key  (r) = Recorded By, (t) = Taken By, (c) = Cosigned By    Initials Name Provider Type    ST Velasquez ROSITA Waldron, OTR Occupational Therapist                          PT Assessment/Plan       01/22/18 1115    PT Assessment    Functional Limitations Performance in self-care ADL;Limitation in home management  -MW    Impairments Pain;Impaired lymphatic circulation;Edema;Joint mobility;Muscle strength;Impaired flexibility  -MW    Assessment Comments RUE lymphedema is softer but pt with increased reports of pain in chest/ trunk. Added kinesiotape to attempt to decrease pain and improve fluid movement.   -MW    Rehab Potential Fair  -MW    Patient/caregiver participated in establishment of treatment plan and goals Yes  -MW    Patient would benefit from skilled therapy intervention Yes  -MW    PT Plan    PT Frequency 2x/week  -MW    Predicted Duration of Therapy Intervention (days/wks)     Physical Therapy Interventions (Optional Details) manual lymphatic drainage;manual therapy techniques;ROM (Range of Motion);strengthening;stretching;taping;patient/family education;home exercise program  -MW    PT Plan Comments Cont ASTYM/ STM; MLD. progress HEp as tolerated   -      User Key  (r) = Recorded By, (t) = Taken By, (c) = Cosigned By    Initials Name Provider Type    ST Eva Waldron OTR Occupational Therapist    FRAN Bergeron, PT Physical Therapist                     Exercises       01/22/18 1115          Subjective Comments    Subjective Comments Pt complains of tightness and pain across chest; feels like she's been lifting heavy weights.   -MW      Subjective Pain    Able to rate subjective pain? yes  -MW      Pre-Treatment Pain Level 7  -MW       Post-Treatment Pain Level 7  -MW      Subjective Pain Comment chest; pinky, wrist and elbow  -MW        User Key  (r) = Recorded By, (t) = Taken By, (c) = Cosigned By    Initials Name Provider Type    FRAN Bergeron PT Physical Therapist                              PT OP Goals       01/22/18 2011       Time Calculation    PT Goal Re-Cert Due Date 02/22/18  -       User Key  (r) = Recorded By, (t) = Taken By, (c) = Cosigned By    Initials Name Provider Type    FRAN Bergeron PT Physical Therapist          Therapy Education  Education Details: Use and care of Kinesiotape   Given: Edema management, Symptoms/condition management  Program: Progressed  How Provided: Verbal  Provided to: Patient  Level of Understanding: Verbalized              Time Calculation:   Start Time: 1115  Total Timed Code Minutes- PT: 60 minute(s)     Therapy Charges for Today     Code Description Service Date Service Provider Modifiers Qty    09228087966 HC PT MANUAL THERAPY EA 15 MIN 1/22/2018 Na Bergeron, PT GP 4                    Na Bergeron PT  1/22/2018

## 2018-01-24 ENCOUNTER — TELEPHONE (OUTPATIENT)
Dept: RADIATION ONCOLOGY | Facility: HOSPITAL | Age: 68
End: 2018-01-24

## 2018-01-24 NOTE — TELEPHONE ENCOUNTER
I returned the call from Ms. Urrutia.  She feels she has bruised her sternum and wants a chest x-ray.  She has been seeing a chiropractor.  She feels like it is a pulled pec muscle and unrelated to our treatment.  She said the lymphedema has greatly improved.    I recommended that she call her primary care physician, Dr. Akbar.  Ms. Urrutia is going to do that and was appreciative of the call.

## 2018-01-25 ENCOUNTER — HOSPITAL ENCOUNTER (OUTPATIENT)
Dept: PHYSICAL THERAPY | Facility: HOSPITAL | Age: 68
Setting detail: THERAPIES SERIES
Discharge: HOME | End: 2018-01-25
Attending: INTERNAL MEDICINE

## 2018-01-25 DIAGNOSIS — I89.0 LYMPHEDEMA OF RIGHT UPPER EXTREMITY: ICD-10-CM

## 2018-01-25 DIAGNOSIS — M79.601 RIGHT ARM PAIN: Primary | ICD-10-CM

## 2018-01-25 DIAGNOSIS — L90.5 SCAR CONDITIONS AND FIBROSIS OF SKIN: ICD-10-CM

## 2018-01-25 PROCEDURE — 97140 MANUAL THERAPY 1/> REGIONS: CPT | Performed by: PHYSICAL THERAPIST

## 2018-01-25 NOTE — THERAPY TREATMENT NOTE
Outpatient Physical Therapy Lymphedema Treatment Note  [facility]     Patient Name: Brianna Urrutia  : 1950  MRN: [id number]  Today's Date: 2018        Visit Date: 2018    Visit Dx:    ICD-10-CM ICD-9-CM   1. Right arm pain M79.601 729.5   2. Lymphedema of right upper extremity I89.0 457.1   3. Scar conditions and fibrosis of skin L90.5 709.2       Patient Active Problem List   Diagnosis   • Malignant neoplasm of overlapping sites of right female breast   • Malignant neoplasm of upper-outer quadrant of right female breast              Lymphedema       18 1110             Subjective Comments Pt with complaints of intense pain in sternum with moving from sitting to laying and back to sitting. Has call into PCP to ask for advice, or xray, etc to figure out pain as it limits her sleep.   -MW       Able to rate subjective pain? yes  -MW    Pre-Treatment Pain Level 7  -MW    Post-Treatment Pain Level 7  -MW    Subjective Pain Comment chest (mid sternal), Rt elbow, wrist, and pinky   -MW       Ptting Edema Category By severity  -MW    Pitting Edema Moderate  -MW    Edema Assessment Comment arm / forearm and hand edema decreased   -MW       Location/Assessment Upper Quadrant  -MW    Upper Extremity Conditions right:;intact;clean  -MW    Upper Quadrant Conditions right:;other (comment);intact;clean  -MW    Upper Quadrant Color/Pigment right:;hyperpigmented;other (comment);red   scar tight; red/ pink   -MW       Manual Lymphatic Drainage initial sequence;opened regional lymph nodes;opened anastamoses;extremity treatment;astym  -MW    Initial Sequence supraclavicular;shoulder collectors  -MW    Supraclavicular right;left  -MW    Shoulder Collectors right;left  -MW    Opened Regional Lymph Nodes inguinal  -MW    Inguinal right  -MW    Opened Anastamoses axillo-inguinal;anterior axillo-axillary  -MW    Anterior Axillo-Axillary moving Rt to left   -MW    Axillo-Inguinal right  -MW    Extremity  Treatment MLD to full limb;extremity treatment focus on  -MW    MLD to Full Limb RUE   -MW    Astym UE sequence;anterior-lateral chest;upper traps;other astym  -MW    Anterior-Lateral Chest right  -MW    Anterior-Lateral Chest Comment In supine working around radiation/ tumor scar: Evaluator and localizer to superior anterior lateral Rt chest wall. Min fine soft tissue disruptions in superior chest. Continued ASTYM into lateral trunk ribs/ adjacent to breast with localizer and isolator. Moderate fine soft tissue disruptions. Continued ASTYM over sternum with localizer. Also extra strokes around / star burst at tumor scar.   -MW    Upper Traps right  -MW    Upper Traps Comment Initiated tx in sitting for UT: Evaluator and localizer to UT / scapular region wrapping into posterior-lateral trunk; minimal fine soft tissue disruptions noted through out. Extra strokes at teres mm area/ posterior axilla. Also worked deltoid and triceps area in sitting using Evaluator and localizer. Min fine soft tissue disruptions throughout triceps.    -MW    UE Sequence right  -MW    UE Sequence Comment Evaluator and localizer to full arm. Mixed course and fine disruptions throughout; especially along extensor mm groups. Extra strokes around elbow / radial head area, and wrist.   -MW    Manual Lymphatic Drainage Comments MLD post ASTYM / STM   -MW       Compression/Skin Care wrapping location;bandaging  -MW    Wrapping Location upper extremity  -MW    Wrapping Location UE right:;hand to axilla  -MW    Wrapping Comments assisted pt to don arm sleeve and glove   -MW    Compression/Skin Care Comments pt unsure tape was helpful   -MW      User Key  (r) = Recorded By, (t) = Taken By, (c) = Cosigned By    Initials Name Provider Type    FRAN Bergeron, PT Physical Therapist                              PT Assessment/Plan       01/25/18 1110       PT Assessment    Functional Limitations Performance in self-care ADL;Limitation in home  management  -     Impairments Pain;Impaired lymphatic circulation;Edema;Joint mobility;Muscle strength;Impaired flexibility  -     Assessment Comments Pt in tears about pain in sternum with getting in and out of bed; encouraged her to follow up with PCP. Overall soft tissue disruptions are less rough and edema is softer/ less fullness in arm and hand. Cont ASTYM/ STM, MLD and pt education.   -     PT Plan    PT Frequency 2x/week  -     Physical Therapy Interventions (Optional Details) manual lymphatic drainage;manual therapy techniques;ROM (Range of Motion);strengthening;stretching;taping;patient/family education;home exercise program  -     PT Plan Comments Cont ASTYM/ STM, MLD; pt to follow up regarding sternal pain  -       User Key  (r) = Recorded By, (t) = Taken By, (c) = Cosigned By    Initials Name Provider Type    FRAN Bergeron PT Physical Therapist                        Manual Rx (last 36 hours)      Manual Treatments       01/25/18 1110          Manual Rx 1    Manual Rx 1 Location Rt chest / axilla and posterior axillary fold   -MW      Manual Rx 1 Type STM, tissue bending, and fascial stretches   -MW      Manual Rx 1 Grade very gentle   -MW      Manual Rx 1 Duration 5  -MW        User Key  (r) = Recorded By, (t) = Taken By, (c) = Cosigned By    Initials Name Provider Type    FRAN Bergeron PT Physical Therapist                PT OP Goals       01/25/18 1708       Time Calculation    PT Goal Re-Cert Due Date 02/22/18  -       User Key  (r) = Recorded By, (t) = Taken By, (c) = Cosigned By    Initials Name Provider Type    FRAN Bergeron PT Physical Therapist          Therapy Education  Education Details: Encouraged pt to follow up with PCP about sternal pain; may be costocondrytis or could be related to radiation therapy  Given: Pain management, Symptoms/condition management  Program: Reinforced  How Provided: Verbal  Provided to: Patient  Level of Understanding:  Verbalized              Time Calculation:   Start Time: 1110  Total Timed Code Minutes- PT: 50 minute(s)     Therapy Charges for Today     Code Description Service Date Service Provider Modifiers Qty    56261486301 HC PT MANUAL THERAPY EA 15 MIN 1/25/2018 Na Bergeron, PT GP 3                    Na Bergeron, PT  1/25/2018

## 2018-01-29 ENCOUNTER — HOSPITAL ENCOUNTER (OUTPATIENT)
Dept: PHYSICAL THERAPY | Facility: HOSPITAL | Age: 68
Setting detail: THERAPIES SERIES
Discharge: HOME | End: 2018-01-29
Attending: INTERNAL MEDICINE

## 2018-01-29 ENCOUNTER — HOSPITAL ENCOUNTER (OUTPATIENT)
Dept: OCCUPATIONAL THERAPY | Facility: HOSPITAL | Age: 68
Setting detail: THERAPIES SERIES
Discharge: HOME | End: 2018-01-29
Attending: INTERNAL MEDICINE

## 2018-01-29 DIAGNOSIS — R27.8 DECREASED COORDINATION: ICD-10-CM

## 2018-01-29 DIAGNOSIS — L90.5 SCAR CONDITIONS AND FIBROSIS OF SKIN: ICD-10-CM

## 2018-01-29 DIAGNOSIS — I89.0 LYMPHEDEMA OF RIGHT UPPER EXTREMITY: ICD-10-CM

## 2018-01-29 DIAGNOSIS — M25.611 DECREASED ROM OF RIGHT SHOULDER: ICD-10-CM

## 2018-01-29 DIAGNOSIS — Z78.9 IMPAIRED MOBILITY AND ADLS: Primary | ICD-10-CM

## 2018-01-29 DIAGNOSIS — R29.898 DECREASED GRIP STRENGTH OF RIGHT HAND: ICD-10-CM

## 2018-01-29 DIAGNOSIS — Z74.09 IMPAIRED MOBILITY AND ADLS: Primary | ICD-10-CM

## 2018-01-29 DIAGNOSIS — M79.601 RIGHT ARM PAIN: Primary | ICD-10-CM

## 2018-01-29 DIAGNOSIS — M25.621 DECREASED ROM OF RIGHT ELBOW: ICD-10-CM

## 2018-01-29 PROCEDURE — 97112 NEUROMUSCULAR REEDUCATION: CPT | Performed by: OCCUPATIONAL THERAPIST

## 2018-01-29 PROCEDURE — 97140 MANUAL THERAPY 1/> REGIONS: CPT | Performed by: PHYSICAL THERAPIST

## 2018-01-29 PROCEDURE — 97530 THERAPEUTIC ACTIVITIES: CPT | Performed by: OCCUPATIONAL THERAPIST

## 2018-01-29 NOTE — THERAPY TREATMENT NOTE
Outpatient Physical Therapy Lymphedema Treatment Note  [facility]     Patient Name: Brianna Urrutia  : 1950  MRN: [id number]  Today's Date: 2018        Visit Date: 2018    Visit Dx:    ICD-10-CM ICD-9-CM   1. Right arm pain M79.601 729.5   2. Lymphedema of right upper extremity I89.0 457.1   3. 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   -MW       Able to rate subjective pain? yes  -MW    Pre-Treatment Pain Level 7  -MW    Post-Treatment Pain Level 7  -MW    Subjective Pain Comment mid sternum, elbow, wrist/ pinky   -MW       Ptting Edema Category By severity  -MW    Pitting Edema Moderate;Mild  -MW    Edema Assessment Comment hand edema continues to decrease; forearm softer but still full   -MW       Location/Assessment Upper Quadrant  -MW    Upper Extremity Conditions right:;intact;clean  -MW    Upper Quadrant Conditions right:;other (comment);intact;clean  -MW    Upper Quadrant Color/Pigment right:;hyperpigmented;other (comment);red   scar tight; red/ pink   -MW       Manual Lymphatic Drainage initial sequence;opened regional lymph nodes;opened anastamoses;extremity treatment;astym  -MW    Initial Sequence supraclavicular;shoulder collectors  -MW    Supraclavicular right;left  -MW    Shoulder Collectors right;left  -MW    Opened Regional Lymph Nodes inguinal  -MW    Inguinal right  -MW    Opened Anastamoses axillo-inguinal;anterior axillo-axillary  -MW    Anterior Axillo-Axillary moving Rt to left   -MW    Axillo-Inguinal right  -MW    Extremity Treatment MLD to full limb;extremity treatment focus on  -MW    MLD to  Full Limb RUE  -MW    Astym UE sequence;anterior-lateral chest;upper traps;other astym  -MW    Anterior-Lateral Chest right  -MW    Anterior-Lateral Chest Comment In supine: Evaluator and localizer to superior anterior lateral Rt chest wall. Min fine soft tissue disruptions in superior chest. Continued ASTYM into lateral trunk ribs/ adjacent to breast with localizer and isolator. Moderate fine soft tissue disruptions. Continued ASTYM over sternum with localizer. Also extra strokes around / star burst at tumor scar.   -MW    Upper Traps right  -MW    Upper Traps Comment Initiated tx in sitting for UT: Evaluator and localizer to UT / scapular region wrapping into posterior-lateral trunk; minimal fine soft tissue disruptions noted through out. Extra strokes at teres mm area/ posterior axilla. Also worked deltoid and triceps area in sitting using Evaluator and localizer. Min fine soft tissue disruptions throughout triceps.  Fullness noted posterior axillary fold.   -MW    UE Sequence right  -MW    UE Sequence Comment Evaluator and localizer to full arm. Mixed course and fine disruptions throughout; especially along extensor mm groups. Extra strokes around elbow / radial head area, and wrist.   -MW    Manual Lymphatic Drainage Comments MLD post ASTYM / STM   -MW       Compression/Skin Care wrapping location;bandaging  -MW    Wrapping Location upper extremity  -MW    Wrapping Location UE right:;hand to axilla  -MW    Wrapping Comments assisted pt to don arm sleeve and glove   -MW    Compression/Skin Care Comments Applied Kinesiotape x 4. Half width to pinky/ unlar aspect of wrist with anchor proximal. Second tape - anchor at proximal triceps with 2 fingers passing along olecranon ending in mid forearm. Third tape - half width along mid sternum with anchor proximal sternum, ending proximal to xyphoid process. Fourth - anchor lateral Lt chest with 2 fingers over Rt chest superior to turmor scar.   -MW      User Key  (r) =  Recorded By, (t) = Taken By, (c) = Cosigned By    Initials Name Provider Type    FRAN Bergeron, PT Physical Therapist                              PT Assessment/Plan       01/29/18 1105       PT Assessment    Functional Limitations Performance in self-care ADL;Limitation in home management  -     Impairments Pain;Impaired lymphatic circulation;Edema;Joint mobility;Muscle strength;Impaired flexibility  -     Assessment Comments Overall edema softer and less full in hand. Soft tissue disruptions smoother across chest and in axilla however pt continues to c/o pain. She has self diagnosed ulnar nerve entrapment from previous injury to Rt elbow; has pains in 5th digit which may be consistent with her self diagnosis; however lymphedema reduction remains primary focus of PT. Added Kinesiotape to assist with fluid movement and pain management.   -MW     Rehab Potential Fair  -MW     Patient/caregiver participated in establishment of treatment plan and goals Yes  -MW     Patient would benefit from skilled therapy intervention Yes  -MW     PT Plan    PT Frequency 2x/week  -MW     Physical Therapy Interventions (Optional Details) manual lymphatic drainage;manual therapy techniques;ROM (Range of Motion);strengthening;stretching;taping;patient/family education;home exercise program  -     PT Plan Comments Cont ASTYM/ STM, MLD; pt to follow up regarding sternal pain  -       User Key  (r) = Recorded By, (t) = Taken By, (c) = Cosigned By    Initials Name Provider Type    FRAN Bergeron, PT Physical Therapist                     Exercises       01/29/18 1105          Subjective Comments    Subjective Comments Pt reports she has figured out her pinky; she has ulnar nerve entrapment from her elbow injury about 6 months ago.   -MW      Subjective Pain    Able to rate subjective pain? yes  -MW      Pre-Treatment Pain Level 7  -MW      Post-Treatment Pain Level 7  -MW      Subjective Pain Comment mid sternum, elbow,  wrist/ pinky   -MW        User Key  (r) = Recorded By, (t) = Taken By, (c) = Cosigned By    Initials Name Provider Type    FRAN Bergeron PT Physical Therapist                       Manual Rx (last 36 hours)      Manual Treatments       01/29/18 1105          Manual Rx 1    Manual Rx 1 Location Rt chest / axilla and posterior axillary fold   -MW      Manual Rx 1 Type STM, tissue bending, and fascial stretches   -MW      Manual Rx 1 Grade very gentle   -MW      Manual Rx 1 Duration 5  -MW        User Key  (r) = Recorded By, (t) = Taken By, (c) = Cosigned By    Initials Name Provider Type    FRAN Bergeron PT Physical Therapist                PT OP Goals       01/29/18 1743       Time Calculation    PT Goal Re-Cert Due Date 02/22/18  -MW       User Key  (r) = Recorded By, (t) = Taken By, (c) = Cosigned By    Initials Name Provider Type    FRAN Bergeron PT Physical Therapist          Therapy Education  Education Details: Ck with MD regarding sternal pain.   Given: Pain management, Symptoms/condition management  Program: Reinforced  How Provided: Verbal  Provided to: Patient  Level of Understanding: Verbalized              Time Calculation:   Start Time: 1105  Total Timed Code Minutes- PT: 60 minute(s)     Therapy Charges for Today     Code Description Service Date Service Provider Modifiers Qty    51898286856 HC PT MANUAL THERAPY EA 15 MIN 1/29/2018 Na Bergeron PT GP 4                    Na Bergeron PT  1/29/2018

## 2018-01-29 NOTE — THERAPY TREATMENT NOTE
"Outpatient Occupational Therapy Hand Treatment Note  [facility]     Patient Name: Brianna Urrutia  : 1950  MRN: [id number]  Today's Date: 2018         Visit Date: 2018  Patient Active Problem List   Diagnosis   • Malignant neoplasm of overlapping sites of right female breast   • Malignant neoplasm of upper-outer quadrant of right female breast         Visit Dx:    ICD-10-CM ICD-9-CM   1. Impaired mobility and ADLs Z74.09 799.89   2. Decreased  strength of right hand R29.898 729.89   3. Decreased ROM of right shoulder M25.611 719.51   4. Decreased ROM of right elbow M25.621 719.52   5. Decreased coordination R27.9 781.3                         OT Assessment/Plan       18 1615       OT Assessment    Assessment Comments Pt progressing with dexterity R hand as demonstrated with increased difficulty of tasks this date; pt c/o symptoms of nerve pain that are indicitive of ulnar nerve impingement. Introduced nerve glides and eduction completed on nerve protection.   -ST     OT Plan    OT Plan Comments continue POC as est.   -ST       User Key  (r) = Recorded By, (t) = Taken By, (c) = Cosigned By    Initials Name Provider Type     Eva Waldron, OTR Occupational Therapist                    OT Exercises       18 0938          Subjective Comments    Subjective Comments pt states, \"I have to get ahold of my drPrudencio because I swear the last time I went to my chiroprator, I think he ripped my cartilage apart on my chest....it hurts so bad across my chest\"  -ST      Subjective Pain    Able to rate subjective pain? yes  -ST      Pre-Treatment Pain Level 7  -ST      Post-Treatment Pain Level 7  -ST      Exercise 1    Exercise Name 1 NuStep for controlled and gentle shoulder and elbow f/e;  bilaterally; no resistance   -ST      Time (Minutes) 1 7  -ST      Exercise 2    Exercise Name 2 completed CTS nerve glides R hand  -ST      Sets 2 2  -ST      Reps 2 5  -ST      Exercise 3    Exercise " Name 3 completed prayer stretch then progressed to forward movement for rotation   -ST      Sets 3 2  -ST      Reps 3 5  -ST      Exercise 4    Exercise Name 4 ulnar nerve glides; see written HEP for details (2 separate exercises)  -ST      Sets 4 2  -ST      Reps 4 5  -ST      Exercise 5    Exercise Name 5 theraputty, medium soft, retrieving 10 small items using pad to pad grasp   -ST      Exercise 6    Exercise Name 6 completing FMC grasp using tweezers to retrieve and place varying sized and shaped items; then graded to using pad to pad (shapes including circular, flat and round with no rough surface to grasp)  -ST        User Key  (r) = Recorded By, (t) = Taken By, (c) = Cosigned By    Initials Name Provider Type     Eva Waldron OTR Occupational Therapist                    OT Goals       01/29/18 1616 01/29/18 0955    OT Short Term Goals    STG Date to Achieve  01/25/18  -ST    STG 1  Pt will be min A with bilateral hand strengthening HEP by 4 wks to improve functional engagement in daily tasks.  -ST    STG 1 Progress  Ongoing  -ST    STG 2  Pt will be min A with bilateral FMC HEP by 4 wks to improve functional engagement in daily tasks.   -ST    STG 2 Progress  Ongoing  -ST    STG 3  Pt will be min A with RUE ROM HEP's by 4 wks to improve functional engagement in daily tasks.  -ST    STG 3 Progress  Ongoing  -ST    Long Term Goals    LTG Date to Achieve  02/22/18  -ST    LTG 1  Pt will improve R shoulder flexion by 5 degrees by d/c to improve performance in ADL/IADL tasks.  -ST    LTG 1 Progress  Ongoing  -ST    LTG 2  Pt will improve ROM in each digit by 5 degrees by d/c to increase independence in daily ADL/IADL performance tasks.   -ST    LTG 2 Progress  Partially Met  -ST    LTG 3  Pt will increase R Box and Blocks score by 5 block by d/c to demonstrate increased FMC accuracy and speed for ADL/IADL performance.  -ST    LTG 3 Progress  Progressing  -ST    Time Calculation    OT Goal Re-Cert Due Date  03/22/18  -       User Key  (r) = Recorded By, (t) = Taken By, (c) = Cosigned By    Initials Name Provider Type     ADRIANA Ayala Occupational Therapist          Therapy Education  Given: HEP  Program: New  How Provided: Verbal, Demonstration, Written  Provided to: Patient  Level of Understanding: Teach back education performed, Verbalized, Demonstrated               Time Calculation:   OT Start Time: 0955  Total Timed Code Minutes- OT: 53 minute(s)     Therapy Charges for Today     Code Description Service Date Service Provider Modifiers Qty    76994983097 HC OT THERAPEUTIC ACT EA 15 MIN 1/29/2018 Eva Waldron OTR GO 2    [id number]  OT NEUROMUSC RE EDUCATION EA 15 MIN 1/29/2018 ADRIANA Ayala GO 2                  ADRIANA Saldana  1/29/2018

## 2018-01-31 ENCOUNTER — OFFICE VISIT (OUTPATIENT)
Dept: PSYCHIATRY | Facility: CLINIC | Age: 68
End: 2018-01-31

## 2018-01-31 DIAGNOSIS — F41.1 GENERALIZED ANXIETY DISORDER: Primary | ICD-10-CM

## 2018-01-31 DIAGNOSIS — F51.05 INSOMNIA DUE TO MENTAL CONDITION: ICD-10-CM

## 2018-01-31 DIAGNOSIS — F39 MOOD DISORDER (HCC): ICD-10-CM

## 2018-01-31 PROCEDURE — 90837 PSYTX W PT 60 MINUTES: CPT | Performed by: NURSE PRACTITIONER

## 2018-02-01 ENCOUNTER — HOSPITAL ENCOUNTER (OUTPATIENT)
Dept: OCCUPATIONAL THERAPY | Facility: HOSPITAL | Age: 68
Setting detail: THERAPIES SERIES
Discharge: HOME | End: 2018-02-01
Attending: INTERNAL MEDICINE

## 2018-02-01 ENCOUNTER — HOSPITAL ENCOUNTER (OUTPATIENT)
Dept: PHYSICAL THERAPY | Facility: HOSPITAL | Age: 68
Setting detail: THERAPIES SERIES
Discharge: HOME | End: 2018-02-01
Attending: INTERNAL MEDICINE

## 2018-02-01 DIAGNOSIS — Z74.09 IMPAIRED MOBILITY AND ADLS: Primary | ICD-10-CM

## 2018-02-01 DIAGNOSIS — M25.611 DECREASED ROM OF RIGHT SHOULDER: ICD-10-CM

## 2018-02-01 DIAGNOSIS — M79.601 RIGHT ARM PAIN: ICD-10-CM

## 2018-02-01 DIAGNOSIS — Z78.9 IMPAIRED MOBILITY AND ADLS: Primary | ICD-10-CM

## 2018-02-01 DIAGNOSIS — I89.0 LYMPHEDEMA OF RIGHT UPPER EXTREMITY: Primary | ICD-10-CM

## 2018-02-01 DIAGNOSIS — M25.621 DECREASED ROM OF RIGHT ELBOW: ICD-10-CM

## 2018-02-01 DIAGNOSIS — R29.898 DECREASED GRIP STRENGTH OF RIGHT HAND: ICD-10-CM

## 2018-02-01 DIAGNOSIS — L90.5 SCAR CONDITIONS AND FIBROSIS OF SKIN: ICD-10-CM

## 2018-02-01 DIAGNOSIS — R27.8 DECREASED COORDINATION: ICD-10-CM

## 2018-02-01 PROCEDURE — 97124 MASSAGE THERAPY: CPT | Performed by: OCCUPATIONAL THERAPIST

## 2018-02-01 PROCEDURE — 97112 NEUROMUSCULAR REEDUCATION: CPT | Performed by: OCCUPATIONAL THERAPIST

## 2018-02-01 PROCEDURE — 97140 MANUAL THERAPY 1/> REGIONS: CPT | Performed by: PHYSICAL THERAPIST

## 2018-02-01 PROCEDURE — 97530 THERAPEUTIC ACTIVITIES: CPT | Performed by: OCCUPATIONAL THERAPIST

## 2018-02-01 NOTE — THERAPY TREATMENT NOTE
"Outpatient Occupational Therapy Hand Treatment Note  [facility]     Patient Name: Brianna Urrutia  : 1950  MRN: [id number]  Today's Date: 2018         Visit Date: 2018  Patient Active Problem List   Diagnosis   • Malignant neoplasm of overlapping sites of right female breast   • Malignant neoplasm of upper-outer quadrant of right female breast         Visit Dx:    ICD-10-CM ICD-9-CM   1. Impaired mobility and ADLs Z74.09 799.89   2. Decreased  strength of right hand R29.898 729.89   3. Decreased ROM of right shoulder M25.611 719.51   4. Decreased ROM of right elbow M25.621 719.52   5. Decreased coordination R27.9 781.3                         OT Assessment/Plan       18 1416       OT Assessment    Assessment Comments Pt with decreased dexterity and digit strength when using putty when activating correct muscles by keeping elbow at side vs using compensatory strategies for weakness. However once practiced, pt able to address correct position and felt relief in trap and pec region. Pt continues to c/o nerve pain in elbow and axilla   -ST     OT Plan    OT Plan Comments continue POC as est; recommended to pt that she seek MD advice for nerve pain and nerve conduction test would be helpful  -ST       User Key  (r) = Recorded By, (t) = Taken By, (c) = Cosigned By    Initials Name Provider Type    ST Eva Waldron OTR Occupational Therapist                    OT Exercises       18 1000          Subjective Comments    Subjective Comments Pt states, \"I saw the [facility]  and she told me that I have inflammation across my chest from the intercostal muscles...but nothing is fx'ed\"  -ST      Subjective Pain    Able to rate subjective pain? yes  -ST      Pre-Treatment Pain Level 5  -ST      Post-Treatment Pain Level 4  -ST      Exercise 1    Exercise Name 1 NuStep for controlled and gentle shoulder and elbow f/e;  bilaterally; no resistance   -ST      Time (Minutes) 1 7  -ST      Exercise " 2    Exercise Name 2 completed CTS nerve glides R hand  -ST      Sets 2 2  -ST      Reps 2 5  -ST      Exercise 3    Exercise Name 3 manual resistance and mobilizing over elbow on both ulnar and radial sides using tongue depressor while also completing gentle PROM-wrist f/e, pronation/supination, elbow f/e  -ST      Time (Minutes) 3 10  -ST      Exercise 4    Exercise Name 4 ulnar nerve glides; see written HEP for details (2 separate exercises)  -ST      Sets 4 2  -ST      Reps 4 5  -ST      Exercise 5    Exercise Name 5 theraputty, medium soft, retrieving 10 small items using pad to pad grasp, completed first using digits I/II, then I/III  -ST      Exercise 6    Exercise Name 6 IR/ER AROM using mirror for symmetry and addressing compensatory movements and muscle contractions, cuing for keeping elbow at side   -ST      Sets 6 2  -ST      Reps 6 10  -ST      Exercise 7    Exercise Name 7 pad to pad and lateral grasp patterns using putty with focus on elbow in neutral position vs allowing compensatory motion to take over  -ST      Time (Minutes) 7 8  -ST        User Key  (r) = Recorded By, (t) = Taken By, (c) = Cosigned By    Initials Name Provider Type     Eva Waldron, OTR Occupational Therapist                    OT Goals       02/01/18 1419 02/01/18 1000    OT Short Term Goals    STG Date to Achieve  01/25/18  -ST    STG 1  Pt will be min A with bilateral hand strengthening HEP by 4 wks to improve functional engagement in daily tasks.  -ST    STG 1 Progress  Ongoing  -ST    STG 2  Pt will be min A with bilateral FMC HEP by 4 wks to improve functional engagement in daily tasks.   -ST    STG 2 Progress  Ongoing  -ST    STG 3  Pt will be min A with RUE ROM HEP's by 4 wks to improve functional engagement in daily tasks.  -ST    STG 3 Progress  Ongoing  -ST    Long Term Goals    LTG Date to Achieve  02/22/18  -ST    LTG 1  Pt will improve R shoulder flexion by 5 degrees by d/c to improve performance in ADL/IADL  tasks.  -ST    LTG 1 Progress  Ongoing  -ST    LTG 2  Pt will improve ROM in each digit by 5 degrees by d/c to increase independence in daily ADL/IADL performance tasks.   -ST    LTG 2 Progress  Partially Met  -    LTG 3  Pt will increase R Box and Blocks score by 5 block by d/c to demonstrate increased FMC accuracy and speed for ADL/IADL performance.  -    LTG 3 Progress  Progressing  -    Time Calculation    OT Goal Re-Cert Due Date 03/22/18  -       User Key  (r) = Recorded By, (t) = Taken By, (c) = Cosigned By    Initials Name Provider Type     Eva Waldron OTFLO Occupational Therapist          Therapy Education  Given: HEP, Symptoms/condition management, Posture/body mechanics  Program: Reinforced  How Provided: Verbal, Demonstration  Provided to: Patient  Level of Understanding: Teach back education performed, Verbalized, Demonstrated               Time Calculation:   OT Start Time: 1000  Total Timed Code Minutes- OT: 53 minute(s)     Therapy Charges for Today     Code Description Service Date Service Provider Modifiers Qty    97479464122  OT THER MASSAGE- PER 15 MIN 2/1/2018 Eva Waldron, OTR  1    [id number]  OT NEUROMUSC RE EDUCATION EA 15 MIN 2/1/2018 Eva Waldron OTR GO 1    [id number]  OT THERAPEUTIC ACT EA 15 MIN 2/1/2018 Eva Waldron, OTR GO 2                  Eva Waldron OTR  2/1/2018

## 2018-02-01 NOTE — THERAPY TREATMENT NOTE
Outpatient Physical Therapy Lymphedema Treatment Note  [facility]     Patient Name: Brianna Urrutia  : 1950  MRN: [id number]  Today's Date: 2018        Visit Date: 2018    Visit Dx:    ICD-10-CM ICD-9-CM   1. Lymphedema of right upper extremity I89.0 457.1   2. Scar conditions and fibrosis of skin L90.5 709.2   3. Right arm pain M79.601 729.5       Patient Active Problem List   Diagnosis   • Malignant neoplasm of overlapping sites of right female breast   • Malignant neoplasm of upper-outer quadrant of right female breast              Lymphedema       18 1100             Subjective Comments Pt reports PCP uziel hasn't called her back but was seen at [facility] on Tuesday; diagnosed with costochondritis. Given antinflammatory meds. Some better but still sore.   -MW       Able to rate subjective pain? yes  -MW    Pre-Treatment Pain Level 6  -MW    Post-Treatment Pain Level 6  -MW    Subjective Pain Comment sternum; pink, elbow  -MW       Ptting Edema Category By severity  -MW    Pitting Edema Moderate;Mild  -MW    Edema Assessment Comment Upper arm softer, less full; forearm with persistent fullness but does not wear sleeve for OT tx prior to PT tx   -MW       Location/Assessment Upper Quadrant  -MW    Upper Extremity Conditions right:;intact;clean  -MW    Upper Quadrant Conditions right:;other (comment);intact;clean  -MW    Upper Quadrant Color/Pigment right:;hyperpigmented;other (comment);red   scar tight; red/ pink   -MW    Skin Observations Comment scar decreasing in size   -MW       Manual Lymphatic Drainage initial sequence;opened regional lymph nodes;opened anastamoses;extremity treatment;astym  -MW    Initial Sequence supraclavicular;shoulder collectors  -MW    Supraclavicular right;left  -MW    Shoulder Collectors right;left  -MW    Opened Regional Lymph Nodes inguinal  -MW    Inguinal right  -MW    Opened Anastamoses axillo-inguinal;anterior axillo-axillary;posterior axillo-axillary  -MW     Anterior Axillo-Axillary moving Rt to left   -MW    Posterior Axillo-Axillary moving Rt to left   -MW    Axillo-Inguinal right  -MW    Extremity Treatment MLD to full limb;extremity treatment focus on  -MW    MLD to Full Limb RUE   -MW    Extremity Treatment Focus On elbow, forearm hand   -MW    Astym UE sequence;anterior-lateral chest;upper traps;other astym  -MW    Anterior-Lateral Chest right  -MW    Anterior-Lateral Chest Comment In supine: Evaluator and localizer to superior anterior lateral Rt chest wall. Min fine soft tissue disruptions in superior chest. Continued ASTYM into lateral trunk ribs/ adjacent to breast with localizer and isolator. Moderate fine soft tissue disruptions. Continued ASTYM over sternum with localizer. Also extra strokes around scar; star burst at tumor scar.   -MW    Upper Traps right  -MW    Upper Traps Comment Initiated tx in sitting for UT: Evaluator and localizer to UT / scapular region wrapping into posterior-lateral trunk; minimal fine soft tissue disruptions noted through out. Extra strokes at teres mm area/ posterior axilla. Also worked deltoid and triceps area in sitting using Evaluator and localizer. Min fine soft tissue disruptions throughout triceps.  Fullness noted posterior axillary fold.   -MW    UE Sequence right  -MW    UE Sequence Comment Evaluator and localizer to full arm. Mixed course and fine disruptions throughout; especially along extensor mm groups. Extra strokes around elbow / radial head area, and wrist. Tender along medial arm/ forearm  -MW    Manual Lymphatic Drainage Comments MLD post ASTYM / STM   -MW       Compression/Skin Care wrapping location;bandaging  -MW    Wrapping Location upper extremity  -MW    Wrapping Location UE right:;hand to axilla  -MW    Wrapping Comments assisted pt to don arm sleeve   -MW    Compression/Skin Care Comments Applied Kinesiotape x 3. Half width to pinky/ unlar aspect of wrist with anchor proximal; reinforced end of tape  at nail bed. Second tape - anchor at proximal triceps with 2 fingers passing along olecranon ending in mid forearm. Third tape - anchor in medial forearm with 2 fingers to wrist to drain wrist.   -      User Key  (r) = Recorded By, (t) = Taken By, (c) = Cosigned By    Initials Name Provider Type    FRAN Bergeron PT Physical Therapist                              PT Assessment/Plan       02/01/18 1100       PT Assessment    Functional Limitations Performance in self-care ADL;Limitation in home management  -     Impairments Pain;Impaired lymphatic circulation;Edema;Joint mobility;Muscle strength;Impaired flexibility  -     Assessment Comments Pt reports some relief with Kinesiotape along posterior arm/ ulnar groove, so this tape was replaced as pt removed it during OT. Overall less soft tissue disruptions in scapular region, except for posterior axillary fold. Expect improved fluid reduction and pain with additional areas of Kinesiotape.   -MW     Rehab Potential Fair  -MW     Patient/caregiver participated in establishment of treatment plan and goals Yes  -MW     Patient would benefit from skilled therapy intervention Yes  -MW     PT Plan    PT Frequency 2x/week  -     Physical Therapy Interventions (Optional Details) manual lymphatic drainage;manual therapy techniques;ROM (Range of Motion);strengthening;stretching;taping;patient/family education;home exercise program  -     PT Plan Comments Cont ASTYM/ STM, MLD; trial of nerve glides / slides for Median and ulnar nerves   -       User Key  (r) = Recorded By, (t) = Taken By, (c) = Cosigned By    Initials Name Provider Type    FRAN Bergeron PT Physical Therapist                      Manual Rx (last 36 hours)      Manual Treatments       02/01/18 1100          Manual Rx 1    Manual Rx 1 Location Rt chest / axilla and posterior axillary fold   -      Manual Rx 1 Type STM, tissue bending, and fascial stretches   -      Manual Rx 1 Grade very  gentle   -MW      Manual Rx 1 Duration 5  -MW        User Key  (r) = Recorded By, (t) = Taken By, (c) = Cosigned By    Initials Name Provider Type    FRAN Bergeron PT Physical Therapist                PT OP Goals       02/01/18 1436       Time Calculation    PT Goal Re-Cert Due Date 02/22/18  -MW       User Key  (r) = Recorded By, (t) = Taken By, (c) = Cosigned By    Initials Name Provider Type    FRAN Bergeron PT Physical Therapist          Therapy Education  Education Details: Cont self care; pump and sleeve use.   Given: Pain management, Symptoms/condition management  Program: Reinforced  How Provided: Verbal  Provided to: Patient  Level of Understanding: Verbalized              Time Calculation:   Start Time: 1100  Total Timed Code Minutes- PT: 60 minute(s)     Therapy Charges for Today     Code Description Service Date Service Provider Modifiers Qty    95522343600 HC PT MANUAL THERAPY EA 15 MIN 2/1/2018 Na Bergeron, PT GP 4                    Na Bergeron PT  2/1/2018

## 2018-02-05 ENCOUNTER — HOSPITAL ENCOUNTER (OUTPATIENT)
Dept: PHYSICAL THERAPY | Facility: HOSPITAL | Age: 68
Setting detail: THERAPIES SERIES
Discharge: HOME | End: 2018-02-05
Attending: INTERNAL MEDICINE

## 2018-02-05 DIAGNOSIS — I89.0 LYMPHEDEMA OF RIGHT UPPER EXTREMITY: ICD-10-CM

## 2018-02-05 DIAGNOSIS — L90.5 SCAR CONDITIONS AND FIBROSIS OF SKIN: Primary | ICD-10-CM

## 2018-02-05 DIAGNOSIS — M79.601 RIGHT ARM PAIN: ICD-10-CM

## 2018-02-05 PROCEDURE — 97140 MANUAL THERAPY 1/> REGIONS: CPT | Performed by: PHYSICAL THERAPIST

## 2018-02-05 NOTE — THERAPY TREATMENT NOTE
Outpatient Physical Therapy Lymphedema Treatment Note  [facility]     Patient Name: Brianna Urrutia  : 1950  MRN: [id number]  Today's Date: 2018        Visit Date: 2018    Visit Dx:    ICD-10-CM ICD-9-CM   1. Scar conditions and fibrosis of skin L90.5 709.2   2. Lymphedema of right upper extremity I89.0 457.1   3. Right arm pain M79.601 729.5       Patient Active Problem List   Diagnosis   • Malignant neoplasm of overlapping sites of right female breast   • Malignant neoplasm of upper-outer quadrant of right female breast              Lymphedema       18 1045             Subjective Comments Pt reports sternum feels much better. Arm also is softer and some better, but still having pain in pinky, wrist, arm. Had pain shoot from pinky to left side of neck over the weekend.   -MW       Able to rate subjective pain? yes  -MW    Pre-Treatment Pain Level 5  -MW    Post-Treatment Pain Level 5  -MW    Subjective Pain Comment pinky, wrist, arm   -MW       Ptting Edema Category By severity  -MW    Pitting Edema Moderate;Mild  -MW    Edema Assessment Comment hand mild, upper arm mild, wrist -forearm elbow moderate but softer than previously   -MW       Location/Assessment Upper Quadrant  -MW    Upper Extremity Conditions right:;intact;clean  -MW    Upper Quadrant Conditions right:;other (comment);intact;clean  -MW    Upper Quadrant Color/Pigment right:;hyperpigmented;other (comment);red   scar tight; red/ pink   -MW    Skin Observations Comment area of scar fibrosis continues to loosen and soften   -MW       Manual Lymphatic Drainage initial sequence;opened regional lymph nodes;opened anastamoses;extremity treatment;astym  -MW    Initial Sequence supraclavicular;shoulder collectors  -MW    Supraclavicular right;left  -MW    Shoulder Collectors right;left  -MW    Opened Regional Lymph Nodes inguinal  -MW    Axillary left  -MW    Inguinal right  -MW    Opened Anastamoses axillo-inguinal;anterior  axillo-axillary;posterior axillo-axillary  -MW    Anterior Axillo-Axillary moving Rt to left   -MW    Posterior Axillo-Axillary moving Rt to left   -MW    Axillo-Inguinal right  -MW    Extremity Treatment MLD to full limb;extremity treatment focus on  -MW    MLD to Full Limb RUE   -MW    Extremity Treatment Focus On elbow, forearm   -MW    Astym UE sequence;anterior-lateral chest;upper traps;other astym  -MW    Anterior-Lateral Chest right  -MW    Anterior-Lateral Chest Comment In supine: Evaluator and localizer to superior and lateral Rt chest wall. Min fine soft tissue disruptions in superior chest. Continued ASTYM into lateral trunk ribs/ adjacent to breast with localizer and isolator. Moderate fine soft tissue disruptions. Continued ASTYM over sternum with localizer. Also extra strokes around scar; star burst at tumor scar.   -MW    Upper Traps right  -MW    Upper Traps Comment Initiated tx in sitting for UT: Evaluator and localizer to UT / scapular region wrapping into posterior-lateral trunk; minimal fine soft tissue disruptions noted through out. Extra strokes at teres mm area/ posterior axilla. Also worked deltoid and triceps area in sitting using Evaluator and localizer. Min fine soft tissue disruptions throughout triceps.  Less fullness noted posterior axillary fold.   -MW    UE Sequence right  -MW    UE Sequence Comment Evaluator and localizer to full arm. Mixed course and fine disruptions throughout; especially along extensor mm groups. Extra strokes around elbow / radial head area, ulnar groove and wrist.   -MW    Manual Lymphatic Drainage Comments MLD post ASTYM / STM   -MW       Compression/Skin Care wrapping location;bandaging  -MW    Wrapping Location upper extremity  -MW    Wrapping Location UE right:;hand to axilla  -MW    Wrapping Comments assisted pt to don arm sleeve   -MW    Compression/Skin Care Comments Applied Kinesiotape x 3. Half width to pinky/ unlar aspect of wrist with anchor proximal;  reinforced end of tape at nail bed. Second tape - anchor at proximal triceps with 2 fingers passing along olecranon ending in mid forearm. Third tape - anchor in lateral forearm/ over extensor mm group with 2 fingers to wrist to drain wrist.   -      User Key  (r) = Recorded By, (t) = Taken By, (c) = Cosigned By    Initials Name Provider Type    FRAN Bergeron, PT Physical Therapist                              PT Assessment/Plan       02/05/18 1045       PT Assessment    Functional Limitations Performance in self-care ADL;Limitation in home management  -     Impairments Pain;Impaired lymphatic circulation;Edema;Joint mobility;Muscle strength;Impaired flexibility  -     Assessment Comments Pt continues to report improvement in ulnar nerve area with Kinesiotape placement. Forearm softer but still with significant pitting edema. Shoulder ROM nearly full but c/o pain. Tumor site scar decreasing in size and thickness, also more mobile within breast tissue.   -     Rehab Potential Good  -MW     Patient/caregiver participated in establishment of treatment plan and goals Yes  -MW     Patient would benefit from skilled therapy intervention Yes  -MW     PT Plan    PT Frequency 2x/week  -     Physical Therapy Interventions (Optional Details) manual lymphatic drainage;manual therapy techniques;ROM (Range of Motion);strengthening;stretching;taping;patient/family education;home exercise program  -     PT Plan Comments Cont ASTYM/ STM, MLD; cont nerve glides / slides for Median and ulnar nerves   -       User Key  (r) = Recorded By, (t) = Taken By, (c) = Cosigned By    Initials Name Provider Type    FRAN Bergeron, PT Physical Therapist                     Exercises       02/05/18 1045          Subjective Comments    Subjective Comments Pt reports sternum feels much better. Arm also is softer and some better, but still having pain in pinky, wrist, arm. Had pain shoot from pinky to left side of neck over  the weekend.   -MW      Subjective Pain    Able to rate subjective pain? yes  -MW      Pre-Treatment Pain Level 5  -MW      Post-Treatment Pain Level 5  -MW      Subjective Pain Comment pinky, wrist, arm   -MW      Exercise 1    Exercise Name 1 Rt ulnar nerve flossing   -MW      Cueing 1 Tactile  -MW      Reps 1 2  -MW      Additional Comments moving through wrist flex/ ext  and pronation / supination   -MW        User Key  (r) = Recorded By, (t) = Taken By, (c) = Cosigned By    Initials Name Provider Type    FRAN Bergeron PT Physical Therapist                       Manual Rx (last 36 hours)      Manual Treatments       02/05/18 1045          Manual Rx 1    Manual Rx 1 Location Rt chest, including tumor site scar, axilla and posterior axillary fold   -MW      Manual Rx 1 Type STM, tissue bending, and fascial stretches   -MW      Manual Rx 1 Grade very gentle to moderate   -MW      Manual Rx 1 Duration 8  -MW        User Key  (r) = Recorded By, (t) = Taken By, (c) = Cosigned By    Initials Name Provider Type    FRAN Bergeron PT Physical Therapist                PT OP Goals       02/05/18 1628       Time Calculation    PT Goal Re-Cert Due Date 02/22/18  -MW       User Key  (r) = Recorded By, (t) = Taken By, (c) = Cosigned By    Initials Name Provider Type    FRAN Bergeron PT Physical Therapist          Therapy Education  Education Details: Kinesiotape use and care; gentle removal and Ok to shower.   Given: Pain management, Symptoms/condition management  Program: Reinforced  How Provided: Verbal  Provided to: Patient  Level of Understanding: Verbalized              Time Calculation:   Start Time: 1045  Total Timed Code Minutes- PT: 65 minute(s)     Therapy Charges for Today     Code Description Service Date Service Provider Modifiers Qty    69797377175  PT MANUAL THERAPY EA 15 MIN 2/5/2018 Na Bergeron, PT GP 4                    Na Bergeron PT  2/5/2018

## 2018-02-06 ENCOUNTER — TELEPHONE (OUTPATIENT)
Dept: SOCIAL WORK | Facility: HOSPITAL | Age: 68
End: 2018-02-06

## 2018-02-06 NOTE — TELEPHONE ENCOUNTER
"SW received a phone call from pt asking for further assistance with her cleaning needs and financial assistance for her rent.  Pt states that she is \"always behind $300\" in her rent.  SW explained to pt that all resources that are known have been explored and given to pt over the past year.  Pt has been given multiple resources for financial assistance.  SW did reapply for Cleaning [employer] on behalf of pt.  LORENA informed her that this service is restricted to two visits for women who are in active cancer treatment, which she is not at this time.  LORENA referred pt to Vocational Rehabilitation for assistance in job training and placement.  This may be able to help pt earn enough money so that she is able to pay her monthly rent.    "

## 2018-02-08 ENCOUNTER — APPOINTMENT (OUTPATIENT)
Dept: OCCUPATIONAL THERAPY | Facility: HOSPITAL | Age: 68
End: 2018-02-08
Attending: INTERNAL MEDICINE

## 2018-02-08 ENCOUNTER — HOSPITAL ENCOUNTER (OUTPATIENT)
Dept: PHYSICAL THERAPY | Facility: HOSPITAL | Age: 68
Setting detail: THERAPIES SERIES
Discharge: HOME | End: 2018-02-08
Attending: INTERNAL MEDICINE

## 2018-02-08 DIAGNOSIS — L90.5 SCAR CONDITIONS AND FIBROSIS OF SKIN: Primary | ICD-10-CM

## 2018-02-08 DIAGNOSIS — I89.0 LYMPHEDEMA OF RIGHT UPPER EXTREMITY: ICD-10-CM

## 2018-02-08 DIAGNOSIS — M79.601 RIGHT ARM PAIN: ICD-10-CM

## 2018-02-08 PROCEDURE — G8984 CARRY CURRENT STATUS: HCPCS | Performed by: PHYSICAL THERAPIST

## 2018-02-08 PROCEDURE — G8985 CARRY GOAL STATUS: HCPCS | Performed by: PHYSICAL THERAPIST

## 2018-02-08 PROCEDURE — 97140 MANUAL THERAPY 1/> REGIONS: CPT | Performed by: PHYSICAL THERAPIST

## 2018-02-08 NOTE — THERAPY PROGRESS REPORT/RE-CERT
Outpatient Physical Therapy Lymphedema Progress Note  [facility]     Patient Name: Brianna Urrutia  : 1950  MRN: [id number]  Today's Date: 2018        Visit Date: 2018    Visit Dx:    ICD-10-CM ICD-9-CM   1. Scar conditions and fibrosis of skin L90.5 709.2   2. Lymphedema of right upper extremity I89.0 457.1   3. Right arm pain M79.601 729.5       Patient Active Problem List   Diagnosis   • Malignant neoplasm of overlapping sites of right female breast   • Malignant neoplasm of upper-outer quadrant of right female breast              Lymphedema       18 1100             Subjective Comments Pt reports unable to put compression sleeve or glove on today due to increased pain, swelling and limited function of Lt hand. Also KT makes it more difficult to pull on sleeve.   -MW       Able to rate subjective pain? yes  -MW    Pre-Treatment Pain Level 5  -MW    Post-Treatment Pain Level 7  -MW    Subjective Pain Comment Rt & Lt hands; post chest (slipped at edge of treatment table, caught herself with Lt hand)   -MW       Ptting Edema Category By severity  -MW    Pitting Edema Moderate;Mild;Severe  -MW    Edema Assessment Comment hand mild, upper arm moderate, elbow/ forearm with persistent firm edema. Forearm slighlty softer but tissues remain packed. moderate fullness Rt lateral trunk / posterior axilla.   -MW       Location/Assessment Upper Quadrant  -MW    Upper Extremity Conditions right:;intact;clean  -MW    Upper Quadrant Conditions right:;other (comment);intact;clean  -MW    Upper Quadrant Color/Pigment right:;hyperpigmented;other (comment);red   scar tight; red/ pink   -MW    Upper Quadrant Skin Details moderate fullness Rt lateral trunk / posterior axilla   -MW    Skin Observations Comment area of scar fibrosis continues to loosen and soften   -MW       Measurement Type(s) Quick Girth  -MW    Quick Girth Areas Upper extremities  -MW       Axilla 28.5 cm  -MW    Mid upper arm 28.5 cm  -MW     Elbow 26.5 cm  -MW    Mid forearm 22.5 cm  -MW    Wrist crease 16.8 cm  -MW    Web space 18.4 cm  -MW    Met-heads 18.2 cm  -MW       Manual Lymphatic Drainage initial sequence;opened regional lymph nodes;opened anastamoses;extremity treatment;astym  -MW    Initial Sequence supraclavicular;shoulder collectors  -MW    Supraclavicular right;left  -MW    Shoulder Collectors right;left  -MW    Opened Regional Lymph Nodes inguinal  -MW    Axillary left  -MW    Inguinal right  -MW    Opened Anastamoses axillo-inguinal;anterior axillo-axillary;posterior axillo-axillary  -MW    Anterior Axillo-Axillary moving Rt to left   -MW    Posterior Axillo-Axillary moving Rt to left   -MW    Axillo-Inguinal right  -MW    Extremity Treatment MLD to full limb;extremity treatment focus on  -MW    MLD to Full Limb RUE   -MW    Extremity Treatment Focus On elbow, forearm, hand   -MW    Astym UE sequence;anterior-lateral chest;upper traps;other astym  -MW    Anterior-Lateral Chest right  -MW    Anterior-Lateral Chest Comment In supine: Evaluator and localizer to superior and lateral Rt chest wall. Min fine soft tissue disruptions in superior chest. Continued ASTYM into lateral trunk ribs/ adjacent to breast with localizer and isolator. Moderate fine soft tissue disruptions. Continued ASTYM over sternum with localizer. Also extra strokes around scar; star burst at tumor scar.   -MW    Upper Traps right  -MW    Upper Traps Comment Initiated tx in LT SL for RT UT: Evaluator and localizer to UT / scapular region wrapping into posterior-lateral trunk; minimal fine soft tissue disruptions noted through out. Extra strokes at teres mm area/ posterior axilla. Also worked deltoid and triceps area using Evaluator and localizer. Min fine soft tissue disruptions throughout triceps.  Less fullness noted posterior axillary fold.   -MW    UE Sequence right  -MW    Manual Lymphatic Drainage Comments MLD post ASTYM / STM   -MW       Compression/Skin Care  wrapping location;bandaging  -MW    Wrapping Location upper extremity  -MW    Wrapping Location UE right:;hand to axilla  -MW    Wrapping Comments assisted pt to don arm sleeve and glove   -MW    Compression/Skin Care Comments Held Kinesiotape as pt having difficulty donning compression sleeve with extra friction from KT   -MW      User Key  (r) = Recorded By, (t) = Taken By, (c) = Cosigned By    Initials Name Provider Type    MW Na Bergeron, PT Physical Therapist                PT Assessment/Plan       02/08/18 1100       PT Assessment    Functional Limitations Performance in self-care ADL;Limitation in home management  -MW     Impairments Pain;Impaired lymphatic circulation;Edema;Joint mobility;Muscle strength;Impaired flexibility  -MW     Assessment Comments Pt continues to make gradual progress towards goals but has been limited by pain, numbness and tingling in RT UE and hand, but also by progression of LT hand numbness and tingling. Pt lives alone but has consistently used her current lymph pump at least 2 hours daily, as well as wearing her 20-30 mmHg compression sleeve as much as she is able to don it. ASTYM and STM assisting to loosen soft tissue restrictions to improve ROM and function.  Lymphedema is persistent with severe edema in forearm and moderate in trunk and axilla in spite of pt's compliance with self care. Pt remains severely impaired with DASH score indicating 89% impaired (improved from 95% impaired in November). Recommend continued PT; pursue pump upgrade to enhance lymphedema management of her axilla, trunk and breast area.   -MW     Rehab Potential Good  -MW     Patient/caregiver participated in establishment of treatment plan and goals Yes  -MW     Patient would benefit from skilled therapy intervention Yes  -MW     PT Plan    PT Frequency 2x/week  -MW     Predicted Duration of Therapy Intervention (days/wks) 12 weeks   -MW     Planned CPT's? PT MANUAL THERAPY EA 15 MIN: 58663;PT THER  PROC EA 15 MIN: 35035  -MW     Physical Therapy Interventions (Optional Details) manual lymphatic drainage;manual therapy techniques;ROM (Range of Motion);strengthening;stretching;taping;patient/family education;home exercise program  -MW     PT Plan Comments Cont ASTYM/ STM, MLD; cont nerve glides / slides for Median and ulnar nerves; progress HEP / coordinate with OT.   -MW       User Key  (r) = Recorded By, (t) = Taken By, (c) = Cosigned By    Initials Name Provider Type    FRAN Bergeron, MAT Physical Therapist                 Exercises       02/08/18 1100          Subjective Comments    Subjective Comments Pt reports unable to put compression sleeve or glove on today due to increased pain, swelling and limited function of Lt hand. Also KT makes it more difficult to pull on sleeve.   -MW      Subjective Pain    Able to rate subjective pain? yes  -MW      Pre-Treatment Pain Level 5  -MW      Post-Treatment Pain Level 7  -MW      Subjective Pain Comment Rt & Lt hands; post chest (slipped at edge of treatment table, caught herself with Lt hand)   -MW      Exercise 1    Exercise Name 1 Rt ulnar nerve flossing   -MW      Cueing 1 Tactile;Verbal  -MW      Reps 1 3  -MW      Additional Comments moving through wrist flex/ ext  and pronation / supination   -MW      Exercise 2    Exercise Name 2 RT median nerve flossing   -MW      Cueing 2 Tactile;Verbal  -MW      Reps 2 3  -MW        User Key  (r) = Recorded By, (t) = Taken By, (c) = Cosigned By    Initials Name Provider Type    FRAN Bergeron PT Physical Therapist             Manual Rx (last 36 hours)      Manual Treatments       02/08/18 1100          Manual Rx 1    Manual Rx 1 Location Rt chest, including tumor site scar, axilla and posterior axillary fold   -MW      Manual Rx 1 Type STM, tissue bending, and fascial stretches   -MW      Manual Rx 1 Grade very gentle to moderate   -MW      Manual Rx 1 Duration 8  -MW        User Key  (r) = Recorded By, (t) =  Taken By, (c) = Cosigned By    Initials Name Provider Type    FRAN Bergeron PT Physical Therapist                PT OP Goals       02/08/18 1100    PT Short Term Goals    STG Date to Achieve 12/20/17  -MW    STG 1 Patient to report 25% improvement in RUE pain  -MW    STG 1 Progress Partially Met  -MW    STG 2 RUE circumferential measurement reduction by >/= 2 cm to promote decrease in pain and improved function.   -MW    STG 2 Progress Ongoing  -MW    STG 3 Pt independent with basic home lymph massage to promote fluid movement and decrease in pain.   -MW    STG 3 Progress Met  -MW    STG 3 Progress Comments Using lymph pump daily at least 2 hours; self trunk massage as able (Lt hand pain, N/T)   -MW    Long Term Goals    LTG 1 Patient able to actively raise  degrees or better to improve ability to complete daily activities including fixing her hair  -MW    LTG 1 Progress Met  -MW    LTG 2 Patient to be measured and fit with compression sleeve  -MW    LTG 2 Progress Met  -MW    LTG 3 Improved DASH score by 15 points to demonstrate improved function  /return to PLOF.   -MW    LTG 3 Progress Ongoing;Progressing  -MW    LTG 3 Progress Comments Improved a total of 7 points since November   -MW    Time Calculation    PT Goal Re-Cert Due Date       User Key  (r) = Recorded By, (t) = Taken By, (c) = Cosigned By    Initials Name Provider Type    FRAN Bergeron PT Physical Therapist          Therapy Education  Education Details: Cont Pump use 2 hours daily (); cont HEP for ROM, self massage as able; low salt diet with proteins and veggies.   Given: Pain management, Symptoms/condition management  Program: Reinforced  How Provided: Verbal  Provided to: Patient  Level of Understanding: Verbalized    Outcome Measure Options: Disabilities of the Arm, Shoulder, and Hand (DASH)  DASH  Open a tight or new jar.: Unable  Write: Unable  Turn a key: Unable  Prepare a meal: Severe Difficulty  Push open a heavy  door: Unable  Place an object on a shelf above your head: Severe Difficulty  Do heavy household chores (e.g., wash walls, wash floors): Severe Difficulty  Garden or do yard work: Unable  Make a bed: Severe Difficulty  Carry a shopping bag or briefcase: Unable  Carry a heavy object (over 10 lbs): Unable  Change a lightbulb overhead: Severe Difficulty  Wash or blow dry your hair: Severe Difficulty  Wash your back: Unable  Put on a pullover sweater: Severe Difficulty  Use a knife to cut food: Unable  Recreational activities in which require little effort (e.g., cardplaying, knitting, etc.): Severe Difficulty  Recreational activities in which you take some force or impact through your arm, should or hand (e.g. golf, hammering, tennis, etc.): Unable  Recreational Activities in which you move your arm freely (e.g., frisbee, badminton, etc.): Unable  Manage transportation needs (getting from one place to another): Severe Difficulty  During the past week, to what extent has your arm, shoulder, or hand problem interfered with your normal social activites with family, friends, neighbors or groups?: Quite a bit  During the past week, were you limited in your work or other regular daily activities as a result of your arm, shoulder or hand problem?: Very limited  Arm, Shoulder, or hand pain: Extreme  Arm, shoulder or hand pain when you performed any specific activity: Extreme  Tingling (pins and needles) in your arm, shoulder, or hand: Extreme  Weakness in your arm, shoulder or hand: Extreme  Stiffness in your arm, shoulder or hand: Extreme  During the past week, how much difficulty have you had sleeping because of the pain in your arm, shoulder or hand?: Moderate Difficiculty  I feel less capable, less confident or less useful because of my arm, shoulder or hand problem: Strongly Agree  DASH Sum : 132  Number of Questions Answered: 29  DASH Score: 88.79         Time Calculation:   Start Time: 1100  Total Timed Code Minutes- PT:  60 minute(s)     Therapy Charges for Today     Code Description Service Date Service Provider Modifiers Qty    19064634383 HC PT CARRY MOV HAND OBJ CURRENT 2/8/2018 Na Bergeron, PT GP, CM 1    20009620788 HC PT CARRY MOV HAND OBJ PROJECTED 2/8/2018 Na Bergeron, PT GP, CL 1    [id number] HC PT MANUAL THERAPY EA 15 MIN 2/8/2018 Na Bergeron PT GP 4          PT G-Codes  PT Professional Judgement Used?: Yes  Outcome Measure Options: Disabilities of the Arm, Shoulder, and Hand (DASH)  Score: 132 or 89% impaired   Functional Limitation: Carrying, moving and handling objects  Carrying, Moving and Handling Objects Current Status (): At least 80 percent but less than 100 percent impaired, limited or restricted  Carrying, Moving and Handling Objects Goal Status (): At least 60 percent but less than 80 percent impaired, limited or restricted         Na Bergeron, PT  2/8/2018

## 2018-02-12 ENCOUNTER — HOSPITAL ENCOUNTER (OUTPATIENT)
Dept: PHYSICAL THERAPY | Facility: HOSPITAL | Age: 68
Setting detail: THERAPIES SERIES
Discharge: HOME | End: 2018-02-12
Attending: INTERNAL MEDICINE

## 2018-02-12 ENCOUNTER — HOSPITAL ENCOUNTER (OUTPATIENT)
Dept: OCCUPATIONAL THERAPY | Facility: HOSPITAL | Age: 68
Setting detail: THERAPIES SERIES
Discharge: HOME | End: 2018-02-12
Attending: INTERNAL MEDICINE

## 2018-02-12 DIAGNOSIS — R27.8 DECREASED COORDINATION: ICD-10-CM

## 2018-02-12 DIAGNOSIS — Z74.09 IMPAIRED MOBILITY AND ADLS: Primary | ICD-10-CM

## 2018-02-12 DIAGNOSIS — M25.611 DECREASED ROM OF RIGHT SHOULDER: ICD-10-CM

## 2018-02-12 DIAGNOSIS — R29.898 DECREASED GRIP STRENGTH OF RIGHT HAND: ICD-10-CM

## 2018-02-12 DIAGNOSIS — L90.5 SCAR CONDITIONS AND FIBROSIS OF SKIN: Primary | ICD-10-CM

## 2018-02-12 DIAGNOSIS — M79.601 RIGHT ARM PAIN: ICD-10-CM

## 2018-02-12 DIAGNOSIS — I89.0 LYMPHEDEMA OF RIGHT UPPER EXTREMITY: ICD-10-CM

## 2018-02-12 DIAGNOSIS — Z78.9 IMPAIRED MOBILITY AND ADLS: Primary | ICD-10-CM

## 2018-02-12 DIAGNOSIS — M25.621 DECREASED ROM OF RIGHT ELBOW: ICD-10-CM

## 2018-02-12 PROCEDURE — 97112 NEUROMUSCULAR REEDUCATION: CPT | Performed by: OCCUPATIONAL THERAPIST

## 2018-02-12 PROCEDURE — G8984 CARRY CURRENT STATUS: HCPCS | Performed by: OCCUPATIONAL THERAPIST

## 2018-02-12 PROCEDURE — G8986 CARRY D/C STATUS: HCPCS | Performed by: OCCUPATIONAL THERAPIST

## 2018-02-12 PROCEDURE — 97530 THERAPEUTIC ACTIVITIES: CPT | Performed by: OCCUPATIONAL THERAPIST

## 2018-02-12 PROCEDURE — 97140 MANUAL THERAPY 1/> REGIONS: CPT | Performed by: PHYSICAL THERAPIST

## 2018-02-12 PROCEDURE — G8985 CARRY GOAL STATUS: HCPCS | Performed by: OCCUPATIONAL THERAPIST
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
## 2018-02-14 ENCOUNTER — OFFICE VISIT (OUTPATIENT)
Dept: PSYCHIATRY | Facility: CLINIC | Age: 68
End: 2018-02-14

## 2018-02-14 DIAGNOSIS — F51.05 INSOMNIA DUE TO MENTAL CONDITION: ICD-10-CM

## 2018-02-14 DIAGNOSIS — F41.1 GENERALIZED ANXIETY DISORDER: Primary | ICD-10-CM

## 2018-02-14 PROCEDURE — 90837 PSYTX W PT 60 MINUTES: CPT | Performed by: NURSE PRACTITIONER
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
## 2018-02-15 ENCOUNTER — HOSPITAL ENCOUNTER (OUTPATIENT)
Dept: OCCUPATIONAL THERAPY | Facility: HOSPITAL | Age: 68
Setting detail: THERAPIES SERIES
End: 2018-02-15
Attending: INTERNAL MEDICINE

## 2018-02-15 ENCOUNTER — HOSPITAL ENCOUNTER (OUTPATIENT)
Dept: PHYSICAL THERAPY | Facility: HOSPITAL | Age: 68
Setting detail: THERAPIES SERIES
Discharge: HOME | End: 2018-02-15
Attending: INTERNAL MEDICINE

## 2018-02-15 DIAGNOSIS — L90.5 SCAR CONDITIONS AND FIBROSIS OF SKIN: Primary | ICD-10-CM

## 2018-02-15 DIAGNOSIS — M79.601 RIGHT ARM PAIN: ICD-10-CM

## 2018-02-15 DIAGNOSIS — I89.0 LYMPHEDEMA OF RIGHT UPPER EXTREMITY: ICD-10-CM

## 2018-02-15 PROCEDURE — 97140 MANUAL THERAPY 1/> REGIONS: CPT | Performed by: PHYSICAL THERAPIST

## 2018-02-15 NOTE — THERAPY TREATMENT NOTE
"    Outpatient Physical Therapy Lymphedema Treatment Note  [facility]     Patient Name: Brianna Urrutia  : 1950  MRN: [id number]  Today's Date: 2/15/2018        Visit Date: 02/15/2018    Visit Dx:    ICD-10-CM ICD-9-CM   1. Scar conditions and fibrosis of skin L90.5 709.2   2. Lymphedema of right upper extremity I89.0 457.1   3. Right arm pain M79.601 729.5       Patient Active Problem List   Diagnosis   • Malignant neoplasm of overlapping sites of right female breast   • Malignant neoplasm of upper-outer quadrant of right female breast              Lymphedema       02/15/18 1100             Subjective Comments Pt reports has new PCP and has appt on . Reports continued N/T and weakness LT hand; \"felt normal when I woke up but then when I leaned over to check in the dog, the fingers started to tingle again.\"   -MW       Able to rate subjective pain? yes  -MW    Pre-Treatment Pain Level 5  -MW    Post-Treatment Pain Level 5  -MW    Subjective Pain Comment RT pinky; Lt elbow area   -MW       Ptting Edema Category By severity  -MW    Pitting Edema Moderate;Mild;Severe  -MW    Edema Assessment Comment Mild edema in lateral trunk/ axilla; moderate around elbow; softer but still packed.   -MW       Location/Assessment Upper Quadrant  -MW    Upper Extremity Conditions right:;intact;clean  -MW    Upper Quadrant Conditions right:;other (comment);intact;clean  -MW    Upper Quadrant Color/Pigment right:;hyperpigmented;other (comment);red   scar tight; red/ pink   -MW       Manual Lymphatic Drainage initial sequence;opened regional lymph nodes;opened anastamoses;extremity treatment;astym  -MW    Initial Sequence supraclavicular;shoulder collectors  -MW    Supraclavicular right;left  -MW    Shoulder Collectors right;left  -MW    Opened Regional Lymph Nodes inguinal  -MW    Axillary left  -MW    Inguinal right  -MW    Opened Anastamoses axillo-inguinal;anterior axillo-axillary;posterior axillo-axillary  -MW    " Anterior Axillo-Axillary moving Rt to left   -MW    Posterior Axillo-Axillary moving Rt to left   -MW    Axillo-Inguinal right  -MW    Extremity Treatment MLD to full limb;extremity treatment focus on  -MW    MLD to Full Limb RUE  -MW    Extremity Treatment Focus On elbow, forearm, hand, fingers   -MW    Astym UE sequence;anterior-lateral chest;upper traps;other astym  -MW    Anterior-Lateral Chest right  -MW    Anterior-Lateral Chest Comment In supine: Evaluator and localizer to superior and lateral Rt chest wall. Min fine soft tissue disruptions in superior chest. Continued ASTYM into lateral trunk ribs/ adjacent to breast with localizer and isolator. Moderate fine soft tissue disruptions. Continued ASTYM over sternum with localizer. Also extra strokes around scar; star burst at tumor scar.   -MW    Upper Traps right  -MW    Upper Traps Comment Initiated tx in LT SL for RT UT: Evaluator and localizer to UT / scapular region wrapping into posterior-lateral trunk; minimal fine soft tissue disruptions noted through out. Extra strokes at teres mm area/ posterior axilla. Also worked deltoid and triceps area using Evaluator and localizer. Min fine soft tissue disruptions throughout triceps.    -MW    UE Sequence right  -MW    Manual Lymphatic Drainage Comments MLD post ASTYM / STM   -MW       Compression/Skin Care wrapping location;bandaging  -MW    Wrapping Location upper extremity  -MW    Wrapping Location UE right:;hand to axilla  -MW    Wrapping Comments assisted pt to don arm sleeve    -MW      User Key  (r) = Recorded By, (t) = Taken By, (c) = Cosigned By    Initials Name Provider Type    MW Na Bergeron, PT Physical Therapist                              PT Assessment/Plan       02/15/18 1100       PT Assessment    Functional Limitations Performance in self-care ADL;Limitation in home management  -MW     Impairments Pain;Impaired lymphatic circulation;Edema;Joint mobility;Muscle strength;Impaired flexibility   -MW     Assessment Comments RUE with gradual improvement of lymphedema and smoothing of soft tissue disruptions, but has presistent pain. LUE pain, N/T reported to change with neck position; recommended pt to monitor symptoms and seek medical care of condition worsens.   -MW     Rehab Potential Fair  -MW     Patient/caregiver participated in establishment of treatment plan and goals Yes  -MW     Patient would benefit from skilled therapy intervention Yes  -MW     PT Plan    PT Frequency 2x/week  -     Physical Therapy Interventions (Optional Details) manual lymphatic drainage;manual therapy techniques;ROM (Range of Motion);strengthening;stretching;taping;patient/family education;home exercise program  -     PT Plan Comments Cont ASTYM/ STM, MLD; cont nerve glides / slides for Median and ulnar nerves; progress HEP as coleen.   -       User Key  (r) = Recorded By, (t) = Taken By, (c) = Cosigned By    Initials Name Provider Type    FRAN Bergeron, PT Physical Therapist                       PT OP Goals       02/15/18 1626       Time Calculation    PT Goal Re-Cert Due Date 05/03/18  -       User Key  (r) = Recorded By, (t) = Taken By, (c) = Cosigned By    Initials Name Provider Type    FRAN Bergeron, PT Physical Therapist          Therapy Education  Education Details: Cont pump, sleeve use; pt to monitor N/T with LT hand and neck positions.seek immediate assistance if Lt UE worsens.   Given: Pain management, Symptoms/condition management  Program: Reinforced  How Provided: Verbal  Provided to: Patient  Level of Understanding: Verbalized              Time Calculation:   Start Time: 1100  Total Timed Code Minutes- PT: 55 minute(s)     Therapy Charges for Today     Code Description Service Date Service Provider Modifiers Qty    85617506796 HC PT MANUAL THERAPY EA 15 MIN 2/15/2018 Na Bergeron, PT GP 4                    Na Bergeron, PT  2/15/2018

## 2018-02-19 ENCOUNTER — HOSPITAL ENCOUNTER (OUTPATIENT)
Dept: PHYSICAL THERAPY | Facility: HOSPITAL | Age: 68
Setting detail: THERAPIES SERIES
Discharge: HOME | End: 2018-02-19
Attending: INTERNAL MEDICINE

## 2018-02-19 DIAGNOSIS — L90.5 SCAR CONDITIONS AND FIBROSIS OF SKIN: Primary | ICD-10-CM

## 2018-02-19 DIAGNOSIS — I89.0 LYMPHEDEMA OF RIGHT UPPER EXTREMITY: ICD-10-CM

## 2018-02-19 DIAGNOSIS — M79.601 RIGHT ARM PAIN: ICD-10-CM

## 2018-02-19 PROCEDURE — 97140 MANUAL THERAPY 1/> REGIONS: CPT | Performed by: PHYSICAL THERAPIST

## 2018-02-19 NOTE — THERAPY TREATMENT NOTE
Outpatient Physical Therapy Lymphedema Treatment Note  [facility]     Patient Name: Brianna Urrutia  : 1950  MRN: [id number]  Today's Date: 2018        Visit Date: 2018    Visit Dx:    ICD-10-CM ICD-9-CM   1. Scar conditions and fibrosis of skin L90.5 709.2   2. Lymphedema of right upper extremity I89.0 457.1   3. Right arm pain M79.601 729.5       Patient Active Problem List   Diagnosis   • Malignant neoplasm of overlapping sites of right female breast   • Malignant neoplasm of upper-outer quadrant of right female breast              Lymphedema       18 1100             Subjective Comments Pt reports she has gained 6 lbs since the end of her radiation therapy, but still thinner than before tx. Also reports mopping the floor this weekend, so she is a little sore; Lt hand with constant tingling in thumb, index and middle fingers.   -MW       Able to rate subjective pain? yes  -MW    Pre-Treatment Pain Level 5  -MW    Post-Treatment Pain Level 5  -MW    Subjective Pain Comment chest; Rt pinky, wrist.   -MW       Ptting Edema Category By severity  -MW    Pitting Edema Moderate;Mild  -MW    Edema Assessment Comment moderate to severe fullness RT lateral trunk/ lateral breast area; mild to moderate in upper arm, moderate in forearm (softer but still firm in deeper tissues).   -MW       Location/Assessment Upper Quadrant  -MW    Upper Extremity Conditions right:;intact;clean  -MW    Upper Quadrant Conditions right:;other (comment);intact;clean  -MW    Upper Quadrant Color/Pigment right:;hyperpigmented;other (comment);red   scar fading red, pink, hypopigmented   -MW       Manual Lymphatic Drainage initial sequence;opened regional lymph nodes;opened anastamoses;extremity treatment;astym  -MW    Initial Sequence supraclavicular;shoulder collectors  -MW    Supraclavicular right;left  -MW    Shoulder Collectors right;left  -MW    Opened Regional Lymph Nodes inguinal  -MW    Axillary left  -MW     Inguinal right  -MW    Opened Anastamoses axillo-inguinal;anterior axillo-axillary;posterior axillo-axillary  -MW    Anterior Axillo-Axillary moving Rt to left   -MW    Posterior Axillo-Axillary moving Rt to left   -MW    Axillo-Inguinal right  -MW    Extremity Treatment MLD to full limb;extremity treatment focus on  -MW    MLD to Full Limb RUE   -MW    Extremity Treatment Focus On lateral trunk, elbow and forearm   -MW    Astym UE sequence;anterior-lateral chest;upper traps;other astym  -MW    Anterior-Lateral Chest right  -MW    Anterior-Lateral Chest Comment In supine: Evaluator and localizer to superior and lateral Rt chest wall. Min fine soft tissue disruptions in superior chest. Continued ASTYM into lateral trunk ribs/ adjacent to breast with localizer and isolator. Moderate fine soft tissue disruptions. Continued ASTYM over sternum with localizer. Also extra strokes around scar; star burst at tumor scar.   -MW    Upper Traps right  -MW    Upper Traps Comment Initiated tx in sitting for RT UT: Evaluator and localizer to UT / scapular region wrapping into posterior-lateral trunk; minimal fine soft tissue disruptions noted through out. Extra strokes at teres mm area/ posterior axilla. Also worked deltoid and triceps area using Evaluator and localizer. Min fine soft tissue disruptions throughout triceps.    -MW    UE Sequence right  -MW    Manual Lymphatic Drainage Comments MLD post ASTYM / STM   -MW       Compression/Skin Care wrapping location;bandaging  -MW    Wrapping Location upper extremity  -MW    Wrapping Location UE right:;hand to axilla  -MW    Wrapping Comments assisted pt to don arm sleeve    -MW      User Key  (r) = Recorded By, (t) = Taken By, (c) = Cosigned By    Initials Name Provider Type    FRAN Bergeron, PT Physical Therapist                              PT Assessment/Plan       02/19/18 1100       PT Assessment    Functional Limitations Performance in self-care ADL;Limitation in home  management  -MW     Impairments Pain;Impaired lymphatic circulation;Edema;Joint mobility;Muscle strength;Impaired flexibility  -     Assessment Comments More fullness in Rt lateral trunk/ breast area this date; less full post treatment. Continued decrease in soft tissue disruptions with ASTYM; tumor scar continues to soften and decrease in size. Forearm softer though still with significant pitting edema. LUE symptoms appear to be stable; has PCP appt on 2/28.   -MW     Rehab Potential Fair  -MW     Patient/caregiver participated in establishment of treatment plan and goals Yes  -MW     Patient would benefit from skilled therapy intervention Yes  -MW     PT Plan    PT Frequency 2x/week  -MW     Physical Therapy Interventions (Optional Details) manual lymphatic drainage;manual therapy techniques;ROM (Range of Motion);strengthening;stretching;taping;patient/family education;home exercise program  -     PT Plan Comments Cont ASTYM/ STM, MLD; cont nerve glides / slides for Median and ulnar nerves; progress HEP as coleen.   -MW       User Key  (r) = Recorded By, (t) = Taken By, (c) = Cosigned By    Initials Name Provider Type    FRAN Bergeron PT Physical Therapist                  Manual Rx (last 36 hours)      Manual Treatments       02/19/18 1100          Manual Rx 1    Manual Rx 1 Location Rt chest, including tumor site scar, axilla and posterior axillary fold   -MW      Manual Rx 1 Type STM, tissue bending, and fascial stretches   -MW      Manual Rx 1 Grade gentle to moderate   -MW      Manual Rx 1 Duration 8  -MW        User Key  (r) = Recorded By, (t) = Taken By, (c) = Cosigned By    Initials Name Provider Type    FRAN Bergeron PT Physical Therapist                PT OP Goals       02/19/18 1616       Time Calculation    PT Goal Re-Cert Due Date 05/03/18  -MW       User Key  (r) = Recorded By, (t) = Taken By, (c) = Cosigned By    Initials Name Provider Type    FRAN Bergeron PT Physical  Therapist                         Time Calculation:   Start Time: 1100  Total Timed Code Minutes- PT: 60 minute(s)     Therapy Charges for Today     Code Description Service Date Service Provider Modifiers Qty    71355310727 HC PT MANUAL THERAPY EA 15 MIN 2/19/2018 Na Bergeron, PT GP 4                    Na Bergeron, PT  2/19/2018

## 2018-02-22 ENCOUNTER — HOSPITAL ENCOUNTER (OUTPATIENT)
Dept: PHYSICAL THERAPY | Facility: HOSPITAL | Age: 68
Setting detail: THERAPIES SERIES
Discharge: HOME | End: 2018-02-22
Attending: INTERNAL MEDICINE

## 2018-02-22 DIAGNOSIS — L90.5 SCAR CONDITIONS AND FIBROSIS OF SKIN: Primary | ICD-10-CM

## 2018-02-22 DIAGNOSIS — M79.601 RIGHT ARM PAIN: ICD-10-CM

## 2018-02-22 DIAGNOSIS — I89.0 LYMPHEDEMA OF RIGHT UPPER EXTREMITY: ICD-10-CM

## 2018-02-22 PROCEDURE — 97140 MANUAL THERAPY 1/> REGIONS: CPT | Performed by: PHYSICAL THERAPIST

## 2018-02-22 NOTE — THERAPY TREATMENT NOTE
"    Outpatient Physical Therapy Lymphedema Treatment Note   [place]     Patient Name: Brianna Urrutia  : 1950  MRN: [id number]  Today's Date: 2018        Visit Date: 2018    Visit Dx:    ICD-10-CM ICD-9-CM   1. Scar conditions and fibrosis of skin L90.5 709.2   2. Lymphedema of right upper extremity I89.0 457.1   3. Right arm pain M79.601 729.5       Patient Active Problem List   Diagnosis   • Malignant neoplasm of overlapping sites of right female breast   • Malignant neoplasm of upper-outer quadrant of right female breast              Lymphedema       18 1100             Subjective Comments Pt reports she would like to take the tape off; maybe it was \"too much flow\" Notes sorenss / pain in Rt shoulder. Pinky hurts or is numb    -MW       Able to rate subjective pain? yes  -MW    Pre-Treatment Pain Level 5  -MW    Post-Treatment Pain Level 5  -MW    Subjective Pain Comment shoulder (point tender anteriorly),elbow and pinky  -MW       Ptting Edema Category By severity  -MW    Pitting Edema Moderate;Mild  -MW    Edema Assessment Comment moderate in lateral trunk; mild upper arm, moderate elbow with moderate to severe in forearm; softer on surface but still has packed edema as well   -MW       Location/Assessment Upper Quadrant  -MW    Upper Extremity Conditions right:;intact;clean  -MW    Upper Quadrant Conditions right:;other (comment);intact;clean  -MW    Upper Quadrant Color/Pigment right:;hyperpigmented;other (comment);red   scar fading red, pink, hypopigmented   -MW       Manual Lymphatic Drainage initial sequence;opened regional lymph nodes;opened anastamoses;extremity treatment;astym  -MW    Initial Sequence supraclavicular;shoulder collectors  -MW    Supraclavicular right;left  -MW    Shoulder Collectors right;left  -MW    Opened Regional Lymph Nodes inguinal  -MW    Axillary left  -MW    Inguinal right  -MW    Opened Anastamoses axillo-inguinal;anterior axillo-axillary;posterior " axillo-axillary  -MW    Anterior Axillo-Axillary moving Rt to left   -MW    Posterior Axillo-Axillary moving Rt to left   -MW    Axillo-Inguinal right  -MW    Extremity Treatment MLD to full limb;extremity treatment focus on  -MW    MLD to Full Limb RUE   -MW    Extremity Treatment Focus On lateral trunk, elbow and forearm   -MW    Astym UE sequence;anterior-lateral chest;upper traps;other astym  -MW    Anterior-Lateral Chest right  -MW    Anterior-Lateral Chest Comment In supine: Evaluator and localizer to superior and lateral Rt chest wall. Min fine soft tissue disruptions in superior chest. Continued ASTYM into lateral trunk ribs/ adjacent to breast with localizer and isolator. Moderate fine soft tissue disruptions. Continued ASTYM over sternum with localizer. Also extra strokes around scar; star burst at tumor scar.   -MW    Upper Traps right  -MW    Upper Traps Comment Initiated tx in sitting for RT UT: Evaluator and localizer to UT / scapular region wrapping into posterior-lateral trunk; minimal fine soft tissue disruptions noted through out. Extra strokes at teres mm area/ posterior axilla. Also worked deltoid and triceps area using Evaluator and localizer. Min fine soft tissue disruptions throughout triceps. Repositioned in LSL for additional strokes to posterior shoulder and lateral trunk.   -MW    UE Sequence right  -MW    Manual Lymphatic Drainage Comments MLD post ASTYM / STM   -MW       Compression/Skin Care wrapping location;bandaging  -MW    Wrapping Location upper extremity  -MW    Wrapping Location UE right:;hand to axilla  -MW    Wrapping Comments assisted pt to don arm sleeve    -MW    Compression/Skin Care Comments cast padding to proximal band of arm sleeve per pt request   -      User Key  (r) = Recorded By, (t) = Taken By, (c) = Cosigned By    Initials Name Provider Type    MW Na Bergeron, PT Physical Therapist                              PT Assessment/Plan       02/22/18 1100       PT  "Assessment    Functional Limitations Performance in self-care ADL;Limitation in home management  -MW     Impairments Pain;Impaired lymphatic circulation;Edema;Joint mobility;Muscle strength;Impaired flexibility  -MW     Assessment Comments RT lateral trunk fullness improved but still present. RUE edema softer and less full in upper arm; elbow and proximal forearm with persistent pitting and packed edema; pt lives alone and has little opportunity for RUE to full rest. Continue to note decrease in soft tissue disruptions in Rt UT, chest and UE however, pt with continued c/o pain in these area.   -MW     Rehab Potential Fair  -MW     Patient/caregiver participated in establishment of treatment plan and goals Yes  -MW     Patient would benefit from skilled therapy intervention Yes  -MW     PT Plan    PT Frequency 2x/week  -MW     Physical Therapy Interventions (Optional Details) manual lymphatic drainage;manual therapy techniques;ROM (Range of Motion);strengthening;stretching;taping;patient/family education;home exercise program  -MW     PT Plan Comments Cont ASTYM/ STM, MLD; cont nerve glides / slides for Median and ulnar nerves; progress HEP as coleen.   -MW       User Key  (r) = Recorded By, (t) = Taken By, (c) = Cosigned By    Initials Name Provider Type    MW Na Bergeron, PT Physical Therapist                     Exercises       02/22/18 1100          Subjective Comments    Subjective Comments Pt reports she would like to take the tape off; maybe it was \"too much flow\" Notes sorenss / pain in Rt shoulder. Pinky hurts or is numb    -MW      Subjective Pain    Able to rate subjective pain? yes  -MW      Pre-Treatment Pain Level 5  -MW      Post-Treatment Pain Level 5  -MW      Subjective Pain Comment shoulder (point tender anteriorly),elbow and pinky  -MW      Exercise 1    Exercise Name 1 RT median nerve stretch   -MW      Cueing 1 Tactile;Verbal  -MW      Reps 1 1  -MW        User Key  (r) = Recorded By, (t) = " Taken By, (c) = Cosigned By    Initials Name Provider Type    FRAN Bergeron PT Physical Therapist                       Manual Rx (last 36 hours)      Manual Treatments       02/22/18 1100          Manual Rx 1    Manual Rx 1 Location Rt chest, including tumor site scar, axilla and posterior axillary fold   -MW      Manual Rx 1 Type STM, tissue bending, and fascial stretches   -MW      Manual Rx 1 Grade gentle to moderate   -MW      Manual Rx 1 Duration 8  -MW      Manual Rx 2    Manual Rx 2 Location Rt UT and leveator scapulae   -MW      Manual Rx 2 Type STM/ trigger point release   -MW      Manual Rx 2 Grade min to moderate pressures and tissue bending  -MW      Manual Rx 2 Duration 5  -MW        User Key  (r) = Recorded By, (t) = Taken By, (c) = Cosigned By    Initials Name Provider Type    FRAN Bergeron PT Physical Therapist                PT OP Goals       02/22/18 1624       Time Calculation    PT Goal Re-Cert Due Date 05/03/18  -MW       User Key  (r) = Recorded By, (t) = Taken By, (c) = Cosigned By    Initials Name Provider Type    FRAN Bergeron PT Physical Therapist                         Time Calculation:   Start Time: 1100  Total Timed Code Minutes- PT: 60 minute(s)     Therapy Charges for Today     Code Description Service Date Service Provider Modifiers Qty    24574541282 HC PT MANUAL THERAPY EA 15 MIN 2/22/2018 Na Bergeron, PT GP 4                    Na Bergeron PT  2/22/2018

## 2018-02-26 ENCOUNTER — HOSPITAL ENCOUNTER (OUTPATIENT)
Dept: PHYSICAL THERAPY | Facility: HOSPITAL | Age: 68
Setting detail: THERAPIES SERIES
Discharge: HOME | End: 2018-02-26
Attending: INTERNAL MEDICINE

## 2018-02-26 DIAGNOSIS — M79.601 RIGHT ARM PAIN: ICD-10-CM

## 2018-02-26 DIAGNOSIS — L90.5 SCAR CONDITIONS AND FIBROSIS OF SKIN: Primary | ICD-10-CM

## 2018-02-26 DIAGNOSIS — I89.0 LYMPHEDEMA OF RIGHT UPPER EXTREMITY: ICD-10-CM

## 2018-02-26 PROCEDURE — 97140 MANUAL THERAPY 1/> REGIONS: CPT | Performed by: PHYSICAL THERAPIST

## 2018-02-26 NOTE — THERAPY TREATMENT NOTE
Outpatient Physical Therapy Lymphedema Treatment Note  [facility]     Patient Name: Brianna Urrutia  : 1950  MRN: [id number]  Today's Date: 2018        Visit Date: 2018    Visit Dx:    ICD-10-CM ICD-9-CM   1. Scar conditions and fibrosis of skin L90.5 709.2   2. Lymphedema of right upper extremity I89.0 457.1   3. Right arm pain M79.601 729.5       Patient Active Problem List   Diagnosis   • Malignant neoplasm of overlapping sites of right female breast   • Malignant neoplasm of upper-outer quadrant of right female breast              Lymphedema       18 1105             Subjective Comments Pt reports went to chircpracter for her neck; worked out a way to not put pressure on her chest. Then went to yoga class Friday evening; easy floor class   -MW       Able to rate subjective pain? yes  -MW    Pre-Treatment Pain Level 5  -MW    Post-Treatment Pain Level 5  -MW    Subjective Pain Comment medial upper arm, elbow and wrist RT   -MW       Ptting Edema Category By severity  -MW    Pitting Edema Moderate;Mild  -MW    Edema Assessment Comment moderate in lateral trunk; mild upper arm, moderate elbow with moderate to severe in forearm; softer on surface but still has packed edema in forearm/ elbow   -MW       Location/Assessment Upper Quadrant  -MW    Upper Extremity Conditions right:;intact;clean  -MW    Upper Quadrant Conditions right:;other (comment);intact;clean  -MW    Upper Quadrant Color/Pigment right:;hyperpigmented;other (comment);red   scar fading red, pink, hypopigmented   -MW       Manual Lymphatic Drainage initial sequence;opened regional lymph nodes;opened anastamoses;extremity treatment;astym  -MW    Initial Sequence supraclavicular;shoulder collectors  -MW    Supraclavicular right;left  -MW    Shoulder Collectors right;left  -MW    Opened Regional Lymph Nodes inguinal  -MW    Axillary left  -MW    Inguinal right  -MW    Opened Anastamoses axillo-inguinal;anterior  axillo-axillary;posterior axillo-axillary  -MW    Anterior Axillo-Axillary moving Rt to left   -MW    Posterior Axillo-Axillary moving Rt to left   -MW    Axillo-Inguinal right  -MW    Extremity Treatment MLD to full limb;extremity treatment focus on  -MW    MLD to Full Limb RUE  -MW    Extremity Treatment Focus On lateral trunk, axilla, elbow and forearm   -MW    Astym UE sequence;anterior-lateral chest;upper traps;other astym  -MW    Anterior-Lateral Chest right  -MW    Anterior-Lateral Chest Comment In supine: Evaluator and localizer to superior and lateral Rt chest wall. Min fine soft tissue disruptions in superior chest. Continued ASTYM into lateral trunk ribs/ adjacent to breast with localizer and isolator. Moderate fine soft tissue disruptions. Continued ASTYM over sternum with localizer. Also extra strokes around scar; star burst at tumor scar.   -MW    Upper Traps right  -MW    Upper Traps Comment Initiated tx in sitting for RT UT: Evaluator and localizer to UT / scapular region wrapping into posterior-lateral trunk; minimal fine soft tissue disruptions noted through out. Extra strokes at teres mm area/ posterior axilla. Also worked deltoid and triceps area using Evaluator and localizer. Min fine soft tissue disruptions throughout triceps.   -MW    UE Sequence right  -MW    UE Sequence Comment Evaluator and localizer to full arm. Mixed course and fine disruptions throughout; especially along extensor mm groups. Extra strokes around elbow / radial head area, ulnar groove and wrist.   -MW    Manual Lymphatic Drainage Comments MLD post ASTYM / STM   -MW       Compression/Skin Care wrapping location;bandaging  -MW    Wrapping Location upper extremity  -MW    Wrapping Location UE right:;hand to axilla  -MW    Wrapping Comments assisted pt to don arm sleeve    -MW      User Key  (r) = Recorded By, (t) = Taken By, (c) = Cosigned By    Initials Name Provider Type    FRAN Bergeron, PT Physical Therapist                               PT Assessment/Plan       02/26/18 1105       PT Assessment    Functional Limitations Performance in self-care ADL;Limitation in home management  -MW     Impairments Pain;Impaired lymphatic circulation;Edema;Joint mobility;Muscle strength;Impaired flexibility  -     Assessment Comments RUE edema softer/ less full post treatment but seems very persistent even with use of compression pump and sleeve daily. Pt continues to demonstrate smoothing of soft tissue restrictions during ASTYM but little improvement in pain reports. Pt continues to report pain and N/T in RT as well as LT UE/hands; hopefully new PCP will be able to provided insight and diagnostic options to determine origin / treatment options for her as her symptoms seem to be progressive.   -MW     Rehab Potential Fair  -MW     Patient/caregiver participated in establishment of treatment plan and goals Yes  -MW     Patient would benefit from skilled therapy intervention Yes  -MW     PT Plan    PT Frequency 2x/week  -MW     Physical Therapy Interventions (Optional Details) manual lymphatic drainage;manual therapy techniques;ROM (Range of Motion);strengthening;stretching;taping;patient/family education;home exercise program  -     PT Plan Comments Cont ASTYM/ STM, MLD; cont nerve glides / slides for Median and ulnar nerves; progress HEP as coleen.   -       User Key  (r) = Recorded By, (t) = Taken By, (c) = Cosigned By    Initials Name Provider Type    FRAN Bergeron, PT Physical Therapist                     Exercises       02/26/18 1105          Subjective Comments    Subjective Comments Pt reports went to chircpracter for her neck; worked out a way to not put pressure on her chest. Then went to yoga class Friday evening; easy floor class   -MW      Subjective Pain    Able to rate subjective pain? yes  -MW      Pre-Treatment Pain Level 5  -MW      Post-Treatment Pain Level 5  -MW      Subjective Pain Comment medial upper arm,  elbow and wrist RT   -MW      Exercise 1    Exercise Name 1 RT median nerve stretch   -MW      Cueing 1 Tactile;Verbal  -MW      Reps 1 5  -MW      Additional Comments various angles of shoulder ABD; working between elbow ext with wrist flex/ ext and elbow flex.   -MW        User Key  (r) = Recorded By, (t) = Taken By, (c) = Cosigned By    Initials Name Provider Type    FRAN Bergeron PT Physical Therapist                       Manual Rx (last 36 hours)      Manual Treatments       02/26/18 1105          Manual Rx 1    Manual Rx 1 Location Rt chest, axilla and posterior axillary fold   -MW      Manual Rx 1 Type STM, tissue bending, and fascial stretches   -MW      Manual Rx 1 Grade gentle to moderate   -MW      Manual Rx 1 Duration 8  -MW        User Key  (r) = Recorded By, (t) = Taken By, (c) = Cosigned By    Initials Name Provider Type    FRAN Bergeron PT Physical Therapist                PT OP Goals       02/26/18 1554       Time Calculation    PT Goal Re-Cert Due Date 05/03/18  -MW       User Key  (r) = Recorded By, (t) = Taken By, (c) = Cosigned By    Initials Name Provider Type    FRAN Bergeron PT Physical Therapist                         Time Calculation:   Start Time: 1105  Total Timed Code Minutes- PT: 55 minute(s)     Therapy Charges for Today     Code Description Service Date Service Provider Modifiers Qty    91049216213 HC PT MANUAL THERAPY EA 15 MIN 2/26/2018 Na Bergeron, PT GP 4                    Na Bergeron PT  2/26/2018

## 2018-02-28 ENCOUNTER — OFFICE VISIT (OUTPATIENT)
Dept: FAMILY MEDICINE CLINIC | Facility: CLINIC | Age: 68
End: 2018-02-28

## 2018-02-28 VITALS
SYSTOLIC BLOOD PRESSURE: 162 MMHG | OXYGEN SATURATION: 99 % | DIASTOLIC BLOOD PRESSURE: 90 MMHG | BODY MASS INDEX: 21 KG/M2 | TEMPERATURE: 99.2 F | WEIGHT: 123 LBS | HEIGHT: 64 IN | HEART RATE: 94 BPM

## 2018-02-28 DIAGNOSIS — Z00.00 WELCOME TO MEDICARE PREVENTIVE VISIT: Primary | ICD-10-CM

## 2018-02-28 DIAGNOSIS — R31.9 URINARY TRACT INFECTION WITH HEMATURIA, SITE UNSPECIFIED: ICD-10-CM

## 2018-02-28 DIAGNOSIS — M79.10 MYALGIA: ICD-10-CM

## 2018-02-28 DIAGNOSIS — N39.0 URINARY TRACT INFECTION WITH HEMATURIA, SITE UNSPECIFIED: ICD-10-CM

## 2018-02-28 DIAGNOSIS — R35.0 URINARY FREQUENCY: ICD-10-CM

## 2018-02-28 DIAGNOSIS — I10 ESSENTIAL HYPERTENSION: ICD-10-CM

## 2018-02-28 DIAGNOSIS — I89.0 LYMPHEDEMA OF UPPER EXTREMITY: ICD-10-CM

## 2018-02-28 DIAGNOSIS — G56.90 NEUROPATHY OF UPPER EXTREMITY, UNSPECIFIED LATERALITY: ICD-10-CM

## 2018-02-28 LAB
BILIRUB BLD-MCNC: NEGATIVE MG/DL
CLARITY, POC: CLEAR
COLOR UR: YELLOW
GLUCOSE UR STRIP-MCNC: NEGATIVE MG/DL
KETONES UR QL: NEGATIVE
LEUKOCYTE EST, POC: ABNORMAL
NITRITE UR-MCNC: NEGATIVE MG/ML
PH UR: 7 [PH] (ref 5–8)
PROT UR STRIP-MCNC: NEGATIVE MG/DL
RBC # UR STRIP: ABNORMAL /UL
SP GR UR: 1.01 (ref 1–1.03)
UROBILINOGEN UR QL: NORMAL

## 2018-02-28 PROCEDURE — G0402 INITIAL PREVENTIVE EXAM: HCPCS | Performed by: PHYSICIAN ASSISTANT

## 2018-02-28 PROCEDURE — 99212 OFFICE O/P EST SF 10 MIN: CPT | Performed by: PHYSICIAN ASSISTANT

## 2018-02-28 PROCEDURE — 81003 URINALYSIS AUTO W/O SCOPE: CPT | Performed by: PHYSICIAN ASSISTANT

## 2018-02-28 RX ORDER — SULFAMETHOXAZOLE AND TRIMETHOPRIM 800; 160 MG/1; MG/1
1 TABLET ORAL 2 TIMES DAILY
Qty: 14 TABLET | Refills: 0 | Status: SHIPPED | OUTPATIENT
Start: 2018-02-28 | End: 2018-03-06 | Stop reason: SINTOL

## 2018-02-28 NOTE — PROGRESS NOTES
QUICK REFERENCE INFORMATION:  The ABCs of the Annual Wellness Visit    [facility]    [facility]ISK [facility]    1950    Recent Hospitalizations:  Recently treated at the following:  [facility].      Current Medical Providers:  Patient Care Team:  Christina Wade PA-C as PCP - General (Family Medicine)      Smoking Status:  History   Smoking Status   • Never Smoker   Smokeless Tobacco   • Never Used       Alcohol Consumption:  History   Alcohol Use No       Depression Screen:   PHQ-2/PHQ-9 Depression Screening 2/28/2018   Little interest or pleasure in doing things 0   Feeling down, depressed, or hopeless 0   Total Score 0       Health Habits and Functional and Cognitive Screening:  Functional & Cognitive Status 2/28/2018   Do you have difficulty preparing food and eating? Yes   Do you have difficulty bathing yourself, getting dressed or grooming yourself? Yes   Do you have difficulty using the toilet? No   Do you have difficulty moving around from place to place? Yes   Do you have trouble with steps or getting out of a bed or a chair? Yes   In the past year have you fallen or experienced a near fall? Yes   Current Diet Well Balanced Diet   Dental Exam Up to date   Eye Exam Up to date   Exercise (times per week) 0 times per week   Current Exercise Activities Include None   Do you need help using the phone?  No   Are you deaf or do you have serious difficulty hearing?  No   Do you need help with transportation? Yes   Do you need help shopping? No   Do you need help preparing meals?  Yes   Do you need help with housework?  Yes   Do you need help with laundry? Yes   Do you need help taking your medications? No   Do you need help managing money? No   Have you felt unusual stress, anger or loneliness in the last month? No   Who do you live with? Alone   If you need help, do you have trouble finding someone available to you? Yes   Have you been bothered in the last four weeks by sexual  problems? No   Do you have difficulty concentrating, remembering or making decisions? No           Does the patient have evidence of cognitive impairment? No    Aspirin use counseling? Does not need ASA (and currently is not on it)      Recent Lab Results:  CMP:  Lab Results   Component Value Date    BUN 9 12/11/2017    CREATININE 0.60 12/11/2017    EGFRIFNONA 100 12/11/2017    BCR 15.0 12/11/2017     12/11/2017    K 3.2 (L) 12/11/2017    CO2 30.0 12/11/2017    CALCIUM 9.0 12/11/2017    ALBUMIN 4.20 12/11/2017    LABIL2 1.3 (L) 12/11/2017    BILITOT 0.6 12/11/2017    ALKPHOS 67 12/11/2017    AST 40 (H) 12/11/2017    ALT 45 (H) 12/11/2017     Lipid Panel:     HbA1c:       Visual Acuity:  No exam data present    Age-appropriate Screening Schedule:  Refer to the list below for future screening recommendations based on patient's age, sex and/or medical conditions. Orders for these recommended tests are listed in the plan section. The patient has been provided with a written plan.    Health Maintenance   Topic Date Due   • TDAP/TD VACCINES (1 - Tdap) 07/09/1971   • COLONOSCOPY  06/09/2017   • ZOSTER VACCINE  06/09/2017   • PNEUMOCOCCAL VACCINES (65+ LOW/MEDIUM RISK) (1 of 2 - PCV13) 07/09/2017   • INFLUENZA VACCINE  08/01/2017   • MAMMOGRAM  05/25/2019        Subjective   History of Present Illness    Brianna SUERO Renan is a 67 y.o. female a new patient presenting for a Welcome to Medicare Visit. She is also here to establish care as a new patient to our practice.  She has 2 new/uncontrolled issues to discuss she started having urinary symptoms this week that she is concerned about and has ongoing progressive symptoms of numbness tingling and weakness in both of her hands for the past 6 months.    The following portions of the patient's history were reviewed and updated as appropriate: current medications, past family history, past medical history, past social history, past surgical history and problem  list.    Outpatient Medications Prior to Visit   Medication Sig Dispense Refill   • losartan (COZAAR) 25 MG tablet Take 25 mg by mouth Daily.     • promethazine (PHENERGAN) 25 MG tablet Take 0.5 tablets by mouth Every 6 (Six) Hours As Needed for Nausea or Vomiting. 30 tablet 0   • TraMADol HCl 50 MG tablet dispersible Take 0.5 tablets by mouth As Needed.     • mirtazapine (REMERON) 15 MG tablet Take 1/2-1 tablet as needed for sleep 30 tablet 2   • Unable to find 1 each 1 (One) Time. [place] [employer]       No facility-administered medications prior to visit.        Patient Active Problem List   Diagnosis   • Malignant neoplasm of overlapping sites of right female breast   • Malignant neoplasm of upper-outer quadrant of right female breast       Advance Care Planning:  has an advance directive - a copy HAS NOT been provided    Identification of Risk Factors:  Risk factors include: chronic pain.    Review of Systems   Constitutional: Negative for chills and fever.   Gastrointestinal: Positive for abdominal pain. Negative for nausea and vomiting.   Genitourinary: Positive for difficulty urinating, dysuria, frequency and urgency. Negative for hematuria.        Symptoms of urinary frequency and dysuria for the past 2 days.   Neurological: Positive for weakness and numbness.        Patient's been expressing numbness tingling and weakness in both of her hands and arms.  She states she is diagnosed with breast cancer last year and has had lymphedema in right upper extremity since her treatments, she believes that her symptoms originally were the right hand only and now are moving to the left.  She is seen in occupational therapist and a physical therapist currently.       Compared to one year ago, the patient feels her physical health is better.  Compared to one year ago, the patient feels her mental health is better.    Objective    Physical Exam   Constitutional: She is oriented to person, place, and time. She appears  "well-developed and well-nourished.   Abdominal: Soft. She exhibits no distension. There is no tenderness. There is no rebound and no guarding.   Musculoskeletal: Normal range of motion. She exhibits edema ( Lymphedema right upper extremity, wears a sleeve). She exhibits no tenderness.   Weakness of hand  2/5 right upper extremity 3/5 left upper extremity.   Neurological: She is alert and oriented to person, place, and time.   Skin: Skin is warm and dry.   Nursing note and vitals reviewed.      Vitals:    02/28/18 1409   BP: 162/90   Pulse: 94   Temp: 99.2 °F (37.3 °C)   TempSrc: Oral   SpO2: 99%   Weight: 55.8 kg (123 lb)   Height: 162.6 cm (64\")       Body mass index is 21.11 kg/(m^2).  Discussed the patient's BMI with her. BMI is within normal parameters. No follow-up required.    Procedure   Procedures       Assessment/Plan   Patient Self-Management and Personalized Health Advice  The patient has been provided with information about: exercise and preventive services including:   · Urinary Incontinence assessment done.    Visit Diagnoses:    ICD-10-CM ICD-9-CM   1. Welcome to Medicare preventive visit Z00.00 V70.0   2. Urinary tract infection with hematuria, site unspecified N39.0 599.0    R31.9    3. Neuropathy of upper extremity, unspecified laterality G56.90 354.9   4. Myalgia  M79.1 729.1   5. Urinary frequency R35.0 788.41   6. Lymphedema of upper extremity I89.0 457.1   7. Essential hypertension I10 401.9       Orders Placed This Encounter   Procedures   • POC Urinalysis Dipstick, Automated   • EMG & Nerve Conduction Test     Standing Status:   Future     Standing Expiration Date:   2/28/2019     Order Specific Question:   Extremity     Answer:   upper     Order Specific Question:   Please specify number of extremities:     Answer:   2 Extremities     Order Specific Question:   Reason for Exam:     Answer:   numbness and tingling upper extremities       Outpatient Encounter Prescriptions as of 2/28/2018 "   Medication Sig Dispense Refill   • losartan (COZAAR) 25 MG tablet Take 25 mg by mouth Daily.     • promethazine (PHENERGAN) 25 MG tablet Take 0.5 tablets by mouth Every 6 (Six) Hours As Needed for Nausea or Vomiting. 30 tablet 0   • TraMADol HCl 50 MG tablet dispersible Take 0.5 tablets by mouth As Needed.     • sulfamethoxazole-trimethoprim (BACTRIM DS,SEPTRA DS) 800-160 MG per tablet Take 1 tablet by mouth 2 (Two) Times a Day. 14 tablet 0   • [DISCONTINUED] mirtazapine (REMERON) 15 MG tablet Take 1/2-1 tablet as needed for sleep 30 tablet 2   • [DISCONTINUED] Unable to find 1 each 1 (One) Time. [place] [employer]       No facility-administered encounter medications on file as of 2/28/2018.        Reviewed use of high risk medication in the elderly: no  Reviewed for potential of harmful drug interactions in the elderly: no    Follow Up:  No Follow-up on file.     An After Visit Summary and PPPS with all of these plans were given to the patient.          We'll follow-up with patient after nerve conduction studies received and reviewed, advised her to continue with her physical therapy/occupational therapy current plan, follow-up after antibiotic completed if UTI symptoms not resolved, follow-up if blood pressure not stabilized within the next 2 weeks, patient will check this at home.

## 2018-03-01 ENCOUNTER — TELEPHONE (OUTPATIENT)
Dept: FAMILY MEDICINE CLINIC | Facility: CLINIC | Age: 68
End: 2018-03-01

## 2018-03-01 ENCOUNTER — HOSPITAL ENCOUNTER (OUTPATIENT)
Dept: PHYSICAL THERAPY | Facility: HOSPITAL | Age: 68
Setting detail: THERAPIES SERIES
Discharge: HOME | End: 2018-03-01
Attending: INTERNAL MEDICINE

## 2018-03-01 DIAGNOSIS — L90.5 SCAR CONDITIONS AND FIBROSIS OF SKIN: Primary | ICD-10-CM

## 2018-03-01 DIAGNOSIS — I89.0 LYMPHEDEMA OF RIGHT UPPER EXTREMITY: ICD-10-CM

## 2018-03-01 DIAGNOSIS — M79.601 RIGHT ARM PAIN: ICD-10-CM

## 2018-03-01 DIAGNOSIS — M54.2 NECK PAIN: Primary | ICD-10-CM

## 2018-03-01 PROCEDURE — 97140 MANUAL THERAPY 1/> REGIONS: CPT | Performed by: PHYSICAL THERAPIST

## 2018-03-01 NOTE — TELEPHONE ENCOUNTER
----- Message from Linda Millard sent at 3/1/2018 12:12 PM EST -----  Patient said that she needs an xray of her neck/having pain. She said that she would like a return call at [phone]..  ThanksLinda..

## 2018-03-01 NOTE — THERAPY TREATMENT NOTE
Outpatient Physical Therapy Lymphedema Treatment Note  [facility]     Patient Name: Brianna Urrutia  : 1950  MRN: [id number]  Today's Date: 3/1/2018        Visit Date: 2018    Visit Dx:    ICD-10-CM ICD-9-CM   1. Scar conditions and fibrosis of skin L90.5 709.2   2. Lymphedema of right upper extremity I89.0 457.1   3. Right arm pain M79.601 729.5       Patient Active Problem List   Diagnosis   • Malignant neoplasm of overlapping sites of right female breast   • Malignant neoplasm of upper-outer quadrant of right female breast              Lymphedema       18 1100             Subjective Comments reports visit to new PCP was good; will have nerve test and has UTI.   -MW       Able to rate subjective pain? yes  -MW    Pre-Treatment Pain Level 5  -MW    Post-Treatment Pain Level 5  -MW    Subjective Pain Comment Rt medial arm, elbow, wrist, hand   -MW       Ptting Edema Category By severity  -MW    Pitting Edema Moderate;Mild  -MW    Edema Assessment Comment Mild edema lateral trunk and upper arm, moderate around elbow and forearm  -MW       Location/Assessment Upper Quadrant  -MW    Upper Extremity Conditions right:;intact;clean  -MW    Upper Quadrant Conditions right:;other (comment);intact;clean  -MW    Upper Quadrant Color/Pigment right:;hyperpigmented;other (comment);red   scar fading red, pink, hypopigmented   -MW       Manual Lymphatic Drainage initial sequence;opened regional lymph nodes;opened anastamoses;extremity treatment;astym  -MW    Initial Sequence supraclavicular;shoulder collectors  -MW    Supraclavicular right;left  -MW    Shoulder Collectors right;left  -MW    Opened Regional Lymph Nodes inguinal  -MW    Axillary left  -MW    Inguinal right  -MW    Opened Anastamoses axillo-inguinal;anterior axillo-axillary;posterior axillo-axillary  -MW    Anterior Axillo-Axillary moving Rt to left   -MW    Posterior Axillo-Axillary moving Rt to left   -MW    Axillo-Inguinal right  -MW     Extremity Treatment MLD to full limb;extremity treatment focus on  -MW    MLD to Full Limb RUE   -MW    Extremity Treatment Focus On lateral trunk, axilla, elbow and forearm   -MW    Astym UE sequence;anterior-lateral chest;upper traps;other astym  -MW    Anterior-Lateral Chest right  -MW    Anterior-Lateral Chest Comment In supine: Evaluator and localizer to superior and lateral Rt chest wall. Min fine soft tissue disruptions in superior chest. Continued ASTYM into lateral trunk ribs/ adjacent to breast with localizer and isolator. Moderate fine soft tissue disruptions. Continued ASTYM over sternum with localizer. Also extra strokes around scar; star burst at tumor scar.   -MW    Upper Traps right  -MW    Upper Traps Comment Initiated tx in sitting for RT UT: Evaluator and localizer to UT / scapular region wrapping into posterior-lateral trunk; minimal fine soft tissue disruptions noted through out. Extra strokes at teres mm area/ posterior axilla.   -MW    UE Sequence right  -MW    Manual Lymphatic Drainage Comments MLD post ASTYM / STM   -MW       Compression/Skin Care wrapping location;bandaging  -MW    Wrapping Location upper extremity  -MW    Wrapping Location UE right:;hand to axilla  -MW    Wrapping Comments pt forgot sleeve; applied size 3.5 compressogrip as temporary sleeve.   -MW      User Key  (r) = Recorded By, (t) = Taken By, (c) = Cosigned By    Initials Name Provider Type    MW Na Bergeron, PT Physical Therapist                              PT Assessment/Plan       03/01/18 1100       PT Assessment    Functional Limitations Performance in self-care ADL;Limitation in home management  -MW     Impairments Pain;Impaired lymphatic circulation;Edema;Joint mobility;Muscle strength;Impaired flexibility  -MW     Assessment Comments RUE edema softer; less soft tissue disruptions during ASTYM, but continues to have persistent pain. Noted muscle wasting of Rt thenar eminence. Await results of EMG/ NC study  and extended care. Pt to cont with home pump use.   -MW     Rehab Potential Fair  -MW     Patient/caregiver participated in establishment of treatment plan and goals Yes  -MW     Patient would benefit from skilled therapy intervention Yes  -MW     PT Plan    PT Frequency 2x/week  -MW     Physical Therapy Interventions (Optional Details) manual lymphatic drainage;manual therapy techniques;ROM (Range of Motion);strengthening;stretching;taping;patient/family education;home exercise program  -MW     PT Plan Comments Cont ASTYM/ STM, MLD; cont nerve glides / slides for Median and ulnar nerves; progress HEP as coleen.   -MW       User Key  (r) = Recorded By, (t) = Taken By, (c) = Cosigned By    Initials Name Provider Type    FRAN Bergeron, PT Physical Therapist                     Exercises       03/01/18 1100          Subjective Comments    Subjective Comments reports visit to new PCP was good; will have nerve test and has UTI.   -MW      Subjective Pain    Able to rate subjective pain? yes  -MW      Pre-Treatment Pain Level 5  -MW      Post-Treatment Pain Level 5  -MW      Subjective Pain Comment Rt medial arm, elbow, wrist, hand   -MW      Exercise 1    Exercise Name 1 RT median nerve stretch   -MW      Cueing 1 Tactile;Verbal  -MW      Reps 1 2  -MW      Exercise 2    Exercise Name 2 RT ulnar nerve stretch   -MW      Cueing 2 Tactile;Verbal  -MW      Reps 2 2  -MW        User Key  (r) = Recorded By, (t) = Taken By, (c) = Cosigned By    Initials Name Provider Type    FRAN Bergeron, PT Physical Therapist                       Manual Rx (last 36 hours)      Manual Treatments       03/01/18 1100          Manual Rx 1    Manual Rx 1 Location Rt chest, axilla and posterior axillary fold   -MW      Manual Rx 1 Type STM, tissue bending, and fascial stretches   -MW      Manual Rx 1 Grade gentle to moderate   -MW      Manual Rx 1 Duration 8  -MW        User Key  (r) = Recorded By, (t) = Taken By, (c) = Cosigned By     Initials Name Provider Type    FRAN Bergeron, PT Physical Therapist                PT OP Goals       03/01/18 1245       Time Calculation    PT Goal Re-Cert Due Date 05/03/18  -FRAN       User Key  (r) = Recorded By, (t) = Taken By, (c) = Cosigned By    Initials Name Provider Type    FRAN Bergeron PT Physical Therapist                         Time Calculation:   Start Time: 1100  Total Timed Code Minutes- PT: 55 minute(s)     Therapy Charges for Today     Code Description Service Date Service Provider Modifiers Qty    75393307626 HC PT MANUAL THERAPY EA 15 MIN 3/1/2018 Na Bergeron, PT GP 4                    Na Bergeron, PT  3/1/2018

## 2018-03-05 ENCOUNTER — HOSPITAL ENCOUNTER (OUTPATIENT)
Dept: PHYSICAL THERAPY | Facility: HOSPITAL | Age: 68
Setting detail: THERAPIES SERIES
Discharge: HOME | End: 2018-03-05
Attending: INTERNAL MEDICINE

## 2018-03-05 ENCOUNTER — HOSPITAL ENCOUNTER (OUTPATIENT)
Dept: GENERAL RADIOLOGY | Facility: HOSPITAL | Age: 68
Discharge: HOME | End: 2018-03-05
Admitting: PHYSICIAN ASSISTANT

## 2018-03-05 DIAGNOSIS — M54.2 NECK PAIN: ICD-10-CM

## 2018-03-05 DIAGNOSIS — M79.601 RIGHT ARM PAIN: ICD-10-CM

## 2018-03-05 DIAGNOSIS — I89.0 LYMPHEDEMA OF RIGHT UPPER EXTREMITY: ICD-10-CM

## 2018-03-05 DIAGNOSIS — L90.5 SCAR CONDITIONS AND FIBROSIS OF SKIN: Primary | ICD-10-CM

## 2018-03-05 PROCEDURE — G8984 CARRY CURRENT STATUS: HCPCS | Performed by: PHYSICAL THERAPIST

## 2018-03-05 PROCEDURE — 72050 X-RAY EXAM NECK SPINE 4/5VWS: CPT

## 2018-03-05 PROCEDURE — G8985 CARRY GOAL STATUS: HCPCS | Performed by: PHYSICAL THERAPIST

## 2018-03-05 PROCEDURE — 97140 MANUAL THERAPY 1/> REGIONS: CPT | Performed by: PHYSICAL THERAPIST

## 2018-03-05 NOTE — THERAPY PROGRESS REPORT/RE-CERT
Outpatient Physical Therapy Lymphedema Treatment Note  [facility]     Patient Name: Brianna Urrutia  : 1950  MRN: [id number]  Today's Date: 3/5/2018        Visit Date: 2018    Visit Dx:    ICD-10-CM ICD-9-CM   1. Scar conditions and fibrosis of skin L90.5 709.2   2. Lymphedema of right upper extremity I89.0 457.1   3. Right arm pain M79.601 729.5       Patient Active Problem List   Diagnosis   • Malignant neoplasm of overlapping sites of right female breast   • Malignant neoplasm of upper-outer quadrant of right female breast              Lymphedema       18 1100             Subjective Comments Pt reports feeling nauseous; not felt right all weekend. Going for neck xray today; nerve test isn't until May   -MW       Able to rate subjective pain? yes  -MW    Pre-Treatment Pain Level 6  -MW    Post-Treatment Pain Level 4  -MW    Subjective Pain Comment Rt arm/ wrist, pinky   -MW       Ptting Edema Category By severity  -MW    Pitting Edema Moderate;Mild  -MW    Edema Assessment Comment Moderate edema lateral trunk, mild upper arm, moderate in elbow and forearm, hand mild   -MW       Location/Assessment Upper Quadrant  -MW    Upper Extremity Conditions right:;intact;clean  -MW    Upper Quadrant Conditions right:;other (comment);intact;clean  -MW    Upper Quadrant Color/Pigment right:;hyperpigmented;other (comment);red   scar fading red, pink, hypopigmented   -MW    Skin Observations Comment Medial Rt breast area of erythema dark brown red with central dark speck (? bug bite); was present last visit but previously was bright erythema like acute irritation.   -MW       Manual Lymphatic Drainage initial sequence;opened regional lymph nodes;opened anastamoses;extremity treatment;astym  -MW    Initial Sequence supraclavicular;shoulder collectors  -MW    Supraclavicular right;left  -MW    Shoulder Collectors right;left  -MW    Opened Regional Lymph Nodes inguinal  -MW    Axillary left  -MW    Inguinal  right  -MW    Opened Anastamoses axillo-inguinal;anterior axillo-axillary;posterior axillo-axillary  -MW    Anterior Axillo-Axillary moving Rt to left   -MW    Posterior Axillo-Axillary moving Rt to left   -MW    Axillo-Inguinal right  -MW    Extremity Treatment MLD to full limb;extremity treatment focus on  -MW    MLD to Full Limb RUE   -MW    Extremity Treatment Focus On lateral trunk, axilla, elbow and forearm   -MW    Astym UE sequence;anterior-lateral chest;upper traps;other astym  -MW    Anterior-Lateral Chest right  -MW    Anterior-Lateral Chest Comment In supine: Evaluator and localizer to superior and lateral Rt chest wall. Min fine soft tissue disruptions in superior chest; avoided medial area of erythema. Continued ASTYM into lateral trunk ribs/ adjacent to breast with localizer and isolator. Moderate fine soft tissue disruptions. Avoided scar as well as pt reports area has been more tender.   -MW    Upper Traps right  -MW    Upper Traps Comment Initiated tx in sitting for RT UT: Evaluator and localizer to UT / scapular region wrapping into posterior-lateral trunk; minimal fine soft tissue disruptions noted through out. Extra strokes at teres mm area/ posterior axilla. Also working deltoid and triceps; no obvious disruptions noted.   -MW    UE Sequence right  -MW    UE Sequence Comment Evaluator and localizer to full arm. Mixed course and fine disruptions throughout; especially along extensor mm groups. Extra strokes around elbow / radial head area, ulnar groove and wrist.   -MW    Manual Lymphatic Drainage Comments MLD post ASTYM   -MW       Compression/Skin Care wrapping location;bandaging  -MW    Wrapping Location upper extremity  -MW    Wrapping Location UE right:;hand to axilla  -MW    Wrapping Comments assisted pt to don arm sleeve  and glove   -MW      User Key  (r) = Recorded By, (t) = Taken By, (c) = Cosigned By    Initials Name Provider Type    FRAN Bergeron, PT Physical Therapist                               PT Assessment/Plan       03/05/18 1100       PT Assessment    Functional Limitations Performance in self-care ADL;Limitation in home management  -     Impairments Pain;Impaired lymphatic circulation;Edema;Joint mobility;Muscle strength;Impaired flexibility  -     Assessment Comments Pt reporting higher level of distress over unemployment and limited income but yet in too much pain and has limited hand function bilaterally so not sure what her job options may even be; has appointment with voc rehab some time soon. RUE lymphedema less full but still with firm pitting area in forearm and around elbow into medial arm. Medial breast with area of red-brown erythema; encouraged pt to seek evaluation by PCP. Goals remain partially met. Pt continues to have N/T and pain in RUE as well as LUE; still in process of w/u with new PCP. Overall prognosis remains fair to improve function to achieve remaining goals.   -MW     Rehab Potential Fair  -MW     Patient/caregiver participated in establishment of treatment plan and goals Yes  -MW     Patient would benefit from skilled therapy intervention Yes  -MW     PT Plan    PT Frequency 2x/week  -MW     Predicted Duration of Therapy Intervention (days/wks) 8 weeks   -MW     Planned CPT's? PT MANUAL THERAPY EA 15 MIN: 92985;PT THER PROC EA 15 MIN: 34096  -MW     Physical Therapy Interventions (Optional Details) manual lymphatic drainage;manual therapy techniques;ROM (Range of Motion);strengthening;stretching;taping;patient/family education;home exercise program  -     PT Plan Comments Cont ASTYM/ STM, MLD; cont nerve glides / slides for Median and ulnar nerves; progress HEP as coleen.   -       User Key  (r) = Recorded By, (t) = Taken By, (c) = Cosigned By    Initials Name Provider Type     Na Bergeron, PT Physical Therapist                     Exercises       03/05/18 1100          Subjective Comments    Subjective Comments Pt reports feeling  nauseous; not felt right all weekend. Going for neck xray today; nerve test isn't until May   -MW      Subjective Pain    Able to rate subjective pain? yes  -MW      Pre-Treatment Pain Level 6  -MW      Post-Treatment Pain Level 4  -MW      Subjective Pain Comment Rt arm/ wrist, pinky   -MW      Exercise 1    Exercise Name 1 RT median nerve stretch   -MW      Cueing 1 Tactile;Verbal  -MW      Reps 1 2  -MW      Additional Comments various angles of shoulder ABD  -MW      Exercise 2    Exercise Name 2 RT ulnar nerve stretch   -MW      Cueing 2 Tactile;Verbal  -MW      Reps 2 2  -MW        User Key  (r) = Recorded By, (t) = Taken By, (c) = Cosigned By    Initials Name Provider Type    FRAN Bergeron, PT Physical Therapist                              PT OP Goals       03/05/18 1100    PT Short Term Goals    STG Date to Achieve 12/20/17  -MW    STG 1 Patient to report 25% improvement in RUE pain  -MW    STG 1 Progress Partially Met;Ongoing  -MW    STG 2 RUE circumferential measurement reduction by >/= 2 cm to promote decrease in pain and improved function.   -MW    STG 2 Progress Ongoing  -MW    STG 3 Pt independent with basic home lymph massage to promote fluid movement and decrease in pain.   -MW    STG 3 Progress Met  -MW    Long Term Goals    LTG 1 Patient able to actively raise  degrees or better to improve ability to complete daily activities including fixing her hair  -MW    LTG 1 Progress Met  -MW    LTG 2 Patient to be measured and fit with compression sleeve  -MW    LTG 2 Progress Met  -MW    LTG 3 Improved DASH score by 15 points to demonstrate improved function  /return to PLOF.   -MW    LTG 3 Progress Ongoing;Progressing  -MW    LTG 3 Progress Comments Improved 9 points since November (139 to 130).   -MW    Time Calculation    PT Goal Re-Cert Due Date 05/03/18  -MW      User Key  (r) = Recorded By, (t) = Taken By, (c) = Cosigned By    Initials Name Provider Type    FRAN Bergeron, PT  Physical Therapist               Outcome Measure Options: Disabilities of the Arm, Shoulder, and Hand (DASH)  DASH  Open a tight or new jar.: Unable  Write: Severe Difficulty  Turn a key: Severe Difficulty  Prepare a meal: Severe Difficulty  Push open a heavy door: Unable  Place an object on a shelf above your head: Severe Difficulty  Do heavy household chores (e.g., wash walls, wash floors): Severe Difficulty  Garden or do yard work: Unable  Make a bed: Severe Difficulty  Carry a shopping bag or briefcase: Severe Difficulty  Carry a heavy object (over 10 lbs): Unable  Change a lightbulb overhead: Unable  Wash or blow dry your hair: Severe Difficulty  Wash your back: Unable  Put on a pullover sweater: Severe Difficulty  Use a knife to cut food: Unable  Recreational activities in which require little effort (e.g., cardplaying, knitting, etc.): Unable  Recreational activities in which you take some force or impact through your arm, should or hand (e.g. golf, hammering, tennis, etc.): Unable  Recreational Activities in which you move your arm freely (e.g., frisbee, badminton, etc.): Unable  Manage transportation needs (getting from one place to another): Severe Difficulty  During the past week, to what extent has your arm, shoulder, or hand problem interfered with your normal social activites with family, friends, neighbors or groups?: Quite a bit  During the past week, were you limited in your work or other regular daily activities as a result of your arm, shoulder or hand problem?: Unable  Arm, Shoulder, or hand pain: Severe  Arm, shoulder or hand pain when you performed any specific activity: Severe  Tingling (pins and needles) in your arm, shoulder, or hand: Extreme  Weakness in your arm, shoulder or hand: Extreme  Stiffness in your arm, shoulder or hand: Extreme  During the past week, how much difficulty have you had sleeping because of the pain in your arm, shoulder or hand?: Moderate Difficiculty  I feel less  capable, less confident or less useful because of my arm, shoulder or hand problem: Strongly Agree  DASH Sum : 130  Number of Questions Answered: 29  DASH Score: 87.07         Time Calculation:   Start Time: 1100  Total Timed Code Minutes- PT: 55 minute(s)     Therapy Charges for Today     Code Description Service Date Service Provider Modifiers Qty    12149698469 HC PT CARRY MOV HAND OBJ CURRENT 3/5/2018 Na Bergeron PT GP, CM 1    08024212256 HC PT CARRY MOV HAND OBJ PROJECTED 3/5/2018 Na Bergeron PT GP, CL 1    38075787012 HC PT MANUAL THERAPY EA 15 MIN 3/5/2018 Na Bergeron PT GP 4          PT G-Codes  PT Professional Judgement Used?: Yes  Outcome Measure Options: Disabilities of the Arm, Shoulder, and Hand (DASH)  Score: 130 or 87% impaired   Functional Limitation: Carrying, moving and handling objects  Carrying, Moving and Handling Objects Current Status (): At least 80 percent but less than 100 percent impaired, limited or restricted  Carrying, Moving and Handling Objects Goal Status (): At least 60 percent but less than 80 percent impaired, limited or restricted         Na Bergeron, PT  3/5/2018

## 2018-03-06 ENCOUNTER — OFFICE VISIT (OUTPATIENT)
Dept: FAMILY MEDICINE CLINIC | Facility: CLINIC | Age: 68
End: 2018-03-06

## 2018-03-06 VITALS
WEIGHT: 122.8 LBS | HEART RATE: 97 BPM | HEIGHT: 64 IN | DIASTOLIC BLOOD PRESSURE: 80 MMHG | BODY MASS INDEX: 20.96 KG/M2 | OXYGEN SATURATION: 98 % | TEMPERATURE: 98 F | SYSTOLIC BLOOD PRESSURE: 134 MMHG

## 2018-03-06 DIAGNOSIS — M25.551 HIP PAIN, RIGHT: ICD-10-CM

## 2018-03-06 DIAGNOSIS — L30.9 DERMATITIS: Primary | ICD-10-CM

## 2018-03-06 DIAGNOSIS — N39.0 URINARY TRACT INFECTION WITHOUT HEMATURIA, SITE UNSPECIFIED: ICD-10-CM

## 2018-03-06 DIAGNOSIS — M54.2 NECK PAIN: ICD-10-CM

## 2018-03-06 DIAGNOSIS — R93.89 ABNORMAL X-RAY OF NECK: ICD-10-CM

## 2018-03-06 PROCEDURE — 99214 OFFICE O/P EST MOD 30 MIN: CPT | Performed by: PHYSICIAN ASSISTANT

## 2018-03-06 RX ORDER — NITROFURANTOIN 25; 75 MG/1; MG/1
100 CAPSULE ORAL EVERY 12 HOURS SCHEDULED
Qty: 14 CAPSULE | Refills: 0 | Status: SHIPPED | OUTPATIENT
Start: 2018-03-06 | End: 2018-04-17

## 2018-03-06 NOTE — PROGRESS NOTES
Reagan Urrutia is a 67 y.o. female  Skin Lesion (painful skin lesion on right breast) and Follow-up (Follow up on xray of spine )      History of Present Illness  Patient comes in for 3 separate issues today 1 is a skin lesion on her right breast that she noticed a few days ago states feels irritated and somewhat itchy.  No history of trauma.  Second issue is nausea this developed on Bactrim that she is taking for UTI.  Third issue is follow-up on chronic neck pain with numbness and weakness in arms with C-spine x-ray showed recently.  The following portions of the patient's history were reviewed and updated as appropriate: allergies, current medications, past social history and problem list    Review of Systems   Constitutional: Negative for fever.   HENT: Negative for sore throat, trouble swallowing and voice change.    Respiratory: Negative for shortness of breath.    Musculoskeletal: Positive for neck pain and neck stiffness. Negative for arthralgias.   Skin: Positive for color change and rash. Negative for pallor and wound.   Neurological: Positive for weakness and numbness.   Hematological: Negative for adenopathy.       Objective     Vitals:    03/06/18 1405   BP: 134/80   Pulse: 97   Temp: 98 °F (36.7 °C)   SpO2: 98%       Physical Exam   Constitutional: She appears well-developed and well-nourished. No distress.   HENT:   Head: Normocephalic and atraumatic.   Neck: No JVD present. Spinous process tenderness and muscular tenderness present. Decreased range of motion present. No tracheal deviation present. No thyromegaly present.   Pulmonary/Chest: Effort normal and breath sounds normal. No stridor.   Lymphadenopathy:     She has no cervical adenopathy.   Skin: Skin is warm and dry. Rash noted. She is not diaphoretic. There is erythema. No pallor.   Erythematous plaque right anterior chest measuring approximately 1 cm in diameter with silver colored crust noted.  No vesicles.   Nursing note and  vitals reviewed.      Assessment/Plan     Diagnoses and all orders for this visit:    Dermatitis  -     fluocinonide-emollient (LIDEX-E) 0.05 % cream; Apply  topically 2 (Two) Times a Day. To rash    Hip pain, right  -     Ambulatory Referral to Physical Therapy Evaluate and treat    Neck pain  -     MRI Cervical Spine Without Contrast; Future    Abnormal x-ray of neck  -     MRI Cervical Spine Without Contrast; Future    Urinary tract infection without hematuria, site unspecified  -     nitrofurantoin, macrocrystal-monohydrate, (MACROBID) 100 MG capsule; Take 1 capsule by mouth Every 12 (Twelve) Hours.    Reviewed neck x-ray films with patient and she is continuing to have neck pain with numbness and weakness in upper arms will order MRI of C-spine for further evaluation.  Dermatitis consistent with a psoriatic appearing plaque.  Prescription given for steroid cream will refer to dermatology if persistent.

## 2018-03-07 ENCOUNTER — OFFICE VISIT (OUTPATIENT)
Dept: PSYCHIATRY | Facility: CLINIC | Age: 68
End: 2018-03-07

## 2018-03-07 DIAGNOSIS — F41.1 GENERALIZED ANXIETY DISORDER: Primary | ICD-10-CM

## 2018-03-07 DIAGNOSIS — F51.05 INSOMNIA DUE TO MENTAL CONDITION: ICD-10-CM

## 2018-03-07 PROCEDURE — 90837 PSYTX W PT 60 MINUTES: CPT | Performed by: NURSE PRACTITIONER

## 2018-03-07 NOTE — PROGRESS NOTES
Reagan Urrutia is a 67 y.o. female who is here for  therapy    Chief Complaint: Diagnoses and all orders for this visit:    Generalized anxiety disorder    Insomnia due to mental condition      History of Present Illness Patient presents by herself for therapy. She reports ongoing pain in elbows, right arm lymphedema, numbness in hands. She did get in with new PCP and likes her a lot. She has had imaging of neck and will have MRI of neck this week. She reports ongoing struggles with poverty and then the pain. She feels alone, but expresses her spiritual connection to God as strong. Patient knows her community resources well and taps into them ie [employer] for glasses. She has two friends in her building , two [place] men who are supportive of her, again both in poverty. She has spoke with her niece and she has a son and his family in [place]. Pt brought a sleeping bag to lay on in office because the chairs are too uncomfortable she states.   Denies adverse effects from medications. Anxiety 6. Depression 5, she refuses medications.   (Scales based on 0 - 10 with 10 being the worst)        The following portions of the patient's history were reviewed and updated as appropriate: allergies, current medications, past family history, past medical history, past social history, past surgical history and problem list.    Review of Systemsdenies fever, cough, s/s’s of infection, denies GI/ problems, denies new medical issues     Objective   Physical Exam  There were no vitals taken for this visit.    Allergies   Allergen Reactions   • Iodinated Diagnostic Agents Other (See Comments)     Patient states she has swelling and pain of joints for days following injection   • Penicillins        Current Medications:   Current Outpatient Prescriptions   Medication Sig Dispense Refill   • fluocinonide-emollient (LIDEX-E) 0.05 % cream Apply  topically 2 (Two) Times a Day. To rash 15 g 0   • losartan (COZAAR) 25 MG  tablet Take 25 mg by mouth Daily.     • nitrofurantoin, macrocrystal-monohydrate, (MACROBID) 100 MG capsule Take 1 capsule by mouth Every 12 (Twelve) Hours. 14 capsule 0   • promethazine (PHENERGAN) 25 MG tablet Take 0.5 tablets by mouth Every 6 (Six) Hours As Needed for Nausea or Vomiting. 30 tablet 0   • TraMADol HCl 50 MG tablet dispersible Take 0.5 tablets by mouth As Needed.       No current facility-administered medications for this visit.        CONTROLLED MEDICATIONS:     Appearance: appropriate  Hygiene:   good  Cooperation:  Cooperative  Eye Contact:  Good  Psychomotor Behavior:  Appropriate  Mood:  within normal limits  Affect:  Appropriate  Hopelessness: Denies  Speech: rambling surrounded theme poverty and pain,   Thought Process: tangential    Thought Content:  Normal  Suicidal:  None  Homicidal:  None  Hallucinations:  None  Delusion:  None  Memory:  Intact  Orientation:  Person, Place, Time and Situation  Reliability:  fair  Insight:  Fair  Judgement:  Fair  Impulse Control:  Fair  Estimated Intelligence: average range   Physical/Medical Issues:  No       Assessment/Plan   Diagnoses and all orders for this visit:    Generalized anxiety disorder    Insomnia due to mental condition    face to face 60 minutes therapy   Supportive therapy, pt needs someone to listen to her and be present to her without expecting something back. Problem solving with her in this phase of life in poverty and cancer, lymphedema struggles in right arm.  Assisted patient in processing above session content; acknowledged and normalized patient’s thoughts, feelings, and concerns.  Applied  positive coping skills and behavior management in session.  .  Allowed patient to freely discuss issues without interruption or judgment. Provided safe, confidential environment to facilitate the development of positive therapeutic relationship and encourage open, honest communication. Assisted patient in identifying risk factors which would  "indicate the need for higher level of care including thoughts to harm self or others and/or self-harming behavior and encouraged patient to contact this office, call 911, or present to the nearest emergency room should any of these events occur. Discussed crisis intervention services and means to access.  Patient adamantly and convincingly denies current suicidal or homicidal ideation or perceptual disturbance.  ·    She is not taking the mirtazapine for sleep, she states it is there if she needs it but sleeping on own well. She does smoke cannabis \"a couple hits to help me settle down\".       We discussed risks, benefits, and side effects of the above medications and the patient was agreeable with the plan. Patient was educated on the importance of compliance with treatment and follow-up appointments.   Controlled substance prescriptions are either  printed off for patient, telephoned in  or ordered through [facility] by this provider  Instructed to call for questions or concerns and return early if necessary. Crisis plan reviewed including going to the Emergency department.    Return in about 2 weeks (around 3/21/2018).         Please note that portions of this note were completed with a voice recognition program.  Efforts were made to edit dictation, but occasionally words are mistranscribed.     "

## 2018-03-08 ENCOUNTER — HOSPITAL ENCOUNTER (OUTPATIENT)
Dept: PHYSICAL THERAPY | Facility: HOSPITAL | Age: 68
Setting detail: THERAPIES SERIES
Discharge: HOME | End: 2018-03-08
Attending: INTERNAL MEDICINE

## 2018-03-08 ENCOUNTER — HOSPITAL ENCOUNTER (OUTPATIENT)
Dept: MRI IMAGING | Facility: HOSPITAL | Age: 68
Discharge: HOME | End: 2018-03-08
Admitting: PHYSICIAN ASSISTANT

## 2018-03-08 DIAGNOSIS — R93.89 ABNORMAL X-RAY OF NECK: ICD-10-CM

## 2018-03-08 DIAGNOSIS — M79.601 RIGHT ARM PAIN: ICD-10-CM

## 2018-03-08 DIAGNOSIS — M79.18 PAIN IN RIGHT BUTTOCK: ICD-10-CM

## 2018-03-08 DIAGNOSIS — M54.2 NECK PAIN: ICD-10-CM

## 2018-03-08 DIAGNOSIS — L90.5 SCAR CONDITIONS AND FIBROSIS OF SKIN: Primary | ICD-10-CM

## 2018-03-08 DIAGNOSIS — I89.0 LYMPHEDEMA OF RIGHT UPPER EXTREMITY: ICD-10-CM

## 2018-03-08 PROCEDURE — 97110 THERAPEUTIC EXERCISES: CPT | Performed by: PHYSICAL THERAPIST

## 2018-03-08 PROCEDURE — G8979 MOBILITY GOAL STATUS: HCPCS | Performed by: PHYSICAL THERAPIST

## 2018-03-08 PROCEDURE — 72141 MRI NECK SPINE W/O DYE: CPT

## 2018-03-08 PROCEDURE — 97164 PT RE-EVAL EST PLAN CARE: CPT | Performed by: PHYSICAL THERAPIST

## 2018-03-08 PROCEDURE — 97140 MANUAL THERAPY 1/> REGIONS: CPT | Performed by: PHYSICAL THERAPIST

## 2018-03-08 PROCEDURE — G8980 MOBILITY D/C STATUS: HCPCS | Performed by: PHYSICAL THERAPIST
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
## 2018-03-09 ENCOUNTER — APPOINTMENT (OUTPATIENT)
Dept: MRI IMAGING | Facility: HOSPITAL | Age: 68
End: 2018-03-09

## 2018-03-11 NOTE — PROGRESS NOTES
Please notify patient that her MRI does show significant bone spurring which may be compressing on her nerve endings in her spine.  I would like for her to be seen and evaluated further by a neurosurgeon.  Please arrange this and notify patient of findings.

## 2018-03-12 ENCOUNTER — TRANSCRIBE ORDERS (OUTPATIENT)
Dept: FAMILY MEDICINE CLINIC | Facility: CLINIC | Age: 68
End: 2018-03-12

## 2018-03-12 DIAGNOSIS — M54.2 CERVICALGIA: Primary | ICD-10-CM

## 2018-03-14 ENCOUNTER — OFFICE VISIT (OUTPATIENT)
Dept: PSYCHIATRY | Facility: CLINIC | Age: 68
End: 2018-03-14

## 2018-03-14 DIAGNOSIS — F41.1 GENERALIZED ANXIETY DISORDER: Primary | ICD-10-CM

## 2018-03-14 DIAGNOSIS — F39 MOOD DISORDER (HCC): ICD-10-CM

## 2018-03-14 DIAGNOSIS — F90.2 ATTENTION DEFICIT HYPERACTIVITY DISORDER, COMBINED TYPE: ICD-10-CM

## 2018-03-14 PROCEDURE — 90837 PSYTX W PT 60 MINUTES: CPT | Performed by: NURSE PRACTITIONER
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
## 2018-03-15 ENCOUNTER — APPOINTMENT (OUTPATIENT)
Dept: PHYSICAL THERAPY | Facility: HOSPITAL | Age: 68
End: 2018-03-15
Attending: INTERNAL MEDICINE

## 2018-03-15 ENCOUNTER — HOSPITAL ENCOUNTER (OUTPATIENT)
Dept: PHYSICAL THERAPY | Facility: HOSPITAL | Age: 68
Setting detail: THERAPIES SERIES
Discharge: HOME | End: 2018-03-15
Attending: INTERNAL MEDICINE

## 2018-03-15 DIAGNOSIS — L90.5 SCAR CONDITIONS AND FIBROSIS OF SKIN: Primary | ICD-10-CM

## 2018-03-15 DIAGNOSIS — M79.601 RIGHT ARM PAIN: ICD-10-CM

## 2018-03-15 DIAGNOSIS — I89.0 LYMPHEDEMA OF RIGHT UPPER EXTREMITY: ICD-10-CM

## 2018-03-15 DIAGNOSIS — M79.18 PAIN IN RIGHT BUTTOCK: ICD-10-CM

## 2018-03-15 PROCEDURE — 97140 MANUAL THERAPY 1/> REGIONS: CPT | Performed by: PHYSICAL THERAPIST

## 2018-03-15 NOTE — THERAPY TREATMENT NOTE
Outpatient Physical Therapy Lymphedema Treatment Note  [facility]     Patient Name: Brianna Urrutia  : 1950  MRN: [id number]  Today's Date: 3/15/2018        Visit Date: 03/15/2018    Visit Dx:    ICD-10-CM ICD-9-CM   1. Scar conditions and fibrosis of skin L90.5 709.2   2. Lymphedema of right upper extremity I89.0 457.1   3. Right arm pain M79.601 729.5   4. Pain in right buttock M79.1 729.1       Patient Active Problem List   Diagnosis   • Malignant neoplasm of overlapping sites of right female breast   • Malignant neoplasm of upper-outer quadrant of right female breast              Lymphedema     Row Name 03/15/18 1500          Able to rate subjective pain? yes  -MW    Pre-Treatment Pain Level 6  -MW    Post-Treatment Pain Level 4  -MW    Subjective Pain Comment Rt arm/ hand; tender Rt shoulder, breast/ lat trunk   -MW          Subjective Comments Reports rt hand and arm were looking really good yesterday but hand is puffy today. Rt hip / buttock some better.   -MW          Ptting Edema Category By severity  -MW    Pitting Edema Moderate;Mild  -MW          Location/Assessment Upper Quadrant  -MW    Upper Extremity Conditions right:;intact;clean  -MW    Upper Quadrant Conditions right:;other (comment);intact;clean  -MW    Upper Quadrant Color/Pigment right:;other (comment);red   scar fading, pink and white, hypopigmented   -MW    Skin Observations Comment medial breast area now with scant residual dry skin flakes; area resolved.   -MW          Measurement Type(s) Quick Girth  -MW    Quick Girth Areas Upper extremities  -MW          Manual Lymphatic Drainage initial sequence;opened regional lymph nodes;opened anastamoses;extremity treatment;astym  -MW    Initial Sequence supraclavicular;shoulder collectors  -MW    Supraclavicular right;left  -MW    Shoulder Collectors right;left  -MW    Opened Regional Lymph Nodes inguinal  -MW    Axillary left  -MW    Inguinal right  -MW    Opened Anastamoses  axillo-inguinal;anterior axillo-axillary;posterior axillo-axillary  -MW    Anterior Axillo-Axillary moving Rt to left   -MW    Posterior Axillo-Axillary moving Rt to left   -MW    Axillo-Inguinal right  -MW    Extremity Treatment MLD to full limb;extremity treatment focus on  -MW    MLD to Full Limb RUE  -MW    Extremity Treatment Focus On lateral trunk, axilla, elbow and forearm into hand   -MW    Astym UE sequence;upper traps;other astym  -MW    Anterior-Lateral Chest right  -MW    Anterior-Lateral Chest Comment In supine: Evaluator and localizer to superior and lateral Rt chest wall. Min fine soft tissue disruptions in superior chest; avoided medial area of dry skin. Continued ASTYM into lateral trunk ribs/ adjacent to breast with localizer and isolator. Moderate fine soft tissue disruptions.   -MW    Upper Traps right  -MW    Upper Traps Comment Initiated tx in sitting for RT UT: Evaluator and localizer to UT / scapular region wrapping into posterior-lateral trunk; minimal fine soft tissue disruptions noted through out. Extra strokes at teres mm area/ posterior axilla.   -MW    UE Sequence right  -MW    UE Sequence Comment Focused on flexor aspect and forearm extensors.   -MW    Manual Lymphatic Drainage Comments MLD post ASTYM   -MW          Compression/Skin Care wrapping location;bandaging  -MW    Wrapping Location upper extremity  -MW    Wrapping Location UE right:;hand to axilla  -MW    Wrapping Comments assisted pt to don arm sleeve  and glove   -MW      User Key  (r) = Recorded By, (t) = Taken By, (c) = Cosigned By    Initials Name Provider Type    MW Na Bergeron PT Physical Therapist                              PT Assessment/Plan     Row Name 03/15/18 1500          PT Assessment    Functional Limitations Performance in self-care ADL;Limitation in home management;Impaired gait  -MW     Impairments Pain;Impaired lymphatic circulation;Edema;Joint mobility;Muscle strength;Impaired flexibility;Motor  function;Impaired muscle length   stair climbing   -MW     Assessment Comments RUE edema stable but softer. Fewer soft tissue restrictions noted during ASTYM. Pt continues to have persistent pain in RUE. Has appointment next week with spinal neuro specialist for further work up of potential neck dysfucntion. Pt reports Rt buttock improving so not directly addressed this session.   -MW     Rehab Potential Fair  -MW     Patient/caregiver participated in establishment of treatment plan and goals Yes  -MW     Patient would benefit from skilled therapy intervention Yes  -MW        PT Plan    PT Frequency 2x/week  -MW     Physical Therapy Interventions (Optional Details) manual lymphatic drainage;manual therapy techniques;ROM (Range of Motion);strengthening;stretching;taping;patient/family education;home exercise program  -MW     PT Plan Comments Cont RUE treatment; add Rt hip ther exer and STM; consider US or moist heat.   -MW       User Key  (r) = Recorded By, (t) = Taken By, (c) = Cosigned By    Initials Name Provider Type    FRAN Bergeron PT Physical Therapist                     Exercises     Row Name 03/15/18 1500             Subjective Comments    Subjective Comments Reports rt hand and arm were looking really good yesterday but hand is puffy today. Rt hip / buttock some better.   -MW         Subjective Pain    Able to rate subjective pain? yes  -MW      Pre-Treatment Pain Level 6  -MW      Post-Treatment Pain Level 4  -MW      Subjective Pain Comment Rt arm/ hand; tender Rt shoulder, breast/ lat trunk   -MW         Exercise 1    Exercise Name 1 RT median nerve stretch   -MW      Cueing 1 Tactile;Verbal  -MW      Reps 1 6  -MW      Additional Comments moving between elbow flex/ ext; pronation / supination and wrist flex/ ext   -MW        User Key  (r) = Recorded By, (t) = Taken By, (c) = Cosigned By    Initials Name Provider Type    FRAN Bergeron PT Physical Therapist                              PT  OP Goals     Row Name 03/15/18 1827          Time Calculation    PT Goal Re-Cert Due Date 05/03/18  -FRAN       User Key  (r) = Recorded By, (t) = Taken By, (c) = Cosigned By    Initials Name Provider Type    FRAN Bergeron, PT Physical Therapist                         Time Calculation:   Start Time: 1500  Total Timed Code Minutes- PT: 55 minute(s)     Therapy Charges for Today     Code Description Service Date Service Provider Modifiers Qty    08005514578 HC PT MANUAL THERAPY EA 15 MIN 3/15/2018 Na Bergeron, PT GP 4                    Na Bergeron, PT  3/15/2018

## 2018-03-19 ENCOUNTER — HOSPITAL ENCOUNTER (OUTPATIENT)
Dept: PHYSICAL THERAPY | Facility: HOSPITAL | Age: 68
Setting detail: THERAPIES SERIES
Discharge: HOME | End: 2018-03-19
Attending: INTERNAL MEDICINE

## 2018-03-19 DIAGNOSIS — M79.18 PAIN IN RIGHT BUTTOCK: ICD-10-CM

## 2018-03-19 DIAGNOSIS — L90.5 SCAR CONDITIONS AND FIBROSIS OF SKIN: Primary | ICD-10-CM

## 2018-03-19 DIAGNOSIS — I89.0 LYMPHEDEMA OF RIGHT UPPER EXTREMITY: ICD-10-CM

## 2018-03-19 DIAGNOSIS — M79.601 RIGHT ARM PAIN: ICD-10-CM

## 2018-03-19 PROCEDURE — 97110 THERAPEUTIC EXERCISES: CPT | Performed by: PHYSICAL THERAPIST

## 2018-03-19 PROCEDURE — 97140 MANUAL THERAPY 1/> REGIONS: CPT | Performed by: PHYSICAL THERAPIST

## 2018-03-19 NOTE — THERAPY TREATMENT NOTE
Outpatient Physical Therapy Lymphedema Treatment Note  [facility]     Patient Name: Brianna Urrutia  : 1950  MRN: [id number]  Today's Date: 3/19/2018        Visit Date: 2018    Visit Dx:    ICD-10-CM ICD-9-CM   1. Scar conditions and fibrosis of skin L90.5 709.2   2. Lymphedema of right upper extremity I89.0 457.1   3. Right arm pain M79.601 729.5   4. Pain in right buttock M79.1 729.1       Patient Active Problem List   Diagnosis   • Malignant neoplasm of overlapping sites of right female breast   • Malignant neoplasm of upper-outer quadrant of right female breast              Lymphedema     Row Name 18 1100          Able to rate subjective pain? yes  -MW    Pre-Treatment Pain Level 5  -MW    Post-Treatment Pain Level 5  -MW    Subjective Pain Comment Neck Lt and RUE   -MW          Subjective Comments Reports neck seems to be causing more issues; LT and RT hand, wrist, pain and N/T   -MW          Ptting Edema Category By severity  -MW    Pitting Edema Moderate;Mild  -MW    Edema Assessment Comment Hand mild; forearm and upperarm mild to mod - softer   -MW          Location/Assessment Upper Quadrant  -MW    Upper Extremity Conditions right:;intact;clean  -MW    Upper Quadrant Conditions right:;other (comment);intact;clean  -MW    Upper Quadrant Color/Pigment right:;other (comment);red   scar fading, pink and white, hypopigmented   -MW          Measurement Type(s) --  -MW    Quick Girth Areas --  -MW          Manual Lymphatic Drainage initial sequence;opened regional lymph nodes;opened anastamoses;extremity treatment;astym  -MW    Initial Sequence supraclavicular;shoulder collectors  -MW    Supraclavicular right;left  -MW    Shoulder Collectors right;left  -MW    Opened Regional Lymph Nodes inguinal  -MW    Axillary left  -MW    Inguinal right  -MW    Opened Anastamoses axillo-inguinal;anterior axillo-axillary;posterior axillo-axillary  -MW    Anterior Axillo-Axillary moving Rt to left   -MW     Posterior Axillo-Axillary moving Rt to left   -MW    Axillo-Inguinal right  -MW    Extremity Treatment MLD to full limb;extremity treatment focus on  -MW    MLD to Full Limb RUE  -MW    Extremity Treatment Focus On axilla, upper arm, elbow and forearm   -MW    Astym UE sequence;upper traps;other astym  -MW    Anterior-Lateral Chest right  -MW    Anterior-Lateral Chest Comment In supine: Evaluator and localizer to superior and lateral Rt chest wall. Min fine soft tissue disruptions in superior chest. Continued ASTYM into lateral trunk ribs/ adjacent to breast with localizer and isolator. Moderate fine soft tissue disruptions. Repositioned into shoulder flex/ ABD to open axilla; evaluator and localizer to area. Min to mod course to fine soft tissue disruptions noted.   -MW    Upper Traps right  -MW    Upper Traps Comment Initiated tx in sitting for RT UT: Evaluator and localizer to UT / scapular region wrapping into posterior-lateral trunk; minimal fine soft tissue disruptions noted through out. Extra strokes at teres mm area/ posterior axilla.   -MW    UE Sequence --  -MW    Manual Lymphatic Drainage Comments MLD post ASTYM   -MW          Compression/Skin Care wrapping location;bandaging  -MW    Wrapping Location upper extremity  -MW    Wrapping Location UE right:;hand to axilla  -MW    Wrapping Comments assisted pt to don arm sleeve   -MW      User Key  (r) = Recorded By, (t) = Taken By, (c) = Cosigned By    Initials Name Provider Type    FRAN Bergeron, PT Physical Therapist                              PT Assessment/Plan     Row Name 03/19/18 1100          PT Assessment    Functional Limitations Performance in self-care ADL;Limitation in home management;Impaired gait  -MW     Impairments Pain;Impaired lymphatic circulation;Edema;Joint mobility;Muscle strength;Impaired flexibility;Motor function;Impaired muscle length   stair climbing   -MW     Assessment Comments RUE lymphedema continues to soften; less  packed edema around elbow. Slow decrease in soft tissue disruptions noted during ASTYM. Continues to have N/T and pain RUE as well as LT. Rt buttock with some decrease in c/o pain; good tolerance and flexibility with ther exer. Progress to Rt hip strengthening ther exer next visit. Await results / new plans after pt evaluated by neurosurgeon next week.   -MW     Rehab Potential Fair  -MW     Patient/caregiver participated in establishment of treatment plan and goals Yes  -MW     Patient would benefit from skilled therapy intervention Yes  -MW        PT Plan    PT Frequency 2x/week  -MW     Physical Therapy Interventions (Optional Details) manual lymphatic drainage;manual therapy techniques;ROM (Range of Motion);strengthening;stretching;taping;patient/family education;home exercise program  -MW     PT Plan Comments Cont RUE treatment; add Rt hip ther exer and STM  -MW       User Key  (r) = Recorded By, (t) = Taken By, (c) = Cosigned By    Initials Name Provider Type    MW Na Bergeron, PT Physical Therapist                     Exercises     Row Name 03/19/18 1100             Subjective Comments    Subjective Comments Reports neck seems to be causing more issues; LT and RT hand, wrist, pain and N/T   -MW         Subjective Pain    Able to rate subjective pain? yes  -MW      Pre-Treatment Pain Level 5  -MW      Post-Treatment Pain Level 5  -MW      Subjective Pain Comment Neck Lt and RUE   -MW         Exercise 1    Exercise Name 1 Total exercise minutes: 10   -MW         Exercise 2    Exercise Name 2 Hip ADD in hooklying with pillow  -MW      Cueing 2 Tactile;Verbal  -MW      Sets 2 2  -MW      Reps 2 5  -MW      Additional Comments modified hip/ knee flexion angles   -MW         Exercise 3    Exercise Name 3 SKTC   -MW      Cueing 3 Tactile;Verbal  -MW      Reps 3 3  -MW      Additional Comments also DKTC   -MW         Exercise 4    Exercise Name 4 Figure 4 stretch RT   -MW      Cueing 4 Tactile;Verbal  -MW       Reps 4 3  -MW         Exercise 5    Exercise Name 5 Crossed leg stretch (cow stretch)   -MW      Cueing 5 Verbal  -MW      Reps 5 3  -MW        User Key  (r) = Recorded By, (t) = Taken By, (c) = Cosigned By    Initials Name Provider Type    FRAN Bergeron PT Physical Therapist                       Manual Rx (last 36 hours)      Manual Treatments     Row Name 03/19/18 1100             Manual Rx 1    Manual Rx 1 Location Rt chest, axilla and posterior axillary fold   -MW      Manual Rx 1 Type STM, tissue bending, and fascial stretches   -MW      Manual Rx 1 Grade gentle to moderate   -MW      Manual Rx 1 Duration 8  -MW        User Key  (r) = Recorded By, (t) = Taken By, (c) = Cosigned By    Initials Name Provider Type    FRAN Bergeron PT Physical Therapist              Therapy Education  Education Details: Cont HEP - hip stretches; lymphedema self care  Given: Pain management, Symptoms/condition management  Program: Reinforced  How Provided: Verbal  Provided to: Patient  Level of Understanding: Verbalized              Time Calculation:   Start Time: 1100  Total Timed Code Minutes- PT: 58 minute(s)     Therapy Charges for Today     Code Description Service Date Service Provider Modifiers Qty    57735222213  PT MANUAL THERAPY EA 15 MIN 3/19/2018 Na Bergeron, PT GP 3    93615245573 HC PT THER PROC EA 15 MIN 3/19/2018 Na Bergeron, PT GP 1                    Na Bergeron, MAT  3/19/2018

## 2018-03-21 ENCOUNTER — OFFICE VISIT (OUTPATIENT)
Dept: PSYCHIATRY | Facility: CLINIC | Age: 68
End: 2018-03-21

## 2018-03-21 DIAGNOSIS — F51.05 INSOMNIA DUE TO MENTAL CONDITION: ICD-10-CM

## 2018-03-21 DIAGNOSIS — F90.2 ATTENTION DEFICIT HYPERACTIVITY DISORDER, COMBINED TYPE: ICD-10-CM

## 2018-03-21 DIAGNOSIS — F39 MOOD DISORDER (HCC): ICD-10-CM

## 2018-03-21 DIAGNOSIS — F41.1 GENERALIZED ANXIETY DISORDER: Primary | ICD-10-CM

## 2018-03-21 PROCEDURE — 90837 PSYTX W PT 60 MINUTES: CPT | Performed by: NURSE PRACTITIONER

## 2018-03-21 NOTE — PROGRESS NOTES
"    Reagan SUERO Renan is a 67 y.o. female who is here for therapy    Chief Complaint: anxiety,     History of Present Illness Patient presents by herself much more calm today than I have seen her. She reports sleeping well and eating well. Just went out to lunch with a friend from [place] and feeling less lonely. She cont to have conflict with neighbors \"they think they can be disrepectful and mean, why do people think that way\"?  Patient reports getting neurosurgeon consult on Monday to evaluate neck \"I had the MRI\"  Patient wanting to discussed stressors in life, financial, health, relationships.      (Scales based on 0 - 10 with 10 being the worst)    The following portions of the patient's history were reviewed and updated as appropriate: allergies, current medications, past family history, past medical history, past social history, past surgical history and problem list.    Review of Systems denies fever, cough, s/s’s of infection, denies GI/ problems, denies new medical issues since last visit     Objective   Physical Exam  There were no vitals taken for this visit.    Allergies   Allergen Reactions   • Iodinated Diagnostic Agents Other (See Comments)     Patient states she has swelling and pain of joints for days following injection   • Penicillins        Current Medications:   Current Outpatient Prescriptions   Medication Sig Dispense Refill   • fluocinonide-emollient (LIDEX-E) 0.05 % cream Apply  topically 2 (Two) Times a Day. To rash 15 g 0   • losartan (COZAAR) 25 MG tablet Take 25 mg by mouth Daily.     • nitrofurantoin, macrocrystal-monohydrate, (MACROBID) 100 MG capsule Take 1 capsule by mouth Every 12 (Twelve) Hours. 14 capsule 0   • promethazine (PHENERGAN) 25 MG tablet Take 0.5 tablets by mouth Every 6 (Six) Hours As Needed for Nausea or Vomiting. 30 tablet 0   • TraMADol HCl 50 MG tablet dispersible Take 0.5 tablets by mouth As Needed.       No current facility-administered " medications for this visit.         Appearance: appropriate, wears arm compression   Hygiene:   good  Cooperation:  Cooperative  Eye Contact:  Good  Psychomotor Behavior:  Appropriate  Mood:  within normal limits  Affect:  Appropriate  Hopelessness: Denies  Speech:  Normal rate pace and volume for first time, not rambling, staying mostly on topic   Thought Process:  Linear  Thought Content:  Normal  Suicidal:  None  Homicidal:  None  Hallucinations:  None  Delusion:  None  Memory:  Intact  Orientation:  Person, Place, Time and Situation  Reliability: good  Insight:  Fair  Judgement:  Fair  Impulse Control:  Fair  Estimated Intelligence: average range       Assessment/Plan   Diagnoses and all orders for this visit:    Generalized anxiety disorder    Attention deficit hyperactivity disorder, combined type    Mood disorder    Insomnia due to mental condition       60 minutes face to face therapy   decreased anxiety, more organized linear thinking versus rambling tangential as in past.     Assisted patient in processing above session content; acknowledged and normalized patient’s thoughts, feelings, and concerns.  Applied  positive coping skills and behavior management in session.  Allowed patient to freely discuss issues without interruption or judgment. Provided safe, confidential environment to facilitate the development of positive therapeutic relationship and encourage open, honest communication. Assisted patient in identifying risk factors which would indicate the need for higher level of care including thoughts to harm self or others and/or self-harming behavior and encouraged patient to contact this office, call 911, or present to the nearest emergency room should any of these events occur. Discussed crisis intervention services and means to access.  Patient adamantly and convincingly denies current suicidal or homicidal ideation or perceptual disturbance.    Coping skills reviewed and encouraged positive  framing of thoughts,boudaries with neighbors,  processed financial stressors, health and relationships   Sleep hygiene reviewed, encouraged more whole foods, less processed foods, fruits vegetables     Instructed to call for questions or concerns and return early if necessary. Crisis plan reviewed including going to the Emergency department.    Return in about 2 weeks (around 4/4/2018).         Please note that portions of this note were completed with a voice recognition program.  Efforts were made to edit dictation, but occasionally words are mistranscribed.

## 2018-03-22 ENCOUNTER — TELEPHONE (OUTPATIENT)
Dept: PSYCHIATRY | Facility: CLINIC | Age: 68
End: 2018-03-22

## 2018-03-26 ENCOUNTER — OFFICE VISIT (OUTPATIENT)
Dept: NEUROSURGERY | Facility: CLINIC | Age: 68
End: 2018-03-26

## 2018-03-26 VITALS
TEMPERATURE: 97.1 F | OXYGEN SATURATION: 99 % | WEIGHT: 126 LBS | HEART RATE: 81 BPM | SYSTOLIC BLOOD PRESSURE: 106 MMHG | BODY MASS INDEX: 21.51 KG/M2 | RESPIRATION RATE: 16 BRPM | DIASTOLIC BLOOD PRESSURE: 74 MMHG | HEIGHT: 64 IN

## 2018-03-26 DIAGNOSIS — M50.30 DEGENERATIVE DISC DISEASE, CERVICAL: Primary | ICD-10-CM

## 2018-03-26 DIAGNOSIS — G62.9 NEUROPATHY: ICD-10-CM

## 2018-03-26 PROCEDURE — 99203 OFFICE O/P NEW LOW 30 MIN: CPT | Performed by: NEUROLOGICAL SURGERY

## 2018-03-26 RX ORDER — GABAPENTIN 300 MG/1
300 CAPSULE ORAL
Qty: 14 CAPSULE | Refills: 0 | Status: SHIPPED | OUTPATIENT
Start: 2018-03-26 | End: 2018-04-17 | Stop reason: SINTOL
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
## 2018-03-27 ENCOUNTER — HOSPITAL ENCOUNTER (OUTPATIENT)
Dept: PHYSICAL THERAPY | Facility: HOSPITAL | Age: 68
Setting detail: THERAPIES SERIES
Discharge: HOME | End: 2018-03-27
Attending: INTERNAL MEDICINE

## 2018-03-27 DIAGNOSIS — I89.0 LYMPHEDEMA OF RIGHT UPPER EXTREMITY: ICD-10-CM

## 2018-03-27 DIAGNOSIS — M79.601 RIGHT ARM PAIN: ICD-10-CM

## 2018-03-27 DIAGNOSIS — L90.5 SCAR CONDITIONS AND FIBROSIS OF SKIN: Primary | ICD-10-CM

## 2018-03-27 PROCEDURE — 97140 MANUAL THERAPY 1/> REGIONS: CPT | Performed by: PHYSICAL THERAPIST

## 2018-03-27 NOTE — THERAPY TREATMENT NOTE
Outpatient Physical Therapy Lymphedema Treatment Note  [facility]     Patient Name: Brianna Urrutia  : 1950  MRN: [id number]  Today's Date: 3/27/2018        Visit Date: 2018    Visit Dx:    ICD-10-CM ICD-9-CM   1. Scar conditions and fibrosis of skin L90.5 709.2   2. Lymphedema of right upper extremity I89.0 457.1   3. Right arm pain M79.601 729.5       Patient Active Problem List   Diagnosis   • Malignant neoplasm of overlapping sites of right female breast   • Malignant neoplasm of upper-outer quadrant of right female breast   • Degenerative disc disease, cervical   • Neuropathy              Lymphedema     Row Name 18 1000          Able to rate subjective pain? yes  -MW    Pre-Treatment Pain Level 5  -MW    Post-Treatment Pain Level 4  -MW    Subjective Pain Comment Rt arm/ pinky finger   -MW          Subjective Comments Reports good appointment with Dr. Jacobson; needs to go for NCV test (hopefully sooner than May). need to do more testing to determine nerve entrapment vs related referred pain in UE's   -MW          Ptting Edema Category By severity  -MW    Pitting Edema Moderate;Mild  -MW    Edema Assessment Comment Hand mild; forearm and upperarm mild to mod - softer   -MW          Location/Assessment Upper Quadrant  -MW    Upper Extremity Conditions right:;intact;clean  -MW    Upper Quadrant Conditions right:;other (comment);intact;clean  -MW    Upper Quadrant Color/Pigment right:;other (comment);red   scar fading, pink and white, hypopigmented   -MW          Manual Lymphatic Drainage initial sequence;opened regional lymph nodes;opened anastamoses;extremity treatment;astym  -MW    Initial Sequence supraclavicular;shoulder collectors  -MW    Supraclavicular right;left  -MW    Shoulder Collectors right;left  -MW    Opened Regional Lymph Nodes inguinal  -MW    Axillary left  -MW    Inguinal right  -MW    Opened Anastamoses axillo-inguinal;anterior axillo-axillary;posterior axillo-axillary   -MW    Anterior Axillo-Axillary moving Rt to left   -MW    Posterior Axillo-Axillary moving Rt to left   -MW    Axillo-Inguinal right  -MW    Extremity Treatment MLD to full limb;extremity treatment focus on  -MW    MLD to Full Limb RUE  -MW    Extremity Treatment Focus On axilla, lateral trunk, upper arm, elbow and forearm   -MW    Astym UE sequence;upper traps;other astym  -MW    Anterior-Lateral Chest right  -MW    Anterior-Lateral Chest Comment In supine: Evaluator and localizer to superior and lateral Rt chest wall. Min fine soft tissue disruptions in superior chest. Continued ASTYM into lateral trunk ribs/ adjacent to breast with localizer and isolator. Moderate fine soft tissue disruptions. Repositioned into shoulder flex/ ABD to open axilla; evaluator and localizer to area. Min to mod course to fine soft tissue disruptions noted.   -MW    Upper Traps right  -MW    Upper Traps Comment Initiated tx in sitting for RT UT: Evaluator and localizer to UT / scapular region wrapping into posterior-lateral trunk; minimal fine soft tissue disruptions noted through out. Extra strokes at teres mm area/ posterior axilla. Also completed ASTYM to deltoid and triceps areas with evaluator and localizer. Min fine soft tissue disruptions noted in triceps.   -MW    UE Sequence right  -MW    UE Sequence Comment Evaluator and localizer to RUE; extra strokes at radial head, around olecranon and ulnar groove.   -MW    Manual Lymphatic Drainage Comments MLD post ASTYM   -MW          Compression/Skin Care wrapping location;bandaging  -MW    Wrapping Location upper extremity  -MW    Wrapping Location UE right:;hand to axilla  -MW    Wrapping Comments assisted pt to don arm sleeve   -MW      User Key  (r) = Recorded By, (t) = Taken By, (c) = Cosigned By    Initials Name Provider Type    MW Na Bergeron, PT Physical Therapist                              PT Assessment/Plan     Row Name 03/27/18 1000          PT Assessment     Functional Limitations Performance in self-care ADL;Limitation in home management;Impaired gait  -MW     Impairments Pain;Impaired lymphatic circulation;Edema;Joint mobility;Muscle strength;Impaired flexibility;Motor function;Impaired muscle length   stair climbing   -MW     Assessment Comments RUE lymphedema continues to soften; less packed edema around elbow. Added more STM / gentle myofascial stretches to forearm during MLD. Soft tissue disruptions lessening with ASTYM. No c/o Rt buttock pain this visit.   -MW     Rehab Potential Fair  -MW     Patient/caregiver participated in establishment of treatment plan and goals Yes  -MW     Patient would benefit from skilled therapy intervention Yes  -MW        PT Plan    PT Frequency 2x/week  -MW     Physical Therapy Interventions (Optional Details) manual lymphatic drainage;manual therapy techniques;ROM (Range of Motion);strengthening;stretching;taping;patient/family education;home exercise program  -MW     PT Plan Comments Cont ASTYM/ STM, MLD RUE; monitor Rt hip pain. progress HEP   -MW       User Key  (r) = Recorded By, (t) = Taken By, (c) = Cosigned By    Initials Name Provider Type    FRAN Bergeron, MAT Physical Therapist                        Manual Rx (last 36 hours)      Manual Treatments     Row Name 03/27/18 1000             Manual Rx 1    Manual Rx 1 Location Rt chest, axilla and posterior axillary fold   -MW      Manual Rx 1 Type STM, tissue bending, and fascial stretches   -MW      Manual Rx 1 Grade gentle   -MW      Manual Rx 1 Duration 8  -MW        User Key  (r) = Recorded By, (t) = Taken By, (c) = Cosigned By    Initials Name Provider Type    FRAN Bergeron PT Physical Therapist              Therapy Education  Education Details: Encouraged pt to use visualization for fluid movement and pain relief.   Given: Pain management, Symptoms/condition management  Program: Reinforced  How Provided: Verbal  Provided to: Patient  Level of Understanding:  Verbalized              Time Calculation:   Start Time: 1000  Total Timed Code Minutes- PT: 60 minute(s)     Therapy Charges for Today     Code Description Service Date Service Provider Modifiers Qty    07401700437 HC PT MANUAL THERAPY EA 15 MIN 3/27/2018 Na Bergeron, PT GP 4                    Na Bergeron, PT  3/27/2018

## 2018-03-29 ENCOUNTER — HOSPITAL ENCOUNTER (OUTPATIENT)
Dept: NEUROLOGY | Facility: HOSPITAL | Age: 68
Discharge: HOME | End: 2018-03-29
Admitting: PHYSICIAN ASSISTANT

## 2018-03-29 ENCOUNTER — OFFICE VISIT (OUTPATIENT)
Dept: FAMILY MEDICINE CLINIC | Facility: CLINIC | Age: 68
End: 2018-03-29

## 2018-03-29 ENCOUNTER — HOSPITAL ENCOUNTER (OUTPATIENT)
Dept: PHYSICAL THERAPY | Facility: HOSPITAL | Age: 68
Setting detail: THERAPIES SERIES
Discharge: HOME | End: 2018-03-29
Attending: INTERNAL MEDICINE

## 2018-03-29 ENCOUNTER — TELEPHONE (OUTPATIENT)
Dept: NEUROSURGERY | Facility: CLINIC | Age: 68
End: 2018-03-29

## 2018-03-29 VITALS
TEMPERATURE: 97.4 F | WEIGHT: 124 LBS | HEART RATE: 98 BPM | DIASTOLIC BLOOD PRESSURE: 82 MMHG | SYSTOLIC BLOOD PRESSURE: 120 MMHG | BODY MASS INDEX: 21.28 KG/M2 | OXYGEN SATURATION: 99 %

## 2018-03-29 DIAGNOSIS — G89.29 CHRONIC NECK PAIN: Primary | ICD-10-CM

## 2018-03-29 DIAGNOSIS — M54.2 CHRONIC NECK PAIN: Primary | ICD-10-CM

## 2018-03-29 DIAGNOSIS — M79.10 MYALGIA: ICD-10-CM

## 2018-03-29 DIAGNOSIS — I89.0 LYMPHEDEMA OF RIGHT UPPER EXTREMITY: ICD-10-CM

## 2018-03-29 DIAGNOSIS — G56.90 NEUROPATHY OF UPPER EXTREMITY, UNSPECIFIED LATERALITY: ICD-10-CM

## 2018-03-29 DIAGNOSIS — L30.9 DERMATITIS: ICD-10-CM

## 2018-03-29 DIAGNOSIS — M79.601 RIGHT ARM PAIN: ICD-10-CM

## 2018-03-29 DIAGNOSIS — L90.5 SCAR CONDITIONS AND FIBROSIS OF SKIN: Primary | ICD-10-CM

## 2018-03-29 PROCEDURE — 97140 MANUAL THERAPY 1/> REGIONS: CPT | Performed by: PHYSICAL THERAPIST

## 2018-03-29 PROCEDURE — 95886 MUSC TEST DONE W/N TEST COMP: CPT

## 2018-03-29 PROCEDURE — 95910 NRV CNDJ TEST 7-8 STUDIES: CPT

## 2018-03-29 PROCEDURE — 99213 OFFICE O/P EST LOW 20 MIN: CPT | Performed by: PHYSICIAN ASSISTANT

## 2018-03-29 NOTE — THERAPY TREATMENT NOTE
Outpatient Physical Therapy Lymphedema Treatment Note  [facility]     Patient Name: Brianna Urrutia  : 1950  MRN: [id number]  Today's Date: 3/29/2018        Visit Date: 2018    Visit Dx:    ICD-10-CM ICD-9-CM   1. Scar conditions and fibrosis of skin L90.5 709.2   2. Lymphedema of right upper extremity I89.0 457.1   3. Right arm pain M79.601 729.5       Patient Active Problem List   Diagnosis   • Malignant neoplasm of overlapping sites of right female breast   • Malignant neoplasm of upper-outer quadrant of right female breast   • Degenerative disc disease, cervical   • Neuropathy              Lymphedema     Row Name 18 0820          Able to rate subjective pain? yes  -MW    Pre-Treatment Pain Level 4  -MW    Post-Treatment Pain Level 4  -MW    Subjective Pain Comment Rt elbow and wrist   -MW          Subjective Comments apologized for being late; overslept and then forgot her glasses. Reports arm improving after deep session on Tuesday.   -MW          Ptting Edema Category By severity  -MW    Pitting Edema Moderate;Mild  -MW    Edema Assessment Comment Rt upper arm soft and less full; elbow also softer and less fullness. Hand and wrist very mild (tendons and veins visible on hand)   -MW          Location/Assessment Upper Quadrant  -MW    Upper Extremity Conditions right:;intact;clean  -MW    Upper Quadrant Conditions right:;other (comment);intact;clean  -MW    Upper Quadrant Color/Pigment right:;other (comment);red   scar fading, pink and white, hypopigmented   -MW          Manual Lymphatic Drainage initial sequence;opened regional lymph nodes;opened anastamoses;extremity treatment;astym  -MW    Initial Sequence supraclavicular;shoulder collectors  -MW    Supraclavicular right;left  -MW    Shoulder Collectors right  -MW    Opened Regional Lymph Nodes inguinal  -MW    Axillary --  -MW    Inguinal right  -MW    Opened Anastamoses axillo-inguinal;anterior axillo-axillary;posterior  axillo-axillary  -MW    Posterior Axillo-Axillary moving Rt to left   -MW    Axillo-Inguinal right  -MW    Extremity Treatment MLD to full limb;extremity treatment focus on  -MW    MLD to Full Limb RUE   -MW    Extremity Treatment Focus On axilla, lateral trunk, upper arm, elbow   -MW    Astym UE sequence;upper traps;other astym  -MW    Anterior-Lateral Chest --  -MW    Anterior-Lateral Chest Comment In supine: Evaluator and localizer into open axilla with arm positioned in flex/ABD; continued into lateral trunk ribs/ adjacent to breast with localizer and isolator. Moderate fine soft tissue disruptions noted.   -MW    Upper Traps right  -MW    Upper Traps Comment Initiated tx in sitting for RT UT: Evaluator and localizer to UT / scapular region wrapping into posterior-lateral trunk; minimal fine soft tissue disruptions noted through out. Extra strokes at teres mm area/ posterior axilla. Also completed ASTYM to triceps area with evaluator and localizer. Min fine soft tissue disruptions noted in triceps.   -MW    UE Sequence right  -MW    UE Sequence Comment Evaluator and localizer to RUE; extra strokes at radial head, around olecranon and ulnar groove.   -MW    Manual Lymphatic Drainage Comments MLD post ASTYM   -MW          Compression/Skin Care wrapping location;bandaging  -MW    Wrapping Location upper extremity  -MW    Wrapping Location UE right:;hand to axilla  -MW    Wrapping Comments pt forgot sleeve; applied size 3.5 compressogrip to forearm and upper arm with double layer on forearm, size 3 to hand and wrist doubled over hand.   -MW      User Key  (r) = Recorded By, (t) = Taken By, (c) = Cosigned By    Initials Name Provider Type    MW Na Bergeron, PT Physical Therapist                              PT Assessment/Plan     Row Name 03/29/18 0820          PT Assessment    Functional Limitations Performance in self-care ADL;Limitation in home management;Impaired gait  -MW     Impairments Pain;Impaired  lymphatic circulation;Edema;Joint mobility;Muscle strength;Impaired flexibility;Motor function;Impaired muscle length   stair climbing   -     Assessment Comments RUE softer and less full than previously (due to limited time did not measure). Tissues also softer with ASTYM, STM and myofascial releases especially around elbow and areas of horizontal thickening in medial upper arm and proximal forearm. Pt continues to be compliant with home compression pump use; seems to have worked out fitting of vest unit. Continues to do her best to wear compression but has been difficult with pain and weakness in LUE as well as RUE. Await results of MD visits and NCV testing.   -MW     Rehab Potential Fair  -MW     Patient/caregiver participated in establishment of treatment plan and goals Yes  -MW     Patient would benefit from skilled therapy intervention Yes  -MW        PT Plan    PT Frequency 2x/week  -     Physical Therapy Interventions (Optional Details) manual lymphatic drainage;manual therapy techniques;ROM (Range of Motion);strengthening;stretching;taping;patient/family education;home exercise program  -     PT Plan Comments Cont ASTYM/ STM, MLD RUE; monitor Rt hip pain. progress HEP   -       User Key  (r) = Recorded By, (t) = Taken By, (c) = Cosigned By    Initials Name Provider Type    FRAN Bergeron PT Physical Therapist                    Manual Rx (last 36 hours)      Manual Treatments     Row Name 03/29/18 0820             Manual Rx 1    Manual Rx 1 Location RT axilla and RUE   -      Manual Rx 1 Type STM, tissue bending, and myofascial stretches   -MW      Manual Rx 1 Grade gentle   -MW      Manual Rx 1 Duration 10  -MW        User Key  (r) = Recorded By, (t) = Taken By, (c) = Cosigned By    Initials Name Provider Type    FRAN Bergeron PT Physical Therapist                             Time Calculation:   Start Time: 0820  Total Timed Code Minutes- PT: 40 minute(s)     Therapy Charges for  Today     Code Description Service Date Service Provider Modifiers Qty    85116690440 HC PT MANUAL THERAPY EA 15 MIN 3/29/2018 Na Bergeron, PT GP 3                    Na Bergeron, PT  3/29/2018

## 2018-03-29 NOTE — TELEPHONE ENCOUNTER
Provider:  Kavon  Caller: pt came into clinic and left a written msg  Time of call:   10:15am  Phone #:  [phone]  Surgery:    Surgery Date:    Last visit:   03/26/18  Next visit:      MONICA:         Reason for call:          Pt was seen by Dr. Jacobson and given Gabapentin 300mg QHS.  Pt states she has done her research and would like to only begin gabapentin at 100mg capsules qhs.        After speaking with Dr. Jacobson, he agrees that pt may take the lesser dose. Pt has been notified.  Rx for Gabapentin 100mg 1 qhs # 14 0 rf has  Been phoned into the [facility].

## 2018-03-29 NOTE — PROGRESS NOTES
Reagan Urrutia is a 67 y.o. female  Neck Injury (Follow up MRI) and Med Refill      History of Present Illness  Patient comes in today for follow-up on chronic neck pain with radiculopathy.  She has significant degenerative disc disease and spinal stenosis.  She has been seen by Dr. Jacobson and is scheduled to have nerve conduction study today.  She has been receiving her tramadol under controlled substance agreement in the past with her previous primary care doctor Meagan Akbar.  She has transferred her care to us and is requesting that we take over the prescription of tramadol.  She does have daily significant pain 7-8 on a continuing basis, interfering with ADLs and sleep at night.  She states that she typically requires 1 tramadol 4 times a day and denies he problems or adverse effects from this medication.  Additionally she needs a refill of her Lidex cream states this has helped significantly with her rash but still has a little bit of irritation, see previous office notes.  The following portions of the patient's history were reviewed and updated as appropriate: allergies, current medications, past social history and problem list    Review of Systems   Constitutional: Negative for fever.   HENT: Negative for sore throat, trouble swallowing and voice change.    Respiratory: Negative for shortness of breath.    Musculoskeletal: Positive for neck pain and neck stiffness. Negative for arthralgias.   Skin: Positive for color change and rash. Negative for pallor and wound.   Hematological: Negative for adenopathy.       Objective     Vitals:    03/29/18 0932   BP: 120/82   Pulse: 98   Temp: 97.4 °F (36.3 °C)   SpO2: 99%       Physical Exam   Constitutional: She appears well-developed and well-nourished. No distress.   HENT:   Head: Normocephalic and atraumatic.   Neck: No JVD present. Spinous process tenderness and muscular tenderness present. Decreased range of motion present. No tracheal deviation  present. No thyromegaly present.   Pulmonary/Chest: Effort normal and breath sounds normal. No stridor.   Lymphadenopathy:     She has no cervical adenopathy.   Skin: Skin is warm and dry. She is not diaphoretic.   Nursing note and vitals reviewed.      Assessment/Plan     Diagnoses and all orders for this visit:    Chronic neck pain  -     Ambulatory Referral to Physical Therapy Evaluate and treat    Dermatitis  -     fluocinonide-emollient (LIDEX-E) 0.05 % cream; Apply  topically 2 (Two) Times a Day. To rash    Refill given on tramadol 50 mg 1 4 times a day as needed for neck pain #120 with 2 refills, in full sentences agreement reviewed and signed today, Baljinder reviewed, appropriate.  Patient will follow-up in 3 months for recheck and will follow-up with neurosurgery as scheduled as well.

## 2018-04-02 ENCOUNTER — HOSPITAL ENCOUNTER (OUTPATIENT)
Dept: PHYSICAL THERAPY | Facility: HOSPITAL | Age: 68
Setting detail: THERAPIES SERIES
Discharge: HOME | End: 2018-04-02
Attending: INTERNAL MEDICINE

## 2018-04-02 DIAGNOSIS — M79.601 RIGHT ARM PAIN: ICD-10-CM

## 2018-04-02 DIAGNOSIS — I89.0 LYMPHEDEMA OF RIGHT UPPER EXTREMITY: ICD-10-CM

## 2018-04-02 DIAGNOSIS — L90.5 SCAR CONDITIONS AND FIBROSIS OF SKIN: Primary | ICD-10-CM

## 2018-04-02 PROCEDURE — 97140 MANUAL THERAPY 1/> REGIONS: CPT | Performed by: PHYSICAL THERAPIST

## 2018-04-02 NOTE — THERAPY TREATMENT NOTE
Outpatient Physical Therapy Lymphedema Treatment Note   [place]     Patient Name: Brianna Urrutia  : 1950  MRN: [id number]  Today's Date: 2018        Visit Date: 2018    Visit Dx:    ICD-10-CM ICD-9-CM   1. Scar conditions and fibrosis of skin L90.5 709.2   2. Lymphedema of right upper extremity I89.0 457.1   3. Right arm pain M79.601 729.5       Patient Active Problem List   Diagnosis   • Malignant neoplasm of overlapping sites of right female breast   • Malignant neoplasm of upper-outer quadrant of right female breast   • Degenerative disc disease, cervical   • Neuropathy              Lymphedema     Row Name 18 1100          Able to rate subjective pain? yes  -MW    Pre-Treatment Pain Level 5  -MW    Post-Treatment Pain Level 7  -MW    Subjective Pain Comment Rt posterior axilla; Rt elbow and pinky finger.   -MW          Subjective Comments States had NCV testing last week; has follow up with Dr Jacobson tomorrow.   -MW          Ptting Edema Category By severity  -MW    Pitting Edema Moderate;Mild  -MW    Edema Assessment Comment Mild to moderate edema ulnar aspect of hand/ wrist; moderate but soft edema in forearm and elbow with soft mild edema in upper arm.   -MW          Location/Assessment Upper Quadrant  -MW    Upper Extremity Conditions right:;intact;clean  -MW    Upper Quadrant Conditions right:;other (comment);intact;clean  -MW    Upper Quadrant Color/Pigment right:;other (comment);red   scar fading, pink and white, hypopigmented   -MW    Skin Observations Comment Rt hand cool; elbow hot but not pink/ no erythema   -MW          Manual Lymphatic Drainage initial sequence;opened regional lymph nodes;opened anastamoses;extremity treatment;astym  -MW    Initial Sequence supraclavicular;shoulder collectors  -MW    Supraclavicular right;left  -MW    Shoulder Collectors right  -MW    Opened Regional Lymph Nodes inguinal  -MW    Inguinal right  -MW    Opened Anastamoses  axillo-inguinal;anterior axillo-axillary;posterior axillo-axillary  -MW    Anterior Axillo-Axillary moving Rt to left   -MW    Posterior Axillo-Axillary moving Rt to left   -MW    Axillo-Inguinal right  -MW    Extremity Treatment MLD to full limb;extremity treatment focus on  -MW    MLD to Full Limb RUE   -MW    Extremity Treatment Focus On Lateral trunk, elbow and forearm / hand.   -MW    Astym upper traps;anterior-lateral chest  -MW    Anterior-Lateral Chest right  -MW    Anterior-Lateral Chest Comment In supine: Evaluator and localizer to superior and lateral Rt chest wall. Min fine soft tissue disruptions in superior chest. Continued ASTYM into lateral trunk ribs/ adjacent to breast with localizer and isolator. Moderate fine soft tissue disruptions. Repositioned into shoulder flex/ ABD to open axilla; evaluator and localizer to area. Min to mod course to fine soft tissue disruptions noted in axilla.    -MW    Upper Traps right  -MW    Upper Traps Comment Initiated tx in sitting for RT UT: Evaluator and localizer to UT / scapular region wrapping into posterior-lateral trunk; minimal fine soft tissue disruptions noted through out. Extra strokes at teres mm area/ posterior axilla.   -MW    UE Sequence right  -MW    Manual Lymphatic Drainage Comments MLD post ASTYM   -MW          Compression/Skin Care wrapping location;bandaging  -MW    Wrapping Location upper extremity  -MW    Wrapping Location UE right:;hand to axilla  -MW    Wrapping Comments assisted pt to don arm sleeve   -MW      User Key  (r) = Recorded By, (t) = Taken By, (c) = Cosigned By    Initials Name Provider Type    MW Na Bergeron, PT Physical Therapist                              PT Assessment/Plan     Row Name 04/02/18 1100          PT Assessment    Functional Limitations Performance in self-care ADL;Limitation in home management;Impaired gait  -MW     Impairments Pain;Impaired lymphatic circulation;Edema;Joint mobility;Muscle  strength;Impaired flexibility;Motor function;Impaired muscle length   stair climbing   -MW     Assessment Comments RUE upperarm continues to soften with reduction in edema; forearm and elbow remain more full but still softer. Awaiting clarification of NCV results and plan/ next steps from Dr Jacobson; has follow up tomorrow. Cont ASTYM/ STM/ MLD RUE. Recheck Rt hip/ buttock next visit.   -MW     Rehab Potential Fair  -MW     Patient/caregiver participated in establishment of treatment plan and goals Yes  -MW     Patient would benefit from skilled therapy intervention Yes  -MW        PT Plan    PT Frequency 2x/week  -MW     Physical Therapy Interventions (Optional Details) manual lymphatic drainage;manual therapy techniques;ROM (Range of Motion);strengthening;stretching;taping;patient/family education;home exercise program  -MW     PT Plan Comments Cont ASTYM/ STM, MLD RUE; monitor Rt hip pain. progress HEP   -MW       User Key  (r) = Recorded By, (t) = Taken By, (c) = Cosigned By    Initials Name Provider Type    FRAN Bergeron, PT Physical Therapist                     Exercises     Row Name 04/02/18 1100             Subjective Comments    Subjective Comments States had NCV testing last week; has follow up with Dr Jacobson tomorrow.   -MW         Subjective Pain    Able to rate subjective pain? yes  -MW      Pre-Treatment Pain Level 5  -MW      Post-Treatment Pain Level 7  -MW      Subjective Pain Comment Rt posterior axilla; Rt elbow and pinky finger.   -MW         Exercise 1    Exercise Name 1 Total exercise minutes: 4   -MW         Exercise 2    Exercise Name 2 RUE AAROM / median and ulnar nerve glides   -MW      Cueing 2 Tactile;Verbal  -MW      Reps 2 5  -MW        User Key  (r) = Recorded By, (t) = Taken By, (c) = Cosigned By    Initials Name Provider Type    FRAN Bergeron PT Physical Therapist                       Manual Rx (last 36 hours)      Manual Treatments     Row Name 04/02/18 1100              Manual Rx 1    Manual Rx 1 Location RT axilla and RUE   -MW      Manual Rx 1 Type STM, tissue bending, and myofascial stretches   -MW      Manual Rx 1 Grade gentle   -MW      Manual Rx 1 Duration 8  -MW        User Key  (r) = Recorded By, (t) = Taken By, (c) = Cosigned By    Initials Name Provider Type    FRAN Bergeron PT Physical Therapist              Therapy Education  Education Details: Cont self care - pump, compression, movement and visualization  Given: Pain management, Symptoms/condition management, Edema management  Program: Reinforced  How Provided: Verbal  Provided to: Patient  Level of Understanding: Verbalized              Time Calculation:   Start Time: 1100  Total Timed Code Minutes- PT: 55 minute(s)     Therapy Charges for Today     Code Description Service Date Service Provider Modifiers Qty    [id number] HC PT MANUAL THERAPY EA 15 MIN 4/2/2018 Na Bergeron, PT GP 4                    Na Bergeron, PT  4/2/2018

## 2018-04-03 ENCOUNTER — OFFICE VISIT (OUTPATIENT)
Dept: NEUROSURGERY | Facility: CLINIC | Age: 68
End: 2018-04-03

## 2018-04-03 VITALS
WEIGHT: 124.4 LBS | TEMPERATURE: 97.3 F | HEIGHT: 64 IN | SYSTOLIC BLOOD PRESSURE: 135 MMHG | DIASTOLIC BLOOD PRESSURE: 70 MMHG | BODY MASS INDEX: 21.24 KG/M2

## 2018-04-03 DIAGNOSIS — C50.811 MALIGNANT NEOPLASM OF OVERLAPPING SITES OF RIGHT FEMALE BREAST, UNSPECIFIED ESTROGEN RECEPTOR STATUS (HCC): Primary | ICD-10-CM

## 2018-04-03 PROCEDURE — 99212 OFFICE O/P EST SF 10 MIN: CPT | Performed by: NEUROLOGICAL SURGERY
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
## 2018-04-03 NOTE — PATIENT INSTRUCTIONS
call Dr. Jacobson on a Monday or Tuesday with an update.   Ask for Dyan,  and leave a message for  Dr. Jacobson.  He will call you back at the end of the day as soon as he can.     [phone]

## 2018-04-04 ENCOUNTER — OFFICE VISIT (OUTPATIENT)
Dept: PSYCHIATRY | Facility: CLINIC | Age: 68
End: 2018-04-04

## 2018-04-04 DIAGNOSIS — F90.2 ATTENTION DEFICIT HYPERACTIVITY DISORDER, COMBINED TYPE: ICD-10-CM

## 2018-04-04 DIAGNOSIS — F41.1 GENERALIZED ANXIETY DISORDER: Primary | ICD-10-CM

## 2018-04-04 PROCEDURE — 90837 PSYTX W PT 60 MINUTES: CPT | Performed by: NURSE PRACTITIONER

## 2018-04-04 NOTE — PROGRESS NOTES
Reagan Urrutia is a 67 y.o. female who is here for therapy     Chief Complaint:Diagnoses and all orders for this visit:    Generalized anxiety disorder    Attention deficit hyperactivity disorder, combined type         History of Present Illness Patient presents by herself for therapy. She reports relationship issues and difficulty with neighbors in her apt house. She identifies reasons for this but doesn't make it easier on her. Discussed relationship struggles with her son and other stressors in her life. She discussed health issues but was very pleased with her care and evaluation and treatment option with Dr. Jacobson neurosurgeon. She cont to get PT and O T for lymphedema. She is sleeping better and eating healthy, still struggles with low income. She is very cautious with medication but feels that medicinal cannabis would be of great benefit of discomfort and pain she experiences. She is going to be referred into palliative care  . Patient reports Denies adverse effects from medications.   (Scales based on 0 - 10 with 10 being the worst)        The following portions of the patient's history were reviewed and updated as appropriate: allergies, current medications, past family history, past medical history, past social history, past surgical history and problem list.    Review of Systemsdenies fever, cough, s/s’s of infection, denies GI/ problems, denies new medical issues     Objective   Physical Exam  There were no vitals taken for this visit.    Allergies   Allergen Reactions   • Iodinated Diagnostic Agents Other (See Comments)     Patient states she has swelling and pain of joints for days following injection   • Penicillins        Current Medications:   Current Outpatient Prescriptions   Medication Sig Dispense Refill   • fluocinonide-emollient (LIDEX-E) 0.05 % cream Apply  topically 2 (Two) Times a Day. To rash 15 g 0   • gabapentin (NEURONTIN) 300 MG capsule Take 1 capsule by mouth  every night at bedtime. 14 capsule 0   • losartan (COZAAR) 25 MG tablet Take 25 mg by mouth Daily.     • nitrofurantoin, macrocrystal-monohydrate, (MACROBID) 100 MG capsule Take 1 capsule by mouth Every 12 (Twelve) Hours. 14 capsule 0   • promethazine (PHENERGAN) 25 MG tablet Take 0.5 tablets by mouth Every 6 (Six) Hours As Needed for Nausea or Vomiting. 30 tablet 0   • TraMADol HCl 50 MG tablet dispersible Take 0.5 tablets by mouth As Needed.       No current facility-administered medications for this visit.             Appearance: appropriate  Hygiene:   good  Cooperation:  Cooperative  Eye Contact:  Good  Psychomotor Behavior:  Appropriate  Mood:  anxiety  Affect:  Appropriate  Hopelessness: Denies  Speech: tends to get loud normal pace   Thought Process: tangential   Thought Content:  Normal  Suicidal:  None  Homicidal:  None  Hallucinations:  None  Delusion:  None  Memory:  Intact  Orientation:  Person, Place, Time and Situation  Reliability:  fair  Insight:  Fair  Judgement:  Fair  Impulse Control:  Fair  Estimated Intelligence: average range   Physical/Medical Issues:  No       Assessment/Plan   Diagnoses and all orders for this visit:    Generalized anxiety disorder    Attention deficit hyperactivity disorder, combined type    60 minutes face to face therapy   Discussed place of residence and moving to more stable environment  Processed relationships positive and negative healthy vs toxic   Discussed designing her life at this phase of life and will cont to work with her setting realistic goals     Assisted patient in processing above session content; acknowledged and normalized patient’s thoughts, feelings, and concerns.  Applied  positive coping skills and behavior management in session.  Allowed patient to freely discuss issues without interruption or judgment. Provided safe, confidential environment to facilitate the development of positive therapeutic relationship and encourage open, honest  communication. Assisted patient in identifying risk factors which would indicate the need for higher level of care including thoughts to harm self or others and/or self-harming behavior and encouraged patient to contact this office, call 911, or present to the nearest emergency room should any of these events occur. Discussed crisis intervention services and means to access.  Patient adamantly and convincingly denies current suicidal or homicidal ideation or perceptual disturbance.    Instructed to call for questions or concerns and return early if necessary. Crisis plan reviewed including going to the Emergency department.    Return in about 1 week (around 4/11/2018).         Please note that portions of this note were completed with a voice recognition program.  Efforts were made to edit dictation, but occasionally words are mistranscribed.

## 2018-04-05 ENCOUNTER — HOSPITAL ENCOUNTER (OUTPATIENT)
Dept: PHYSICAL THERAPY | Facility: HOSPITAL | Age: 68
Setting detail: THERAPIES SERIES
Discharge: HOME | End: 2018-04-05
Attending: INTERNAL MEDICINE

## 2018-04-05 ENCOUNTER — TELEPHONE (OUTPATIENT)
Dept: NEUROSURGERY | Facility: CLINIC | Age: 68
End: 2018-04-05

## 2018-04-05 DIAGNOSIS — M79.601 RIGHT ARM PAIN: ICD-10-CM

## 2018-04-05 DIAGNOSIS — L90.5 SCAR CONDITIONS AND FIBROSIS OF SKIN: Primary | ICD-10-CM

## 2018-04-05 DIAGNOSIS — I89.0 LYMPHEDEMA OF RIGHT UPPER EXTREMITY: ICD-10-CM

## 2018-04-05 DIAGNOSIS — M54.2 CERVICALGIA: ICD-10-CM

## 2018-04-05 PROCEDURE — G8984 CARRY CURRENT STATUS: HCPCS

## 2018-04-05 PROCEDURE — G8985 CARRY GOAL STATUS: HCPCS

## 2018-04-05 PROCEDURE — 97140 MANUAL THERAPY 1/> REGIONS: CPT | Performed by: PHYSICAL THERAPIST

## 2018-04-05 PROCEDURE — 97164 PT RE-EVAL EST PLAN CARE: CPT | Performed by: PHYSICAL THERAPIST

## 2018-04-05 NOTE — TELEPHONE ENCOUNTER
Dr Jacobson wanted them to work on her neck pain and right arm pain and swelling. I spoke with the therapist and updated them
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
## 2018-04-05 NOTE — TELEPHONE ENCOUNTER
Provider:  Kavon  Caller: Na ([facility])  Time of call:   09:54am  Phone #:  [phone]  Surgery:  NA  Surgery Date:    Last visit:   04/03/18  Next visit: NA (pt to call Kavon)    Reason for call:         Physical therapist left msg requesting clarification of recent PT/OT order.  States pt has already been attending PT for other issues.  Needs to know what Dr. Jacobson is wanting to work on since pt has multiple documented problems.

## 2018-04-09 ENCOUNTER — HOSPITAL ENCOUNTER (OUTPATIENT)
Dept: PHYSICAL THERAPY | Facility: HOSPITAL | Age: 68
Setting detail: THERAPIES SERIES
Discharge: HOME | End: 2018-04-09
Attending: INTERNAL MEDICINE

## 2018-04-09 DIAGNOSIS — M79.601 RIGHT ARM PAIN: ICD-10-CM

## 2018-04-09 DIAGNOSIS — L90.5 SCAR CONDITIONS AND FIBROSIS OF SKIN: Primary | ICD-10-CM

## 2018-04-09 DIAGNOSIS — I89.0 LYMPHEDEMA OF RIGHT UPPER EXTREMITY: ICD-10-CM

## 2018-04-09 DIAGNOSIS — M54.2 CERVICALGIA: ICD-10-CM

## 2018-04-09 PROCEDURE — 97140 MANUAL THERAPY 1/> REGIONS: CPT | Performed by: PHYSICAL THERAPIST

## 2018-04-09 NOTE — THERAPY TREATMENT NOTE
Outpatient Physical Therapy Lymphedema Treatment Note  [facility]     Patient Name: Brianna Urrutia  : 1950  MRN: [id number]  Today's Date: 2018        Visit Date: 2018    Visit Dx:    ICD-10-CM ICD-9-CM   1. Scar conditions and fibrosis of skin L90.5 709.2   2. Lymphedema of right upper extremity I89.0 457.1   3. Right arm pain M79.601 729.5   4. Cervicalgia M54.2 723.1       Patient Active Problem List   Diagnosis   • Malignant neoplasm of overlapping sites of right female breast   • Malignant neoplasm of upper-outer quadrant of right female breast   • Degenerative disc disease, cervical   • Neuropathy              Lymphedema     Row Name 18 1100          Able to rate subjective pain? yes  -MW    Pre-Treatment Pain Level 5  -MW    Post-Treatment Pain Level 5  -MW    Subjective Pain Comment Rt arm/ posterior axilla   -MW          Subjective Comments Had adjustment this morning by chiropracter on Lt neck and Rt low back/ buttock   -MW          Ptting Edema Category By severity  -MW    Pitting Edema Moderate;Mild  -MW    Edema Assessment Comment RUE mild to moderate; softer through out but still more full around elbow/ proximal forearm   -MW          Location/Assessment Upper Quadrant  -MW    Upper Extremity Conditions right:;intact;clean  -MW    Upper Quadrant Conditions right:;intact;clean  -MW    Upper Quadrant Color/Pigment right:;other (comment);red   scar fading, pink and white, hypopigmented   -MW          Measurement Type(s) --  -MW    Quick Girth Areas --  -MW          Manual Lymphatic Drainage initial sequence;opened regional lymph nodes;opened anastamoses;extremity treatment;astym  -MW    Initial Sequence supraclavicular;shoulder collectors  -MW    Supraclavicular right;left  -MW    Shoulder Collectors right  -MW    Opened Anastamoses anterior axillo-axillary;posterior axillo-axillary  -MW    Anterior Axillo-Axillary moving Rt to left   -MW    Posterior Axillo-Axillary moving  "Rt to left   -MW    Extremity Treatment MLD to full limb;extremity treatment focus on  -MW    MLD to Full Limb RUE   -MW    Extremity Treatment Focus On Elbow and forearm   -MW    Astym upper traps;other astym  -MW    Anterior-Lateral Chest right  -MW    Upper Traps right;left  -MW    Upper Traps Comment Initiated tx in sitting for RT & LT UT: Evaluator and localizer to UT / scapular region; minimal fine soft tissue disruptions noted through out. Extra strokes at mid traps and leveator scapulea.   -MW    UE Sequence left  -MW    Other Astym In supine: Evaluator and localizer to Rt axilla and lateral trunk ribs/ adjacent to breast. Moderate course to fine soft tissue disruptions.   -MW    Manual Lymphatic Drainage Comments ASTYM followed by STM and MLD   -MW          Compression/Skin Care --  -MW    Wrapping Location --  -MW    Wrapping Location UE --  -MW    Compression/Skin Care Comments Pt forgot sleeve; will don at home   -MW      User Key  (r) = Recorded By, (t) = Taken By, (c) = Cosigned By    Initials Name Provider Type    MW Na Bergeron, PT Physical Therapist                              PT Assessment/Plan     Row Name 04/09/18 1100          PT Assessment    Functional Limitations Performance in self-care ADL;Limitation in home management;Impaired gait  -MW     Impairments Pain;Impaired lymphatic circulation;Edema;Joint mobility;Muscle strength;Impaired flexibility;Motor function;Impaired muscle length   stair climbing   -     Assessment Comments RUE lymphedema softer and less full; very good response to STM/ MLD this session. Pt with decrease in muscle tension and pain with STM and TP work in shoulders and neck. Pt reports Rt buttock is \"ok.\" Reviewed with pt that need to prioritize treatment areas each session.   -MW     Rehab Potential Fair  -MW     Patient/caregiver participated in establishment of treatment plan and goals Yes  -MW     Patient would benefit from skilled therapy intervention Yes  " -MW        PT Plan    PT Frequency 2x/week  -MW     Physical Therapy Interventions (Optional Details) manual therapy techniques;manual lymphatic drainage;patient/family education;home exercise program;modalities;ROM (Range of Motion);strengthening;stretching;taping  -MW     PT Plan Comments Cont ASTYM/ STM upper back & neck; Lt wrist; progress HEP for ROM, strength as tolerated. Consider estim for pain management.   -MW       User Key  (r) = Recorded By, (t) = Taken By, (c) = Cosigned By    Initials Name Provider Type    FRAN Bergeron PT Physical Therapist                     Exercises     Row Name 04/09/18 1100             Subjective Comments    Subjective Comments Had adjustment this morning by chiropracter on Lt neck and Rt low back/ buttock   -MW         Subjective Pain    Able to rate subjective pain? yes  -MW      Pre-Treatment Pain Level 5  -MW      Post-Treatment Pain Level 5  -MW      Subjective Pain Comment Rt arm/ posterior axilla   -MW        User Key  (r) = Recorded By, (t) = Taken By, (c) = Cosigned By    Initials Name Provider Type    FRAN Bergeron PT Physical Therapist                       Manual Rx (last 36 hours)      Manual Treatments     Row Name 04/09/18 1100             Manual Rx 1    Manual Rx 1 Location RT axilla and RUE   -MW      Manual Rx 1 Type STM, tissue bending, myofascial stretches  -MW      Manual Rx 1 Grade gentle   -MW      Manual Rx 1 Duration 8  -MW         Manual Rx 2    Manual Rx 2 Location Bilat UT/ mid thoracic spine, leveator scap   -MW      Manual Rx 2 Type STM, TP at LS   -MW      Manual Rx 2 Grade 15  -MW         Manual Rx 3    Manual Rx 3 Location Suboccipital release with manual traction   -MW      Manual Rx 3 Grade II  -MW      Manual Rx 3 Duration 5  -MW        User Key  (r) = Recorded By, (t) = Taken By, (c) = Cosigned By    Initials Name Provider Type    FRAN Bergeron PT Physical Therapist                             Time Calculation:   Start  Time: 1100  Total Timed Code Minutes- PT: 55 minute(s)     Therapy Charges for Today     Code Description Service Date Service Provider Modifiers Qty    47350010386 HC PT MANUAL THERAPY EA 15 MIN 4/9/2018 Na Bergeron, PT GP 4                    Na Bergeron, PT  4/9/2018

## 2018-04-10 DIAGNOSIS — Z51.81 THERAPEUTIC DRUG MONITORING: Primary | ICD-10-CM

## 2018-04-12 ENCOUNTER — HOSPITAL ENCOUNTER (OUTPATIENT)
Dept: OCCUPATIONAL THERAPY | Facility: HOSPITAL | Age: 68
Setting detail: THERAPIES SERIES
Discharge: HOME | End: 2018-04-12

## 2018-04-12 ENCOUNTER — HOSPITAL ENCOUNTER (OUTPATIENT)
Dept: PHYSICAL THERAPY | Facility: HOSPITAL | Age: 68
Setting detail: THERAPIES SERIES
Discharge: HOME | End: 2018-04-12
Attending: INTERNAL MEDICINE

## 2018-04-12 DIAGNOSIS — R29.898 DECREASED GRIP STRENGTH OF RIGHT HAND: ICD-10-CM

## 2018-04-12 DIAGNOSIS — M54.2 CERVICALGIA: ICD-10-CM

## 2018-04-12 DIAGNOSIS — L90.5 SCAR CONDITIONS AND FIBROSIS OF SKIN: Primary | ICD-10-CM

## 2018-04-12 DIAGNOSIS — Z78.9 IMPAIRED MOBILITY AND ADLS: Primary | ICD-10-CM

## 2018-04-12 DIAGNOSIS — M25.621 DECREASED ROM OF RIGHT ELBOW: ICD-10-CM

## 2018-04-12 DIAGNOSIS — Z74.09 IMPAIRED MOBILITY AND ADLS: Primary | ICD-10-CM

## 2018-04-12 DIAGNOSIS — M25.611 DECREASED ROM OF RIGHT SHOULDER: ICD-10-CM

## 2018-04-12 DIAGNOSIS — M79.601 RIGHT ARM PAIN: ICD-10-CM

## 2018-04-12 DIAGNOSIS — M79.601 ARM PAIN, RIGHT: ICD-10-CM

## 2018-04-12 DIAGNOSIS — R27.8 DECREASED COORDINATION: ICD-10-CM

## 2018-04-12 DIAGNOSIS — I89.0 LYMPHEDEMA OF RIGHT UPPER EXTREMITY: ICD-10-CM

## 2018-04-12 PROCEDURE — G8985 CARRY GOAL STATUS: HCPCS | Performed by: OCCUPATIONAL THERAPIST

## 2018-04-12 PROCEDURE — G8984 CARRY CURRENT STATUS: HCPCS | Performed by: OCCUPATIONAL THERAPIST

## 2018-04-12 PROCEDURE — 97140 MANUAL THERAPY 1/> REGIONS: CPT | Performed by: PHYSICAL THERAPIST

## 2018-04-12 PROCEDURE — 97530 THERAPEUTIC ACTIVITIES: CPT | Performed by: OCCUPATIONAL THERAPIST

## 2018-04-12 PROCEDURE — 97166 OT EVAL MOD COMPLEX 45 MIN: CPT | Performed by: OCCUPATIONAL THERAPIST
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
## 2018-04-12 NOTE — THERAPY TREATMENT NOTE
"    Outpatient Physical Therapy Lymphedema Treatment Note  [facility]     Patient Name: Brianna Urrutia  : 1950  MRN: [id number]  Today's Date: 2018        Visit Date: 2018    Visit Dx:    ICD-10-CM ICD-9-CM   1. Scar conditions and fibrosis of skin L90.5 709.2   2. Lymphedema of right upper extremity I89.0 457.1   3. Right arm pain M79.601 729.5   4. Cervicalgia M54.2 723.1       Patient Active Problem List   Diagnosis   • Malignant neoplasm of overlapping sites of right female breast   • Malignant neoplasm of upper-outer quadrant of right female breast   • Degenerative disc disease, cervical   • Neuropathy              Lymphedema     Row Name 18 1000          Able to rate subjective pain? yes  -MW    Pre-Treatment Pain Level 5  -MW    Post-Treatment Pain Level 5  -MW    Subjective Pain Comment Less sore under arm, but still intense in elbow   -MW          Subjective Comments Saw chiropracter yesterday and goes again tomorrow; worked on Rt shoulder and rt hip/ back. Better but sore.  \"what we did last time was good, so lets keep doing it\"   -MW          Ptting Edema Category By severity  -MW    Pitting Edema Moderate;Mild  -MW    Edema Assessment Comment RUE edema soft with mild to mod fullness around elbow   -MW          Location/Assessment Upper Quadrant  -MW    Upper Extremity Conditions right:;intact;clean  -MW    Upper Quadrant Conditions right:;intact;clean  -MW    Upper Quadrant Color/Pigment right:;other (comment);red   scar fading, pink and white, hypopigmented   -MW          Manual Lymphatic Drainage initial sequence;opened regional lymph nodes;opened anastamoses;extremity treatment;astym  -MW    Initial Sequence supraclavicular;shoulder collectors  -MW    Supraclavicular right;left  -MW    Shoulder Collectors right  -MW    Opened Anastamoses anterior axillo-axillary;posterior axillo-axillary;axillo-inguinal  -MW    Anterior Axillo-Axillary moving Rt to left   -MW    Posterior " Axillo-Axillary moving Rt to left   -MW    Axillo-Inguinal right  -MW    Extremity Treatment MLD to full limb;extremity treatment focus on  -MW    MLD to Full Limb RUE  -MW    Extremity Treatment Focus On Elbow and forearm   -MW    Astym upper traps;other astym  -MW    Anterior-Lateral Chest right  -MW    Upper Traps right;left  -MW    Upper Traps Comment Initiated tx in sitting for RT & LT UT: Evaluator and localizer to UT / scapular region; minimal fine soft tissue disruptions noted through out. Extra strokes at mid traps and leveator scapulea.   -MW    UE Sequence right  -MW    UE Sequence Comment Evaluator and localizer to RUE focused on forerm and hand; extra strokes at radial head, around olecranon, ulnar groove and wrist/ pisiform, flexor retinaculum.   -MW    Other Astym In supine: Evaluator and localizer to Rt axilla and lateral trunk ribs/ adjacent to breast. Moderate course to fine soft tissue disruptions.   -MW    Manual Lymphatic Drainage Comments ASTYM followed by STM and MLD   -MW          Wrapping Location upper extremity  -MW    Wrapping Location UE hand to axilla  -MW    Wrapping Comments assisted pt to don sleeve and glove   -MW      User Key  (r) = Recorded By, (t) = Taken By, (c) = Cosigned By    Initials Name Provider Type    FRAN Bergeron PT Physical Therapist             Hand Therapy (last 24 hours)      Hand Eval     Row Name 04/12/18 0900             Hand  Strength     Strength Affected Side Bilateral  -ST          Strength Right    Right  Test 1 2  -ST      Right  Test 2 4  -ST      Right  Test 3 4  -ST       Strength Average Right 3.33  -ST          Strength Left    Left  Test 1 47  -ST      Left  Test 2 50  -ST      Left  Test 3 52  -ST       Strength Average Left 49.67  -ST         Pinch Strength    Affected Side Bilateral  -ST         Right Hand Strength - Pinch (lbs)    Lateral 3 lbs  -ST         Left Hand Strength - Pinch (lbs)     Lateral 9.6 lbs  -ST         Therapy Education    Given HEP;Symptoms/condition management;Pain management;Posture/body mechanics;Edema management  -ST      Program New  -ST      How Provided Verbal;Demonstration  -ST      Provided to Patient  -ST      Level of Understanding Teach back education performed;Verbalized;Demonstrated  -ST        User Key  (r) = Recorded By, (t) = Taken By, (c) = Cosigned By    Initials Name Provider Type    ST Eva Waldron OTR Occupational Therapist                          PT Assessment/Plan     Row Name 04/12/18 1000       PT Assessment    Functional Limitations Performance in self-care ADL;Limitation in home management;Impaired gait  -MW    Impairments Pain;Impaired lymphatic circulation;Edema;Joint mobility;Muscle strength;Impaired flexibility;Motor function;Impaired muscle length   stair climbing   -MW    Assessment Comments RUE lymphedema continues to decrease and soften; however pain is persistent. Pt with decreased c/o neck pain and Rt buttock pain. Monitor changes as pt has multiple interventions this week.   -MW    Rehab Potential Fair  -MW    Patient/caregiver participated in establishment of treatment plan and goals Yes  -MW    Patient would benefit from skilled therapy intervention Yes  -MW       PT Plan    PT Frequency 2x/week  -MW    Predicted Duration of Therapy Intervention (OT Eval)  --    Physical Therapy Interventions (Optional Details) manual therapy techniques;manual lymphatic drainage;patient/family education;home exercise program;modalities;ROM (Range of Motion);strengthening;stretching;taping  -MW    PT Plan Comments Cont ASTYM/ STM upper back & neck; Lt wrist; progress HEP for ROM, strength as tolerated. Consider estim for pain management.   -      User Key  (r) = Recorded By, (t) = Taken By, (c) = Cosigned By    Initials Name Provider Type    ADRIANA Porter Occupational Therapist    FRAN Bergeron, PT Physical Therapist                    "  Exercises     Row Name 04/12/18 1000             Subjective Comments    Subjective Comments Saw chiropracter yesterday and goes again tomorrow; worked on Rt shoulder and rt hip/ back. Better but sore.  \"what we did last time was good, so lets keep doing it\"   -MW         Subjective Pain    Able to rate subjective pain? yes  -MW      Pre-Treatment Pain Level 5  -MW      Post-Treatment Pain Level 5  -MW      Subjective Pain Comment Less sore under arm, but still intense in elbow   -MW         Exercise 1    Exercise Name 1 Total exercise minutes: 3  -MW         Exercise 2    Exercise Name 2 RUE: median nerve and ulnar nerve   -MW      Cueing 2 Tactile;Verbal  -MW      Reps 2 4  -MW      Additional Comments moving between elbow flex/ ext; forearm pronation/ supination and wrist flex/ ext; long axis twist   -MW        User Key  (r) = Recorded By, (t) = Taken By, (c) = Cosigned By    Initials Name Provider Type    FRAN Bergeron PT Physical Therapist                       Manual Rx (last 36 hours)      Manual Treatments     Row Name 04/12/18 1000             Manual Rx 1    Manual Rx 1 Location RT axilla and RUE   -MW      Manual Rx 1 Type STM, tissue bending, myofascial stretches  -MW      Manual Rx 1 Grade gentle   -MW      Manual Rx 1 Duration 8  -MW         Manual Rx 2    Manual Rx 2 Location Bilat UT/ mid thoracic spine, leveator scap   -MW      Manual Rx 2 Type STM, TP at LS   -MW      Manual Rx 2 Grade 8  -MW         Manual Rx 3    Manual Rx 3 Location Suboccipital release with manual traction   -MW      Manual Rx 3 Grade II  -MW      Manual Rx 3 Duration 5  -MW        User Key  (r) = Recorded By, (t) = Taken By, (c) = Cosigned By    Initials Name Provider Type    FRAN Bergeron PT Physical Therapist              Therapy Education  Education Details: Cont pump use; gentle HEP ther exer   Given: Pain management, Symptoms/condition management, Edema management  Program: Reinforced  How Provided: " Verbal  Provided to: Patient  Level of Understanding: Verbalized              Time Calculation:   Start Time: 1000  Total Timed Code Minutes- PT: 55 minute(s)     Therapy Charges for Today     Code Description Service Date Service Provider Modifiers Qty    37358563857 HC PT MANUAL THERAPY EA 15 MIN 4/12/2018 Na Bergeron, PT GP 4                    Na Bergeron, PT  4/12/2018
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
## 2018-04-16 ENCOUNTER — HOSPITAL ENCOUNTER (OUTPATIENT)
Dept: PHYSICAL THERAPY | Facility: HOSPITAL | Age: 68
Setting detail: THERAPIES SERIES
Discharge: HOME | End: 2018-04-16
Attending: INTERNAL MEDICINE

## 2018-04-16 DIAGNOSIS — L90.5 SCAR CONDITIONS AND FIBROSIS OF SKIN: Primary | ICD-10-CM

## 2018-04-16 DIAGNOSIS — M79.601 RIGHT ARM PAIN: ICD-10-CM

## 2018-04-16 DIAGNOSIS — I89.0 LYMPHEDEMA OF RIGHT UPPER EXTREMITY: ICD-10-CM

## 2018-04-16 PROCEDURE — 97140 MANUAL THERAPY 1/> REGIONS: CPT | Performed by: PHYSICAL THERAPIST

## 2018-04-16 NOTE — THERAPY TREATMENT NOTE
Outpatient Physical Therapy Lymphedema Treatment Note   [place]     Patient Name: Brianna Urrutia  : 1950  MRN: [id number]  Today's Date: 2018        Visit Date: 2018    Visit Dx:    ICD-10-CM ICD-9-CM   1. Scar conditions and fibrosis of skin L90.5 709.2   2. Lymphedema of right upper extremity I89.0 457.1   3. Right arm pain M79.601 729.5       Patient Active Problem List   Diagnosis   • Malignant neoplasm of overlapping sites of right female breast   • Malignant neoplasm of upper-outer quadrant of right female breast   • Degenerative disc disease, cervical   • Neuropathy              Lymphedema     Row Name 18 1100          Able to rate subjective pain? yes  -MW    Pre-Treatment Pain Level 7  -MW    Post-Treatment Pain Level 6  -MW    Subjective Pain Comment Woke up with pain in Rt shoulder, elbow and wrist.   -MW          Subjective Comments reports was doing pretty well but then woke up with more pain; shoulder, elbow and wrist. So put on 2 sleeves and that helped some.   -MW          Ptting Edema Category By severity  -MW    Pitting Edema Moderate;Mild  -MW    Edema Assessment Comment RUE very soft, less fullness with double sleeve on.   -MW          Location/Assessment Upper Quadrant  -MW    Upper Extremity Conditions right:;intact;clean  -MW    Upper Quadrant Conditions right:;intact;clean  -MW    Upper Quadrant Color/Pigment right:;other (comment);red   scar fading, pink and white, hypopigmented   -MW    Skin Observations Comment Noted red patches/ blotches around tumor site scar post ASTYM; area also warm to hot.   -MW          Manual Lymphatic Drainage initial sequence;opened regional lymph nodes;opened anastamoses;extremity treatment;astym  -MW    Initial Sequence supraclavicular;shoulder collectors  -MW    Supraclavicular right;left  -MW    Shoulder Collectors right  -MW    Inguinal right  -MW    Opened Anastamoses anterior axillo-axillary;posterior  "axillo-axillary;axillo-inguinal  -MW    Anterior Axillo-Axillary moving Rt to left   -MW    Posterior Axillo-Axillary moving Rt to left   -MW    Axillo-Inguinal right  -MW    Extremity Treatment MLD to full limb;extremity treatment focus on  -MW    MLD to Full Limb RUE   -MW    Extremity Treatment Focus On Elbow, forearm, wrist and hand   -MW    Astym upper traps;other astym;anterior-lateral chest  -MW    Anterior-Lateral Chest right  -MW    Anterior-Lateral Chest Comment In supine: Evaluator and localizer to superior and lateral Rt chest wall. Min fine soft tissue disruptions in superior chest. Continued ASTYM into lateral trunk ribs/ adjacent to breast with localizer and isolator. Moderate fine soft tissue disruptions. Repositioned into shoulder flex/ ABD to open axilla; evaluator and localizer to area. Min to mod course to fine soft tissue disruptions noted in axilla.    -MW    Upper Traps right;left  -MW    Upper Traps Comment Initiated tx in sitting for RT & LT UT: Evaluator and localizer to UT / scapular region; minimal fine soft tissue disruptions noted through out. Extra strokes at mid traps and leveator scapulea., and RT teres region.   -MW    UE Sequence right  -MW    UE Sequence Comment Evaluator and localizer to RUE focused on forerm and hand; extra strokes at radial head, around olecranon, ulnar groove and wrist/ pisiform, flexor retinaculum.   -MW    Manual Lymphatic Drainage Comments ASTYM followed by STM and MLD   -MW          Wrapping Location upper extremity  -MW    Wrapping Location UE hand to axilla  -MW    Wrapping Comments assisted pt to don sleeve, glove and second \"copper\" sleeve    -MW      User Key  (r) = Recorded By, (t) = Taken By, (c) = Cosigned By    Initials Name Provider Type    FRAN Bergeron, PT Physical Therapist                              PT Assessment/Plan     Row Name 04/16/18 1100          PT Assessment    Functional Limitations Performance in self-care ADL;Limitation in " home management;Impaired gait  -MW     Impairments Pain;Impaired lymphatic circulation;Edema;Joint mobility;Muscle strength;Impaired flexibility;Motor function;Impaired muscle length   stair climbing   -     Assessment Comments RUE softer with doubled sleeve in place; refills quickly - more firm by the time ASTYM was completed on UTs. Pt with increased c/o pain so deferred additional ther exer. Significant mm guarding around RT scapula and shoulder girdle. Monitor / recheck skin right chest / scar area.   -MW     Rehab Potential Fair  -MW     Patient/caregiver participated in establishment of treatment plan and goals Yes  -MW     Patient would benefit from skilled therapy intervention Yes  -MW        PT Plan    PT Frequency 2x/week  -MW     Physical Therapy Interventions (Optional Details) manual therapy techniques;manual lymphatic drainage;patient/family education;home exercise program;modalities;ROM (Range of Motion);strengthening;stretching;taping  -     PT Plan Comments Cont ASTYM/ STM upper back & neck; Lt wrist; progress HEP for ROM, strength as tolerated. Await input from Palliative care appt for tx plan revisions.   -MW       User Key  (r) = Recorded By, (t) = Taken By, (c) = Cosigned By    Initials Name Provider Type    FRAN Bergeron, PT Physical Therapist                        Manual Rx (last 36 hours)      Manual Treatments     Row Name 04/16/18 1100             Manual Rx 1    Manual Rx 1 Location RT axilla and RUE   -MW      Manual Rx 1 Type STM, tissue bending, myofascial stretches  -MW      Manual Rx 1 Grade gentle   -MW      Manual Rx 1 Duration 8  -MW         Manual Rx 2    Manual Rx 2 Location Bilat UT/ mid thoracic spine, leveator scap   -MW      Manual Rx 2 Type STM, TP at LS; working along medial scapular border - very tense, tight and tender.   -MW      Manual Rx 2 Grade 12  -MW        User Key  (r) = Recorded By, (t) = Taken By, (c) = Cosigned By    Initials Name Provider Type    FRAN  Na Bergeron, PT Physical Therapist                             Time Calculation:   Start Time: 1100  Total Timed Code Minutes- PT: 55 minute(s)     Therapy Charges for Today     Code Description Service Date Service Provider Modifiers Qty    80353971852  PT MANUAL THERAPY EA 15 MIN 4/16/2018 Na Bergeron, PT GP 4                    Na Bergeron, PT  4/16/2018

## 2018-04-17 ENCOUNTER — HOSPITAL ENCOUNTER (OUTPATIENT)
Dept: OCCUPATIONAL THERAPY | Facility: HOSPITAL | Age: 68
Discharge: HOME | End: 2018-04-17
Admitting: NEUROLOGICAL SURGERY

## 2018-04-17 ENCOUNTER — LAB (OUTPATIENT)
Dept: LAB | Facility: HOSPITAL | Age: 68
End: 2018-04-17

## 2018-04-17 ENCOUNTER — OFFICE VISIT (OUTPATIENT)
Dept: PALLIATIVE CARE | Facility: CLINIC | Age: 68
End: 2018-04-17

## 2018-04-17 VITALS
HEART RATE: 85 BPM | WEIGHT: 128 LBS | HEIGHT: 64 IN | BODY MASS INDEX: 21.85 KG/M2 | DIASTOLIC BLOOD PRESSURE: 84 MMHG | OXYGEN SATURATION: 98 % | SYSTOLIC BLOOD PRESSURE: 152 MMHG

## 2018-04-17 DIAGNOSIS — M79.601 ARM PAIN, RIGHT: ICD-10-CM

## 2018-04-17 DIAGNOSIS — Z74.09 IMPAIRED MOBILITY AND ADLS: Primary | ICD-10-CM

## 2018-04-17 DIAGNOSIS — G62.9 NEUROPATHY: Primary | ICD-10-CM

## 2018-04-17 DIAGNOSIS — M25.621 DECREASED ROM OF RIGHT ELBOW: ICD-10-CM

## 2018-04-17 DIAGNOSIS — R29.898 DECREASED GRIP STRENGTH OF RIGHT HAND: ICD-10-CM

## 2018-04-17 DIAGNOSIS — R27.8 DECREASED COORDINATION: ICD-10-CM

## 2018-04-17 DIAGNOSIS — Z51.81 THERAPEUTIC DRUG MONITORING: ICD-10-CM

## 2018-04-17 DIAGNOSIS — Z78.9 IMPAIRED MOBILITY AND ADLS: Primary | ICD-10-CM

## 2018-04-17 DIAGNOSIS — M25.611 DECREASED ROM OF RIGHT SHOULDER: ICD-10-CM

## 2018-04-17 LAB
AMPHET+METHAMPHET UR QL: NEGATIVE
AMPHETAMINES UR QL: NEGATIVE
BARBITURATES UR QL SCN: NEGATIVE
BENZODIAZ UR QL SCN: NEGATIVE
BUPRENORPHINE SERPL-MCNC: NEGATIVE NG/ML
CANNABINOIDS SERPL QL: POSITIVE
COCAINE UR QL: NEGATIVE
METHADONE UR QL SCN: NEGATIVE
OPIATES UR QL: POSITIVE
OXYCODONE UR QL SCN: NEGATIVE
PCP UR QL SCN: NEGATIVE
PROPOXYPH UR QL: NEGATIVE
TRICYCLICS UR QL SCN: NEGATIVE

## 2018-04-17 PROCEDURE — 99204 OFFICE O/P NEW MOD 45 MIN: CPT | Performed by: INTERNAL MEDICINE

## 2018-04-17 PROCEDURE — 97530 THERAPEUTIC ACTIVITIES: CPT | Performed by: OCCUPATIONAL THERAPIST

## 2018-04-17 PROCEDURE — 80306 DRUG TEST PRSMV INSTRMNT: CPT

## 2018-04-17 NOTE — PROGRESS NOTES
Reagan SUERO Renan is a 67 y.o. female.     PCP:  Meagan Akbar and YASMANY Nelson  Oncology:  Delicia Ibarra  Rad Onc:  Gema Edmonds  NS:  Shawn Jacobson    History of Present Illness   67yowf with h/o stage IIIa R breast cancer HER-2 +, ER -.  S/p R breast radiation.  Resultant RUE lymphedema and plexopathy.  Seen by Dr. Jacobson for RUE pain, no evidence of nerve root entrapment on MRI.  Gabapentin ordered.  She never started.  Referred by Dr. Jacobson to palliative for consultation re: pain management.     Noted follows with Jodi King for mood, ADHD, sleep disturbance.    Pain: RUE fire and cold pain.  Worse with movement.  She is R handed.  Starts at shoulder down to fingers.  Glass-cutting sensation of fingertips.    Support/Distress: Financial limitations, as no longer working.  Prior to cancer, was active in Yoga.  Very healthy lifestyle.  Loss of self with loss of employment and increase dependence on others due to RUE impairments and pain.      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    The following portions of the patient's history were reviewed and updated as appropriate: allergies, current medications, past family history, past medical history, past social history, past surgical history and problem list.    Review of Systems  Otherwise negative except as below and as already detailed in HPI.    MONICA:  Reviewed.  See scanned form in Media. No concerns.  Consistent with history.  Prescribers identified as members of care team.     Medication Counts:  Reviewed.  See RN note. Did not bring medication to appointment    Opioid Risk Tool:  Low Risk    UDS:  THC, Screen, Urine   Date Value Ref Range Status   04/17/2018 Positive (A) Negative Final     Phencyclidine (PCP), Urine   Date Value Ref Range Status   04/17/2018 Negative Negative Final     Cocaine  Screen, Urine   Date Value Ref Range Status   04/17/2018 Negative Negative Final     Methamphetamine, Urine   Date Value Ref Range Status   04/17/2018 Negative Negative Final     Opiate Screen   Date Value Ref Range Status   04/17/2018 Positive (A) Negative Final     Amphetamine Screen, Urine   Date Value Ref Range Status   04/17/2018 Negative Negative Final     Benzodiazepine Screen, Urine   Date Value Ref Range Status   04/17/2018 Negative Negative Final     Tricyclic Antidepressants Screen   Date Value Ref Range Status   04/17/2018 Negative Negative Final     Methadone Screen, Urine   Date Value Ref Range Status   04/17/2018 Negative Negative Final     Barbiturates Screen, Urine   Date Value Ref Range Status   04/17/2018 Negative Negative Final     Oxycodone Screen, Urine   Date Value Ref Range Status   04/17/2018 Negative Negative Final     Propoxyphene Screen   Date Value Ref Range Status   04/17/2018 Negative Negative Final     Buprenorphine, Screen, Urine   Date Value Ref Range Status   04/17/2018 Negative Negative Final     Palliative Performance Scale  Palliative Performance Scale Score: 80%    [place] Symptom Assessment System Revised  Pain Score: worst possible pain  Tiredness Score: 2  Nausea Score: No nausea  Depression Score: No depression  Anxiety Score: No anxiety  Drowsiness Score: No drowsiness  Lack of Appetite Score: 6  Wellbeing Score: 6  Dyspnea Score: No shortness of breath  Other Problem Score: Best possible response  Source of Information: patient    JACQUI-7:    Over the last two weeks, how often have you been bothered by the following problems?  Feeling nervous, anxious or on edge: Not at all  Not being able to stop or control worrying: Not at all  Worrying too much about different things: Not at all  Trouble Relaxing: Not at all  Being so restless that it is hard to sit still: Not at all  Becoming easily annoyed or irritable: Not at all  Feeling afraid as if something awful might happen:  Not at all  JACQUI 7 Total Score: 0    PHQ-9:  PHQ-2/PHQ-9 Depression Screening 4/17/2018   Little interest or pleasure in doing things 0   Feeling down, depressed, or hopeless 0   Trouble falling or staying asleep, or sleeping too much 0   Feeling tired or having little energy 0   Poor appetite or overeating 0   Feeling bad about yourself - or that you are a failure or have let yourself or your family down 0   Trouble concentrating on things, such as reading the newspaper or watching television 0   Moving or speaking so slowly that other people could have noticed. Or the opposite - being so fidgety or restless that you have been moving around a lot more than usual 0   Thoughts that you would be better off dead, or of hurting yourself in some way 0   Total Score 0        ECOG: (2) Ambulatory and capable of self care, unable to carry out work activity, up and about > 50% or waking hours    Objective   Physical Exam   Constitutional: She appears well-developed and well-nourished. No distress.   Well-kempt   Eyes: EOM are normal. Pupils are equal, round, and reactive to light. No scleral icterus.   Cardiovascular: Normal rate and regular rhythm.  Exam reveals no gallop and no friction rub.    No murmur heard.  Pulmonary/Chest: No stridor.   Musculoskeletal: She exhibits edema, tenderness and deformity.   RUE lymphedema, painful neurofibroma R mid tricep area, full ROM R shoulder.   Skin: Rash noted. She is not diaphoretic.   Scattered blanching erythema areas, about nickel size, of R chest area   Psychiatric: Her speech is normal and behavior is normal. Thought content normal. Her mood appears anxious. She is not actively hallucinating. Cognition and memory are normal. She does not express impulsivity. She is attentive.   Nursing note and vitals reviewed.        Assessment/Plan   Brianna was seen today for pain and poor appetite.    Diagnoses and all orders for this visit:    Neuropathy, painful RUE  -     Ambulatory  Referral to Pain Management    Discussion:  Reviewed side effect profile of various medications.  Reviewed some long term risks of chronic opioid therapy (currently on Tramadol low/moderate dose).  Counseled on non-opioid therapy and rational polypharmacy with anticonvulsant (namely gabapentinoids) and antidepressants (SNRIs and TCAs) for chronic neuropathic pain.  Counseled on interventional pain evaluation as well as various approaches to chronic pain management (regenerative, interventional neurolysis, functional wellness) and various practices in [place].      Her main concern is negative impact of medications on mood.  Reviewed long term opioid therapy increasing depression risk.  Reviewed possible improvement in anxiety and mood with gabapentinoid (off-label) and antidepressants (although pain doses often lower than for mood doses.)    She values coordinated care within single healthcare system.  Will refer her to Dr. Alexander Jamison, who can manage pharmacologic and non-pharmacologic comprehensive pain management plan.      Her primary care provider is managing her opioid therapy.  Currently low/moderate dose opioid is improving her pain.  However, she reports OT and PT is mainly what is improving her function.  She exhibits low risk of opioid misuse, however.  UDT + opiate - will send confirmation.  + THC, she admits to marijuana use, for pain.    Counseled her on cannabis use disorder.  Counseled if she is interested in theoretical analgesic effect of marijuana, that she try oral and topical hemp oil instead, without THC which alters mood (her main hesitancy to use medications)    No palliative clinic follow up.

## 2018-04-17 NOTE — THERAPY TREATMENT NOTE
Outpatient Occupational Therapy Hand Treatment Note  [facility]     Patient Name: Brianna Urrutia  : 1950  MRN: [id number]  Today's Date: 2018         Visit Date: 2018  Patient Active Problem List   Diagnosis   • Malignant neoplasm of overlapping sites of right female breast   • Malignant neoplasm of upper-outer quadrant of right female breast   • Degenerative disc disease, cervical   • Neuropathy         Visit Dx:    ICD-10-CM ICD-9-CM   1. Impaired mobility and ADLs Z74.09 799.89   2. Decreased  strength of right hand R29.898 729.89   3. Decreased ROM of right shoulder M25.611 719.51   4. Decreased ROM of right elbow M25.621 719.52   5. Decreased coordination R27.9 781.3   6. Arm pain, right M79.601 729.5                         OT Assessment/Plan     Row Name 18 0448          OT Assessment    Assessment Comments Pt with significant pain this date however with good participation and follow through with exercises. Required several rest breaks d/t pain. Focus on MCP joints flexion for improving intrinsic muscle control. Requiring OT to provide AAROM then pt able to hold for 10-15 seconds at a time.   -ST        OT Plan    OT Plan Comments Continue POC as est.   -ST       User Key  (r) = Recorded By, (t) = Taken By, (c) = Cosigned By    Initials Name Provider Type    ST Eva Waldron OTR Occupational Therapist                    OT Exercises     Row Name 18 5180             Subjective Pain    Able to rate subjective pain? yes  -ST      Pre-Treatment Pain Level 8  -ST      Post-Treatment Pain Level 8  -ST         Exercise 1    Exercise Name 1 Addressed power grasp using yellow foam block  -ST      Sets 1 2  -ST      Reps 1 10  -ST         Exercise 2    Exercise Name 2 lateral and pincer grasp using yellow foam block; cuing for correct grasp patterns d/t weakness  -ST      Sets 2 2  -ST      Reps 2 10  -ST         Exercise 3    Exercise Name 3 MCP flexion; PIP flexion (see  written HEP for details); requiring AAROM to start activity then pt able to hold 10-15 seconds  -ST      Sets 3 3  -ST      Reps 3 10  -ST         Exercise 4    Exercise Name 4 wrist extension with digit extension holds, focus on extension control of PIP and DIPs  -ST      Sets 4 2  -ST      Reps 4 10  -ST        User Key  (r) = Recorded By, (t) = Taken By, (c) = Cosigned By    Initials Name Provider Type    ADRIANA Porter Occupational Therapist                    OT Goals     Row Name 04/17/18 1523 04/17/18 1305       OT Short Term Goals    STG Date to Achieve  -- 05/12/18  -ST    STG 1  -- Pt will be independent with B FMC HEP's by 4 wks to increase independence with ADLs.  -ST    STG 2  -- Pt will be independent with B hand strengthening HEP's by 4 wks to increase fxnl engagement in daily tasks.   -ST    STG 3  -- Pt will be min A with RUE ROM HEP's by 4 wks to improve functional engagement in daily tasks.  -ST       Long Term Goals    LTG Date to Achieve  -- 06/11/18  -ST    LTG 1  -- Pt will increase all ROM by 5* by d/c to demonstrate increased ability to perform ADL tasks.   -ST    LTG 2  -- Pt will increase R  strength by 5# to improve fxnl engagement in daily tasks.   -ST    LTG 3  -- Pt will increase R Box and Blocks score by 5 block by d/c to demonstrate increased FMC accuracy and speed for ADL/IADL performance.  -ST       Time Calculation    OT Goal Re-Cert Due Date 07/11/18  -ST  --      User Key  (r) = Recorded By, (t) = Taken By, (c) = Cosigned By    Initials Name Provider Type    ADRIANA Porter Occupational Therapist          Therapy Education  Given: HEP  Program: New  How Provided: Verbal, Demonstration, Written  Provided to: Patient  Level of Understanding: Teach back education performed, Verbalized, Demonstrated               Time Calculation:   OT Start Time: 1305  Total Timed Code Minutes- OT: 40 minute(s)     Therapy Charges for Today     Code Description Service Date  Service Provider Modifiers Qty    [id number]  OT THERAPEUTIC ACT EA 15 MIN 4/17/2018 ADRIANA Ayala GO 3                  ADRIANA Saldana  4/17/2018

## 2018-04-17 NOTE — PATIENT INSTRUCTIONS
Classes of drugs:  1.  Lyrica and Gabapentin - risk of habit forming but highly effective for neuropathic pain  2.  Cymbalta, Effexor - antidepressants that can help chronic pain, especially neuropathic pain.  3.  Tricyclic antidepressant (Elavil, Pamelor, Doxepin) - also help neuropathic pain.  Doses used for pain are much lower than for depression dosing.  Side effects are similar to Benadryl.  4.  Hemp oil - specific for only CBD for pain without THC mood altering effect    Types of pain specialist:  1.  Interventional and medication management (Dr. Jamison at [facility], Dr. Corcoran at [place])  2.  Regenerative medicine (Dr. Worley at [facility])  3.  Functional medicine (Dr. Turner off [facility])

## 2018-04-23 ENCOUNTER — TELEPHONE (OUTPATIENT)
Dept: PAIN MEDICINE | Facility: CLINIC | Age: 68
End: 2018-04-23

## 2018-04-23 ENCOUNTER — HOSPITAL ENCOUNTER (OUTPATIENT)
Dept: PHYSICAL THERAPY | Facility: HOSPITAL | Age: 68
Setting detail: THERAPIES SERIES
Discharge: HOME | End: 2018-04-23
Attending: INTERNAL MEDICINE

## 2018-04-23 DIAGNOSIS — M54.2 CERVICALGIA: ICD-10-CM

## 2018-04-23 DIAGNOSIS — I89.0 LYMPHEDEMA OF RIGHT UPPER EXTREMITY: ICD-10-CM

## 2018-04-23 DIAGNOSIS — L90.5 SCAR CONDITIONS AND FIBROSIS OF SKIN: Primary | ICD-10-CM

## 2018-04-23 DIAGNOSIS — M79.601 RIGHT ARM PAIN: ICD-10-CM

## 2018-04-23 PROCEDURE — 97140 MANUAL THERAPY 1/> REGIONS: CPT | Performed by: PHYSICAL THERAPIST

## 2018-04-23 NOTE — THERAPY TREATMENT NOTE
"    Outpatient Physical Therapy Lymphedema Treatment Note  [facility]     Patient Name: Brianna Urrutia  : 1950  MRN: [id number]  Today's Date: 2018        Visit Date: 2018    Visit Dx:    ICD-10-CM ICD-9-CM   1. Scar conditions and fibrosis of skin L90.5 709.2   2. Lymphedema of right upper extremity I89.0 457.1   3. Right arm pain M79.601 729.5   4. Cervicalgia M54.2 723.1       Patient Active Problem List   Diagnosis   • Malignant neoplasm of overlapping sites of right female breast   • Malignant neoplasm of upper-outer quadrant of right female breast   • Degenerative disc disease, cervical   • Neuropathy              Lymphedema     Row Name 18 1100          Able to rate subjective pain? yes  -MW    Pre-Treatment Pain Level 10  -MW    Post-Treatment Pain Level 9  -MW    Subjective Pain Comment Rt axilla/ wrist and pinky   -MW          Subjective Comments Been in severe pain since last week Tues/ Wed. Had a chiropracter visit that seemed to go well but then started having more pain in Rt shoulder/ arm pit, all \"Down the ulnar nerve\" Continues to take Tramodol for pain but not helping much.   -MW          Ptting Edema Category By severity  -MW    Pitting Edema Moderate;Mild  -MW    Edema Assessment Comment mild in hand; moderate in forearm and around elbow   -MW          Location/Assessment Upper Quadrant  -MW    Upper Extremity Conditions right:;intact;clean  -MW    Upper Quadrant Conditions right:;intact;clean  -MW    Upper Quadrant Color/Pigment right:;other (comment);red;erythema   scar fading, pink and white, hypopigmented   -MW    Skin Observations Comment Noted erythema lateral and inferior to original tumor site; also area long medial upper arm. Superior aspect of breast warm / hot compared to Lt side and RUE.   -MW          Manual Lymphatic Drainage initial sequence;opened regional lymph nodes;opened anastamoses;extremity treatment;astym  -MW    Initial Sequence " "supraclavicular;shoulder collectors  -MW    Supraclavicular right;left  -MW    Shoulder Collectors right  -MW    Inguinal right  -MW    Opened Anastamoses anterior axillo-axillary  -MW    Anterior Axillo-Axillary moving Rt to left   -MW    Axillo-Inguinal --  -MW    Extremity Treatment MLD to full limb;extremity treatment focus on  -MW    Extremity Treatment Focus On MLD mixed with STM RUE   -MW    Astym upper traps;other astym;anterior-lateral chest  -MW    Anterior-Lateral Chest right  -MW    Anterior-Lateral Chest Comment In supine: Evaluator and localizer to superior and lateral Rt chest wall. Min fine soft tissue disruptions in superior chest. Repositioned into shoulder flex/ ABD to open axilla; evaluator and localizer to area; extra strokes in posterior axilla \"that's the spot\". Min to mod course to fine soft tissue disruptions noted in axilla. Continued ASTYM into lateral trunk ribs/ adjacent to breast with localizer and isolator. Moderate fine soft tissue disruptions.  -MW    Upper Traps right;left  -MW    Upper Traps Comment Initiated tx in sitting with UE propped on 3 pillows:  RT & LT UT: Evaluator and localizer to UT / scapular region; minimal fine soft tissue disruptions noted through out. Extra strokes at mid traps and leveator scapulea, and RT teres region.   -MW    UE Sequence right  -MW    Manual Lymphatic Drainage Comments ASTYM followed by STM and MLD   -MW          Wrapping Location upper extremity  -MW    Wrapping Location UE hand to axilla  -MW    Wrapping Comments assisted pt to don glove and arm sleeve   -MW    Compression/Skin Care Comments Pt requested assistance to remove shirt due to increased pain.   -MW      User Key  (r) = Recorded By, (t) = Taken By, (c) = Cosigned By    Initials Name Provider Type    FRAN Bergeron, PT Physical Therapist                              PT Assessment/Plan     Row Name 04/23/18 1100          PT Assessment    Functional Limitations Performance in " "self-care ADL;Limitation in home management;Impaired gait  -     Impairments Pain;Impaired lymphatic circulation;Edema;Joint mobility;Muscle strength;Impaired flexibility;Motor function;Impaired muscle length   stair climbing   -     Assessment Comments Pt with c/o severe pain especially \"along the ulnar nerve;\" pt presents with more postural deviations attempting to lessen pain in Rt axilla / UE. Pt reports some relief with ASTYM, STM but still not at her baseline function. Encouraged her to follow up with PCP or to go to ED if still not able to manage pain with current meds. Edema softer post MLD; and overall appeared less tense post ASTYM and STM. Unsure of long term management of symptoms. Also noted heat in Rt breast superior to turmor site and patches of erytema lateral and inferior to turmor site.   -MW     Rehab Potential Fair  -MW     Patient/caregiver participated in establishment of treatment plan and goals Yes  -MW     Patient would benefit from skilled therapy intervention Yes  -MW        PT Plan    PT Frequency 2x/week  -MW     Physical Therapy Interventions (Optional Details) manual therapy techniques;manual lymphatic drainage;patient/family education;home exercise program;modalities;ROM (Range of Motion);strengthening;stretching;taping  -     PT Plan Comments Cont ASTYM/ STM upper back & neck; Lt wrist; progress HEP for ROM, strength as tolerated.   -       User Key  (r) = Recorded By, (t) = Taken By, (c) = Cosigned By    Initials Name Provider Type    MW Na Bergeron, PT Physical Therapist                     Exercises     Row Name 04/23/18 1100             Subjective Comments    Subjective Comments Been in severe pain since last week Tues/ Wed. Had a chiropracter visit that seemed to go well but then started having more pain in Rt shoulder/ arm pit, all \"Down the ulnar nerve\" Continues to take Tramodol for pain but not helping much.   -         Subjective Pain    Able to rate " subjective pain? yes  -MW      Pre-Treatment Pain Level 10  -MW      Post-Treatment Pain Level 9  -MW      Subjective Pain Comment Rt axilla/ wrist and pinky   -MW         Exercise 1    Exercise Name 1 Total exercise minutes: 3  -MW         Exercise 2    Exercise Name 2 RUE: median nerve and ulnar nerve   -MW      Cueing 2 Verbal;Tactile  -MW      Reps 2 6  -MW      Additional Comments moving between elbow flex/ ext; forearm pronation/ supination and wrist flex/ ext; long axis twist   -MW        User Key  (r) = Recorded By, (t) = Taken By, (c) = Cosigned By    Initials Name Provider Type    FRAN Bergeron PT Physical Therapist                       Manual Rx (last 36 hours)      Manual Treatments     Row Name 04/23/18 1100             Manual Rx 1    Manual Rx 1 Location RT axilla and RUE   -MW      Manual Rx 1 Type STM, tissue bending, myofascial stretches in medial upper arm and forearm   -MW      Manual Rx 1 Grade gentle   -MW      Manual Rx 1 Duration 10  -MW         Manual Rx 2    Manual Rx 2 Location Bilat UT/ mid thoracic spine, leveator scap   -MW      Manual Rx 2 Type STM- LS, mid traps, working along medial scapular border - very tense, tight and tender.   -MW      Manual Rx 2 Grade 12  -MW        User Key  (r) = Recorded By, (t) = Taken By, (c) = Cosigned By    Initials Name Provider Type    FRAN Bergeron PT Physical Therapist              Therapy Education  Education Details: Encouraged pt to follow up with PCP or go to ED for severe unrelenting pain.   Given: Pain management, Symptoms/condition management, Edema management  Program: Reinforced  How Provided: Verbal  Provided to: Patient  Level of Understanding: Verbalized              Time Calculation:   Start Time: 1100  Total Timed Code Minutes- PT: 55 minute(s)     Therapy Charges for Today     Code Description Service Date Service Provider Modifiers Qty    00367733172 HC PT MANUAL THERAPY EA 15 MIN 4/23/2018 Na Bergeron, MAT GP 4                     Na Bergeron, PT  4/23/2018

## 2018-04-25 ENCOUNTER — TELEPHONE (OUTPATIENT)
Dept: PSYCHIATRY | Facility: CLINIC | Age: 68
End: 2018-04-25

## 2018-04-26 ENCOUNTER — HOSPITAL ENCOUNTER (OUTPATIENT)
Dept: PHYSICAL THERAPY | Facility: HOSPITAL | Age: 68
Setting detail: THERAPIES SERIES
Discharge: HOME | End: 2018-04-26
Attending: INTERNAL MEDICINE

## 2018-04-26 DIAGNOSIS — I89.0 LYMPHEDEMA OF RIGHT UPPER EXTREMITY: ICD-10-CM

## 2018-04-26 DIAGNOSIS — L90.5 SCAR CONDITIONS AND FIBROSIS OF SKIN: Primary | ICD-10-CM

## 2018-04-26 DIAGNOSIS — M54.2 CERVICALGIA: ICD-10-CM

## 2018-04-26 DIAGNOSIS — M79.601 RIGHT ARM PAIN: ICD-10-CM

## 2018-04-26 PROCEDURE — 97140 MANUAL THERAPY 1/> REGIONS: CPT | Performed by: PHYSICAL THERAPIST

## 2018-04-26 NOTE — THERAPY TREATMENT NOTE
Outpatient Physical Therapy Lymphedema Treatment Note  [facility]     Patient Name: Brianna Urrutia  : 1950  MRN: [id number]  Today's Date: 2018        Visit Date: 2018    Visit Dx:    ICD-10-CM ICD-9-CM   1. Scar conditions and fibrosis of skin L90.5 709.2   2. Lymphedema of right upper extremity I89.0 457.1   3. Right arm pain M79.601 729.5   4. Cervicalgia M54.2 723.1       Patient Active Problem List   Diagnosis   • Malignant neoplasm of overlapping sites of right female breast   • Malignant neoplasm of upper-outer quadrant of right female breast   • Degenerative disc disease, cervical   • Neuropathy              Lymphedema     Row Name 18 1010          Able to rate subjective pain? yes  -MW    Pre-Treatment Pain Level 8  -MW    Post-Treatment Pain Level 8  -MW    Subjective Pain Comment Rt axilla/ scapula, medial arm, elbow, wrist   -MW          Subjective Comments Continues to have severe constant pain; a little relief with frequent pump use. Plans to try Gabapentin this afternoon when friend can come sit with her for a couple hours  -MW          Ptting Edema Category By severity  -MW    Pitting Edema Moderate;Mild  -MW    Edema Assessment Comment RUE edema softer, smaller but still pitting and packed feeling in flexor aspect of forearm.   -MW          Location/Assessment Upper Quadrant  -MW    Upper Extremity Conditions right:;intact;clean  -MW    Upper Quadrant Conditions right:;intact;clean  -MW    Upper Quadrant Color/Pigment right:;other (comment);red;erythema   scar fading, pink and white, hypopigmented   -MW    Skin Observations Comment Patches of erythema unchanged from last visit; Rt breast seems same temperature as lt   -MW          Manual Lymphatic Drainage initial sequence;opened regional lymph nodes;opened anastamoses;extremity treatment;astym  -MW    Initial Sequence supraclavicular;shoulder collectors  -MW    Supraclavicular right;left  -MW    Shoulder Collectors  right  -MW    Inguinal right  -MW    Opened Anastamoses anterior axillo-axillary  -MW    Anterior Axillo-Axillary moving Rt to left   -MW    Extremity Treatment MLD to full limb;extremity treatment focus on  -MW    Extremity Treatment Focus On MLD mixed with STM RUE   -MW    Astym upper traps;other astym;anterior-lateral chest  -MW    Anterior-Lateral Chest right  -MW    Anterior-Lateral Chest Comment In supine: positioned into shoulder flex/ ABD to open axilla; evaluator and localizer to area with extra strokes in posterior axilla and into lateral ribs/ trunk. Min to mod course to fine soft tissue disruptions noted in axilla. ASTYM to lateral trunk ribs/ adjacent to breast with localizer and isolator. Moderate fine to course soft tissue disruptions.  -MW    Upper Traps right;left  -MW    Upper Traps Comment Initiated tx in sitting with UE propped on pillow:  RT & LT UT: Evaluator and localizer to UT / scapular region; minimal fine soft tissue disruptions noted through out. Extra strokes at mid traps and leveator scapulea, and RT teres region.   -MW    UE Sequence right  -MW    Manual Lymphatic Drainage Comments ASTYM followed by STM and MLD   -MW          Wrapping Location upper extremity  -MW    Wrapping Location UE hand to axilla  -MW    Wrapping Comments assisted pt to don glove and arm sleeve   -MW      User Key  (r) = Recorded By, (t) = Taken By, (c) = Cosigned By    Initials Name Provider Type    MW Na Bergeron, PT Physical Therapist                              PT Assessment/Plan     Row Name 04/26/18 1010          PT Assessment    Functional Limitations Performance in self-care ADL;Limitation in home management;Impaired gait  -MW     Impairments Pain;Impaired lymphatic circulation;Edema;Joint mobility;Muscle strength;Impaired flexibility;Motor function;Impaired muscle length   stair climbing   -MW     Assessment Comments Pt with slight improvement in pain or seems less distressed compared to last  visit; but still with increased pain over her previous baseline. Rt breast skin unchanged from last visit but tissue without heat today. Arm softer but still with thick edema over flexor aspect of forearm. Consider adding estim / interferential set up for pain manaagement.   -MW     Rehab Potential Fair  -MW     Patient/caregiver participated in establishment of treatment plan and goals Yes  -MW     Patient would benefit from skilled therapy intervention Yes  -MW        PT Plan    PT Frequency 2x/week  -MW     Physical Therapy Interventions (Optional Details) manual therapy techniques;manual lymphatic drainage;patient/family education;home exercise program;modalities;ROM (Range of Motion);strengthening;stretching;taping  -MW     PT Plan Comments Cont ASTYM/ STM with focus on RUE and neck; MLD; ? Estim for pain relief?   -MW       User Key  (r) = Recorded By, (t) = Taken By, (c) = Cosigned By    Initials Name Provider Type    FRAN Bergeron, PT Physical Therapist                     Exercises     Row Name 04/26/18 1010             Subjective Comments    Subjective Comments Continues to have severe constant pain; a little relief with frequent pump use. Plans to try Gabapentin this afternoon when friend can come sit with her for a couple hours  -MW         Subjective Pain    Able to rate subjective pain? yes  -MW      Pre-Treatment Pain Level 8  -MW      Post-Treatment Pain Level 8  -MW      Subjective Pain Comment Rt axilla/ scapula, medial arm, elbow, wrist   -MW         Exercise 1    Exercise Name 1 Total exercise minutes: 3  -MW         Exercise 2    Exercise Name 2 RUE: median nerve and ulnar nerve   -MW      Cueing 2 Verbal;Tactile  -MW      Reps 2 4  -MW      Additional Comments moving between elbow flex/ ext; forearm pronation/ supination and wrist flex/ ext; long axis twist (radial N)   -MW        User Key  (r) = Recorded By, (t) = Taken By, (c) = Cosigned By    Initials Name Provider Type    FRAN Monzon  Elyssa, MAT Physical Therapist                       Manual Rx (last 36 hours)      Manual Treatments     Row Name 04/26/18 1010             Manual Rx 1    Manual Rx 1 Location RT axilla and RUE   -MW      Manual Rx 1 Type STM, tissue bending, myofascial stretches in medial upper arm and forearm with focus on forearm flexor aspect.   -MW      Manual Rx 1 Grade gentle   -MW      Manual Rx 1 Duration 12  -MW         Manual Rx 2    Manual Rx 2 Location Bilat UT/ mid thoracic spine, leveator scap   -MW      Manual Rx 2 Type STM- LS, mid traps, working along medial scapular border remains tense, tight and tender.   -MW      Manual Rx 2 Grade 8  -MW        User Key  (r) = Recorded By, (t) = Taken By, (c) = Cosigned By    Initials Name Provider Type    MW Na Bergeron PT Physical Therapist              Therapy Education  Education Details: Contact MD as needed for pain management; compression pump and sleeve daily   Given: Pain management, Symptoms/condition management, Edema management  Program: Reinforced  How Provided: Verbal  Provided to: Patient  Level of Understanding: Verbalized              Time Calculation:   Start Time: 1010  Total Timed Code Minutes- PT: 55 minute(s)     Therapy Charges for Today     Code Description Service Date Service Provider Modifiers Qty    74261501522 HC PT MANUAL THERAPY EA 15 MIN 4/26/2018 Na Bergeron, PT GP 4                    Na Bergeron, PT  4/26/2018

## 2018-04-30 ENCOUNTER — HOSPITAL ENCOUNTER (OUTPATIENT)
Dept: PHYSICAL THERAPY | Facility: HOSPITAL | Age: 68
Setting detail: THERAPIES SERIES
Discharge: HOME | End: 2018-04-30
Attending: INTERNAL MEDICINE

## 2018-04-30 DIAGNOSIS — I89.0 LYMPHEDEMA OF RIGHT UPPER EXTREMITY: ICD-10-CM

## 2018-04-30 DIAGNOSIS — M79.601 RIGHT ARM PAIN: ICD-10-CM

## 2018-04-30 DIAGNOSIS — L90.5 SCAR CONDITIONS AND FIBROSIS OF SKIN: Primary | ICD-10-CM

## 2018-04-30 PROCEDURE — 97140 MANUAL THERAPY 1/> REGIONS: CPT | Performed by: PHYSICAL THERAPIST

## 2018-05-02 ENCOUNTER — APPOINTMENT (OUTPATIENT)
Dept: NEUROLOGY | Facility: HOSPITAL | Age: 68
End: 2018-05-02

## 2018-05-03 ENCOUNTER — HOSPITAL ENCOUNTER (OUTPATIENT)
Dept: OCCUPATIONAL THERAPY | Facility: HOSPITAL | Age: 68
Setting detail: THERAPIES SERIES
Discharge: HOME | End: 2018-05-03

## 2018-05-03 ENCOUNTER — HOSPITAL ENCOUNTER (OUTPATIENT)
Dept: PHYSICAL THERAPY | Facility: HOSPITAL | Age: 68
Setting detail: THERAPIES SERIES
Discharge: HOME | End: 2018-05-03

## 2018-05-03 DIAGNOSIS — M25.611 DECREASED ROM OF RIGHT SHOULDER: ICD-10-CM

## 2018-05-03 DIAGNOSIS — Z74.09 IMPAIRED MOBILITY AND ADLS: Primary | ICD-10-CM

## 2018-05-03 DIAGNOSIS — R29.898 DECREASED GRIP STRENGTH OF RIGHT HAND: ICD-10-CM

## 2018-05-03 DIAGNOSIS — M54.2 CERVICALGIA: ICD-10-CM

## 2018-05-03 DIAGNOSIS — R27.8 DECREASED COORDINATION: ICD-10-CM

## 2018-05-03 DIAGNOSIS — L90.5 SCAR CONDITIONS AND FIBROSIS OF SKIN: Primary | ICD-10-CM

## 2018-05-03 DIAGNOSIS — M79.601 ARM PAIN, RIGHT: ICD-10-CM

## 2018-05-03 DIAGNOSIS — M79.601 RIGHT ARM PAIN: ICD-10-CM

## 2018-05-03 DIAGNOSIS — M25.621 DECREASED ROM OF RIGHT ELBOW: ICD-10-CM

## 2018-05-03 DIAGNOSIS — Z78.9 IMPAIRED MOBILITY AND ADLS: Primary | ICD-10-CM

## 2018-05-03 DIAGNOSIS — I89.0 LYMPHEDEMA OF RIGHT UPPER EXTREMITY: ICD-10-CM

## 2018-05-03 PROCEDURE — 97110 THERAPEUTIC EXERCISES: CPT | Performed by: OCCUPATIONAL THERAPIST

## 2018-05-03 PROCEDURE — 97112 NEUROMUSCULAR REEDUCATION: CPT | Performed by: OCCUPATIONAL THERAPIST

## 2018-05-03 PROCEDURE — 97140 MANUAL THERAPY 1/> REGIONS: CPT | Performed by: PHYSICAL THERAPIST

## 2018-05-03 PROCEDURE — G8984 CARRY CURRENT STATUS: HCPCS | Performed by: PHYSICAL THERAPIST

## 2018-05-03 PROCEDURE — G8985 CARRY GOAL STATUS: HCPCS | Performed by: PHYSICAL THERAPIST

## 2018-05-03 PROCEDURE — 97530 THERAPEUTIC ACTIVITIES: CPT | Performed by: OCCUPATIONAL THERAPIST

## 2018-05-03 NOTE — THERAPY PROGRESS REPORT/RE-CERT
Outpatient Physical Therapy Lymphedema Progress Note  [facility]     Patient Name: Brianna Urrutia  : 1950  MRN: [id number]  Today's Date: 5/3/2018        Visit Date: 2018    Visit Dx:    ICD-10-CM ICD-9-CM   1. Scar conditions and fibrosis of skin L90.5 709.2   2. Lymphedema of right upper extremity I89.0 457.1   3. Right arm pain M79.601 729.5   4. Cervicalgia M54.2 723.1       Patient Active Problem List   Diagnosis   • Malignant neoplasm of overlapping sites of right female breast   • Malignant neoplasm of upper-outer quadrant of right female breast   • Degenerative disc disease, cervical   • Neuropathy              Lymphedema     Row Name 18 1015          Able to rate subjective pain? yes  -MW    Pre-Treatment Pain Level 6  -MW    Post-Treatment Pain Level 6  -MW    Subjective Pain Comment Rt axilla, neck, Rt lateral trunk, hand   -MW          Subjective Comments Pt requesting to focus on neck and Rt side/ axilla to clear lymph to help nerve in neck   -MW          Ptting Edema Category By severity  -MW    Pitting Edema Moderate;Mild  -MW          Location/Assessment Upper Quadrant  -MW    Upper Extremity Conditions right:;intact;clean  -MW    Upper Quadrant Conditions right:;intact;clean  -MW    Upper Quadrant Color/Pigment right:;other (comment);red;erythema   scar fading, pink and white, hypopigmented   -MW    Skin Observations Comment Patches of erythema Rt lateral and inferior breast presistent but appear stable from last week; encouraged pt to follow up with PCP.   -MW          Measurement Type(s) Quick Girth  -MW    Quick Girth Areas Upper extremities  -MW          Axilla 30.5 cm  -MW    Mid upper arm 28.5 cm  -MW    Elbow 25.5 cm  -MW    Mid forearm 22.4 cm  -MW    Wrist crease 16.3 cm  -MW    Web space 17 cm  -MW    Met-heads 17.8 cm  -MW    RUE Quick Girth Total 158  -MW          Manual Lymphatic Drainage initial sequence;opened regional lymph nodes;opened anastamoses;extremity  treatment;astym  -MW    Initial Sequence supraclavicular;shoulder collectors  -MW    Supraclavicular right  -MW    Shoulder Collectors right  -MW    Inguinal right  -MW    Opened Anastamoses --  -MW    Axillo-Inguinal right  -MW    Extremity Treatment extremity treatment focus on;other extremity treatment  -MW    Extremity Treatment Focus On Rt posterior shoulder, axilla, Rt lateral trunk/ ribs   -MW    Astym upper traps  -MW    Anterior-Lateral Chest --  -MW    Upper Traps right;left  -MW    Upper Traps Comment Initiated tx in sitting with UE propped on pillow:  RT & LT UT: Evaluator and localizer to UT / scapular region; minimal fine soft tissue disruptions noted through out. Extra strokes at mid traps and leveator scapulea, and RT teres region. Brief STM to mid and upper traps. Repositioned in LSL for additional STM and MLD Rt posterior shoulder/ axilla and lateral trunk; also ASTYM wrapping around lateral ribs. Tender posterior axillar fold/ teres/ lateral aspect of lats.   -MW    UE Sequence right  -MW    Manual Lymphatic Drainage Comments ASTYM followed by STM and MLD   -MW          Wrapping Location upper extremity  -MW    Wrapping Location UE hand to axilla  -MW    Wrapping Comments assisted pt to don arm sleeve   -MW      User Key  (r) = Recorded By, (t) = Taken By, (c) = Cosigned By    Initials Name Provider Type    FRAN Bergeron PT Physical Therapist                  PT Ortho     Row Name 05/03/18 1400       Right Upper Ext    Rt Shoulder Abduction AROM 0-145 in sidelying; c/o pain   -MW    Rt Shoulder Flexion AROM 0-125 in sidelying; c/o pain    -MW    Rt Shoulder Flexion PROM 0-165 in sidelying; c/o pain   -MW      User Key  (r) = Recorded By, (t) = Taken By, (c) = Cosigned By    Initials Name Provider Type    FRAN Bergeron PT Physical Therapist                        PT Assessment/Plan     Row Name 05/03/18 1015          PT Assessment    Functional Limitations Performance in self-care  ADL;Limitation in home management;Impaired gait  -MW     Impairments Pain;Impaired lymphatic circulation;Edema;Joint mobility;Muscle strength;Impaired flexibility;Motor function;Impaired muscle length   stair climbing   -     Assessment Comments Pt continues to report pain in RUE with focus at wrist and pinky but also in posterior axilla. RUE circumferential measurements are smaller in forearm but mildly increased in upperarm. She is able to demonstrate improved RT shoulder AROM in sidelying, but remains limited overall with ROM but also increased pain with ROM and activities. Functionally neck seems to be less of an issue than last month. No c/o of Rt buttock except when climbing stairs. Rt breast continues to display color changes that appear to be vascular-like changes; encouraged pt to follow up with PCP. Progress towards goals remains slow and limited by pain and multifaceted nature of her symptoms.   -MW     Rehab Potential Fair  -MW     Patient/caregiver participated in establishment of treatment plan and goals Yes  -MW     Patient would benefit from skilled therapy intervention Yes  -MW        PT Plan    PT Frequency 2x/week;1x/week   consider decreasing frequency   -MW     Predicted Duration of Therapy Intervention (OT Eval) 8 weeks   -MW     Planned CPT's? PT MANUAL THERAPY EA 15 MIN: 16425;PT THER PROC EA 15 MIN: 56115;PT ELECTRICAL STIM UNATTEND:   -MW     Physical Therapy Interventions (Optional Details) manual therapy techniques;manual lymphatic drainage;patient/family education;home exercise program;modalities;ROM (Range of Motion);strengthening;stretching;taping  -     PT Plan Comments Cont ASTYM/ STM with focus on RUE and neck; MLD. Discuss frequency decrease and overall D/c planning   -       User Key  (r) = Recorded By, (t) = Taken By, (c) = Cosigned By    Initials Name Provider Type    FRAN Bergeron, PT Physical Therapist                                 PT OP Goals     Row Name  05/03/18 1015       PT Short Term Goals    STG 1 Patient to report 25% improvement in RUE pain  -MW    STG 1 Progress Not Met  -MW    STG 2 RUE circumferential measurement reduction by >/= 2 cm to promote decrease in pain and improved function.   -MW    STG 2 Progress Ongoing  -MW    STG 2 Progress Comments Forearm improved but upper arm increased this visit   -MW    STG 3 Pt independent with basic home lymph massage to promote fluid movement and decrease in pain.   -MW    STG 3 Progress Met  -MW    STG 4 Pt independent with [facility] for LE flexibility and strength to improve function.   -MW    STG 4 Progress Partially Met  -MW    STG 5 Pt to report decrease in sleep distrubance due to Rt buttock pain; 20% improvement.   -MW    STG 5 Progress Met  -MW    STG 5 Progress Comments RUE pain overshadows buttock   -MW       Long Term Goals    LTG 1 Patient able to actively raise  degrees or better to improve ability to complete daily activities including fixing her hair  -MW    LTG 1 Progress Ongoing;Partially Met  -MW    LTG 2 Patient to be measured and fit with compression sleeve  -MW    LTG 2 Progress Met  -MW    LTG 3 Improved DASH score by 15 points to demonstrate improved function  /return to PLOF.   -MW    LTG 3 Progress Ongoing  -MW    LTG 3 Progress Comments DASH raw score 128; 85% impaired   -MW    LTG 4 Pt to demonstrate functional improvement of Rt hip/ buttock with improved LEFS by > 10 point.   -MW    LTG 4 Progress Ongoing  -MW    LTG 5 Pt to report improved stair climbing while carrying laundry or groceries.   -MW    LTG 5 Progress Ongoing;Partially Met  -MW    LTG 6 pt to demonstrate 10% improvement in neck AROM for improved driving safety.   -MW    LTG 6 Progress Partially Met  -MW       Time Calculation    PT Goal Re-Cert Due Date  --      User Key  (r) = Recorded By, (t) = Taken By, (c) = Cosigned By    Initials Name Provider Type    FRAN Bergeron, PT Physical Therapist          Therapy  Education  Education Details: Cont lymph pump and compression sleeve/ glove. HEP as able. Follow up with PCP for Rt breast skin changes.   Given: Symptoms/condition management, Edema management  Program: Reinforced  How Provided: Verbal  Provided to: Patient  Level of Understanding: Verbalized    Outcome Measure Options: Disabilities of the Arm, Shoulder, and Hand (DASH)  DASH  Open a tight or new jar.: Unable  Write: Severe Difficulty  Turn a key: Severe Difficulty  Prepare a meal: Severe Difficulty  Push open a heavy door: Severe Difficulty  Place an object on a shelf above your head: Severe Difficulty  Do heavy household chores (e.g., wash walls, wash floors): Severe Difficulty  Garden or do yard work: Unable  Make a bed: Severe Difficulty  Carry a shopping bag or briefcase: Severe Difficulty  Carry a heavy object (over 10 lbs): Unable  Change a lightbulb overhead: Unable  Wash or blow dry your hair: Severe Difficulty  Wash your back: Unable  Put on a pullover sweater: Severe Difficulty  Use a knife to cut food: Unable  Recreational activities in which require little effort (e.g., cardplaying, knitting, etc.): Moderate Difficulty  Recreational activities in which you take some force or impact through your arm, should or hand (e.g. golf, hammering, tennis, etc.): Unable  Recreational Activities in which you move your arm freely (e.g., frisbee, badminton, etc.): Unable  Manage transportation needs (getting from one place to another): Severe Difficulty  During the past week, to what extent has your arm, shoulder, or hand problem interfered with your normal social activites with family, friends, neighbors or groups?: Extremely  During the past week, were you limited in your work or other regular daily activities as a result of your arm, shoulder or hand problem?: Very limited  Arm, Shoulder, or hand pain: Extreme  Arm, shoulder or hand pain when you performed any specific activity: Extreme  Tingling (pins and  needles) in your arm, shoulder, or hand: Severe  Weakness in your arm, shoulder or hand: Extreme  Stiffness in your arm, shoulder or hand: Extreme  During the past week, how much difficulty have you had sleeping because of the pain in your arm, shoulder or hand?: Moderate Difficiculty  I feel less capable, less confident or less useful because of my arm, shoulder or hand problem: Strongly Agree  DASH Sum : 128  Number of Questions Answered: 29  DASH Score: 85.34         Time Calculation:   Start Time: 1015  Total Timed Code Minutes- PT: 45 minute(s)     Therapy Charges for Today     Code Description Service Date Service Provider Modifiers Qty    18921575908 HC PT CARRY MOV HAND OBJ CURRENT 5/3/2018 Na Bergeron, PT GP, CM 1    96497429407 HC PT CARRY MOV HAND OBJ PROJECTED 5/3/2018 Na Bergeron, PT GP, CL 1    72067430262 HC PT MANUAL THERAPY EA 15 MIN 5/3/2018 Na Bergeron, PT GP 3          PT G-Codes  PT Professional Judgement Used?: Yes  Outcome Measure Options: Disabilities of the Arm, Shoulder, and Hand (DASH)  Score: DASH raw score: 128 or 85% impaired   Functional Limitation: Carrying, moving and handling objects  Carrying, Moving and Handling Objects Current Status (): At least 80 percent but less than 100 percent impaired, limited or restricted  Carrying, Moving and Handling Objects Goal Status (): At least 60 percent but less than 80 percent impaired, limited or restricted         Na Bergeron, PT  5/3/2018

## 2018-05-04 NOTE — THERAPY TREATMENT NOTE
Outpatient Occupational Therapy Hand Treatment Note  [facility]     Patient Name: Brianna Urrutia  : 1950  MRN: [id number]  Today's Date: 2018         Visit Date: 2018  Patient Active Problem List   Diagnosis   • Malignant neoplasm of overlapping sites of right female breast   • Malignant neoplasm of upper-outer quadrant of right female breast   • Degenerative disc disease, cervical   • Neuropathy         Visit Dx:    ICD-10-CM ICD-9-CM   1. Impaired mobility and ADLs Z74.09 799.89   2. Decreased  strength of right hand R29.898 729.89   3. Decreased ROM of right shoulder M25.611 719.51   4. Decreased ROM of right elbow M25.621 719.52   5. Decreased coordination R27.9 781.3   6. Arm pain, right M79.601 729.5                                 OT Exercises     Row Name 18 1108             Subjective Comments    Subjective Comments --  -ST         Subjective Pain    Able to rate subjective pain? --  -ST        User Key  (r) = Recorded By, (t) = Taken By, (c) = Cosigned By    Initials Name Provider Type    ST Eva Waldron, OTR Occupational Therapist                    OT Goals     Row Name 18 1108          OT Short Term Goals    STG Date to Achieve 18  -ST     STG 1 Pt will be independent with B [facility] HEP's by 4 wks to increase independence with ADLs.  -ST     STG 1 Progress New  -ST     STG 2 Pt will be independent with B hand strengthening HEP's by 4 wks to increase fxnl engagement in daily tasks.   -ST     STG 2 Progress New  -ST     STG 3 Pt will be min A with RUE ROM HEP's by 4 wks to improve functional engagement in daily tasks.  -ST     STG 3 Progress New  -ST        Long Term Goals    LTG Date to Achieve 18  -ST     LTG 1 Pt will increase all ROM by 5* by d/c to demonstrate increased ability to perform ADL tasks.   -ST     LTG 1 Progress New  -ST     LTG 2 Pt will increase R  strength by 5# to improve fxnl engagement in daily tasks.   -ST     LTG 2 Progress New   -ST     LTG 3 Pt will increase R Box and Blocks score by 5 block by d/c to demonstrate increased FMC accuracy and speed for ADL/IADL performance.  -ST     LTG 3 Progress New  -ST       User Key  (r) = Recorded By, (t) = Taken By, (c) = Cosigned By    Initials Name Provider Type    ADRIANA Porter Occupational Therapist          Therapy Education  Given: HEP, Posture/body mechanics, Symptoms/condition management  Program: Reinforced  How Provided: Verbal, Demonstration  Provided to: Patient  Level of Understanding: Teach back education performed, Verbalized, Demonstrated               Time Calculation:   OT Start Time: 1108  Total Timed Code Minutes- OT: 53 minute(s)     Therapy Charges for Today     Code Description Service Date Service Provider Modifiers Qty    30962842874  OT THERAPEUTIC ACT EA 15 MIN 5/3/2018 Eva Waldron OTR GO 2    97677570546  OT NEUROMUSC RE EDUCATION EA 15 MIN 5/3/2018 Eva Waldron OTR GO 1    [id number]  OT THER PROC EA 15 MIN 5/3/2018 Eva Waldron OTR GO 1                  Eva Waldron OTR  5/4/2018

## 2018-05-09 ENCOUNTER — OFFICE VISIT (OUTPATIENT)
Dept: PSYCHIATRY | Facility: CLINIC | Age: 68
End: 2018-05-09

## 2018-05-09 ENCOUNTER — OFFICE VISIT (OUTPATIENT)
Dept: FAMILY MEDICINE CLINIC | Facility: CLINIC | Age: 68
End: 2018-05-09

## 2018-05-09 VITALS — WEIGHT: 125 LBS | BODY MASS INDEX: 21.46 KG/M2

## 2018-05-09 DIAGNOSIS — F41.1 GENERALIZED ANXIETY DISORDER: Primary | ICD-10-CM

## 2018-05-09 DIAGNOSIS — G89.29 OTHER CHRONIC PAIN: ICD-10-CM

## 2018-05-09 DIAGNOSIS — F51.05 INSOMNIA DUE TO MENTAL CONDITION: ICD-10-CM

## 2018-05-09 DIAGNOSIS — L30.9 DERMATITIS: Primary | ICD-10-CM

## 2018-05-09 PROCEDURE — 99213 OFFICE O/P EST LOW 20 MIN: CPT | Performed by: PHYSICIAN ASSISTANT

## 2018-05-09 PROCEDURE — 90837 PSYTX W PT 60 MINUTES: CPT | Performed by: NURSE PRACTITIONER

## 2018-05-09 RX ORDER — VALACYCLOVIR HYDROCHLORIDE 1 G/1
1000 TABLET, FILM COATED ORAL 3 TIMES DAILY
Qty: 21 TABLET | Refills: 0 | Status: SHIPPED | OUTPATIENT
Start: 2018-05-09 | End: 2018-05-29

## 2018-05-09 NOTE — PROGRESS NOTES
"    Reagan Urrutia is a 67 y.o. female who is here for 60 minute psychotherapy    Chief Complaint:     Generalized anxiety disorder    Insomnia due to mental condition    Other chronic pain    History of Present Illness  Patient presents by herself for therapy. She reports struggling with chronic pain and escalates so much she hadn't been able to much of anything over past 3 days. She reports it is in her right shoulder and along her arm into her fingers and around her right rib cage. She is compliant with compression sleeve she reports and massaging the area. She goes to PT and O.T. She has had neurosurgeon eval, and now getting evaluated by pain management \"but it is all going so slowly and there I am hurting the whole time\". She admits to cannabis use because \"it helps but I can't afford it and can't afford they type that helps\". She takes tramadol that is prescribed but it gives intermittent reduction in pain but \"isn't long lasting\". She has been suffering she states for months and months and she is \"wore out\". Denies SI/HI or AVH. We discussed distraction and emotional support.     The following portions of the patient's history were reviewed and updated as appropriate: allergies, current medications, past family history, past medical history, past social history, past surgical history and problem list.    Review of Systems denies fever, cough, s/s’s of infection, denies GI/ problems, CHRONIC PAIN RIGHT SHOULDER ARM, LYMPH EDEMA RIGHT UPPER EXTREM.      Objective   Physical Exam  There were no vitals taken for this visit.    Allergies   Allergen Reactions   • Iodinated Diagnostic Agents Other (See Comments)     Patient states she has swelling and pain of joints for days following injection   • Penicillins        Current Medications:   Current Outpatient Prescriptions   Medication Sig Dispense Refill   • fluocinonide-emollient (LIDEX-E) 0.05 % cream Apply  topically 2 (Two) Times a Day. To rash " 15 g 0   • losartan (COZAAR) 25 MG tablet Take 25 mg by mouth Daily.     • promethazine (PHENERGAN) 25 MG tablet Take 0.5 tablets by mouth Every 6 (Six) Hours As Needed for Nausea or Vomiting. 30 tablet 0   • TraMADol HCl 50 MG tablet dispersible Take 0.5 tablets by mouth As Needed.     • valACYclovir (VALTREX) 1000 MG tablet Take 1 tablet by mouth 3 (Three) Times a Day. 21 tablet 0     No current facility-administered medications for this visit.      Appearance: appropriate  Hygiene:   good  Cooperation:  Cooperative  Eye Contact:  Good  Psychomotor Behavior:  Appropriate  Mood: sad, anxious regarding her health, poverty   Affect:  Appropriate  Hopelessness: Denies  Speech:  Normal  Thought Process:  Linear  Thought Content:  Normal  Suicidal:  None  Homicidal:  None  Hallucinations:  None  Delusion:  None  Memory:  Intact  Orientation:  Person, Place, Time and Situation  Reliability:  fair  Insight:  Fair  Judgement:  Fair  Impulse Control:  Fair  Estimated Intelligence: average range   Physical/Medical Issues:  No     Assessment/Plan   Diagnoses and all orders for this visit:    Generalized anxiety disorder    Insomnia due to mental condition    Other chronic pain    face to face 60 minutes therapy  Assisted patient in processing above session content; acknowledged and normalized patient’s thoughts, feelings, and concerns.  Applied  positive coping skills and behavior management in session.  Allowed patient to freely discuss issues without interruption or judgment. Provided safe, confidential environment to facilitate the development of positive therapeutic relationship and encourage open, honest communication. Assisted patient in identifying risk factors which would indicate the need for higher level of care including thoughts to harm self or others and/or self-harming behavior and encouraged patient to contact this office, call 911, or present to the nearest emergency room should any of these events occur.  Discussed crisis intervention services and means to access.  Patient adamantly and convincingly denies current suicidal or homicidal ideation or perceptual disturbance.  Coping skills reviewed , distraction, positive reframing, gratitude, mindfulness, and encouraged positive framing of thoughts  Instructed to call for questions or concerns and return early if necessary. Crisis plan reviewed including going to the Emergency department.    Return in about 7 days (around 5/16/2018).

## 2018-05-09 NOTE — PROGRESS NOTES
Reagan Urrutia is a 67 y.o. female  Rash (rash on chest and right side x2 weeks )      History of Present Illness  Patient comes in today concerned with a rash that has developed over her right upper chest wall that is stinging and itching.  She is concerned it could be shingles.  She states she used a cream that she had at home for dermatitis and it did not help.  Is been constantly there for the past 10 days.  The following portions of the patient's history were reviewed and updated as appropriate: allergies, current medications, past social history and problem list    Review of Systems   Constitutional: Negative for fever.   Musculoskeletal: Negative for arthralgias.   Skin: Positive for color change and rash. Negative for pallor and wound.       Objective     There were no vitals filed for this visit.    Physical Exam   Constitutional: She appears well-developed and well-nourished. No distress.   Skin: Skin is warm and dry. Rash noted. She is not diaphoretic. There is erythema. No pallor.   Patient does have a faint erythematous macular scattered rash over her right chest wall around to the right axillary region.  No vesicles and no crusts and it is very faint.  Skin does seem to be sensitive.   Nursing note and vitals reviewed.      Assessment/Plan     Diagnoses and all orders for this visit:    Dermatitis    Other orders  -     valACYclovir (VALTREX) 1000 MG tablet; Take 1 tablet by mouth 3 (Three) Times a Day.    Dermatitis concerning for possible early herpes zoster, prescription for Valtrex as noted above, follow-up for recheck if symptoms persist.

## 2018-05-10 ENCOUNTER — HOSPITAL ENCOUNTER (OUTPATIENT)
Dept: PHYSICAL THERAPY | Facility: HOSPITAL | Age: 68
Setting detail: THERAPIES SERIES
Discharge: HOME | End: 2018-05-10

## 2018-05-10 DIAGNOSIS — I89.0 LYMPHEDEMA OF RIGHT UPPER EXTREMITY: ICD-10-CM

## 2018-05-10 DIAGNOSIS — L90.5 SCAR CONDITIONS AND FIBROSIS OF SKIN: Primary | ICD-10-CM

## 2018-05-10 DIAGNOSIS — M79.601 RIGHT ARM PAIN: ICD-10-CM

## 2018-05-10 PROCEDURE — 97140 MANUAL THERAPY 1/> REGIONS: CPT | Performed by: PHYSICAL THERAPIST

## 2018-05-10 NOTE — THERAPY TREATMENT NOTE
Outpatient Physical Therapy Lymphedema Treatment Note  [facility]     Patient Name: Brianna Urrutia  : 1950  MRN: [id number]  Today's Date: 5/10/2018        Visit Date: 05/10/2018    Visit Dx:    ICD-10-CM ICD-9-CM   1. Scar conditions and fibrosis of skin L90.5 709.2   2. Lymphedema of right upper extremity I89.0 457.1   3. Right arm pain M79.601 729.5       Patient Active Problem List   Diagnosis   • Malignant neoplasm of overlapping sites of right female breast   • Malignant neoplasm of upper-outer quadrant of right female breast   • Degenerative disc disease, cervical   • Neuropathy              Lymphedema     Row Name 05/10/18 1500          Able to rate subjective pain? yes  -MW    Pre-Treatment Pain Level 8  -MW    Post-Treatment Pain Level 9  -MW    Subjective Pain Comment Rt axilla (posterior), arm, breast, wrist, hand   -MW          Subjective Comments Pt reports pain is slightly improved from Monday- was unable to function due to pain on Monday/Tuesday. Saw PCP and MH providers.   -MW          Ptting Edema Category By severity  -MW    Pitting Edema Moderate;Mild  -MW    Edema Assessment Comment RUE edema softer and less full; noted increased fullness / firmness Rt posterior lateral ribs (lower 1/2 to 2/3 of ribs).   -MW          Location/Assessment Upper Quadrant  -MW    Upper Extremity Conditions right:;intact;clean  -MW    Upper Quadrant Conditions right:;intact;clean  -MW    Upper Quadrant Color/Pigment right:;other (comment);red;erythema   scar fading, pink and white, hypopigmented   -MW    Skin Observations Comment Patches of erythema Rt lateral and inferior breast presistent but appear stable from last week  -MW          Measurement Type(s) --  -MW    Quick Girth Areas --  -MW          Manual Lymphatic Drainage initial sequence;opened regional lymph nodes;opened anastamoses;extremity treatment;astym  -MW    Initial Sequence supraclavicular;shoulder collectors  -MW    Supraclavicular  right  -MW    Shoulder Collectors right  -MW    Opened Regional Lymph Nodes axillary;inguinal  -MW    Axillary right;left  -MW    Inguinal right  -MW    Opened Anastamoses anterior axillo-axillary  -MW    Anterior Axillo-Axillary moving Rt to left   -MW    Axillo-Inguinal right  -MW    Extremity Treatment extremity treatment focus on;other extremity treatment  -MW    Extremity Treatment Focus On Rt posterior shoulder, axilla, Rt lateral trunk/ ribs   -MW    Astym --  -MW    Upper Traps --  -MW    UE Sequence --  -MW    Manual Lymphatic Drainage Comments MLD and STM mixed across upper back, shoulders, with focus on Rt scapula, posterior axillar, lateral trunk and then RUE.   -MW          Wrapping Location upper extremity  -MW    Wrapping Location UE hand to axilla  -MW    Wrapping Comments assisted pt to don arm sleeve   -MW    Compression/Skin Care Comments applied Kinesiotape to Rt mid traps, Rt lower ribs with anchor below waist, 3 fingers to drain area. KT to Rt superior shoulder/ UT - leveator scap area.   -MW      User Key  (r) = Recorded By, (t) = Taken By, (c) = Cosigned By    Initials Name Provider Type    MW Na Bergeron, PT Physical Therapist                              PT Assessment/Plan     Row Name 05/10/18 1500          PT Assessment    Functional Limitations Performance in self-care ADL;Limitation in home management;Impaired gait  -MW     Impairments Pain;Impaired lymphatic circulation;Edema;Joint mobility;Muscle strength;Impaired flexibility;Motor function;Impaired muscle length   stair climbing   -MW     Assessment Comments Pt with increased pain in Rt posterior axilla and shoulder, as well as continued pain in RUE and Rt breast area. Skin of Rt breast with persistent color and possible vascular changes; possible shingles per pt report (hasn't been able to take meds yet). RUE lymphedema softer and less packed, but Rt lateral ribs more full over larger area covering distal 1/2 to 2/3rds of Rt  lateral to posterior area. Some improvement with CDT-MLD and STM. Trial of Kinesiotape to posterior-lateral ribs and mid traps to decrease full and pain.   -MW     Rehab Potential Fair  -MW     Patient/caregiver participated in establishment of treatment plan and goals Yes  -MW     Patient would benefit from skilled therapy intervention Yes  -MW        PT Plan    PT Frequency 2x/week  -MW     Physical Therapy Interventions (Optional Details) manual therapy techniques;manual lymphatic drainage;patient/family education;home exercise program;modalities;ROM (Range of Motion);strengthening;stretching;taping  -     PT Plan Comments Cont STM/ MLD; ASTYM if tolerated.   -MW       User Key  (r) = Recorded By, (t) = Taken By, (c) = Cosigned By    Initials Name Provider Type    FRAN Bergeron, PT Physical Therapist                      Manual Rx (last 36 hours)      Manual Treatments     Row Name 05/10/18 1500             Manual Rx 1    Manual Rx 1 Location RT axilla and RUE   -MW      Manual Rx 1 Type STM, tissue bending, myofascial stretches in posterior shoulder / upper arm and forearm  -MW      Manual Rx 1 Grade gentle to moderate   -MW      Manual Rx 1 Duration 20  -MW         Manual Rx 2    Manual Rx 2 Location Bilat UT/ mid thoracic spine, leveator scap   -MW      Manual Rx 2 Type STM- LS, mid traps, scapular borders into posterior axilla   -MW      Manual Rx 2 Grade 15  -MW        User Key  (r) = Recorded By, (t) = Taken By, (c) = Cosigned By    Initials Name Provider Type    FRAN Bergeron, PT Physical Therapist              Therapy Education  Education Details: Follow up with Dr Montoya's for more options on pain management. Take meds as prescribed for pain.   Given: Symptoms/condition management, Edema management  Program: Reinforced  How Provided: Verbal  Provided to: Patient  Level of Understanding: Verbalized              Time Calculation:   Start Time: 1500  Total Timed Code Minutes- PT: 60  minute(s)     Therapy Charges for Today     Code Description Service Date Service Provider Modifiers Qty    08031007726 HC PT MANUAL THERAPY EA 15 MIN 5/10/2018 Na Bergeron, PT GP 4                    Na Bergeron, PT  5/10/2018

## 2018-05-14 ENCOUNTER — HOSPITAL ENCOUNTER (OUTPATIENT)
Dept: PHYSICAL THERAPY | Facility: HOSPITAL | Age: 68
Setting detail: THERAPIES SERIES
Discharge: HOME | End: 2018-05-14

## 2018-05-14 ENCOUNTER — TELEPHONE (OUTPATIENT)
Dept: FAMILY MEDICINE CLINIC | Facility: CLINIC | Age: 68
End: 2018-05-14

## 2018-05-14 DIAGNOSIS — L90.5 SCAR CONDITIONS AND FIBROSIS OF SKIN: Primary | ICD-10-CM

## 2018-05-14 DIAGNOSIS — M79.601 RIGHT ARM PAIN: ICD-10-CM

## 2018-05-14 DIAGNOSIS — I89.0 LYMPHEDEMA OF RIGHT UPPER EXTREMITY: ICD-10-CM

## 2018-05-14 DIAGNOSIS — M54.2 CERVICALGIA: ICD-10-CM

## 2018-05-14 PROCEDURE — G0283 ELEC STIM OTHER THAN WOUND: HCPCS | Performed by: PHYSICAL THERAPIST

## 2018-05-14 PROCEDURE — 97140 MANUAL THERAPY 1/> REGIONS: CPT | Performed by: PHYSICAL THERAPIST

## 2018-05-14 NOTE — TELEPHONE ENCOUNTER
Tell her that I have checked an insurance does not pay for the topical Lidoderm/lidocaine but you can get a 4% over-the-counter, have her check with the pharmacist.

## 2018-05-14 NOTE — TELEPHONE ENCOUNTER
----- Message from Katelynn Garcia sent at 5/14/2018  3:27 PM EDT -----  Contact: PT WALK IN  Going to start the shingles medicine, doesn't want to take the Lyrica. Wants a topical lidocaine ointment to put on her arm when it hurts. Please call patient.

## 2018-05-14 NOTE — THERAPY TREATMENT NOTE
"    Outpatient Physical Therapy Lymphedema Treatment Note  [facility]     Patient Name: Brianna Urrutia  : 1950  MRN: [id number]  Today's Date: 2018        Visit Date: 2018    Visit Dx:    ICD-10-CM ICD-9-CM   1. Scar conditions and fibrosis of skin L90.5 709.2   2. Lymphedema of right upper extremity I89.0 457.1   3. Right arm pain M79.601 729.5   4. Cervicalgia M54.2 723.1       Patient Active Problem List   Diagnosis   • Malignant neoplasm of overlapping sites of right female breast   • Malignant neoplasm of upper-outer quadrant of right female breast   • Degenerative disc disease, cervical   • Neuropathy              Lymphedema     Row Name 18 1400          Able to rate subjective pain? yes  -MW    Pre-Treatment Pain Level 6  -MW    Post-Treatment Pain Level 4  -MW    Subjective Pain Comment Rt axilla, shoulder, lateral ribs, scapula   -MW          Subjective Comments Pt reports continues to have intense pain; has not yet started med for shingles as the \"pill is too big and I didn't want to start anything new with all the pain now.\"  Reports continues to use pump, arm sleeve and attempting exercises but has been so tired due to pain. Skin seems the same.   -MW          Ptting Edema Category By severity  -MW    Pitting Edema Moderate;Mild  -MW    Edema Assessment Comment Mild to moderate edema RUE, with mild to moderate edema Rt ribs posterior -lateral aspect with thickened tissues.   -MW          Location/Assessment Upper Quadrant  -MW    Upper Extremity Conditions right:;intact;clean  -MW    Upper Quadrant Conditions right:;intact;clean  -MW    Upper Quadrant Color/Pigment right:;other (comment);red;erythema   scar fading, pink and white, hypopigmented   -MW    Skin Observations Comment patches of erythema / increased vascularity seem to have increased (covering more areas especially more medial and superior breast).   -MW          Manual Lymphatic Drainage initial sequence;opened " regional lymph nodes;opened anastamoses;extremity treatment;astym  -MW    Initial Sequence supraclavicular;shoulder collectors  -MW    Supraclavicular right  -MW    Shoulder Collectors right  -MW    Opened Regional Lymph Nodes axillary;inguinal  -MW    Axillary right  -MW    Inguinal right  -MW    Opened Anastamoses axillo-inguinal  -MW    Axillo-Inguinal right  -MW    Extremity Treatment extremity treatment focus on;other extremity treatment  -MW    MLD to Full Limb R  -MW    Extremity Treatment Focus On Rt shoulder, posterior ribs/ trunk   -MW    Other Extremity Treatment brief MLD RUE   -MW    Astym upper traps  -MW    Upper Traps right  -MW    Upper Traps Comment Initiated tx in sitting with UE propped on pillow:  RT UT: Evaluator and localizer to UT / scapular region; minimal fine soft tissue disruptions noted through out. Extra strokes at mid traps and leveator scapulea, and RT teres region. Continued down over Rt posterior and lateral ribs, with extra strokes at area of fullness/ thickened tissue. Repositioned in LSL for additional STM and MLD Rt posterior shoulder/ axilla and lateral trunk.   -MW    Manual Lymphatic Drainage Comments MLD intersparsed with STM traps, RT lateral neck, shoulder, with focus on Rt scapula, posterior axilla, Rt lateral trunk and ribs and then RUE.   -MW          Wrapping Location upper extremity  -MW    Wrapping Location UE hand to axilla  -MW    Wrapping Comments assisted pt to don arm sleeve   -MW    Compression/Skin Care Comments Pt reports KT was helpful sometimes but painful at other times; declining additional placement.   -MW      User Key  (r) = Recorded By, (t) = Taken By, (c) = Cosigned By    Initials Name Provider Type    MW Na Bergeron, PT Physical Therapist                              PT Assessment/Plan     Row Name 05/14/18 1400          PT Assessment    Functional Limitations Performance in self-care ADL;Limitation in home management;Impaired gait  -MW      "Impairments Pain;Impaired lymphatic circulation;Edema;Joint mobility;Muscle strength;Impaired flexibility;Motor function;Impaired muscle length   stair climbing   -MW     Assessment Comments Pt returns with continued complaints of pain limiting her function and QOL. Trial of IFC for pain reduction, with report of some improvement but uncertain of long term benefits as it would be very difficult for pt to manage a TENS unit at home. Pt asking for UE sling or rotator cuff support to \"immobilize the arm\" but concerned this will lead to increased edema in forearm and hand with elbow in 90 flexed position. Encouraging pt to follow up with pain management MD or for nerve block options. Will confer with OT regarding sling/ support options when OT returns to clinic. Cont IFC for pain reduction.   -MW     Rehab Potential Fair  -MW     Patient/caregiver participated in establishment of treatment plan and goals Yes  -MW     Patient would benefit from skilled therapy intervention Yes  -MW        PT Plan    PT Frequency 2x/week  -MW     Physical Therapy Interventions (Optional Details) manual therapy techniques;manual lymphatic drainage;patient/family education;home exercise program;modalities;ROM (Range of Motion);strengthening;stretching  -MW     PT Plan Comments Cont IFC, STM/MLD, ASTYM; ther exer as tolerated   -MW       User Key  (r) = Recorded By, (t) = Taken By, (c) = Cosigned By    Initials Name Provider Type    FRAN Bergeron PT Physical Therapist                 Modalities     Row Name 05/14/18 1400             ELECTRICAL STIMULATION    Attended/Unattended Unattended  -MW      Stimulation Type IFC  -MW      Max mAmp 20  -MW      Location/Electrode Placement/Other bracketing Rt shoulder: ant-superior chest, mid traps, lower lateral ribs, distal triceps   -MW      Rx Minutes 20 mins  -MW        User Key  (r) = Recorded By, (t) = Taken By, (c) = Cosigned By    Initials Name Provider Type    FRAN Bergeron, PT " "Physical Therapist                     Manual Rx (last 36 hours)      Manual Treatments     Row Name 05/14/18 1400             Manual Rx 1    Manual Rx 1 Location RT axilla and RUE   -MW      Manual Rx 1 Type STM, tissue bending, myofascial stretches in posterior shoulder / upper arm; posterior axilla.  -MW      Manual Rx 1 Grade gentle to moderate   -MW      Manual Rx 1 Duration 15  -MW         Manual Rx 2    Manual Rx 2 Location RT UT/ mid thoracic spine, leveator scap, Rt neck    -MW      Manual Rx 2 Type STM- LS, UT/ mid traps, scapular borders into posterior axilla   -MW      Manual Rx 2 Grade 20  -MW        User Key  (r) = Recorded By, (t) = Taken By, (c) = Cosigned By    Initials Name Provider Type    MW Na Bergeron, PT Physical Therapist              Therapy Education  Education Details: Discussed pro's/ con's of shoulder support or arm sling as pt asking for \"rotator cuff\" support to \"keep arm and shoulder still\"   Given: Symptoms/condition management, Edema management  Program: Reinforced  How Provided: Verbal  Provided to: Patient  Level of Understanding: Verbalized              Time Calculation:   Start Time: 1400  Total Timed Code Minutes- PT: 60 minute(s)     Therapy Charges for Today     Code Description Service Date Service Provider Modifiers Qty    79742796939 HC PT MANUAL THERAPY EA 15 MIN 5/14/2018 Na Bergeron, PT GP 1    14873827025 HC PT ELECTRICAL STIM UNATTENDED 5/14/2018 Na Bergeron, PT  1                    Na Bergeron, PT  5/14/2018     "

## 2018-05-15 ENCOUNTER — TELEPHONE (OUTPATIENT)
Dept: NEUROSURGERY | Facility: CLINIC | Age: 68
End: 2018-05-15

## 2018-05-15 DIAGNOSIS — M50.30 DDD (DEGENERATIVE DISC DISEASE), CERVICAL: Primary | ICD-10-CM

## 2018-05-15 RX ORDER — DICLOFENAC SODIUM 75 MG/1
75 TABLET, DELAYED RELEASE ORAL 2 TIMES DAILY
Qty: 60 TABLET | Refills: 2 | Status: SHIPPED | OUTPATIENT
Start: 2018-05-15 | End: 2018-05-15

## 2018-05-15 NOTE — TELEPHONE ENCOUNTER
"Provider:  Kavon  Caller: Brianna  Time of call:   843c  Phone #:  [phone]  Last visit:   04/03/18  Next visit: prn    Reason for call:       Pt left a message stating that  made her promise if she ever needed anything to call him.     Pt wants to come in and be seen, or speak with  over the phone if possible. Pt sounded tearful during the voice message.    ---Called pt back  Pt is c/o right shoulder/arm nerve pain. Pt wants to discuss nerve block injections, B-12 shots and topical ointments/creams to help with her nerve pain. She states the pain pills she takes is making her \"crazy\".     Pt contacted PCP yesterday trying to get lidocaine gel but insurance would not pay for it, pt wants to know if  has any alternatives \"pt states she believes the best way to treat this nerve pain is a topical gel.\"    Pt purchased icy hot roll on gel yesterday and states its helping.   She also seen her PT yesterday and used a tens unit which seemed to help her pain a lot.     Pt wants to speak with  today if at all possible.  "

## 2018-05-15 NOTE — TELEPHONE ENCOUNTER
Spoke with patient at length.   We have ordered Voltaren gel for her along with a TENS unit script. She will speak with her PCP in order to order B12 injections  The scrip is in Henrique's office for the TENS unit    I am forwarding this message along to Chio in Dr. Jamison's office in order to get her an appointment in pain management.     I believe that I have answered the patients questions satisfactorily, but she will call if she has questions.   Thanks, Harper

## 2018-05-16 ENCOUNTER — OFFICE VISIT (OUTPATIENT)
Dept: PSYCHIATRY | Facility: CLINIC | Age: 68
End: 2018-05-16

## 2018-05-16 DIAGNOSIS — F41.1 GENERALIZED ANXIETY DISORDER: Primary | ICD-10-CM

## 2018-05-16 DIAGNOSIS — G89.29 OTHER CHRONIC PAIN: ICD-10-CM

## 2018-05-16 DIAGNOSIS — F51.05 INSOMNIA DUE TO MENTAL CONDITION: ICD-10-CM

## 2018-05-16 PROCEDURE — 90837 PSYTX W PT 60 MINUTES: CPT | Performed by: NURSE PRACTITIONER

## 2018-05-16 NOTE — PROGRESS NOTES
"    Reagan Urrutia is a 67 y.o. female who is here for therapy     Chief Complaint:     Generalized anxiety disorder    Insomnia due to mental condition    Other chronic pain        History of Present Illness Patient presents by herself for therapy. She reports struggling with chronic pain and escalates so much she hadn't been able to much of anything for past 7 days. She reports it is in her right shoulder and along her arm into her fingers and around her right rib cage. She is compliant with compression sleeve she reports and massaging the area. She goes to PT and O.T. She has had neurosurgeon eval, and now getting evaluated by pain management \"but it is all going so slowly and there I am hurting the whole time\". She admits to cannabis use because \"it helps but I can't afford it and can't afford they type that helps\". She takes tramadol that is prescribed but it gives intermittent reduction in pain but \"isn't long lasting\". She has new non steroidal cream and oral tablet to begin today. She has been suffering she states for months and months and she is \"wore out\". Denies SI/HI or AVH. We discussed distraction and emotional support. She tries to work on distraction and trying to move her thoughts outside of her body but very difficult at this time. She is being seen by PCP awaiting pain management. Discussed other relaxation and positions she can use to help and social support to assist in thinking of other things.      The following portions of the patient's history were reviewed and updated as appropriate: allergies, current medications, past family history, past medical history, past social history, past surgical history and problem list.     Review of Systems denies fever, cough, s/s’s of infection, denies GI/ problems, CHRONIC PAIN RIGHT SHOULDER ARM, LYMPH EDEMA RIGHT UPPER EXTREM.        The following portions of the patient's history were reviewed and updated as appropriate: allergies, " current medications, past family history, past medical history, past social history, past surgical history and problem list.    Review of Systemsdenies fever, cough, s/s’s of infection, denies GI/ problems, denies new medical issues     Objective   Physical Exam  There were no vitals taken for this visit.    Allergies   Allergen Reactions   • Iodinated Diagnostic Agents Other (See Comments)     Patient states she has swelling and pain of joints for days following injection   • Penicillins        Current Medications:   Current Outpatient Prescriptions   Medication Sig Dispense Refill   • diclofenac (VOLTAREN) 1 % gel gel Apply 4 g topically 4 (Four) Times a Day As Needed (for pain). 100 g 5   • fluocinonide-emollient (LIDEX-E) 0.05 % cream Apply  topically 2 (Two) Times a Day. To rash 15 g 0   • losartan (COZAAR) 25 MG tablet Take 25 mg by mouth Daily.     • promethazine (PHENERGAN) 25 MG tablet Take 0.5 tablets by mouth Every 6 (Six) Hours As Needed for Nausea or Vomiting. 30 tablet 0   • TraMADol HCl 50 MG tablet dispersible Take 0.5 tablets by mouth As Needed.     • valACYclovir (VALTREX) 1000 MG tablet Take 1 tablet by mouth 3 (Three) Times a Day. 21 tablet 0     No current facility-administered medications for this visit.        Appearance: appropriate  Hygiene:   good  Cooperation:  Cooperative  Eye Contact:  Good  Psychomotor Behavior:  Appropriate  Mood:  Distracted by pain   Affect:  Appropriate  Hopelessness: Denies  Speech:  Normal  Thought Process:  Linear  Thought Content:  Normal  Suicidal:  None  Homicidal:  None  Hallucinations:  None  Delusion:  None  Memory:  Intact  Orientation:  Person, Place, Time and Situation  Reliability:  fair  Insight:  Fair  Judgement:  Fair  Impulse Control:  Fair  Estimated Intelligence: average range   Physical/Medical Issues:  No     Assessment/Plan   Diagnoses and all orders for this visit:    Generalized anxiety disorder    Insomnia due to mental condition    Other  chronic pain    face to face 60 minutes therapy  Assisted patient in processing above session content; acknowledged and normalized patient’s thoughts, feelings, and concerns.  Applied  positive coping skills and behavior management in session.  Allowed patient to freely discuss issues without interruption or judgment. Provided safe, confidential environment to facilitate the development of positive therapeutic relationship and encourage open, honest communication. Assisted patient in identifying risk factors which would indicate the need for higher level of care including thoughts to harm self or others and/or self-harming behavior and encouraged patient to contact this office, call 911, or present to the nearest emergency room should any of these events occur. Discussed crisis intervention services and means to access.  Patient adamantly and convincingly denies current suicidal or homicidal ideation or perceptual disturbance.  Coping skills reviewed , distraction, positive reframing, gratitude, mindfulness, and encouraged positive framing of thoughts  Instructed to call for questions or concerns and return early if necessary. Crisis plan reviewed including going to the Emergency department.     Cont therapy working on mindfulness, coping with chronic pain       Instructed to call for questions or concerns and return early if necessary. Crisis plan reviewed including going to the Emergency department.    Return in about 2 weeks (around 5/30/2018).         .

## 2018-05-17 ENCOUNTER — HOSPITAL ENCOUNTER (OUTPATIENT)
Dept: PHYSICAL THERAPY | Facility: HOSPITAL | Age: 68
Setting detail: THERAPIES SERIES
Discharge: HOME | End: 2018-05-17

## 2018-05-17 DIAGNOSIS — M54.2 CERVICALGIA: ICD-10-CM

## 2018-05-17 DIAGNOSIS — I89.0 LYMPHEDEMA OF RIGHT UPPER EXTREMITY: ICD-10-CM

## 2018-05-17 DIAGNOSIS — M79.601 RIGHT ARM PAIN: ICD-10-CM

## 2018-05-17 DIAGNOSIS — L90.5 SCAR CONDITIONS AND FIBROSIS OF SKIN: Primary | ICD-10-CM

## 2018-05-17 PROCEDURE — G0283 ELEC STIM OTHER THAN WOUND: HCPCS | Performed by: PHYSICAL THERAPIST

## 2018-05-17 PROCEDURE — 97140 MANUAL THERAPY 1/> REGIONS: CPT | Performed by: PHYSICAL THERAPIST

## 2018-05-21 ENCOUNTER — HOSPITAL ENCOUNTER (OUTPATIENT)
Dept: PHYSICAL THERAPY | Facility: HOSPITAL | Age: 68
Setting detail: THERAPIES SERIES
Discharge: HOME | End: 2018-05-21

## 2018-05-21 DIAGNOSIS — M79.601 RIGHT ARM PAIN: ICD-10-CM

## 2018-05-21 DIAGNOSIS — L90.5 SCAR CONDITIONS AND FIBROSIS OF SKIN: Primary | ICD-10-CM

## 2018-05-21 DIAGNOSIS — I89.0 LYMPHEDEMA OF RIGHT UPPER EXTREMITY: ICD-10-CM

## 2018-05-21 PROCEDURE — 97140 MANUAL THERAPY 1/> REGIONS: CPT | Performed by: PHYSICAL THERAPIST

## 2018-05-21 PROCEDURE — G0283 ELEC STIM OTHER THAN WOUND: HCPCS | Performed by: PHYSICAL THERAPIST

## 2018-05-21 NOTE — THERAPY TREATMENT NOTE
Outpatient Physical Therapy Lymphedema Treatment Note  [facility]     Patient Name: Brianna Urrutia  : 1950  MRN: [id number]  Today's Date: 2018        Visit Date: 2018    Visit Dx:    ICD-10-CM ICD-9-CM   1. Scar conditions and fibrosis of skin L90.5 709.2   2. Lymphedema of right upper extremity I89.0 457.1   3. Right arm pain M79.601 729.5       Patient Active Problem List   Diagnosis   • Malignant neoplasm of overlapping sites of right female breast   • Malignant neoplasm of upper-outer quadrant of right female breast   • Degenerative disc disease, cervical   • Neuropathy              Lymphedema     Row Name 18 1300          Able to rate subjective pain? yes  -MW    Pre-Treatment Pain Level 7  -MW    Post-Treatment Pain Level 5  -MW    Subjective Pain Comment upper back, shoulder blade, arm, wrist, hand   -MW          Subjective Comments Pt reports defrosted her freezer on Friday and has been in horrible pain again. Not sure how much the new meds are working. Felt good after last session   -MW          Ptting Edema Category By severity  -MW    Pitting Edema Moderate;Mild  -MW    Edema Assessment Comment Mild to moderate edema RUE, with mild to moderate edema Rt ribs posterior -lateral aspect with thickened tissues.   -MW          Location/Assessment Upper Quadrant  -MW    Upper Extremity Conditions right:;intact;clean  -MW    Upper Quadrant Conditions right:;intact;clean  -MW    Upper Quadrant Color/Pigment right:;other (comment);red;erythema   scar fading, pink and white, hypopigmented   -MW    Skin Observations Comment patches of erythema / increased vascularity with mild decrease especially over medial breast.  -MW          Measurement Type(s) Quick Girth  -MW    Quick Girth Areas Upper extremities  -MW          Axilla 31.3 cm  -MW    Mid upper arm 28 cm  -MW    Elbow 24.5 cm  -MW    Mid forearm 21.3 cm  -MW    Wrist crease 16 cm  -MW    Web space 17.1 cm  -MW    Met-heads 17.8  cm  -MW    RUE Quick Girth Total 156  -MW          Manual Lymphatic Drainage initial sequence;opened regional lymph nodes;opened anastamoses;extremity treatment;astym  -MW    Initial Sequence supraclavicular;shoulder collectors  -MW    Supraclavicular right  -MW    Shoulder Collectors right  -MW    Opened Regional Lymph Nodes axillary;inguinal  -MW    Axillary right;left  -MW    Inguinal right  -MW    Opened Anastamoses axillo-inguinal;anterior axillo-axillary  -MW    Anterior Axillo-Axillary moving Rt to left   -MW    Axillo-Inguinal right  -MW    Extremity Treatment extremity treatment focus on;other extremity treatment  -MW    MLD to Full Limb RUE, MLD with STM and myofascial stretches   -MW    Extremity Treatment Focus On RUE and Rt shoulder, posterior ribs/ trunk   -MW    Astym --  -MW    Anterior-Lateral Chest --  -MW    Upper Traps --  -MW    Manual Lymphatic Drainage Comments MLD mixed with STM traps, Rt shoulder, with focus on Rt scapula, posterior axilla, Rt lateral trunk and ribs and then RUE.   -MW          Wrapping Location upper extremity  -MW    Wrapping Location UE hand to axilla  -MW    Wrapping Comments assisted pt to don arm sleeve   -MW    Compression/Skin Care Comments Assisted pt to apply topical pain cream.  Called [place] [facility] for new arm sleeve; have [employer] max regular in stock.   -MW      User Key  (r) = Recorded By, (t) = Taken By, (c) = Cosigned By    Initials Name Provider Type    MW Na Bergeron, PT Physical Therapist                              PT Assessment/Plan     Row Name 05/21/18 1300          PT Assessment    Functional Limitations Performance in self-care ADL;Limitation in home management;Impaired gait  -MW     Impairments Pain;Impaired lymphatic circulation;Edema;Joint mobility;Muscle strength;Impaired flexibility;Motor function;Impaired muscle length   stair climbing   -MW     Assessment Comments Pt with increased c/o pain after extra activity defrosting freezer.  Improved soft tissue mobility post STM with tissue bending and lifting techniques along RT UT, lateral trunk and forearm. Expect pt to benefit from new compression sleeve but may be challenging for pt to don. Pain control and overall function and QOL remain complex and challenging.   -MW     Rehab Potential Fair  -MW     Patient/caregiver participated in establishment of treatment plan and goals Yes  -MW     Patient would benefit from skilled therapy intervention Yes  -MW        PT Plan    PT Frequency 2x/week  -     Physical Therapy Interventions (Optional Details) manual therapy techniques;manual lymphatic drainage;patient/family education;home exercise program;modalities;ROM (Range of Motion);strengthening;stretching  -     PT Plan Comments Cont STM/MLD with AAROM to attempt to improve function; IFC prn   -       User Key  (r) = Recorded By, (t) = Taken By, (c) = Cosigned By    Initials Name Provider Type    FRAN Bergeron, PT Physical Therapist                        Manual Rx (last 36 hours)      Manual Treatments     Row Name 05/21/18 1300             Manual Rx 1    Manual Rx 1 Location RT axilla, pectoralis major and RUE   -      Manual Rx 1 Type STM, tissue bending, lifting and space correction techniques  -      Manual Rx 1 Grade gentle to moderate with AAROM at shoulder and supination   -      Manual Rx 1 Duration 25  -MW         Manual Rx 2    Manual Rx 2 Location RT and LT UT/ mid thoracic spine, leveator scap, Rt ribs/ lateral trunk   -      Manual Rx 2 Type STM, tissue bending, lifting and space correction techniques  -      Manual Rx 2 Grade gentle to moderate with AAROM for forward reach in SL   -MW      Manual Rx 2 Duration 15  -MW        User Key  (r) = Recorded By, (t) = Taken By, (c) = Cosigned By    Initials Name Provider Type    FRAN Bergeron PT Physical Therapist                             Time Calculation:   Start Time: 1300  Total Timed Code Minutes- PT: 75  minute(s)     Therapy Charges for Today     Code Description Service Date Service Provider Modifiers Qty    53109581233 HC PT MANUAL THERAPY EA 15 MIN 5/21/2018 Na Bergeron, PT GP 4    64822479893 HC PT ELECTRICAL STIM UNATTENDED 5/21/2018 Na Bergeron, PT  1                    Na Bergeron, PT  5/21/2018

## 2018-05-23 ENCOUNTER — OFFICE VISIT (OUTPATIENT)
Dept: PSYCHIATRY | Facility: CLINIC | Age: 68
End: 2018-05-23

## 2018-05-23 DIAGNOSIS — F51.05 INSOMNIA DUE TO MENTAL CONDITION: ICD-10-CM

## 2018-05-23 DIAGNOSIS — G89.29 OTHER CHRONIC PAIN: ICD-10-CM

## 2018-05-23 DIAGNOSIS — F41.1 GENERALIZED ANXIETY DISORDER: Primary | ICD-10-CM

## 2018-05-23 DIAGNOSIS — F90.2 ATTENTION DEFICIT HYPERACTIVITY DISORDER, COMBINED TYPE: ICD-10-CM

## 2018-05-23 PROCEDURE — 90837 PSYTX W PT 60 MINUTES: CPT | Performed by: NURSE PRACTITIONER

## 2018-05-23 NOTE — PROGRESS NOTES
"    Reagan Urrutia is a 67 y.o. female who therapy      Chief Complaint: Diagnoses and all orders for this visit:    Generalized anxiety disorder    Insomnia due to mental condition    Other chronic pain    Attention deficit hyperactivity disorder, combined type        History of Present Illness Patient presents by herself for therapy. She reports struggling with chronic pain and escalates so much she has struggled to do anything but not move. She reports it is in her right shoulder and along her arm into her fingers and around her right rib cage. She is compliant with compression sleeve she reports and massaging the area. She goes to PT and O.T. She has had neurosurgeon eval, and now getting evaluated by pain management \"but it is all going so slowly and there I am hurting the whole time\". She admits to cannabis use because \"it helps but I can't afford it and can't afford they type that helps\". She takes tramadol that is prescribed but it gives intermittent reduction in pain but \"isn't long lasting\". She has new non steroidal cream and oral tablet that has been helping some. She has been suffering she states for months and months and she is \"wore out\". Denies SI/HI or AVH. We discussed distraction and emotional support which she has been doing and helps some. She tries to work on distraction and trying to move her thoughts outside of her body but very difficult at this time. She is being seen by PCP awaiting pain management. Discussed other relaxation and positions she can use to help and social support to assist in thinking of other things.         The following portions of the patient's history were reviewed and updated as appropriate: allergies, current medications, past family history, past medical history, past social history, past surgical history and problem list.    Review of Systemsdenies fever, cough, s/s’s of infection, denies GI/ problems, denies new medical issues     Objective "   Physical Exam  There were no vitals taken for this visit.    Allergies   Allergen Reactions   • Iodinated Diagnostic Agents Other (See Comments)     Patient states she has swelling and pain of joints for days following injection   • Penicillins        Current Medications:   Current Outpatient Prescriptions   Medication Sig Dispense Refill   • diclofenac (VOLTAREN) 1 % gel gel Apply 4 g topically 4 (Four) Times a Day As Needed (for pain). 100 g 5   • fluocinonide-emollient (LIDEX-E) 0.05 % cream Apply  topically 2 (Two) Times a Day. To rash 15 g 0   • losartan (COZAAR) 25 MG tablet Take 25 mg by mouth Daily.     • promethazine (PHENERGAN) 25 MG tablet Take 0.5 tablets by mouth Every 6 (Six) Hours As Needed for Nausea or Vomiting. 30 tablet 0   • TraMADol HCl 50 MG tablet dispersible Take 0.5 tablets by mouth As Needed.     • valACYclovir (VALTREX) 1000 MG tablet Take 1 tablet by mouth 3 (Three) Times a Day. 21 tablet 0     No current facility-administered medications for this visit.      Appearance: appropriate  Hygiene:   good  Cooperation:  Cooperative  Eye Contact:  Good  Psychomotor Behavior:  Appropriate  Mood:  within normal limits  Affect:  Appropriate  Hopelessness: Denies  Speech:  Normal  Thought Process:  Linear  Thought Content:  Normal  Concentration: Normal   Suicidal:  None  Homicidal:  None  Hallucinations:  None  Delusion:  None  Memory:  Intact  Orientation:  Person, Place, Time and Situation  Reliability:  fair  Insight:  Fair  Judgement:  Fair  Impulse Control:  Fair  Estimated Intelligence: average range   Physical/Medical Issues:  No     Assessment/Plan   Diagnoses and all orders for this visit:    Generalized anxiety disorder    Insomnia due to mental condition    Other chronic pain    Attention deficit hyperactivity disorder, combined type        IMPRESSION: chronic pain causing distress and minimizing her ability to not only do ADLs but any real socializing other than talking     Treatment  Plan: stabilize mood, patient will stay out of the hospital and be at optimal level of functioning, take all medication as prescribed. Patient verbalized  understanding and agreement to plan.    face to face 60 minutes therapy  Assisted patient in processing above session content; acknowledged and normalized patient’s thoughts, feelings, and concerns.  Applied  positive coping skills and behavior management in session.  Allowed patient to freely discuss issues without interruption or judgment. Provided safe, confidential environment to facilitate the development of positive therapeutic relationship and encourage open, honest communication. Assisted patient in identifying risk factors which would indicate the need for higher level of care including thoughts to harm self or others and/or self-harming behavior and encouraged patient to contact this office, call 911, or present to the nearest emergency room should any of these events occur. Discussed crisis intervention services and means to access.  Patient adamantly and convincingly denies current suicidal or homicidal ideation or perceptual disturbance.  Coping skills reviewed , distraction, positive reframing, gratitude, mindfulness, and encouraged positive framing of thoughts  Instructed to call for questions or concerns and return early if necessary. Crisis plan reviewed including going to the Emergency department.    Return in about 1 week (around 5/30/2018).

## 2018-05-24 ENCOUNTER — HOSPITAL ENCOUNTER (OUTPATIENT)
Dept: PHYSICAL THERAPY | Facility: HOSPITAL | Age: 68
Setting detail: THERAPIES SERIES
Discharge: HOME | End: 2018-05-24

## 2018-05-24 ENCOUNTER — HOSPITAL ENCOUNTER (OUTPATIENT)
Dept: OCCUPATIONAL THERAPY | Facility: HOSPITAL | Age: 68
Setting detail: THERAPIES SERIES
Discharge: HOME | End: 2018-05-24

## 2018-05-24 DIAGNOSIS — I89.0 LYMPHEDEMA OF RIGHT UPPER EXTREMITY: ICD-10-CM

## 2018-05-24 DIAGNOSIS — M25.621 DECREASED ROM OF RIGHT ELBOW: ICD-10-CM

## 2018-05-24 DIAGNOSIS — R27.8 DECREASED COORDINATION: ICD-10-CM

## 2018-05-24 DIAGNOSIS — M79.601 RIGHT ARM PAIN: ICD-10-CM

## 2018-05-24 DIAGNOSIS — R29.898 DECREASED GRIP STRENGTH OF RIGHT HAND: ICD-10-CM

## 2018-05-24 DIAGNOSIS — M25.611 DECREASED ROM OF RIGHT SHOULDER: ICD-10-CM

## 2018-05-24 DIAGNOSIS — Z78.9 IMPAIRED MOBILITY AND ADLS: Primary | ICD-10-CM

## 2018-05-24 DIAGNOSIS — M79.601 ARM PAIN, RIGHT: ICD-10-CM

## 2018-05-24 DIAGNOSIS — Z74.09 IMPAIRED MOBILITY AND ADLS: Primary | ICD-10-CM

## 2018-05-24 DIAGNOSIS — L90.5 SCAR CONDITIONS AND FIBROSIS OF SKIN: Primary | ICD-10-CM

## 2018-05-24 PROCEDURE — G8985 CARRY GOAL STATUS: HCPCS | Performed by: OCCUPATIONAL THERAPIST

## 2018-05-24 PROCEDURE — 97140 MANUAL THERAPY 1/> REGIONS: CPT | Performed by: OCCUPATIONAL THERAPIST

## 2018-05-24 PROCEDURE — 97530 THERAPEUTIC ACTIVITIES: CPT | Performed by: OCCUPATIONAL THERAPIST

## 2018-05-24 PROCEDURE — 97140 MANUAL THERAPY 1/> REGIONS: CPT | Performed by: PHYSICAL THERAPIST

## 2018-05-24 PROCEDURE — G8984 CARRY CURRENT STATUS: HCPCS | Performed by: OCCUPATIONAL THERAPIST

## 2018-05-24 PROCEDURE — 97535 SELF CARE MNGMENT TRAINING: CPT | Performed by: OCCUPATIONAL THERAPIST

## 2018-05-24 NOTE — THERAPY TREATMENT NOTE
"    Outpatient Physical Therapy Lymphedema Treatment Note  [facility]     Patient Name: Brianna Urrutia  : 1950  MRN: [id number]  Today's Date: 2018        Visit Date: 2018    Visit Dx:    ICD-10-CM ICD-9-CM   1. Scar conditions and fibrosis of skin L90.5 709.2   2. Lymphedema of right upper extremity I89.0 457.1   3. Right arm pain M79.601 729.5       Patient Active Problem List   Diagnosis   • Malignant neoplasm of overlapping sites of right female breast   • Malignant neoplasm of upper-outer quadrant of right female breast   • Degenerative disc disease, cervical   • Neuropathy              Lymphedema     Row Name 18 1100          Able to rate subjective pain? yes  -MW    Pre-Treatment Pain Level 10  -MW    Post-Treatment Pain Level 8  -MW    Subjective Pain Comment \"can I say 11 or 12?\"   -MW          Subjective Comments Requesting assistance for placyement and use of home TENS unit (borrowed from a friend). Notes increased pain but \"what we did last visit helped\"   -MW          Ptting Edema Category By severity  -MW    Pitting Edema Moderate;Mild  -MW    Edema Assessment Comment Mild to moderate edema RUE, with mild to moderate edema Rt ribs posterior -lateral aspect with thickened tissues.   -MW          Location/Assessment Upper Quadrant  -MW    Upper Extremity Conditions right:;intact;clean  -MW    Upper Quadrant Conditions right:;intact;clean  -MW    Upper Quadrant Color/Pigment right:;other (comment);red;erythema   scar fading, pink and white, hypopigmented   -MW    Skin Observations Comment patches of increased vascularity (teleangiectasia?)  -MW          Measurement Type(s) --  -MW    Quick Girth Areas --  -MW          Manual Lymphatic Drainage initial sequence;opened regional lymph nodes;opened anastamoses;extremity treatment;astym  -MW    Initial Sequence supraclavicular;shoulder collectors  -MW    Supraclavicular right  -MW    Shoulder Collectors right  -MW    Opened Regional " Lymph Nodes axillary;inguinal  -MW    Axillary right;left  -MW    Inguinal right  -MW    Opened Anastamoses axillo-inguinal;anterior axillo-axillary  -MW    Axillo-Inguinal right  -MW    Extremity Treatment extremity treatment focus on;other extremity treatment  -MW    MLD to Full Limb RUE, MLD with tissue bending and lifting techniques   -MW    Extremity Treatment Focus On RUE and Rt shoulder, posterior ribs/ trunk   -MW    Astym upper traps;anterior-lateral chest  -MW    Anterior-Lateral Chest right  -MW    Anterior-Lateral Chest Comment In supine working superior-ant-lateral chest (superior and lateral to scar) with evaluator and localizer; min soft tissue disruptions noted  -MW    Upper Traps Comment Initiated tx in sitting on massage chair:  RT and LT UT: Evaluator and localizer to UT / scapular region; minimal fine soft tissue disruptions noted through out. Extra strokes at mid traps and leveator scapulea, and RT teres region. Continued down over Rt posterior and lateral ribs, with extra strokes at area of fullness/ thickened tissue.   -MW    Manual Lymphatic Drainage Comments MLD mixed with STM traps, Rt shoulder, with focus on Rt scapula, posterior axilla, Rt lateral trunk and ribs and then RUE.   -MW          Wrapping Location upper extremity  -MW    Wrapping Location UE hand to axilla  -MW      User Key  (r) = Recorded By, (t) = Taken By, (c) = Cosigned By    Initials Name Provider Type    MW Na Bergeron, PT Physical Therapist             Hand Therapy (last 24 hours)      Hand Eval     Row Name 05/24/18 1007             Subjective Pain    Able to rate subjective pain? yes  -ST      Pre-Treatment Pain Level 9  -ST      Post-Treatment Pain Level 8  -ST      Subjective Pain Comment RUE-scap region, axilla, forearm  -ST          Strength Right    Right  Test 1 5  -ST      Right  Test 2 4  -ST      Right  Test 3 5  -ST       Strength Average Right 4.67  -ST          Strength Left     Left  Test 1 55  -ST      Left  Test 2 55  -ST      Left  Test 3 51  -ST       Strength Average Left 53.67  -ST         Right Hand Strength - Pinch (lbs)    Lateral 4 lbs  -ST         Left Hand Strength - Pinch (lbs)    Lateral 11 lbs  -ST        User Key  (r) = Recorded By, (t) = Taken By, (c) = Cosigned By    Initials Name Provider Type    ST Eva Waldron OTR Occupational Therapist                          PT Assessment/Plan     Row Name 05/24/18 1100       PT Assessment    Functional Limitations Performance in self-care ADL;Limitation in home management;Impaired gait  -MW    Impairments Pain;Impaired lymphatic circulation;Edema;Joint mobility;Muscle strength;Impaired flexibility;Motor function;Impaired muscle length   stair climbing   -MW    Assessment Comments Pt with new arm sleeve with attached gauntlet which she prefers. STM loosening tissue restrictions and adhesions but still with significant pain and little carry over session to session. Will try to teach self mobs for between session work at Rt axilla to further loosen tissue restrictions.   -MW    Rehab Potential Fair  -MW    Patient/caregiver participated in establishment of treatment plan and goals Yes  -MW    Patient would benefit from skilled therapy intervention Yes  -MW       PT Plan    PT Frequency 2x/week  -MW    Predicted Duration of Therapy Intervention (OT Eval)  --    Physical Therapy Interventions (Optional Details) manual therapy techniques;manual lymphatic drainage;patient/family education;home exercise program;modalities;ROM (Range of Motion);strengthening;stretching  -MW    PT Plan Comments Cont STM/MLD with AAROM to attempt to improve function; IFC prn   -MW      User Key  (r) = Recorded By, (t) = Taken By, (c) = Cosigned By    Initials Name Provider Type    ST Eva Waldron OTR Occupational Therapist    FRAN Bergeron, PT Physical Therapist                            Manual Rx (last 36 hours)      Manual  Treatments     Row Name 05/24/18 1100             Manual Rx 1    Manual Rx 1 Location RT axilla, pectoralis major and RUE   -MW      Manual Rx 1 Type STM, tissue bending, lifting and space correction techniques  -MW      Manual Rx 1 Grade gentle to moderate with AAROM at shoulder and supination   -MW      Manual Rx 1 Duration 25  -MW         Manual Rx 2    Manual Rx 2 Location RT and LT UT/ mid thoracic spine, leveator scap, Rt ribs/ lateral trunk   -MW      Manual Rx 2 Type STM, tissue bending, lifting and space correction techniques  -MW      Manual Rx 2 Grade gentle to moderate with AAROM HBH, ER and elbow ext, forearm supination   -MW      Manual Rx 2 Duration 20  -MW        User Key  (r) = Recorded By, (t) = Taken By, (c) = Cosigned By    Initials Name Provider Type    MW Na Bergeron, MAT Physical Therapist              Therapy Education  Education Details: Brief TENS unit use and care; modulated mode; do NOT use while driving.   Given: Pain management, Symptoms/condition management  Program: Reinforced  How Provided: Verbal  Provided to: Patient  Level of Understanding: Verbalized              Time Calculation:   Start Time: 1100  Total Timed Code Minutes- PT: 55 minute(s)     Therapy Charges for Today     Code Description Service Date Service Provider Modifiers Qty    20035724126 HC PT MANUAL THERAPY EA 15 MIN 5/24/2018 Na Bergeron, PT GP 4                    Na Bergeron, PT  5/24/2018
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
## 2018-05-27 PROBLEM — G54.2: Status: ACTIVE | Noted: 2018-05-27

## 2018-05-29 ENCOUNTER — OFFICE VISIT (OUTPATIENT)
Dept: PAIN MEDICINE | Facility: CLINIC | Age: 68
End: 2018-05-29

## 2018-05-29 VITALS
RESPIRATION RATE: 16 BRPM | OXYGEN SATURATION: 97 % | HEIGHT: 64 IN | DIASTOLIC BLOOD PRESSURE: 80 MMHG | SYSTOLIC BLOOD PRESSURE: 160 MMHG | HEART RATE: 101 BPM | WEIGHT: 126.8 LBS | TEMPERATURE: 98.4 F | BODY MASS INDEX: 21.65 KG/M2

## 2018-05-29 DIAGNOSIS — I89.0 LYMPHEDEMA OF RIGHT ARM: ICD-10-CM

## 2018-05-29 DIAGNOSIS — Z92.3 HISTORY OF RADIATION THERAPY: ICD-10-CM

## 2018-05-29 DIAGNOSIS — G54.0 BRACHIAL PLEXOPATHY: ICD-10-CM

## 2018-05-29 DIAGNOSIS — F90.9 ATTENTION DEFICIT HYPERACTIVITY DISORDER (ADHD), UNSPECIFIED ADHD TYPE: ICD-10-CM

## 2018-05-29 DIAGNOSIS — C50.411 MALIGNANT NEOPLASM OF UPPER-OUTER QUADRANT OF RIGHT FEMALE BREAST, UNSPECIFIED ESTROGEN RECEPTOR STATUS (HCC): ICD-10-CM

## 2018-05-29 DIAGNOSIS — F41.1 GAD (GENERALIZED ANXIETY DISORDER): ICD-10-CM

## 2018-05-29 PROCEDURE — 99205 OFFICE O/P NEW HI 60 MIN: CPT | Performed by: ANESTHESIOLOGY

## 2018-05-29 RX ORDER — ME-TETRAHYDROFOLATE/B12/HRB236 1-1-500 MG
CAPSULE ORAL
Qty: 60 CAPSULE | Refills: 5 | Status: SHIPPED | OUTPATIENT
Start: 2018-05-29 | End: 2018-01-01

## 2018-05-29 RX ORDER — DIOSMIN COMPLEX NO.1 630 MG
TABLET ORAL
Qty: 60 TABLET | Refills: 5 | Status: SHIPPED | OUTPATIENT
Start: 2018-05-29 | End: 2018-01-01

## 2018-05-29 RX ORDER — DESIPRAMINE HYDROCHLORIDE 10 MG/1
TABLET ORAL
Qty: 60 TABLET | Refills: 5 | Status: SHIPPED | OUTPATIENT
Start: 2018-05-29 | End: 2018-01-01

## 2018-05-31 ENCOUNTER — TELEPHONE (OUTPATIENT)
Dept: FAMILY MEDICINE CLINIC | Facility: CLINIC | Age: 68
End: 2018-05-31

## 2018-05-31 ENCOUNTER — HOSPITAL ENCOUNTER (OUTPATIENT)
Dept: PHYSICAL THERAPY | Facility: HOSPITAL | Age: 68
Setting detail: THERAPIES SERIES
Discharge: HOME | End: 2018-05-31

## 2018-05-31 DIAGNOSIS — L30.9 DERMATITIS: ICD-10-CM

## 2018-05-31 DIAGNOSIS — L90.5 SCAR CONDITIONS AND FIBROSIS OF SKIN: Primary | ICD-10-CM

## 2018-05-31 DIAGNOSIS — I89.0 LYMPHEDEMA OF RIGHT UPPER EXTREMITY: ICD-10-CM

## 2018-05-31 DIAGNOSIS — M50.30 DDD (DEGENERATIVE DISC DISEASE), CERVICAL: Primary | ICD-10-CM

## 2018-05-31 DIAGNOSIS — M79.601 RIGHT ARM PAIN: ICD-10-CM

## 2018-05-31 PROCEDURE — 97140 MANUAL THERAPY 1/> REGIONS: CPT | Performed by: PHYSICAL THERAPIST

## 2018-05-31 NOTE — TELEPHONE ENCOUNTER
----- Message from April KLEBER Grant sent at 5/31/2018 12:31 PM EDT -----  Contact: PT   PT CAME INTO CLINIC REQUESTING A REFILL ON fluocinonide-emollient (LIDEX-E) 0.05 % cream  [facility] [address] - [address] - [phone]  - [phone] (Phone)  [phone] (Fax)

## 2018-05-31 NOTE — TELEPHONE ENCOUNTER
Provider:  Kavon  Caller: Brianna  Time of call: note left at 12PM  Phone #:  [phone]  Last visit:   04/03/18  Next visit: prn    Reason for call:         Pt left hand-written message with  requesting refill.

## 2018-06-04 ENCOUNTER — APPOINTMENT (OUTPATIENT)
Dept: OCCUPATIONAL THERAPY | Facility: HOSPITAL | Age: 68
End: 2018-06-04

## 2018-06-04 ENCOUNTER — APPOINTMENT (OUTPATIENT)
Dept: PHYSICAL THERAPY | Facility: HOSPITAL | Age: 68
End: 2018-06-04
Attending: INTERNAL MEDICINE

## 2018-06-07 ENCOUNTER — TELEPHONE (OUTPATIENT)
Dept: PAIN MEDICINE | Facility: CLINIC | Age: 68
End: 2018-06-07

## 2018-06-07 ENCOUNTER — HOSPITAL ENCOUNTER (OUTPATIENT)
Dept: PHYSICAL THERAPY | Facility: HOSPITAL | Age: 68
Setting detail: THERAPIES SERIES
Discharge: HOME | End: 2018-06-07
Attending: INTERNAL MEDICINE

## 2018-06-07 ENCOUNTER — HOSPITAL ENCOUNTER (OUTPATIENT)
Dept: OCCUPATIONAL THERAPY | Facility: HOSPITAL | Age: 68
Setting detail: THERAPIES SERIES
Discharge: HOME | End: 2018-06-07

## 2018-06-07 DIAGNOSIS — R27.8 DECREASED COORDINATION: ICD-10-CM

## 2018-06-07 DIAGNOSIS — I89.0 LYMPHEDEMA OF RIGHT UPPER EXTREMITY: ICD-10-CM

## 2018-06-07 DIAGNOSIS — M25.621 DECREASED ROM OF RIGHT ELBOW: ICD-10-CM

## 2018-06-07 DIAGNOSIS — M25.611 DECREASED ROM OF RIGHT SHOULDER: ICD-10-CM

## 2018-06-07 DIAGNOSIS — L90.5 SCAR CONDITIONS AND FIBROSIS OF SKIN: Primary | ICD-10-CM

## 2018-06-07 DIAGNOSIS — R29.898 DECREASED GRIP STRENGTH OF RIGHT HAND: ICD-10-CM

## 2018-06-07 DIAGNOSIS — Z78.9 IMPAIRED MOBILITY AND ADLS: Primary | ICD-10-CM

## 2018-06-07 DIAGNOSIS — M79.601 RIGHT ARM PAIN: ICD-10-CM

## 2018-06-07 DIAGNOSIS — Z74.09 IMPAIRED MOBILITY AND ADLS: Primary | ICD-10-CM

## 2018-06-07 DIAGNOSIS — M79.601 ARM PAIN, RIGHT: ICD-10-CM

## 2018-06-07 PROCEDURE — 97140 MANUAL THERAPY 1/> REGIONS: CPT | Performed by: OCCUPATIONAL THERAPIST

## 2018-06-07 PROCEDURE — 97530 THERAPEUTIC ACTIVITIES: CPT | Performed by: OCCUPATIONAL THERAPIST

## 2018-06-07 PROCEDURE — 97140 MANUAL THERAPY 1/> REGIONS: CPT | Performed by: PHYSICAL THERAPIST

## 2018-06-07 PROCEDURE — G8985 CARRY GOAL STATUS: HCPCS | Performed by: PHYSICAL THERAPIST

## 2018-06-07 PROCEDURE — G8984 CARRY CURRENT STATUS: HCPCS | Performed by: PHYSICAL THERAPIST

## 2018-06-07 NOTE — THERAPY TREATMENT NOTE
Outpatient Occupational Therapy Hand Treatment Note  [facility]     Patient Name: Brianna Urrutia  : 1950  MRN: [id number]  Today's Date: 2018         Visit Date: 2018  Patient Active Problem List   Diagnosis   • Malignant neoplasm of overlapping sites of right female breast   • Malignant neoplasm of upper-outer quadrant of right female breast   • Degenerative disc disease, cervical   • Brachial plexopathy   • Lymphedema of right arm   • History of radiation therapy   • JACQUI (generalized anxiety disorder)   • Attention deficit hyperactivity disorder (ADHD)         Visit Dx:    ICD-10-CM ICD-9-CM   1. Impaired mobility and ADLs Z74.09 799.89   2. Decreased  strength of right hand R29.898 729.89   3. Decreased ROM of right shoulder M25.611 719.51   4. Decreased ROM of right elbow M25.621 719.52   5. Decreased coordination R27.9 781.3   6. Arm pain, right M79.601 729.5                         OT Assessment/Plan     Row Name 18 1339          OT Assessment    Assessment Comments Pt's pain severe and impacting progress in session this date. Pt was seen last on May 24th d/t having to cancel appts b/c of pain. In this short time frame, pt noted to have profound muscle wasting in her R, dominant affected hand. What appears most affected are areas of the abductor & oppenens digiti minimi and opponens pollicis, dorsal interosseous and flexor/adductor pollicis muscles. Pt appears to have increased ulnar nerve impact when observing pt's hand position and movement pattern. Will defer tx until nerve block is placed by MD. Plan for nerve block to be placed next week.   -ST        OT Plan    OT Plan Comments Plan to continue skilled OT POC after placement of nerve block   -ST       User Key  (r) = Recorded By, (t) = Taken By, (c) = Cosigned By    Initials Name Provider Type    ADRIANA Porter Occupational Therapist                    OT Exercises     Row Name 18 1000             Subjective  "Comments    Subjective Comments \"I can't handle this pain\"  -ST         Subjective Pain    Able to rate subjective pain? yes  -ST      Pre-Treatment Pain Level 10  -ST      Post-Treatment Pain Level 10  -ST         Exercise 1    Exercise Name 1 OT completed positioning and mobilizing scapula along with cuing for re-positioning of RUE to allow for decreased tension along trap and rhomboids. Techniques done in PROM-AAROM. Progressed to scapular elevation and depression.  -ST      Time (Minutes) 1 15  -ST         Exercise 2    Exercise Name 2 neck rotation, f/e while OT palpating trap and providing manual cuing to relax impacted and painful muscle groups to improve shoulder ROM for fxnl use.   -ST         Exercise 3    Exercise Name 3 use of blue foam block for f/e of digits and addressing power grasp R hand  -ST      Reps 3 10  -ST         Exercise 4    Exercise Name 4 key grasp using blue foam block for strengthening and ROM  -ST      Reps 4 10  -ST        User Key  (r) = Recorded By, (t) = Taken By, (c) = Cosigned By    Initials Name Provider Type    ST Eva Waldron OTR Occupational Therapist                    OT Goals     Row Name 06/07/18 1357 06/07/18 1000       OT Short Term Goals    STG Date to Achieve  -- 06/23/18  -ST    STG 1  -- Pt will be independent with B FMC HEP's by 4 wks to increase independence with ADLs.  -ST    STG 1 Progress  -- Ongoing  -ST    STG 2  -- Pt will be independent with B hand strengthening HEP's by 4 wks to increase fxnl engagement in daily tasks.   -ST    STG 2 Progress  -- Ongoing  -ST    STG 3  -- Pt will be min A with RUE ROM HEP's by 4 wks to improve functional engagement in daily tasks.  -ST    STG 3 Progress  -- Ongoing  -ST       Long Term Goals    LTG Date to Achieve  -- 07/23/18  -ST    LTG 1  -- Pt will increase all ROM by 5* by d/c to demonstrate increased ability to perform ADL tasks.   -ST    LTG 1 Progress  -- Ongoing  -ST    LTG 2  -- Pt will increase R  " strength by 5# to improve fxnl engagement in daily tasks.   -ST    LTG 2 Progress  -- Ongoing  -ST    LTG 3  -- Pt will increase R Box and Blocks score by 5 block by d/c to demonstrate increased FMC accuracy and speed for ADL/IADL performance.  -ST    LTG 3 Progress  -- Ongoing  -ST       Time Calculation    OT Goal Re-Cert Due Date 08/22/18  -ST  --      User Key  (r) = Recorded By, (t) = Taken By, (c) = Cosigned By    Initials Name Provider Type     ADRIANA Ayala Occupational Therapist          Therapy Education  Given: Symptoms/condition management, Posture/body mechanics  Program: Reinforced  How Provided: Verbal, Demonstration  Provided to: Patient  Level of Understanding: Teach back education performed, Verbalized, Demonstrated               Time Calculation:   OT Start Time: 1000  Total Timed Code Minutes- OT: 53 minute(s)     Therapy Charges for Today     Code Description Service Date Service Provider Modifiers Qty    01074780936  OT MANUAL THERAPY EA 15 MIN 6/7/2018 Eva Waldron OTR GO 1    [id number]  OT THERAPEUTIC ACT EA 15 MIN 6/7/2018 ADRIANA Ayala GO 3                  ADRIANA Saldana  6/7/2018

## 2018-06-07 NOTE — THERAPY PROGRESS REPORT/RE-CERT
"    Outpatient Physical Therapy Lymphedema Progress Note  [facility]     Patient Name: Brianna Urrutia  : 1950  MRN: [id number]  Today's Date: 2018        Visit Date: 2018    Visit Dx:    ICD-10-CM ICD-9-CM   1. Scar conditions and fibrosis of skin L90.5 709.2   2. Lymphedema of right upper extremity I89.0 457.1   3. Right arm pain M79.601 729.5       Patient Active Problem List   Diagnosis   • Malignant neoplasm of overlapping sites of right female breast   • Malignant neoplasm of upper-outer quadrant of right female breast   • Degenerative disc disease, cervical   • Brachial plexopathy   • Lymphedema of right arm   • History of radiation therapy   • JACQUI (generalized anxiety disorder)   • Attention deficit hyperactivity disorder (ADHD)              Lymphedema     Row Name 18 1105          Able to rate subjective pain? yes  -MW    Pre-Treatment Pain Level 10  -MW    Post-Treatment Pain Level 8  -MW          Subjective Comments \"none of this stuff really works but lets put it on\" (topical pain meds)   -MW          Ptting Edema Category By severity  -MW    Pitting Edema Moderate;Mild  -MW    Edema Assessment Comment Mild lymphedema in hand/ fingers, mild to moderate in forearm and upper arm with persistent thickened area of moderate edema in lateral trunk and ribs.   -MW          Location/Assessment Upper Quadrant  -MW    Upper Extremity Conditions right:;intact;clean  -MW    Upper Quadrant Conditions right:;intact;clean  -MW    Upper Quadrant Color/Pigment right:;other (comment);red;erythema   scar fading, pink and white, hypopigmented   -MW    Skin Observations Comment persistent patches of increased vascularity (teleangiectasia?) which seem brighter / more red this visit   -MW          Measurement Type(s) Quick Girth  -MW    Quick Girth Areas Upper extremities  -MW          Axilla 28.7 cm  -MW    Mid upper arm 28 cm  -MW    Elbow 24.6 cm  -MW    Mid forearm 21 cm  -MW    Wrist crease 15.6 " cm  -MW    Web space 16.7 cm  -MW    Met-heads 17.5 cm  -MW    RUE Quick Girth Total 152.1  -MW          Manual Lymphatic Drainage initial sequence;opened regional lymph nodes;opened anastamoses;extremity treatment;astym  -MW    Initial Sequence supraclavicular;shoulder collectors  -MW    Supraclavicular right;left  -MW    Shoulder Collectors right;left  -MW    Opened Regional Lymph Nodes axillary;inguinal  -MW    Axillary right;left  -MW    Inguinal right  -MW    Opened Anastamoses axillo-inguinal;posterior axillo-axillary  -MW    Posterior Axillo-Axillary moving Rt to left   -MW    Axillo-Inguinal right  -MW    Extremity Treatment extremity treatment focus on;other extremity treatment  -MW    MLD to Full Limb RUE MLD with STM (tissue bending and lifting techniques)   -MW    Extremity Treatment Focus On RUE - upperarm and forearm; posterior-lateral ribs/ trunk   -MW    Astym upper traps;anterior-lateral chest  -MW    Anterior-Lateral Chest Comment In supine working superior-ant-lateral chest (superior and lateral to scar) with evaluator and localizer; min soft tissue disruptions noted. Repositioned in shoulder flexion / ABD to open axilla; evaluator and localizer; moderate course soft tissue disruptions noted.   -MW    Upper Traps right  -MW    Upper Traps Comment Initiated tx in sitting on massage chair:  RT UT: Evaluator and localizer to UT / scapular region; minimal fine soft tissue disruptions noted through out. Extra strokes at mid traps and leveator scapulea, and RT teres region. Continued down over Rt posterior and lateral ribs, with extra strokes at area of fullness/ thickened tissue. Also working into posterior upper arm with evaluator and localizer.   -MW    Manual Lymphatic Drainage Comments MLD mixed with STM   -MW          Wrapping Location upper extremity  -MW    Wrapping Location UE hand to axilla  -MW    Wrapping Comments assisted pt to don arm sleeve   -MW    Compression/Skin Care Comments assisted  pt to apply topical pain cream to Rt scapular area, shoulder and UE   -MW      User Key  (r) = Recorded By, (t) = Taken By, (c) = Cosigned By    Initials Name Provider Type    FRAN Bergeron PT Physical Therapist                  PT Ortho     Row Name 06/07/18 1105       Posture/Observations    Posture/Observations Comments Pt becoming more flexed, guarded with increased pain   -MW       Right Upper Ext    Rt Shoulder Abduction AROM 0-145 with c/o pain   -MW    Rt Shoulder Flexion AROM 0-150 with c/o pain (in standing)   -MW    Rt Shoulder External Rotation PROM 0-45 with support; c/o pain   -MW    Rt Shoulder Internal Rotation AROM hand to lower back only   -MW      User Key  (r) = Recorded By, (t) = Taken By, (c) = Cosigned By    Initials Name Provider Type    FRAN Bergeron PT Physical Therapist                        PT Assessment/Plan     Row Name 06/07/18 1103          PT Assessment    Functional Limitations Performance in self-care ADL;Limitation in home management;Impaired gait  -MW     Impairments Pain;Impaired lymphatic circulation;Edema;Joint mobility;Muscle strength;Impaired flexibility;Motor function;Impaired muscle length   stair climbing   -MW     Assessment Comments Pt with lower DASH score, increased signs of distress due to pain. Noted increased muscle wasting in Rt hand; OT also aware and following up with MD. Hopeful that Brachial Plexus nerve block scheduled for next week will provide pain relief so pt is able to better function. Very complex situation with multifactoral nerve pain, muscle weakness and atrophy; but pt has been evaluated by multiple MD's to assist with management but to no avail. Pt reports some temporary improvement with PT manual therapy interventions, but overall long term prognosis is unclear. Rt breast skin changes also seem to be more bright red with increased vascularity; encouraged pt to again follow up with Radiation or PCP for further assessment.   -MW      "Rehab Potential Poor  -MW     Patient/caregiver participated in establishment of treatment plan and goals Yes  -MW     Patient would benefit from skilled therapy intervention Yes  -MW        PT Plan    PT Frequency 2x/week  -MW     Predicted Duration of Therapy Intervention (OT Eval) 4 weeks   -MW     Planned CPT's? PT MANUAL THERAPY EA 15 MIN: 36496;PT THER PROC EA 15 MIN: 87609;PT ELECTRICAL STIM UNATTEND:   -MW     Physical Therapy Interventions (Optional Details) manual therapy techniques;manual lymphatic drainage;patient/family education;home exercise program;modalities;ROM (Range of Motion);strengthening;stretching  -MW     PT Plan Comments Cont STM/MLD with AAROM to attempt to improve function; modialites prn  -MW       User Key  (r) = Recorded By, (t) = Taken By, (c) = Cosigned By    Initials Name Provider Type    FRAN Bergeron, PT Physical Therapist                     Exercises     Row Name 06/07/18 1102             Subjective Comments    Subjective Comments \"none of this stuff really works but lets put it on\" (topical pain meds)   -MW         Subjective Pain    Able to rate subjective pain? yes  -MW      Pre-Treatment Pain Level 10  -MW      Post-Treatment Pain Level 8  -MW        User Key  (r) = Recorded By, (t) = Taken By, (c) = Cosigned By    Initials Name Provider Type    FRAN Bergeron, PT Physical Therapist                       Manual Rx (last 36 hours)      Manual Treatments     Row Name 06/07/18 1104             Manual Rx 1    Manual Rx 1 Location RT axilla, pectoralis major and RUE   -MW      Manual Rx 1 Type STM, tissue bending, lifting and space correction techniques  -MW      Manual Rx 1 Grade gentle to moderate with AAROM HBH, ER and elbow ext, forearm supination   -MW      Manual Rx 1 Duration 25  -MW         Manual Rx 2    Manual Rx 2 Location RT and LT UT/ mid thoracic spine, leveator scap, Rt ribs/ lateral trunk   -MW      Manual Rx 2 Type STM, tissue bending, lifting and " space correction techniques  -MW      Manual Rx 2 Duration 15  -MW        User Key  (r) = Recorded By, (t) = Taken By, (c) = Cosigned By    Initials Name Provider Type    MW Na Bergeron, PT Physical Therapist                PT OP Goals     Row Name 06/07/18 1105       PT Short Term Goals    STG 1 Patient to report 25% improvement in RUE pain  -MW    STG 1 Progress Not Met  -MW    STG 2 RUE circumferential measurement reduction by >/= 2 cm to promote decrease in pain and improved function.   -MW    STG 2 Progress Partially Met  -MW    STG 2 Progress Comments Circumferential measurements decrease but suspect muscle atrophy partially responsible  -MW    STG 3 Pt independent with basic home lymph massage to promote fluid movement and decrease in pain.   -MW    STG 3 Progress Met  -MW    STG 3 Progress Comments Pt continues to use home lymph pump   -MW    STG 4 Pt independent with HEP for LE flexibility and strength to improve function.   -MW    STG 4 Progress Partially Met  -MW    STG 5 Pt to report decrease in sleep distrubance due to Rt buttock pain; 20% improvement.   -MW    STG 5 Progress Met  -MW       Long Term Goals    LTG 1 Patient able to actively raise  degrees or better to improve ability to complete daily activities including fixing her hair  -MW    LTG 1 Progress Ongoing;Partially Met  -MW    LTG 2 Patient to be measured and fit with compression sleeve  -MW    LTG 2 Progress Met  -MW    LTG 3 Improved DASH score by 15 points to demonstrate improved function  /return to PLOF.   -MW    LTG 3 Progress Ongoing  -MW    LTG 3 Progress Comments DASH score increased as c/o pain and dysfunction increase; complex pain   -MW    LTG 4 Pt to demonstrate functional improvement of Rt hip/ buttock with improved LEFS by > 10 point.   -MW    LTG 4 Progress Ongoing  -MW    LTG 4 Progress Comments Hip pain and dysfunction appear to be masked by intensity of RUE pain.   -MW    LTG 5 Pt to report improved stair  climbing while carrying laundry or groceries.   -    LTG 5 Progress Ongoing;Partially Met  -    LTG 6 pt to demonstrate 10% improvement in neck AROM for improved driving safety.   -    LTG 6 Progress Partially Met  -       Time Calculation    PT Goal Re-Cert Due Date  --      User Key  (r) = Recorded By, (t) = Taken By, (c) = Cosigned By    Initials Name Provider Type    FRAN Bergeron, MAT Physical Therapist          Therapy Education  Education Details: Pt to consider option to follow up with MD or ED if pain persists or worsens.   Given: Pain management, Symptoms/condition management  Program: Reinforced  How Provided: Verbal  Provided to: Patient  Level of Understanding: Verbalized    Outcome Measure Options: Disabilities of the Arm, Shoulder, and Hand (DASH)  DASH  Open a tight or new jar.: Unable  Write: Unable  Turn a key: Unable  Prepare a meal: Unable  Push open a heavy door: Unable  Place an object on a shelf above your head: Unable  Do heavy household chores (e.g., wash walls, wash floors): Unable  Garden or do yard work: Unable  Make a bed: Unable  Carry a shopping bag or briefcase: Unable  Carry a heavy object (over 10 lbs): Unable  Change a lightbulb overhead: Unable  Wash or blow dry your hair: Severe Difficulty  Wash your back: Unable  Put on a pullover sweater: Severe Difficulty  Use a knife to cut food: Unable  Recreational activities in which require little effort (e.g., cardplaying, knitting, etc.): Unable  Recreational activities in which you take some force or impact through your arm, should or hand (e.g. golf, hammering, tennis, etc.): Unable  Recreational Activities in which you move your arm freely (e.g., frisbee, badminton, etc.): Unable  Manage transportation needs (getting from one place to another): Severe Difficulty  During the past week, to what extent has your arm, shoulder, or hand problem interfered with your normal social activites with family, friends, neighbors or  groups?: Extremely  During the past week, were you limited in your work or other regular daily activities as a result of your arm, shoulder or hand problem?: Unable  Arm, Shoulder, or hand pain: Extreme  Arm, shoulder or hand pain when you performed any specific activity: Extreme  Tingling (pins and needles) in your arm, shoulder, or hand: Severe  Weakness in your arm, shoulder or hand: Extreme  Stiffness in your arm, shoulder or hand: Moderate  During the past week, how much difficulty have you had sleeping because of the pain in your arm, shoulder or hand?: Moderate Difficiculty  I feel less capable, less confident or less useful because of my arm, shoulder or hand problem: Strongly Agree  DASH Sum : 137  Number of Questions Answered: 29  DASH Score: 93.1         Time Calculation:   Start Time: 1105  Total Timed Code Minutes- PT: 60 minute(s)     Therapy Charges for Today     Code Description Service Date Service Provider Modifiers Qty    40765041897 HC PT CARRY MOV HAND OBJ CURRENT 6/7/2018 Na Bergeron, PT GP, CM 1    66064026619 HC PT CARRY MOV HAND OBJ PROJECTED 6/7/2018 Na Bergeron, PT GP, CL 1    [id number] HC PT MANUAL THERAPY EA 15 MIN 6/7/2018 Na Bergeron, PT GP 4          PT G-Codes  PT Professional Judgement Used?: Yes  Outcome Measure Options: Disabilities of the Arm, Shoulder, and Hand (DASH)  Score: DASH raw score: 137 or 93% impaired   Functional Limitation: Carrying, moving and handling objects  Carrying, Moving and Handling Objects Current Status (): At least 80 percent but less than 100 percent impaired, limited or restricted  Carrying, Moving and Handling Objects Goal Status (): At least 60 percent but less than 80 percent impaired, limited or restricted         Na Bergeron, PT  6/7/2018

## 2018-06-11 ENCOUNTER — APPOINTMENT (OUTPATIENT)
Dept: OCCUPATIONAL THERAPY | Facility: HOSPITAL | Age: 68
End: 2018-06-11

## 2018-06-11 ENCOUNTER — HOSPITAL ENCOUNTER (OUTPATIENT)
Dept: PHYSICAL THERAPY | Facility: HOSPITAL | Age: 68
Setting detail: THERAPIES SERIES
Discharge: HOME | End: 2018-06-11
Attending: INTERNAL MEDICINE

## 2018-06-11 DIAGNOSIS — I89.0 LYMPHEDEMA OF RIGHT UPPER EXTREMITY: ICD-10-CM

## 2018-06-11 DIAGNOSIS — L90.5 SCAR CONDITIONS AND FIBROSIS OF SKIN: Primary | ICD-10-CM

## 2018-06-11 DIAGNOSIS — M79.601 RIGHT ARM PAIN: ICD-10-CM

## 2018-06-11 PROCEDURE — 97140 MANUAL THERAPY 1/> REGIONS: CPT | Performed by: PHYSICAL THERAPIST

## 2018-06-11 NOTE — THERAPY TREATMENT NOTE
"    Outpatient Physical Therapy Lymphedema Treatment Note  [facility]     Patient Name: Brianna Urrutia  : 1950  MRN: [id number]  Today's Date: 2018        Visit Date: 2018    Visit Dx:    ICD-10-CM ICD-9-CM   1. Scar conditions and fibrosis of skin L90.5 709.2   2. Lymphedema of right upper extremity I89.0 457.1   3. Right arm pain M79.601 729.5       Patient Active Problem List   Diagnosis   • Malignant neoplasm of overlapping sites of right female breast   • Malignant neoplasm of upper-outer quadrant of right female breast   • Degenerative disc disease, cervical   • Brachial plexopathy   • Lymphedema of right arm   • History of radiation therapy   • JACQUI (generalized anxiety disorder)   • Attention deficit hyperactivity disorder (ADHD)              Lymphedema     Row Name 18 1100          Able to rate subjective pain? yes  -MW    Pre-Treatment Pain Level 8  -MW    Post-Treatment Pain Level 6  -MW          Subjective Comments \"I had 36 hours of relief after last session\" Nerve block is Wednesday   -MW          Ptting Edema Category By severity  -MW    Pitting Edema Moderate;Mild  -MW    Edema Assessment Comment Mild in hand; mild to moderate in forearm and upperarm; moderate in Rt lateral trunk/ ribs.   -MW          Location/Assessment Upper Quadrant  -MW    Upper Extremity Conditions right:;intact;clean  -MW    Upper Quadrant Conditions right:;intact;clean  -MW    Upper Quadrant Color/Pigment right:;other (comment);red;erythema   scar cont to fade; areas of hypervascularity stable   -MW    Skin Observations Comment persistent patches of increased vascularity (teleangiectasia?)   -MW          Measurement Type(s) --  -MW    Quick Girth Areas --  -MW          Manual Lymphatic Drainage initial sequence;opened regional lymph nodes;opened anastamoses;extremity treatment;astym  -MW    Initial Sequence supraclavicular;shoulder collectors  -MW    Supraclavicular right;left  -MW    Shoulder " Collectors right;left  -MW    Opened Regional Lymph Nodes axillary;inguinal  -MW    Axillary right;left  -MW    Inguinal right  -MW    Opened Anastamoses axillo-inguinal;posterior axillo-axillary  -MW    Posterior Axillo-Axillary moving Rt to left   -MW    Axillo-Inguinal right  -MW    Extremity Treatment extremity treatment focus on;other extremity treatment  -MW    MLD to Full Limb RUE MLD with STM (tissue bending and lifting techniques)   -MW    Extremity Treatment Focus On RUE - upperarm and forearm; posterior-lateral ribs/ trunk   -MW    Astym upper traps;anterior-lateral chest  -MW    Anterior-Lateral Chest Comment In supine working superior-ant-lateral chest (superior and lateral to scar) with evaluator and localizer; min soft tissue disruptions noted. Repositioned in shoulder flexion / ABD to open axilla; evaluator and localizer; moderate course soft tissue disruptions noted.   -MW    Upper Traps right  -MW    Upper Traps Comment Initiated tx in sitting on massage chair:  RT UT: Evaluator and localizer to UT / scapular region; minimal fine soft tissue disruptions noted through out. Extra strokes at mid traps and leveator scapulea, and RT teres region. Continued down over Rt posterior and lateral ribs, with extra strokes at area of fullness/ thickened tissue.   -MW    Manual Lymphatic Drainage Comments MLD mixed with STM   -MW          Wrapping Location upper extremity  -MW    Wrapping Location UE hand to axilla  -MW    Wrapping Comments assisted pt to don arm sleeve   -MW    Compression/Skin Care Comments assisted pt to apply topical pain cream to Rt scapular area, shoulder and UE   -MW      User Key  (r) = Recorded By, (t) = Taken By, (c) = Cosigned By    Initials Name Provider Type    MW Na Bergeron, PT Physical Therapist                              PT Assessment/Plan     Row Name 06/11/18 1100          PT Assessment    Functional Limitations Performance in self-care ADL;Limitation in home  "management;Impaired gait  -MW     Impairments Pain;Impaired lymphatic circulation;Edema;Joint mobility;Muscle strength;Impaired flexibility;Motor function;Impaired muscle length   stair climbing   -     Assessment Comments Pt returns with reports of some improvement / decrease in RUE and breast -trunk pain but only temporary. Pt appears to be in less distress. Soft tissue disruptions improving with ASTYM to trunk. Forearm soft tissue dysfunction softer post STM but remains thickened and stiff. Color and skin changes of Rt breast appear stable. Hopeful that brachial plexus nerve block will provide good pain relief to allow more function of RUE.   -MW     Rehab Potential Fair  -MW     Patient/caregiver participated in establishment of treatment plan and goals Yes  -MW     Patient would benefit from skilled therapy intervention Yes  -MW        PT Plan    PT Frequency 2x/week  -MW     Physical Therapy Interventions (Optional Details) manual therapy techniques;manual lymphatic drainage;patient/family education;home exercise program;modalities;ROM (Range of Motion);strengthening;stretching  -MW     PT Plan Comments Cont STM/MLD with AAROM to attempt to improve function; modialites prn  -MW       User Key  (r) = Recorded By, (t) = Taken By, (c) = Cosigned By    Initials Name Provider Type    MW Na Bergeron, PT Physical Therapist                     Exercises     Row Name 06/11/18 1100             Subjective Comments    Subjective Comments \"I had 36 hours of relief after last session\" Nerve block is Wednesday   -         Subjective Pain    Able to rate subjective pain? yes  -MW      Pre-Treatment Pain Level 8  -MW      Post-Treatment Pain Level 6  -MW         Exercise 1    Exercise Name 1 Total exercise minutes: 5   -MW         Exercise 2    Exercise Name 2 Rt shoulder, elbow, forearm AAROM with STM   -MW        User Key  (r) = Recorded By, (t) = Taken By, (c) = Cosigned By    Initials Name Provider Type    MW " Na Bergeron, PT Physical Therapist                       Manual Rx (last 36 hours)      Manual Treatments     Row Name 06/11/18 1100             Manual Rx 1    Manual Rx 1 Location RT axilla, pectoralis major and RUE   -      Manual Rx 1 Type STM, tissue bending, lifting and space correction techniques  -      Manual Rx 1 Grade gentle to moderate with AAROM HBH, ER and elbow ext, forearm supination   -MW      Manual Rx 1 Duration 25  -MW         Manual Rx 2    Manual Rx 2 Location RT and LT UT/ mid thoracic spine, leveator scap, Rt ribs/ lateral trunk   -      Manual Rx 2 Type STM, tissue bending, lifting and space correction techniques  -MW      Manual Rx 2 Duration 20  -MW        User Key  (r) = Recorded By, (t) = Taken By, (c) = Cosigned By    Initials Name Provider Type     Na Bergeron, MAT Physical Therapist                             Time Calculation:   Start Time: 1100  Total Timed Code Minutes- PT: 60 minute(s)     Therapy Charges for Today     Code Description Service Date Service Provider Modifiers Qty    71219843499  PT MANUAL THERAPY EA 15 MIN 6/11/2018 Na Bergeron, PT GP 4                    Na Bergeron, PT  6/11/2018

## 2018-06-13 ENCOUNTER — OUTSIDE FACILITY SERVICE (OUTPATIENT)
Dept: PAIN MEDICINE | Facility: CLINIC | Age: 68
End: 2018-06-13

## 2018-06-13 ENCOUNTER — TELEPHONE (OUTPATIENT)
Dept: PAIN MEDICINE | Facility: CLINIC | Age: 68
End: 2018-06-13

## 2018-06-13 PROCEDURE — 76942 ECHO GUIDE FOR BIOPSY: CPT | Performed by: ANESTHESIOLOGY

## 2018-06-13 PROCEDURE — 99152 MOD SED SAME PHYS/QHP 5/>YRS: CPT | Performed by: ANESTHESIOLOGY

## 2018-06-13 PROCEDURE — 64415 NJX AA&/STRD BRCH PLXS IMG: CPT | Performed by: ANESTHESIOLOGY

## 2018-06-14 ENCOUNTER — HOSPITAL ENCOUNTER (OUTPATIENT)
Dept: PHYSICAL THERAPY | Facility: HOSPITAL | Age: 68
Setting detail: THERAPIES SERIES
Discharge: HOME | End: 2018-06-14
Attending: INTERNAL MEDICINE

## 2018-06-14 ENCOUNTER — TELEPHONE (OUTPATIENT)
Dept: PAIN MEDICINE | Facility: CLINIC | Age: 68
End: 2018-06-14

## 2018-06-14 DIAGNOSIS — M79.601 RIGHT ARM PAIN: ICD-10-CM

## 2018-06-14 DIAGNOSIS — I89.0 LYMPHEDEMA OF RIGHT UPPER EXTREMITY: ICD-10-CM

## 2018-06-14 DIAGNOSIS — L90.5 SCAR CONDITIONS AND FIBROSIS OF SKIN: Primary | ICD-10-CM

## 2018-06-14 PROCEDURE — 97140 MANUAL THERAPY 1/> REGIONS: CPT | Performed by: PHYSICAL THERAPIST

## 2018-06-14 NOTE — TELEPHONE ENCOUNTER
Patient had dx/tx right brachial plexus block on 06/13/2018. Called patient to f/u post procedure. Reached voicemail left message for return call

## 2018-06-14 NOTE — THERAPY TREATMENT NOTE
Outpatient Physical Therapy Lymphedema Treatment Note  [facility]     Patient Name: Brianna Urrutia  : 1950  MRN: [id number]  Today's Date: 2018        Visit Date: 2018    Visit Dx:    ICD-10-CM ICD-9-CM   1. Scar conditions and fibrosis of skin L90.5 709.2   2. Lymphedema of right upper extremity I89.0 457.1   3. Right arm pain M79.601 729.5       Patient Active Problem List   Diagnosis   • Malignant neoplasm of overlapping sites of right female breast   • Malignant neoplasm of upper-outer quadrant of right female breast   • Degenerative disc disease, cervical   • Brachial plexopathy   • Lymphedema of right arm   • History of radiation therapy   • JACQUI (generalized anxiety disorder)   • Attention deficit hyperactivity disorder (ADHD)              Lymphedema     Row Name 18 1100          Able to rate subjective pain? yes  -MW    Pre-Treatment Pain Level 6  -MW    Post-Treatment Pain Level 5  -MW          Subjective Comments Pt states unsure if nerve block in helping much but could take up to 3 days to know impact of injection as the local wears off and the block effects remain. wearing sling per Dr LINDQUIST's instructions.   -MW          Ptting Edema Category By severity  -MW    Pitting Edema Moderate;Mild  -MW    Edema Assessment Comment Mild in hand; mild to moderate in forearm and upperarm; mild to moderate in Rt lateral trunk/ ribs.   -MW          Location/Assessment Upper Quadrant  -MW    Upper Extremity Conditions right:;intact;clean  -MW    Upper Quadrant Conditions right:;intact;clean  -MW    Upper Quadrant Color/Pigment right:;other (comment);red;erythema   scar cont to fade; areas of hypervascularity stable   -MW    Skin Observations Comment persistent patches of increased vascularity (teleangiectasia?); injection site visible but no erythema   -MW          Manual Lymphatic Drainage initial sequence;opened regional lymph nodes;opened anastamoses;extremity treatment;astym  -MW     Initial Sequence supraclavicular;shoulder collectors  -MW    Supraclavicular right;left  -MW    Shoulder Collectors right;left  -MW    Opened Regional Lymph Nodes axillary;inguinal  -MW    Axillary right;left  -MW    Inguinal right  -MW    Opened Anastamoses axillo-inguinal;posterior axillo-axillary;anterior axillo-axillary  -MW    Posterior Axillo-Axillary moving Rt to left   -MW    Axillo-Inguinal right  -MW    Extremity Treatment extremity treatment focus on;other extremity treatment  -MW    MLD to Full Limb RUE MLD with STM (tissue bending and lifting techniques)   -MW    Extremity Treatment Focus On RUE - upperarm and forearm; posterior-lateral ribs/ trunk   -MW    Astym upper traps;anterior-lateral chest  -MW    Anterior-Lateral Chest Comment In supine working superior-ant-lateral chest (superior and lateral to scar) with evaluator and localizer; min soft tissue disruptions noted. Repositioned in shoulder flexion / ABD to open axilla; evaluator and localizer; moderate course soft tissue disruptions noted.   -MW    Upper Traps right  -MW    Upper Traps Comment Initiated tx in sitting on massage chair:  RT UT: Evaluator and localizer to UT / scapular region; minimal fine soft tissue disruptions noted through out. Extra strokes at mid traps and leveator scapulea, and RT teres region. Continued down over Rt posterior and lateral ribs wrapping around trunk; extra strokes at area of fullness/ thickened tissue.   -MW    Manual Lymphatic Drainage Comments MLD post ASTYM / STM to posterior trunk, then repositioned on tx table for ant-lat chest ASTYM, then MLD to RUE with STM   -MW          Wrapping Location upper extremity  -MW    Wrapping Location UE hand to axilla  -MW    Wrapping Comments assisted pt to don arm sleeve   -MW    Compression/Skin Care Comments assisted pt to apply topical pain cream to Rt scapular area, shoulder and UE   -MW      User Key  (r) = Recorded By, (t) = Taken By, (c) = Cosigned By     Initials Name Provider Type    FRAN Bergeron, PT Physical Therapist                              PT Assessment/Plan     Row Name 06/14/18 1100          PT Assessment    Functional Limitations Performance in self-care ADL;Limitation in home management;Impaired gait  -     Impairments Pain;Impaired lymphatic circulation;Edema;Joint mobility;Muscle strength;Impaired flexibility;Motor function;Impaired muscle length   stair climbing   -     Assessment Comments Pt returns one day post brachial plexus nerve block with RUE in sling. Difficult to determine if pain is improved but seems to have less active control of RUE; continues to have pain to palpation of Rt lateral scapular border, posterior arm, ventral aspect of forearm and ring/ pinky fingers, as well as breast area, though pt reports less tightness in lateral pectoralis region. Skin stable with hypervascularized areas of Rt breast. Pt reports improvement post ASTYM/ STM.   -MW     Rehab Potential Fair  -MW     Patient/caregiver participated in establishment of treatment plan and goals Yes  -MW     Patient would benefit from skilled therapy intervention Yes  -MW        PT Plan    PT Frequency 2x/week  -     Physical Therapy Interventions (Optional Details) manual therapy techniques;manual lymphatic drainage;patient/family education;home exercise program;modalities;ROM (Range of Motion);strengthening;stretching  -     PT Plan Comments Cont STM/ASTYM/MLD with AAROM to attempt to improve function; modialites prn  -       User Key  (r) = Recorded By, (t) = Taken By, (c) = Cosigned By    Initials Name Provider Type    FRAN Bergeron, PT Physical Therapist                     Exercises     Row Name 06/14/18 1100          Subjective Comments    Subjective Comments Pt states unsure if nerve block in helping much but could take up to 3 days to know impact of injection as the local wears off and the block effects remain. wearing sling per Dr Ibarra  instructions.   -MW        Subjective Pain    Able to rate subjective pain? yes  -MW     Pre-Treatment Pain Level 6  -MW     Post-Treatment Pain Level 5  -MW        Total Minutes    01268 - PT Manual Therapy Minutes  60         Exercise 1    Exercise Name 1 Rt shoulder, elbow, forearm AAROM with STM   -MW     Cueing 1 Tactile;Verbal  -MW     Reps 1 10  -MW       User Key  (r) = Recorded By, (t) = Taken By, (c) = Cosigned By    Initials Name Provider Type    MW Na Bergeron, PT Physical Therapist                            Therapy Education  Education Details: Pt to cont with self lymphedema management; RUE activities as tolerated / per MD instructions post nerve block   Given: Pain management, Symptoms/condition management  Program: Reinforced  How Provided: Verbal  Provided to: Patient  Level of Understanding: Verbalized              Time Calculation:   Start Time: 1100  Total Timed Code Minutes- PT: 60 minute(s)   Therapy Suggested Charges     Code   Minutes Charges    73761 (CPT®) Hc Pt Neuromusc Re Education Ea 15 Min      14159 (CPT®) Hc Pt Ther Proc Ea 15 Min      65817 (CPT®) Hc Gait Training Ea 15 Min      84251 (CPT®) Hc Pt Therapeutic Act Ea 15 Min      79297 (CPT®) Hc Pt Manual Therapy Ea 15 Min 60 4    29213 (CPT®) Hc Pt Ther Massage- Per 15 Min      40011 (CPT®) Hc Pt Iontophoresis Ea 15 Min      09863 (CPT®) Hc Pt Elec Stim Ea-Per 15 Min      47196 (CPT®) Hc Pt Ultrasound Ea 15 Min      64224 (CPT®) Hc Pt Self Care/Mgmt/Train Ea 15 Min      Total  60 4        Therapy Charges for Today     Code Description Service Date Service Provider Modifiers Qty    11713300806 HC PT MANUAL THERAPY EA 15 MIN 6/14/2018 Na Bergeron, PT GP 4                    Na Bergeron, PT  6/14/2018

## 2018-06-14 NOTE — TELEPHONE ENCOUNTER
Patient returned call regarding message left to f/u post procedure. She states that she is still having some arm pain but overall she is doing okay

## 2018-06-18 ENCOUNTER — TELEPHONE (OUTPATIENT)
Dept: FAMILY MEDICINE CLINIC | Facility: CLINIC | Age: 68
End: 2018-06-18

## 2018-06-18 RX ORDER — ONDANSETRON HYDROCHLORIDE 8 MG/1
8 TABLET, FILM COATED ORAL EVERY 8 HOURS PRN
Qty: 12 TABLET | Refills: 0 | Status: SHIPPED | OUTPATIENT
Start: 2018-06-18 | End: 2018-06-25 | Stop reason: SDUPTHER

## 2018-06-18 NOTE — TELEPHONE ENCOUNTER
Patient is asking for Zofran, she received samples before from her Chemo and would like a prescription.     [facility]

## 2018-06-19 ENCOUNTER — TELEPHONE (OUTPATIENT)
Dept: PAIN MEDICINE | Facility: CLINIC | Age: 68
End: 2018-06-19

## 2018-06-19 ENCOUNTER — TELEPHONE (OUTPATIENT)
Dept: FAMILY MEDICINE CLINIC | Facility: CLINIC | Age: 68
End: 2018-06-19

## 2018-06-25 ENCOUNTER — OFFICE VISIT (OUTPATIENT)
Dept: FAMILY MEDICINE CLINIC | Facility: CLINIC | Age: 68
End: 2018-06-25

## 2018-06-25 ENCOUNTER — LAB (OUTPATIENT)
Dept: LAB | Facility: HOSPITAL | Age: 68
End: 2018-06-25

## 2018-06-25 VITALS
HEIGHT: 64 IN | WEIGHT: 119 LBS | BODY MASS INDEX: 20.32 KG/M2 | HEART RATE: 87 BPM | DIASTOLIC BLOOD PRESSURE: 74 MMHG | OXYGEN SATURATION: 98 % | SYSTOLIC BLOOD PRESSURE: 132 MMHG | TEMPERATURE: 99 F

## 2018-06-25 DIAGNOSIS — F41.9 ANXIETY: ICD-10-CM

## 2018-06-25 DIAGNOSIS — R11.0 NAUSEA: Primary | ICD-10-CM

## 2018-06-25 DIAGNOSIS — R11.0 NAUSEA: ICD-10-CM

## 2018-06-25 DIAGNOSIS — B19.20 HEPATITIS C VIRUS INFECTION WITHOUT HEPATIC COMA, UNSPECIFIED CHRONICITY: ICD-10-CM

## 2018-06-25 DIAGNOSIS — M62.838 MUSCLE SPASM: ICD-10-CM

## 2018-06-25 LAB
ALBUMIN SERPL-MCNC: 4.59 G/DL (ref 3.2–4.8)
ALBUMIN/GLOB SERPL: 1.2 G/DL (ref 1.5–2.5)
ALP SERPL-CCNC: 94 U/L (ref 25–100)
ALT SERPL W P-5'-P-CCNC: 52 U/L (ref 7–40)
ANION GAP SERPL CALCULATED.3IONS-SCNC: 9 MMOL/L (ref 3–11)
AST SERPL-CCNC: 40 U/L (ref 0–33)
BASOPHILS # BLD AUTO: 0.02 10*3/MM3 (ref 0–0.2)
BASOPHILS NFR BLD AUTO: 0.2 % (ref 0–1)
BILIRUB CONJ SERPL-MCNC: 0.2 MG/DL (ref 0–0.2)
BILIRUB SERPL-MCNC: 0.6 MG/DL (ref 0.3–1.2)
BUN BLD-MCNC: 27 MG/DL (ref 9–23)
BUN/CREAT SERPL: 27 (ref 7–25)
CALCIUM SPEC-SCNC: 9.5 MG/DL (ref 8.7–10.4)
CHLORIDE SERPL-SCNC: 101 MMOL/L (ref 99–109)
CO2 SERPL-SCNC: 31 MMOL/L (ref 20–31)
CREAT BLD-MCNC: 1 MG/DL (ref 0.6–1.3)
DEPRECATED RDW RBC AUTO: 42.4 FL (ref 37–54)
EOSINOPHIL # BLD AUTO: 0.05 10*3/MM3 (ref 0–0.3)
EOSINOPHIL NFR BLD AUTO: 0.6 % (ref 0–3)
ERYTHROCYTE [DISTWIDTH] IN BLOOD BY AUTOMATED COUNT: 13 % (ref 11.3–14.5)
GFR SERPL CREATININE-BSD FRML MDRD: 56 ML/MIN/1.73
GLOBULIN UR ELPH-MCNC: 3.7 GM/DL
GLUCOSE BLD-MCNC: 87 MG/DL (ref 70–100)
HCT VFR BLD AUTO: 44.6 % (ref 34.5–44)
HCV AB SER DONR QL: REACTIVE
HGB BLD-MCNC: 14.9 G/DL (ref 11.5–15.5)
IMM GRANULOCYTES # BLD: 0.03 10*3/MM3 (ref 0–0.03)
IMM GRANULOCYTES NFR BLD: 0.4 % (ref 0–0.6)
LYMPHOCYTES # BLD AUTO: 1.48 10*3/MM3 (ref 0.6–4.8)
LYMPHOCYTES NFR BLD AUTO: 18.1 % (ref 24–44)
MCH RBC QN AUTO: 30 PG (ref 27–31)
MCHC RBC AUTO-ENTMCNC: 33.4 G/DL (ref 32–36)
MCV RBC AUTO: 89.7 FL (ref 80–99)
MONOCYTES # BLD AUTO: 1.05 10*3/MM3 (ref 0–1)
MONOCYTES NFR BLD AUTO: 12.9 % (ref 0–12)
NEUTROPHILS # BLD AUTO: 5.56 10*3/MM3 (ref 1.5–8.3)
NEUTROPHILS NFR BLD AUTO: 68.2 % (ref 41–71)
PLATELET # BLD AUTO: 403 10*3/MM3 (ref 150–450)
PMV BLD AUTO: 9.8 FL (ref 6–12)
POTASSIUM BLD-SCNC: 4.2 MMOL/L (ref 3.5–5.5)
PROT SERPL-MCNC: 8.3 G/DL (ref 5.7–8.2)
RBC # BLD AUTO: 4.97 10*6/MM3 (ref 3.89–5.14)
SODIUM BLD-SCNC: 141 MMOL/L (ref 132–146)
WBC NRBC COR # BLD: 8.16 10*3/MM3 (ref 3.5–10.8)

## 2018-06-25 PROCEDURE — 80053 COMPREHEN METABOLIC PANEL: CPT

## 2018-06-25 PROCEDURE — 86803 HEPATITIS C AB TEST: CPT

## 2018-06-25 PROCEDURE — 82248 BILIRUBIN DIRECT: CPT

## 2018-06-25 PROCEDURE — 99214 OFFICE O/P EST MOD 30 MIN: CPT | Performed by: PHYSICIAN ASSISTANT

## 2018-06-25 PROCEDURE — 85025 COMPLETE CBC W/AUTO DIFF WBC: CPT

## 2018-06-25 PROCEDURE — 36415 COLL VENOUS BLD VENIPUNCTURE: CPT

## 2018-06-25 PROCEDURE — 87522 HEPATITIS C REVRS TRNSCRPJ: CPT

## 2018-06-25 RX ORDER — TIZANIDINE 4 MG/1
TABLET ORAL
Qty: 30 TABLET | Refills: 0 | Status: SHIPPED | OUTPATIENT
Start: 2018-06-25 | End: 2018-07-10 | Stop reason: SDUPTHER

## 2018-06-25 RX ORDER — ONDANSETRON HYDROCHLORIDE 8 MG/1
8 TABLET, FILM COATED ORAL EVERY 8 HOURS PRN
Qty: 12 TABLET | Refills: 2 | Status: SHIPPED | OUTPATIENT
Start: 2018-06-25 | End: 2018-06-27 | Stop reason: DRUGHIGH

## 2018-06-25 NOTE — PROGRESS NOTES
Reagan Urrutia is a 67 y.o. female  Nausea (increased nausea x1 week )      History of Present Illness  Patient comes in today because of increased nauseousness expressing for the past week.  She states she is worried because she was told once that she had hepatitis C but hasn't had it followed up on.  She states she had a nerve block in her right shoulder performed about a week ago by Dr. Vásquez  and she thinks maybe this caused her to have nausea.  No vomiting, no diarrhea and no abdominal pain.  She states she's had a little muscle spasms and feels more anxious as well.  The following portions of the patient's history were reviewed and updated as appropriate: allergies, current medications, past social history and problem list    Review of Systems   Constitutional: Positive for activity change, appetite change and fatigue.   Gastrointestinal: Positive for nausea. Negative for abdominal distention, abdominal pain, constipation, diarrhea and vomiting.   Musculoskeletal: Positive for myalgias.   Psychiatric/Behavioral: The patient is nervous/anxious.        Objective     Vitals:    06/25/18 1513   BP: 132/74   Pulse: 87   Temp: 99 °F (37.2 °C)   SpO2: 98%       Physical Exam   Constitutional: She is oriented to person, place, and time. She appears well-developed and well-nourished.   HENT:   Head: Normocephalic and atraumatic.   Eyes: Conjunctivae are normal. No scleral icterus.   Neck: No thyroid mass and no thyromegaly present.   Cardiovascular: Normal rate, regular rhythm and normal heart sounds.    Pulmonary/Chest: Effort normal.   Neurological: She is alert and oriented to person, place, and time.   Skin: Skin is warm and dry. No rash noted. No erythema. No pallor.   Psychiatric: Her speech is normal and behavior is normal. Judgment and thought content normal. Her mood appears anxious. Her affect is not angry and not inappropriate. Cognition and memory are normal. She does not exhibit a depressed  mood. She is attentive.   Nursing note and vitals reviewed.      Assessment/Plan     Diagnoses and all orders for this visit:    Nausea  -     Comprehensive Metabolic Panel; Future  -     Hepatic Function Panel; Future  -     CBC & Differential; Future  -     Hepatitis C Antibody; Future    Hepatitis C virus infection without hepatic coma, unspecified chronicity  -     Comprehensive Metabolic Panel; Future  -     Hepatic Function Panel; Future  -     CBC & Differential; Future  -     Hepatitis C Antibody; Future    Anxiety    Muscle spasm    Other orders  -     ondansetron (ZOFRAN) 8 MG tablet; Take 1 tablet by mouth Every 8 (Eight) Hours As Needed for Nausea or Vomiting.  -     tiZANidine (ZANAFLEX) 4 MG tablet; Take 1/2 to 1 every 12 hours as needed for muscle spasms    I will contact patient when I receive her lab results and review them to determine further evaluation and treatment needed.

## 2018-06-26 ENCOUNTER — TELEPHONE (OUTPATIENT)
Dept: FAMILY MEDICINE CLINIC | Facility: CLINIC | Age: 68
End: 2018-06-26

## 2018-06-26 ENCOUNTER — HOSPITAL ENCOUNTER (OUTPATIENT)
Dept: PHYSICAL THERAPY | Facility: HOSPITAL | Age: 68
Setting detail: THERAPIES SERIES
Discharge: HOME | End: 2018-06-26

## 2018-06-26 DIAGNOSIS — M54.2 CERVICALGIA: ICD-10-CM

## 2018-06-26 DIAGNOSIS — L90.5 SCAR CONDITIONS AND FIBROSIS OF SKIN: Primary | ICD-10-CM

## 2018-06-26 DIAGNOSIS — I89.0 LYMPHEDEMA OF RIGHT UPPER EXTREMITY: ICD-10-CM

## 2018-06-26 DIAGNOSIS — M79.601 RIGHT ARM PAIN: ICD-10-CM

## 2018-06-26 PROCEDURE — 97140 MANUAL THERAPY 1/> REGIONS: CPT | Performed by: PHYSICAL THERAPIST

## 2018-06-26 NOTE — TELEPHONE ENCOUNTER
----- Message from Diana Call sent at 6/26/2018 10:53 AM EDT -----  Contact: VIRGIL CROWLEY, LAB, REN[facility], ASK FOR [phone]  PT. SEE'S ISAÍAS.  RENETTA FROM [facility] IS NEEDING MA TO CONTACT HER BACK @ [facility].

## 2018-06-26 NOTE — THERAPY TREATMENT NOTE
Outpatient Physical Therapy Lymphedema Treatment Note  [facility]     Patient Name: Brianna Urrutia  : 1950  MRN: [id number]  Today's Date: 2018        Visit Date: 2018    Visit Dx:    ICD-10-CM ICD-9-CM   1. Scar conditions and fibrosis of skin L90.5 709.2   2. Lymphedema of right upper extremity I89.0 457.1   3. Right arm pain M79.601 729.5   4. Cervicalgia M54.2 723.1       Patient Active Problem List   Diagnosis   • Malignant neoplasm of overlapping sites of right female breast   • Malignant neoplasm of upper-outer quadrant of right female breast   • Degenerative disc disease, cervical   • Brachial plexopathy   • Lymphedema of right arm   • History of radiation therapy   • JACQUI (generalized anxiety disorder)   • Attention deficit hyperactivity disorder (ADHD)              Lymphedema     Row Name 18 1115          Able to rate subjective pain? yes  -MW    Pre-Treatment Pain Level 8  -MW    Post-Treatment Pain Level 8  -MW          Subjective Comments Pt states she has been having N/V last week and over the weekend. Saw Ms Wade yesterday and got some more anti-nausea medicine but it was the wrong kind (not the dissolvable). C/o intense pain in Rt shoulder, arrm, elbow, hand and some pain also in Lt hand wrist.   -MW          Ptting Edema Category By severity  -MW    Pitting Edema Moderate;Mild  -MW    Edema Assessment Comment Mild in hand, forearm and ribs; mild to moderate in upper arm   -MW          Location/Assessment Upper Quadrant  -MW    Upper Extremity Conditions right:;intact;clean  -MW    Upper Quadrant Conditions right:;intact;clean  -MW    Upper Quadrant Color/Pigment right:;other (comment);red;erythema   scar cont to fade; areas of hypervascularity stable   -MW    Skin Observations Comment patches of hypervascularity on Rt breast slightly less bright - faded   -MW          Manual Lymphatic Drainage initial sequence;opened regional lymph nodes;opened anastamoses;extremity  treatment;astym  -MW    Initial Sequence supraclavicular  -MW    Supraclavicular right;left  -MW    Shoulder Collectors --  -MW    Opened Regional Lymph Nodes axillary;inguinal  -MW    Axillary right;left  -MW    Inguinal --  -MW    Opened Anastamoses anterior axillo-axillary  -MW    Posterior Axillo-Axillary moving Rt to left   -MW    Axillo-Inguinal right  -MW    Extremity Treatment extremity treatment focus on;other extremity treatment  -MW    MLD to Full Limb RUE MLD with STM (tissue bending and lifting techniques)   -MW    Extremity Treatment Focus On elbow/ distal upper arm area more fullness   -MW    Astym --  -MW    Upper Traps --  -MW    Manual Lymphatic Drainage Comments STM mixed with MLD seated on massage chair and in supine on tx table   -MW          Wrapping Location upper extremity  -MW    Wrapping Location UE hand to axilla  -MW    Wrapping Comments assisted pt to don arm sleeve and glove   -MW    Compression/Skin Care Comments assisted pt to apply topical pain cream to Rt scapular area, shoulder and UE   -MW      User Key  (r) = Recorded By, (t) = Taken By, (c) = Cosigned By    Initials Name Provider Type    MW Na Bergeron, PT Physical Therapist                              PT Assessment/Plan     Row Name 06/26/18 1113          PT Assessment    Functional Limitations Performance in self-care ADL;Limitation in home management;Impaired gait  -MW     Impairments Pain;Impaired lymphatic circulation;Edema;Joint mobility;Muscle strength;Impaired flexibility;Motor function;Impaired muscle length   stair climbing   -MW     Assessment Comments Pt now greater than 10 days post Rt brachial plexus nerve block and seems to have same or greater level of Rt UE pain complaints. RUE lymphedema stable with less edema in hand and forearm this visit; forearm softer less thickened tissues, but still complains of pain. Rt lateral-posterior rib fullness appears to have resolved, though area remains tender to  "palpation. Pt continues to report pain in Rt posterior axillary fold (teres mm group) but with radiating pain down arm and into fingers. Pt describes intense pain in finger nails, hand, wrist, elbow, scapular region, axilla and chest. Very complex situation as pain has been persistent inspite of multiple treatment modalities and seems to have been unaffected by plexus block. Suspect majority of pain is related to intensity of radiation therapy that was needed due to advanced stage of her breast cancer at time of diagnosis/ initial medical care. ASTYM and STM seem to have been helpful to improve soft tissue mobility and overall flexibility. Monitor RT hand as seemed to have a \"waxy\" appearance today; ? CRPS.   -MW     Rehab Potential Fair  -MW     Patient/caregiver participated in establishment of treatment plan and goals Yes  -MW     Patient would benefit from skilled therapy intervention Yes  -MW        PT Plan    PT Frequency 2x/week  -MW     Physical Therapy Interventions (Optional Details) manual therapy techniques;manual lymphatic drainage;patient/family education;home exercise program;modalities;ROM (Range of Motion);strengthening;stretching  -MW     PT Plan Comments Cont STM/ASTYM/MLD with AAROM to attempt to improve function; modialites prn; progress ther exer   -MW       User Key  (r) = Recorded By, (t) = Taken By, (c) = Cosigned By    Initials Name Provider Type    FRAN Bergeron, PT Physical Therapist                     Exercises     Row Name 06/26/18 5682          Subjective Comments    Subjective Comments Pt states she has been having N/V last week and over the weekend. Saw Ms Wade yesterday and got some more anti-nausea medicine but it was the wrong kind (not the dissolvable). C/o intense pain in Rt shoulder, arrm, elbow, hand and some pain also in Lt hand wrist.   -MW        Subjective Pain    Able to rate subjective pain? yes  -MW     Pre-Treatment Pain Level 8  -MW     Post-Treatment Pain " Level 8  -MW        Total Minutes    39798 - PT Manual Therapy Minutes  55         Exercise 1    Exercise Name 1 Rt shoulder, elbow, forearm AAROM with STM   -MW     Cueing 1 Tactile;Verbal  -MW     Reps 1 10  -MW     Additional Comments additional shoulder rotation   -MW       User Key  (r) = Recorded By, (t) = Taken By, (c) = Cosigned By    Initials Name Provider Type    MW Na Bergeron, PT Physical Therapist                                           Time Calculation:   Start Time: 1115  Total Timed Code Minutes- PT: 55 minute(s)   Therapy Suggested Charges     Code   Minutes Charges    08978 (CPT®) Hc Pt Neuromusc Re Education Ea 15 Min      84952 (CPT®) Hc Pt Ther Proc Ea 15 Min      96072 (CPT®) Hc Gait Training Ea 15 Min      90657 (CPT®) Hc Pt Therapeutic Act Ea 15 Min      29019 (CPT®) Hc Pt Manual Therapy Ea 15 Min 55 4    65255 (CPT®) Hc Pt Ther Massage- Per 15 Min      37269 (CPT®) Hc Pt Iontophoresis Ea 15 Min      62060 (CPT®) Hc Pt Elec Stim Ea-Per 15 Min      02334 (CPT®) Hc Pt Ultrasound Ea 15 Min      43460 (CPT®) Hc Pt Self Care/Mgmt/Train Ea 15 Min      Total  55 4        Therapy Charges for Today     Code Description Service Date Service Provider Modifiers Qty    74981931493 HC PT MANUAL THERAPY EA 15 MIN 6/26/2018 Na Bergeron, PT  4                    Na Bergeron, PT  6/26/2018

## 2018-06-27 ENCOUNTER — TELEPHONE (OUTPATIENT)
Dept: CARDIOLOGY | Facility: HOSPITAL | Age: 68
End: 2018-06-27

## 2018-06-27 RX ORDER — ONDANSETRON 8 MG/1
8 TABLET, ORALLY DISINTEGRATING ORAL EVERY 8 HOURS PRN
Qty: 12 TABLET | Refills: 2 | Status: SHIPPED | OUTPATIENT
Start: 2018-06-27 | End: 2018-01-01 | Stop reason: SDUPTHER

## 2018-06-27 NOTE — TELEPHONE ENCOUNTER
I don't know about a lozenge maybe she means a disintegrating tablet, they both work the same I would just use the one she has.  Her insurance probably not pay for a different one but you can check with the pharmacy.

## 2018-06-27 NOTE — TELEPHONE ENCOUNTER
----- Message from Katelynn Garcia sent at 6/27/2018  1:24 PM EDT -----  Contact: PT  HEVER WAS CALLED IN ON Monday, BUT WAS SUPPOSED TO BE FOR LOZENGES NOT THE TABLETS.     PLEASE CALL IN TO   [facility] [address] [place] [address] - [address] - [phone]  - [phone] (Phone)  [phone] (Fax)

## 2018-06-28 ENCOUNTER — HOSPITAL ENCOUNTER (OUTPATIENT)
Dept: PHYSICAL THERAPY | Facility: HOSPITAL | Age: 68
Setting detail: THERAPIES SERIES
Discharge: HOME | End: 2018-06-28

## 2018-06-28 ENCOUNTER — TELEPHONE (OUTPATIENT)
Dept: FAMILY MEDICINE CLINIC | Facility: CLINIC | Age: 68
End: 2018-06-28

## 2018-06-28 LAB
HCV RNA SERPL NAA+PROBE-ACNC: NORMAL IU/ML
HCV RNA SERPL NAA+PROBE-LOG IU: 5.98 LOG10 IU/ML
TEST INFORMATION: NORMAL

## 2018-06-28 PROCEDURE — 97140 MANUAL THERAPY 1/> REGIONS: CPT | Performed by: PHYSICAL THERAPIST

## 2018-06-28 NOTE — THERAPY TREATMENT NOTE
"    Outpatient Physical Therapy Lymphedema Treatment Note   [place]     Patient Name: Brianna Urrutia  : 1950  MRN: [id number]  Today's Date: 2018        Visit Date: 2018    Visit Dx:  No diagnosis found.    Patient Active Problem List   Diagnosis   • Malignant neoplasm of overlapping sites of right female breast   • Malignant neoplasm of upper-outer quadrant of right female breast   • Degenerative disc disease, cervical   • Brachial plexopathy   • Lymphedema of right arm   • History of radiation therapy   • JACQUI (generalized anxiety disorder)   • Attention deficit hyperactivity disorder (ADHD)              Lymphedema     Row Name 18 1100          Able to rate subjective pain? yes  -MW    Pre-Treatment Pain Level 6  -MW    Post-Treatment Pain Level 8  -MW          Subjective Comments Pt reports some relief for a \"good while\" after last session.  Did some research on radiation fibrosis and the pain that can happen from bone, and other tissues. Also thinks she has a rotator cuff issue that has gone untreated for several years.   -MW          Ptting Edema Category By severity  -MW    Pitting Edema Moderate;Mild  -MW    Edema Assessment Comment Only small area of moderate edema adjacent to elbow; forearm thickness continues to diminish   -MW          Location/Assessment Upper Quadrant  -MW    Upper Extremity Conditions right:;intact;clean  -MW    Upper Quadrant Conditions right:;intact;clean  -MW    Upper Quadrant Color/Pigment right:;other (comment);red;erythema   scar cont to fade; areas of hypervascularity stable   -MW    Skin Observations Comment patches of hypervascularity on Rt breast stable   -MW          Manual Lymphatic Drainage initial sequence;opened regional lymph nodes;opened anastamoses;extremity treatment;astym  -MW    Initial Sequence supraclavicular  -MW    Supraclavicular right;left  -MW    Shoulder Collectors right  -MW    Opened Regional Lymph Nodes axillary;inguinal  -MW "    Axillary right;left  -MW    Opened Anastamoses anterior axillo-axillary;posterior axillo-axillary;axillo-inguinal  -MW    Anterior Axillo-Axillary moving Rt to left   -MW    Posterior Axillo-Axillary moving Rt to left   -MW    Axillo-Inguinal right  -MW    Extremity Treatment extremity treatment focus on;other extremity treatment  -MW    MLD to Full Limb RUE MLD with STM (tissue bending and lifting techniques)   -MW    Extremity Treatment Focus On elbow and forearm thickness   -MW    Astym anterior-lateral chest  -MW    Anterior-Lateral Chest Comment In supine working superior-ant-lateral chest with evaluator and localizer; min soft tissue disruptions noted. Repositioned in shoulder flexion / ABD to open axilla; evaluator and localizer; moderate course soft tissue disruptions noted. Continued into lateral trunk adjacent to breast. Mild to moderate fine to course soft tissue disruptions noted.   -MW    Manual Lymphatic Drainage Comments STM mixed with MLD seated on massage chair and in supine on tx table   -MW          Wrapping Location upper extremity  -MW    Wrapping Location UE hand to axilla  -MW    Wrapping Comments assisted pt to don arm sleeve   -MW    Compression/Skin Care Comments assisted pt to apply topical pain cream to Rt scapular area, shoulder and UE   -MW      User Key  (r) = Recorded By, (t) = Taken By, (c) = Cosigned By    Initials Name Provider Type    MW Na Bergeron, PT Physical Therapist                              PT Assessment/Plan     Row Name 06/28/18 1100          PT Assessment    Functional Limitations Performance in self-care ADL;Limitation in home management;Impaired gait  -MW     Impairments Pain;Impaired lymphatic circulation;Edema;Joint mobility;Muscle strength;Impaired flexibility;Motor function;Impaired muscle length   stair climbing   -MW     Assessment Comments Pt reports better relief with new muscle relaxer medication; also reports some relief of pain after last PT  "session. Overall continues to report high levels of pain. Resumed more aggressive STM to mobilize soft tissue restrictions around Rt shoulder XRT areas. Continue to note cooling of RT distal limb during tx and pale, waxy appearance of hand intermittently during tx. Encouraged pt to follow up for continued assistance for pain relief / treatment options for improved QOL.   -MW     Rehab Potential Fair  -MW     Patient/caregiver participated in establishment of treatment plan and goals Yes  -MW     Patient would benefit from skilled therapy intervention Yes  -MW        PT Plan    PT Frequency 2x/week  -MW     Physical Therapy Interventions (Optional Details) manual therapy techniques;manual lymphatic drainage;patient/family education;home exercise program;modalities;ROM (Range of Motion);strengthening;stretching  -MW     PT Plan Comments Cont STM/ASTYM/MLD with AAROM to attempt to improve function; modialites prn; progress ther exer   -MW       User Key  (r) = Recorded By, (t) = Taken By, (c) = Cosigned By    Initials Name Provider Type    FRAN Bergeron, PT Physical Therapist                     Exercises     Row Name 06/28/18 1100             Subjective Comments    Subjective Comments Pt reports some relief for a \"good while\" after last session.  Did some research on radiation fibrosis and the pain that can happen from bone, and other tissues. Also thinks she has a rotator cuff issue that has gone untreated for several years.   -MW         Subjective Pain    Able to rate subjective pain? yes  -MW      Pre-Treatment Pain Level 6  -MW      Post-Treatment Pain Level 8  -MW         Total Minutes    35079 - PT Manual Therapy Minutes 55  -MW         Exercise 1    Exercise Name 1 Rt shoulder, elbow, forearm AAROM with STM   -MW      Cueing 1 Tactile;Verbal  -MW      Reps 1 10  -MW        User Key  (r) = Recorded By, (t) = Taken By, (c) = Cosigned By    Initials Name Provider Type    FRAN Bergeron, PT Physical " Therapist                       Manual Rx (last 36 hours)      Manual Treatments     Row Name 06/28/18 1100             Total Minutes    52256 - PT Manual Therapy Minutes 55  -MW         Manual Rx 1    Manual Rx 1 Location RT axilla, pectoralis major and RUE   -MW      Manual Rx 1 Type STM, tissue bending, lifting and space correction techniques mixed with MLD   -MW      Manual Rx 1 Grade gentle to moderate with AAROM HBH, ER and elbow ext, forearm supination   -MW      Manual Rx 1 Duration 25  -MW         Manual Rx 2    Manual Rx 2 Location RT and LT UT/ mid thoracic spine, leveator scap  -MW      Manual Rx 2 Type STM, tissue bending, lifting and space correction techniques mixed with MLD   -MW      Manual Rx 2 Grade gentle to moderate pressures   -MW      Manual Rx 2 Duration 20  -MW        User Key  (r) = Recorded By, (t) = Taken By, (c) = Cosigned By    Initials Name Provider Type    FRAN Bergeron, PT Physical Therapist              Therapy Education  Given: Pain management, Symptoms/condition management  Program: Reinforced  How Provided: Verbal  Provided to: Patient  Level of Understanding: Verbalized              Time Calculation:   Start Time: 1100  Total Timed Code Minutes- PT: 55 minute(s)   Therapy Suggested Charges     Code   Minutes Charges    57678 (CPT®) Hc Pt Neuromusc Re Education Ea 15 Min      42161 (CPT®) Hc Pt Ther Proc Ea 15 Min      36674 (CPT®) Hc Gait Training Ea 15 Min      45762 (CPT®) Hc Pt Therapeutic Act Ea 15 Min      29730 (CPT®) Hc Pt Manual Therapy Ea 15 Min 55 4    33662 (CPT®) Hc Pt Ther Massage- Per 15 Min      26638 (CPT®) Hc Pt Iontophoresis Ea 15 Min      21820 (CPT®) Hc Pt Elec Stim Ea-Per 15 Min      40159 (CPT®) Hc Pt Ultrasound Ea 15 Min      28152 (CPT®) Hc Pt Self Care/Mgmt/Train Ea 15 Min      Total  55 4        Therapy Charges for Today     Code Description Service Date Service Provider Modifiers Qty    63052222517 HC PT MANUAL THERAPY EA 15 MIN 6/28/2018  Na Bergeron, PT GP 4                    Na Bergeron, PT  6/28/2018

## 2018-07-02 ENCOUNTER — HOSPITAL ENCOUNTER (OUTPATIENT)
Dept: PHYSICAL THERAPY | Facility: HOSPITAL | Age: 68
Setting detail: THERAPIES SERIES
Discharge: HOME | End: 2018-07-02
Attending: INTERNAL MEDICINE

## 2018-07-02 ENCOUNTER — TRANSCRIBE ORDERS (OUTPATIENT)
Dept: FAMILY MEDICINE CLINIC | Facility: CLINIC | Age: 68
End: 2018-07-02

## 2018-07-02 DIAGNOSIS — M79.601 RIGHT ARM PAIN: ICD-10-CM

## 2018-07-02 DIAGNOSIS — I89.0 LYMPHEDEMA OF RIGHT UPPER EXTREMITY: ICD-10-CM

## 2018-07-02 DIAGNOSIS — B18.2 CHRONIC HEPATITIS C WITHOUT HEPATIC COMA (HCC): Primary | ICD-10-CM

## 2018-07-02 DIAGNOSIS — L90.5 SCAR CONDITIONS AND FIBROSIS OF SKIN: Primary | ICD-10-CM

## 2018-07-02 PROCEDURE — 97140 MANUAL THERAPY 1/> REGIONS: CPT | Performed by: PHYSICAL THERAPIST

## 2018-07-02 NOTE — THERAPY TREATMENT NOTE
"    Outpatient Physical Therapy Lymphedema Treatment Note   [facility]     Patient Name: Brianna Urrutia  : 1950  MRN: [id number]  Today's Date: 2018        Visit Date: 2018    Visit Dx:    ICD-10-CM ICD-9-CM   1. Scar conditions and fibrosis of skin L90.5 709.2   2. Lymphedema of right upper extremity I89.0 457.1   3. Right arm pain M79.601 729.5       Patient Active Problem List   Diagnosis   • Malignant neoplasm of overlapping sites of right female breast (CMS/HCC)   • Malignant neoplasm of upper-outer quadrant of right female breast (CMS/HCC)   • Degenerative disc disease, cervical   • Brachial plexopathy   • Lymphedema of right arm   • History of radiation therapy   • JACQUI (generalized anxiety disorder)   • Attention deficit hyperactivity disorder (ADHD)              Lymphedema     Row Name 18 1110          Able to rate subjective pain? yes  -MW    Pre-Treatment Pain Level 6  -MW    Post-Treatment Pain Level 9  -MW          Subjective Comments Pt reports got some relief from last session. Notes she saw her chiropracter this morning and he worked on her upper spine. C/o intense pain in RT forearm, and upper arm and lateral chest toward end of session. Pt requested moist heat to calm the mm spasm and \"cold\"   -MW          Ptting Edema Category By severity  -MW    Pitting Edema Moderate;Mild  -MW    Edema Assessment Comment Only small area of moderate edema adjacent to elbow; forearm thickness continues to diminish   -MW          Location/Assessment Upper Quadrant  -MW    Upper Extremity Conditions right:;intact;clean  -MW    Upper Extremity Color/Pigment right:;other (comment)   pale, \"waxy\" appearance of hand & forearm when \"cold\"   -MW    Upper Quadrant Conditions right:;intact;clean  -MW    Upper Quadrant Color/Pigment right:;other (comment);red;erythema   scar cont to fade; areas of hypervascularity stable   -MW    Skin Observations Comment patches of hypervascularity on Rt breast " stable, but increased post STM / ASTYM   -MW          Manual Lymphatic Drainage initial sequence;opened regional lymph nodes;opened anastamoses;extremity treatment;astym  -MW    Initial Sequence supraclavicular  -MW    Supraclavicular right;left  -MW    Shoulder Collectors right  -MW    Opened Regional Lymph Nodes axillary;inguinal  -MW    Axillary right  -MW    Inguinal right  -MW    Opened Anastamoses axillo-inguinal  -MW    Axillo-Inguinal right  -MW    Extremity Treatment extremity treatment focus on;other extremity treatment  -MW    MLD to Full Limb RUE MLD with STM (tissue bending and lifting techniques)   -MW    Extremity Treatment Focus On forearm thickness   -MW    Astym anterior-lateral chest  -MW    Anterior-Lateral Chest Comment In supine working superior-ant-lateral chest with evaluator and localizer; min soft tissue disruptions noted. Repositioned in shoulder flexion / ABD to open axilla (multiple attempts to position with pillows before pt able to relax in position); evaluator and localizer; moderate course soft tissue disruptions noted. Continued into lateral trunk adjacent to breast. Mild to moderate fine to course soft tissue disruptions noted.   -MW    Manual Lymphatic Drainage Comments MLD mixed with STM and after ASTYM   -MW          Wrapping Location upper extremity  -MW    Wrapping Location UE hand to axilla  -MW      User Key  (r) = Recorded By, (t) = Taken By, (c) = Cosigned By    Initials Name Provider Type    FRAN Bergeron, PT Physical Therapist                              PT Assessment/Plan     Row Name 07/02/18 1110          PT Assessment    Functional Limitations Performance in self-care ADL;Limitation in home management;Impaired gait  -MW     Impairments Pain;Impaired lymphatic circulation;Edema;Joint mobility;Muscle strength;Impaired flexibility;Motor function;Impaired muscle length   stair climbing   -MW     Assessment Comments Pt c/o pain in Rt lateral chest - tightness, then  "c/o increased pain in RUE into hand and fingers (\"Like glass cutting\"). Pt more limited in Rt shoulder AA/PROM for positioning for manual therapy treatment. RUE lymphedema stable; elbow edema reduced and forearm edema less thick. Rt breast skin changes stable. Hand cool and waxy when pt \"reports arm going cold.\"  May benefit from assessment for chronic regional pain syndrome.   -MW     Rehab Potential Poor  -MW     Patient/caregiver participated in establishment of treatment plan and goals Yes  -MW     Patient would benefit from skilled therapy intervention Yes  -MW        PT Plan    PT Frequency 2x/week  -MW     Physical Therapy Interventions (Optional Details) manual therapy techniques;manual lymphatic drainage;patient/family education;home exercise program;modalities;ROM (Range of Motion);strengthening;stretching  -MW     PT Plan Comments Cont STM/ASTYM/MLD with AAROM to attempt to improve function; modialites prn; progress ther exer   -MW       User Key  (r) = Recorded By, (t) = Taken By, (c) = Cosigned By    Initials Name Provider Type    FRAN Bergeron, MAT Physical Therapist                 Modalities     Row Name 07/02/18 1110             Moist Heat    MH Applied Yes  -MW      Location RUE, shoulder/ chest   -MW      Rx Minutes Other:   20 min   -MW      MH Prior to Rx No  -MW      MH S/P Rx Yes  -MW        User Key  (r) = Recorded By, (t) = Taken By, (c) = Cosigned By    Initials Name Provider Type    FRAN Bergeron, PT Physical Therapist                Exercises     Row Name 07/02/18 1110             Subjective Comments    Subjective Comments Pt reports got some relief from last session. Notes she saw her chiropracter this morning and he worked on her upper spine. C/o intense pain in RT forearm, and upper arm and lateral chest toward end of session. Pt requested moist heat to calm the mm spasm and \"cold\"   -MW         Subjective Pain    Able to rate subjective pain? yes  -MW      Pre-Treatment " Pain Level 6  -MW      Post-Treatment Pain Level 9  -MW         Total Minutes    71634 - PT Manual Therapy Minutes 53  -MW         Exercise 1    Exercise Name 1 Rt shoulder, elbow, forearm, wrist AAROM with STM   -MW      Cueing 1 Tactile;Verbal  -MW      Reps 1 10  -MW         Exercise 2    Exercise Name 2 RT ulnar, median and radial N glides   -MW      Cueing 2 Tactile  -MW      Reps 2 2 each   -MW      Additional Comments very gentle and slow movements; monitoring pain closely   -MW        User Key  (r) = Recorded By, (t) = Taken By, (c) = Cosigned By    Initials Name Provider Type    FRAN Bergeron PT Physical Therapist                       Manual Rx (last 36 hours)      Manual Treatments     Row Name 07/02/18 1110             Total Minutes    33342 - PT Manual Therapy Minutes 53  -MW         Manual Rx 1    Manual Rx 1 Location RT axilla, pectoralis major and RUE   -MW      Manual Rx 1 Type STM, tissue bending, lifting and space correction techniques mixed with MLD   -MW      Manual Rx 1 Grade gentle with limited ROM AAROM HBH, ER and elbow ext, forearm supination   -MW      Manual Rx 1 Duration 25  -MW         Manual Rx 2    Manual Rx 2 Location RT UT/ mid thoracic spine, leveator scap  -MW      Manual Rx 2 Type STM, tissue bending, lifting and space correction techniques mixed with MLD   -MW      Manual Rx 2 Grade gentle to moderate pressures   -MW      Manual Rx 2 Duration 20  -MW        User Key  (r) = Recorded By, (t) = Taken By, (c) = Cosigned By    Initials Name Provider Type    FRAN Bergeron PT Physical Therapist                             Time Calculation:   Start Time: 1110  Total Timed Code Minutes- PT: 53 minute(s)   Therapy Suggested Charges     Code   Minutes Charges    14945 (CPT®) Hc Pt Neuromusc Re Education Ea 15 Min      48314 (CPT®) Hc Pt Ther Proc Ea 15 Min      89519 (CPT®) Hc Gait Training Ea 15 Min      14929 (CPT®) Hc Pt Therapeutic Act Ea 15 Min      42405 (CPT®) Hc Pt  Manual Therapy Ea 15 Min 53 4    50175 (CPT®) Hc Pt Ther Massage- Per 15 Min      14278 (CPT®) Hc Pt Iontophoresis Ea 15 Min      26303 (CPT®) Hc Pt Elec Stim Ea-Per 15 Min      52066 (CPT®) Hc Pt Ultrasound Ea 15 Min      27509 (CPT®) Hc Pt Self Care/Mgmt/Train Ea 15 Min      Total  53 4        Therapy Charges for Today     Code Description Service Date Service Provider Modifiers Qty    21404978588 HC PT MANUAL THERAPY EA 15 MIN 7/2/2018 Na Bergeron, PT GP 4                    Na Bergeron, PT  7/2/2018

## 2018-07-05 ENCOUNTER — HOSPITAL ENCOUNTER (OUTPATIENT)
Dept: PHYSICAL THERAPY | Facility: HOSPITAL | Age: 68
Setting detail: THERAPIES SERIES
Discharge: HOME | End: 2018-07-05
Attending: INTERNAL MEDICINE

## 2018-07-05 ENCOUNTER — DOCUMENTATION (OUTPATIENT)
Dept: OCCUPATIONAL THERAPY | Facility: HOSPITAL | Age: 68
End: 2018-07-05

## 2018-07-05 DIAGNOSIS — M79.601 ARM PAIN, RIGHT: ICD-10-CM

## 2018-07-05 DIAGNOSIS — R27.8 DECREASED COORDINATION: ICD-10-CM

## 2018-07-05 DIAGNOSIS — M25.611 DECREASED ROM OF RIGHT SHOULDER: ICD-10-CM

## 2018-07-05 DIAGNOSIS — L90.5 SCAR CONDITIONS AND FIBROSIS OF SKIN: Primary | ICD-10-CM

## 2018-07-05 DIAGNOSIS — I89.0 LYMPHEDEMA OF RIGHT UPPER EXTREMITY: ICD-10-CM

## 2018-07-05 DIAGNOSIS — Z74.09 IMPAIRED MOBILITY AND ADLS: Primary | ICD-10-CM

## 2018-07-05 DIAGNOSIS — R29.898 DECREASED GRIP STRENGTH OF RIGHT HAND: ICD-10-CM

## 2018-07-05 DIAGNOSIS — Z78.9 IMPAIRED MOBILITY AND ADLS: Primary | ICD-10-CM

## 2018-07-05 DIAGNOSIS — M25.621 DECREASED ROM OF RIGHT ELBOW: ICD-10-CM

## 2018-07-05 DIAGNOSIS — M79.601 RIGHT ARM PAIN: ICD-10-CM

## 2018-07-05 PROCEDURE — G8984 CARRY CURRENT STATUS: HCPCS | Performed by: PHYSICAL THERAPIST

## 2018-07-05 PROCEDURE — G8985 CARRY GOAL STATUS: HCPCS | Performed by: PHYSICAL THERAPIST

## 2018-07-05 PROCEDURE — 97140 MANUAL THERAPY 1/> REGIONS: CPT | Performed by: PHYSICAL THERAPIST

## 2018-07-05 NOTE — THERAPY DISCHARGE NOTE
Outpatient Occupational Therapy Discharge Summary         Patient Name: Brianna Urrutia  : 1950  MRN: [id number]    Today's Date: 2018      Visit Date: 2018            OT Goals     Row Name 18 0633          OT Short Term Goals    STG Date to Achieve 18  -ST     STG 1 Pt will be independent with B FMC HEP's by 4 wks to increase independence with ADLs.  -ST     STG 1 Progress Partially Met  -ST     STG 2 Pt will be independent with B hand strengthening HEP's by 4 wks to increase fxnl engagement in daily tasks.   -ST     STG 2 Progress Partially Met  -ST     STG 3 Pt will be min A with RUE ROM HEP's by 4 wks to improve functional engagement in daily tasks.  -ST     STG 3 Progress Partially Met  -ST        Long Term Goals    LTG Date to Achieve 18  -ST     LTG 1 Pt will increase all ROM by 5* by d/c to demonstrate increased ability to perform ADL tasks.   -ST     LTG 1 Progress Not Met  -ST     LTG 2 Pt will increase R  strength by 5# to improve fxnl engagement in daily tasks.   -ST     LTG 2 Progress Progressing  -ST     LTG 3 Pt will increase R Box and Blocks score by 5 block by d/c to demonstrate increased FMC accuracy and speed for ADL/IADL performance.  -ST     LTG 3 Progress Not Met  -ST       User Key  (r) = Recorded By, (t) = Taken By, (c) = Cosigned By    Initials Name Provider Type    ST Eva Waldron, OTR Occupational Therapist           OP OT Discharge Summary  Date of Discharge: 18  Reason for Discharge: Lack of progress, Change in medical status  Outcomes Achieved: Refer to plan of care for updates on goals achieved  Discharge Destination: Home with home program  Discharge Instructions: Pt to follow HEPs as instructed as tolerated. Pt's diagnositic tests and lack of improvement and actual decline in muscle strength, ROM and increasing pain, indicate that she is no longer appropriate for skilled OT intervention at this time. Nerve pain and symptoms are not  controlled despite mult. disciplines and therefore hinder participation in therapy. Will d/c POC and goals at this time.       Time Calculation:         Therapy Suggested Charges     Code   Minutes Charges    None                 OT G-codes  OT Professional Judgement Used?: Yes  Functional Limitation: Carrying, moving and handling objects  Carrying, Moving and Handling Objects Current Status (): At least 60 percent but less than 80 percent impaired, limited or restricted  Carrying, Moving and Handling Objects Goal Status (): At least 60 percent but less than 80 percent impaired, limited or restricted  Carrying, Moving and Handling Objects Discharge Status (): At least 60 percent but less than 80 percent impaired, limited or restricted           Eva Waldron, OTR   7/5/2018

## 2018-07-05 NOTE — THERAPY PROGRESS REPORT/RE-CERT
"    Outpatient Physical Therapy Lymphedema Progress Note  [facility]     Patient Name: Brianna Urrutia  : 1950  MRN: [id number]  Today's Date: 2018        Visit Date: 2018    Visit Dx:    ICD-10-CM ICD-9-CM   1. Scar conditions and fibrosis of skin L90.5 709.2   2. Lymphedema of right upper extremity I89.0 457.1   3. Right arm pain M79.601 729.5       Patient Active Problem List   Diagnosis   • Malignant neoplasm of overlapping sites of right female breast (CMS/HCC)   • Malignant neoplasm of upper-outer quadrant of right female breast (CMS/HCC)   • Degenerative disc disease, cervical   • Brachial plexopathy   • Lymphedema of right arm   • History of radiation therapy   • JACQUI (generalized anxiety disorder)   • Attention deficit hyperactivity disorder (ADHD)              Lymphedema     Row Name 18 1100          Able to rate subjective pain? yes  -MW    Pre-Treatment Pain Level 6  -MW    Post-Treatment Pain Level 8  -MW    Subjective Pain Comment Reports hand and arm are hurting; fire shooting down   -MW          Subjective Comments Pt reports some relief after last session, but frustrated with amount of pain she has just trying to fold and put away her clothes. \"I can use my arm some but then it gets back at me\"   -MW          Ptting Edema Category By severity  -MW    Pitting Edema Mild  -MW    Edema Assessment Comment Mild edema RUE and lateral trunk; thickened area along medial forearm persistent but softer overall.   -MW          Location/Assessment Upper Quadrant  -MW    Upper Extremity Conditions right:;intact;clean  -MW    Upper Extremity Color/Pigment right:;other (comment)   pale, \"waxy\" appearance of hand & forearm when \"cold\"   -MW    Upper Quadrant Conditions right:;intact;clean  -MW    Upper Quadrant Color/Pigment right:;other (comment);red;erythema   scar cont to fade; areas of hypervascularity stable   -MW    Skin Observations Comment patches of hypervascularity on Rt breast " "stable, but increased/brighter red post STM / ASTYM   -MW          Lymphedema Sensation Comments RUE with frequent reports of shooting pain/ fire or \"broken glass\" sensations at finger tips. Elbow, forearm and hand \"cold\" at times.   -MW          Upper Extremity Capillary Refill right:;left:;less than 3 seconds  -MW    Lymph Pulses Capillary Refill Comments RUE difficult to assess when hand is \"cold\" and has waxy appearance.   -MW          Manual Lymphatic Drainage initial sequence;opened regional lymph nodes;opened anastamoses;extremity treatment;astym  -MW    Initial Sequence supraclavicular  -MW    Supraclavicular right;left  -MW    Shoulder Collectors right  -MW    Opened Regional Lymph Nodes axillary;inguinal  -MW    Axillary right  -MW    Inguinal right  -MW    Opened Anastamoses axillo-inguinal;anterior axillo-axillary;posterior axillo-axillary  -MW    Anterior Axillo-Axillary moving Rt to left   -MW    Posterior Axillo-Axillary moving Rt to left   -MW    Axillo-Inguinal right  -MW    Extremity Treatment extremity treatment focus on;other extremity treatment  -MW    MLD to Full Limb RUE MLD with STM (tissue bending and lifting techniques)   -MW    Extremity Treatment Focus On forearm thickness; softening with STM/ MLD   -MW    Astym anterior-lateral chest;upper traps  -MW    Anterior-Lateral Chest right  -MW    Anterior-Lateral Chest Comment In supine working superior-ant-lateral chest with evaluator and localizer; min course to fine soft tissue disruptions noted. Repositioned in shoulder flexion / ABD to open axilla (supported with pillows); evaluator and localizer. Extra strokes in axilla and along lateral pectoralis with localizer and isolator.  Moderate course soft tissue disruptions noted in axilla. Continued into lateral trunk adjacent to breast. Mild to moderate fine to course soft tissue disruptions noted.   -MW    Upper Traps Comment Initiated tx in sitting on massage chair:  RT UT: Evaluator and " "localizer to UT / scapular region; minimal fine soft tissue disruptions noted through out. Extra strokes at mid traps and leveator scapulea, and RT teres region. Continued down over Rt posterior and lateral ribs wrapping around trunk; extra strokes at area of fullness/ thickened tissue in lateral trunk/ adjacent to breast.   -MW    Manual Lymphatic Drainage Comments MLD mixed with STM and after ASTYM   -MW          Wrapping Location upper extremity  -MW    Wrapping Location UE hand to axilla  -MW    Wrapping Comments assisted pt to don arm sleeve   -MW    Compression/Skin Care Comments assisted pt to apply topical pain cream to Rt scapular area, shoulder and UE   -MW      User Key  (r) = Recorded By, (t) = Taken By, (c) = Cosigned By    Initials Name Provider Type    MW Na Bergeron PT Physical Therapist                  PT Ortho     Row Name 07/05/18 1100       Posture/Observations    Posture/Observations Comments Pt using scarf to create \"sling\" for RUE as this makes it \"hurt less.\"   -MW       Myotomal Screen- Upper Quarter Clearing    Shoulder flexion (C5) Right:;3- (Fair -)   with pain   -MW    Elbow flexion/wrist extension (C6) Right:;3+ (Fair +)   with pain   -MW    Elbow extension/wrist flexion (C7) Right:;3 (Fair)   with pain   -MW    Finger flexion/ (C8) Right:;2- (Poor -);1 (Trace)   unable to close fist; with pain   -MW    Finger abduction (T1) Right:;0 (Absent)   able to touch thumb tip to 2-4 digits with pain   -MW     Right:;0 (Absent)  -MW       Cervical/Shoulder ROM Screen    Cervical lateral flexion Impaired   Rt & Lt SB due to pain Rt side   -MW       Right Upper Ext    Rt Shoulder Abduction AROM 0-80 with pain in standing   -MW    Rt Shoulder Abduction PROM 0-145 in supine; c/o pain   -MW    Rt Shoulder Flexion AROM 0-85 with pain in standing   -MW    Rt Shoulder Flexion PROM 0-150 in supine with pain / pulling   -MW    Rt Shoulder External Rotation AROM 0-30 with pain   -MW    Rt " "Shoulder External Rotation PROM 0-45 with pain   -MW    Rt Shoulder Internal Rotation AROM hand to lower T spine   -MW    Rt Upper Extremity Comments  C/o pain and pulling at pectoralis, as well as teres and pinching \"in shoulder.\"  Pectoralis / anterior axillary fold with visible and palpable tissue tightness (residual scar tissue from radiation therapy); posterior axillary fold with increased muscle tension in teres also tender to palpation/ trigger points.   -MW      User Key  (r) = Recorded By, (t) = Taken By, (c) = Cosigned By    Initials Name Provider Type    MW Na Bergeron PT Physical Therapist                        PT Assessment/Plan     Row Name 07/05/18 1100          Functional Limitations Performance in self-care ADL;Limitation in home management;Impaired gait  -MW    Impairments Pain;Impaired lymphatic circulation;Edema;Joint mobility;Muscle strength;Impaired flexibility;Motor function;Impaired muscle length   stair climbing   -    Assessment Comments Pt continues to report pain relief post PT treatment sessions. Soft tissue restrictions at right chest/ breast area are smoother and tissues are more mobile but pt continues to have pain in her pectoralis area, teres / lateral scapular border as well as pains down her arm and into her wrist and hand. Complex situation with radiation induded soft tissue changes including neuropathy. RUE pain is worsened with neural tension testing of median, radial and ulnar nerves. Due to multiple pain sources it is also difficult to determine extent of rotator cuff pathology. Overall, pt has limited RUE function due to pain and weakness as well as wasting of Rt hand intrinsic muscles; intermittent skin temperature and color changes are concerning for complex region pain syndrome. Pt seems to have had minimal benefit from brachial plexus nerve block, and limited relief with topical pain creams. Pt continues to use heat or cold to treat pain with temporary relief. " "Overall prognosis is limited due to complex neuropathy and pain. Will continue PT to provide continued pain management, manual therapy and anticipate progression of ther exercise as tolerate.   -MW    Rehab Potential Fair  -MW    Patient/caregiver participated in establishment of treatment plan and goals Yes  -MW    Patient would benefit from skilled therapy intervention Yes  -MW          PT Frequency 2x/week  -MW    Predicted Duration of Therapy Intervention (Therapy Eval) 12 weeks   -MW    Planned CPT's? PT MANUAL THERAPY EA 15 MIN: 42688;PT THER PROC EA 15 MIN: 38117;PT ELECTRICAL STIM UNATTEND:   -MW    Physical Therapy Interventions (Optional Details) manual therapy techniques;manual lymphatic drainage;patient/family education;home exercise program;modalities;ROM (Range of Motion);strengthening;stretching  -MW    PT Plan Comments Cont STM/ASTYM with AAROM to attempt to improve function; MLD prn; modialites prn; progress ther exer   -MW      User Key  (r) = Recorded By, (t) = Taken By, (c) = Cosigned By    Initials Name Provider Type    FRAN Bergeron, PT Physical Therapist                 Modalities     Row Name 07/05/18 1100             Moist Heat    MH Applied Yes  -MW      Location Back/ Rt shoulder   -MW      Rx Minutes 15 mins  -MW      MH Prior to Rx No  -MW      MH S/P Rx Yes  -MW        User Key  (r) = Recorded By, (t) = Taken By, (c) = Cosigned By    Initials Name Provider Type    FRAN Bergeron, PT Physical Therapist                Exercises     Row Name 07/05/18 1100             Subjective Comments    Subjective Comments Pt reports some relief after last session, but frustrated with amount of pain she has just trying to fold and put away her clothes. \"I can use my arm some but then it gets back at me\"   -MW         Subjective Pain    Able to rate subjective pain? yes  -MW      Pre-Treatment Pain Level 6  -MW      Post-Treatment Pain Level 8  -MW      Subjective Pain Comment Reports " hand and arm are hurting; fire shooting down   -MW         Total Minutes    52300 - PT Manual Therapy Minutes 55  -MW         Exercise 1    Exercise Name 1 Rt shoulder, elbow, forearm, wrist AAROM with STM   -MW      Cueing 1 Tactile;Verbal  -MW      Reps 1 6  -MW        User Key  (r) = Recorded By, (t) = Taken By, (c) = Cosigned By    Initials Name Provider Type    FRAN Bergeron PT Physical Therapist                       Manual Rx (last 36 hours)      Manual Treatments     Row Name 07/05/18 1100             Total Minutes    46399 - PT Manual Therapy Minutes 55  -MW         Manual Rx 1    Manual Rx 1 Location RT axilla, pectoralis major and RUE   -MW      Manual Rx 1 Type STM, tissue bending, lifting and space correction techniques mixed with MLD   -MW      Manual Rx 1 Grade gentle with limited ROM AAROM HBH, ER and elbow ext, forearm supination   -MW      Manual Rx 1 Duration 25  -MW         Manual Rx 2    Manual Rx 2 Location RT UT/ mid thoracic spine, leveator scap  -MW      Manual Rx 2 Type STM, tissue bending, lifting and space correction techniques mixed with MLD   -MW      Manual Rx 2 Grade gentle to moderate pressures   -MW      Manual Rx 2 Duration 20  -MW        User Key  (r) = Recorded By, (t) = Taken By, (c) = Cosigned By    Initials Name Provider Type    FRAN Bergeron PT Physical Therapist                PT OP Goals     Row Name 07/05/18 1100          STG 1 Patient to report 25% improvement in RUE pain  -MW    STG 1 Progress Not Met  -MW    STG 2 RUE circumferential measurement reduction by >/= 2 cm to promote decrease in pain and improved function.   -MW    STG 2 Progress Partially Met  -MW    STG 3 Pt independent with basic home lymph massage to promote fluid movement and decrease in pain.   -MW    STG 3 Progress Met  -MW    STG 4 Pt independent with HEP for LE flexibility and strength to improve function.   -MW    STG 4 Progress Partially Met  -MW    STG 5 Pt to report decrease in  "sleep distrubance due to Rt buttock pain; 20% improvement.   -MW    STG 5 Progress Met  -MW          LTG 1 Patient able to actively raise  degrees or better to improve ability to complete daily activities including fixing her hair  -MW    LTG 1 Progress Ongoing  -MW    LTG 2 Patient to be measured and fit with compression sleeve  -MW    LTG 2 Progress Met  -MW    LTG 3 Improved DASH score by 15 points to demonstrate improved function  /return to PLOF.   -MW    LTG 3 Progress Ongoing  -MW    LTG 4 Pt to demonstrate functional improvement of Rt hip/ buttock with improved LEFS by > 10 point.   -MW    LTG 4 Progress Ongoing  -MW    LTG 5 Pt to report improved stair climbing while carrying laundry or groceries.   -MW    LTG 5 Progress Ongoing  -MW    LTG 5 Progress Comments RUE dysfunction makes carrying objects extremely difficult.   -MW    LTG 6 pt to demonstrate 10% improvement in neck AROM for improved driving safety.   -MW    LTG 6 Progress Partially Met  -MW          PT Goal Re-Cert Due Date 07/26/18  -      User Key  (r) = Recorded By, (t) = Taken By, (c) = Cosigned By    Initials Name Provider Type    FRAN Bergeron, PT Physical Therapist          Therapy Education  Education Details: Pt to continue to use RUE as much as able, as pain allows. Cont gentle ROM, self AAROM.   Given: Pain management, Symptoms/condition management  Program: Reinforced  How Provided: Verbal  Provided to: Patient  Level of Understanding: Verbalized    Outcome Measure Options: Disabilities of the Arm, Shoulder, and Hand (DASH) (\"I can hardly do anything\" )  DASH  Open a tight or new jar.: Unable  Write: Unable  Turn a key: Unable  Prepare a meal: Severe Difficulty  Push open a heavy door: Unable  Place an object on a shelf above your head: Unable  Do heavy household chores (e.g., wash walls, wash floors): Unable  Garden or do yard work: Unable  Make a bed: Severe Difficulty  Carry a shopping bag or briefcase: Unable  Carry " a heavy object (over 10 lbs): Unable  Change a lightbulb overhead: Unable  Wash or blow dry your hair: Severe Difficulty  Wash your back: Severe Difficulty  Put on a pullover sweater: Severe Difficulty  Use a knife to cut food: Unable  Recreational activities in which require little effort (e.g., cardplaying, knitting, etc.): Unable  Recreational activities in which you take some force or impact through your arm, should or hand (e.g. golf, hammering, tennis, etc.): Unable  Recreational Activities in which you move your arm freely (e.g., frisbee, badminton, etc.): Unable  Manage transportation needs (getting from one place to another): Severe Difficulty  During the past week, to what extent has your arm, shoulder, or hand problem interfered with your normal social activites with family, friends, neighbors or groups?: Extremely  During the past week, were you limited in your work or other regular daily activities as a result of your arm, shoulder or hand problem?: Unable  Arm, Shoulder, or hand pain: Extreme  Arm, shoulder or hand pain when you performed any specific activity: Extreme  Tingling (pins and needles) in your arm, shoulder, or hand: Severe  Weakness in your arm, shoulder or hand: Extreme  Stiffness in your arm, shoulder or hand: Moderate  During the past week, how much difficulty have you had sleeping because of the pain in your arm, shoulder or hand?: Severe Difficulty  I feel less capable, less confident or less useful because of my arm, shoulder or hand problem: Strongly Agree  DASH Sum : 135  Number of Questions Answered: 29  DASH Score: 91.38  DASH COMMENTS: Unable to grasp with RT hand; uses Lt for most activities.          Time Calculation:   Start Time: 1100  Total Timed Code Minutes- PT: 55 minute(s)   Therapy Suggested Charges     Code   Minutes Charges    68034 (CPT®) Hc Pt Neuromusc Re Education Ea 15 Min      97411 (CPT®) Hc Pt Ther Proc Ea 15 Min      19739 (CPT®) Hc Gait Training Ea 15 Min    "   46941 (CPT®) Hc Pt Therapeutic Act Ea 15 Min      29034 (CPT®) Hc Pt Manual Therapy Ea 15 Min 55 4    92893 (CPT®) Hc Pt Ther Massage- Per 15 Min      31274 (CPT®) Hc Pt Iontophoresis Ea 15 Min      23839 (CPT®) Hc Pt Elec Stim Ea-Per 15 Min      26697 (CPT®) Hc Pt Ultrasound Ea 15 Min      42380 (CPT®) Hc Pt Self Care/Mgmt/Train Ea 15 Min      Total  55 4        Therapy Charges for Today     Code Description Service Date Service Provider Modifiers Qty    92923885838 HC PT CARRY MOV HAND OBJ CURRENT 7/5/2018 Na Bergeron, PT GP, CM 1    68018788365 HC PT CARRY MOV HAND OBJ PROJECTED 7/5/2018 Na Bergeron, PT GP, CL 1    47434942739 HC PT MANUAL THERAPY EA 15 MIN 7/5/2018 Na Bergeron, PT GP 4          PT G-Codes  PT Professional Judgement Used?: Yes  Outcome Measure Options: Disabilities of the Arm, Shoulder, and Hand (DASH) (\"I can hardly do anything\" )  Score: DASH raw score: 135 or 91% impaired   Functional Limitation: Carrying, moving and handling objects  Carrying, Moving and Handling Objects Current Status (): At least 80 percent but less than 100 percent impaired, limited or restricted  Carrying, Moving and Handling Objects Goal Status (): At least 60 percent but less than 80 percent impaired, limited or restricted         Na Bergeron, PT  7/5/2018     "

## 2018-07-09 ENCOUNTER — HOSPITAL ENCOUNTER (OUTPATIENT)
Dept: PHYSICAL THERAPY | Facility: HOSPITAL | Age: 68
Setting detail: THERAPIES SERIES
Discharge: HOME | End: 2018-07-09
Attending: INTERNAL MEDICINE

## 2018-07-09 DIAGNOSIS — M79.601 RIGHT ARM PAIN: ICD-10-CM

## 2018-07-09 DIAGNOSIS — L90.5 SCAR CONDITIONS AND FIBROSIS OF SKIN: Primary | ICD-10-CM

## 2018-07-09 DIAGNOSIS — I89.0 LYMPHEDEMA OF RIGHT UPPER EXTREMITY: ICD-10-CM

## 2018-07-09 DIAGNOSIS — M54.2 CERVICALGIA: ICD-10-CM

## 2018-07-09 PROCEDURE — 97140 MANUAL THERAPY 1/> REGIONS: CPT | Performed by: PHYSICAL THERAPIST

## 2018-07-09 NOTE — THERAPY TREATMENT NOTE
"    Outpatient Physical Therapy Lymphedema Treatment Note  [facility]     Patient Name: Brianna Urrutia  : 1950  MRN: [id number]  Today's Date: 2018        Visit Date: 2018    Visit Dx:    ICD-10-CM ICD-9-CM   1. Scar conditions and fibrosis of skin L90.5 709.2   2. Lymphedema of right upper extremity I89.0 457.1   3. Right arm pain M79.601 729.5   4. Cervicalgia M54.2 723.1       Patient Active Problem List   Diagnosis   • Malignant neoplasm of overlapping sites of right female breast (CMS/HCC)   • Malignant neoplasm of upper-outer quadrant of right female breast (CMS/HCC)   • Degenerative disc disease, cervical   • Brachial plexopathy   • Lymphedema of right arm   • History of radiation therapy   • JACQUI (generalized anxiety disorder)   • Attention deficit hyperactivity disorder (ADHD)              Lymphedema     Row Name 18 1115          Able to rate subjective pain? yes  -MW    Pre-Treatment Pain Level 4  -MW    Post-Treatment Pain Level 4  -MW    Subjective Pain Comment Reports pain was aweful Sat night into  morning; almost called EMS as pain was so intense, but managed to go back to sleep and woke up later with much less pain and generally feeling better.   -MW          Subjective Comments Reports Rt hand is going cold and thumb has tremors but pain in shoulder and chest is much better.   -MW          Ptting Edema Category By severity  -MW    Pitting Edema Mild  -MW    Edema Assessment Comment Very  mild fullness in Rt lateral-posterior trunk; mild to moderate fullness Rt forearm, mild in upperarm and hand.   -MW          Location/Assessment Upper Quadrant  -MW    Upper Extremity Conditions right:;intact;clean  -MW    Upper Extremity Color/Pigment right:;other (comment)   pale, \"waxy\" appearance of hand & forearm when \"cold\"   -MW    Upper Quadrant Conditions right:;intact;clean  -MW    Upper Quadrant Color/Pigment right:;other (comment);red;erythema   scar cont to fade; areas of " hypervascularity stable   -MW    Skin Observations Comment patches of hypervascularity on Rt breast stable, but increased/brighter red post STM / ASTYM   -MW          Upper Extremity Capillary Refill --  -MW          Manual Lymphatic Drainage initial sequence;opened regional lymph nodes;opened anastamoses;extremity treatment;astym  -MW    Initial Sequence supraclavicular  -MW    Supraclavicular right;left  -MW    Shoulder Collectors right  -MW    Opened Regional Lymph Nodes axillary;inguinal  -MW    Axillary right  -MW    Inguinal right  -MW    Opened Anastamoses axillo-inguinal;anterior axillo-axillary;posterior axillo-axillary  -MW    Axillo-Inguinal right  -MW    Extremity Treatment extremity treatment focus on;other extremity treatment  -MW    MLD to Full Limb RUE MLD with STM (tissue bending and lifting techniques)   -MW    Extremity Treatment Focus On forearm thickness; softening with STM/ MLD   -MW    Astym anterior-lateral chest;upper traps  -MW    Anterior-Lateral Chest right  -MW    Anterior-Lateral Chest Comment In supine working superior-ant-lateral chest with evaluator and localizer; min course to fine soft tissue disruptions noted. Repositioned in shoulder flexion / ABD to open axilla (supported with pillows); evaluator and localizer. Extra strokes in axilla and along lateral pectoralis with localizer and isolator.  Moderate course soft tissue disruptions noted in axilla. Continued into lateral trunk adjacent to breast. Mild to moderate fine to course soft tissue disruptions noted.   -MW    Upper Traps Comment Initiated tx in sitting on stool with upper body on face craddle:  RT UT: Evaluator and localizer to UT / scapular region; minimal fine soft tissue disruptions noted through out. Extra strokes at mid traps and leveator scapulea, and RT teres region. Continued down over Rt posterior and lateral ribs wrapping around trunk; extra strokes at area of fullness/ thickened tissue in lateral trunk/  adjacent to breast.   -MW    Manual Lymphatic Drainage Comments MLD mixed with STM and after ASTYM   -MW          Wrapping Location upper extremity  -MW    Wrapping Location UE hand to axilla  -MW    Wrapping Comments assisted pt to don arm sleeve   -MW    Compression/Skin Care Comments assisted pt to apply topical pain cream to Rt scapular area, shoulder and UE   -MW      User Key  (r) = Recorded By, (t) = Taken By, (c) = Cosigned By    Initials Name Provider Type     Na Bergeron, PT Physical Therapist                              PT Assessment/Plan     Row Name 07/09/18 1115          PT Assessment    Functional Limitations Performance in self-care ADL;Limitation in home management;Impaired gait  -MW     Impairments Pain;Impaired lymphatic circulation;Edema;Joint mobility;Muscle strength;Impaired flexibility;Motor function;Impaired muscle length   stair climbing   -MW     Assessment Comments Pt returns with decreased c/o pain, decreased tenderness to palpation, decreased muscle tension in UT, mid traps, teres and pectoralis on Rt. Pt able to demonstrate increased ROM Rt shoulder as well as improved positioning of Rt scapula/ shoulder girdle; shoulders level and relaxed. Rt hand remains cool to touch compared to Lt, but less waxy in appearance this visit. Pt also more alert, less groggy.   -MW     Rehab Potential Fair  -MW     Patient/caregiver participated in establishment of treatment plan and goals Yes  -MW     Patient would benefit from skilled therapy intervention Yes  -MW        PT Plan    PT Frequency 2x/week  -MW     Physical Therapy Interventions (Optional Details) manual therapy techniques;manual lymphatic drainage;patient/family education;home exercise program;modalities;ROM (Range of Motion);strengthening;stretching  -MW     PT Plan Comments Cont STM/ASTYM with AAROM to attempt to improve function; MLD prn; modialites prn; progress ther exer   -MW       User Key  (r) = Recorded By, (t) = Taken By,  (c) = Cosigned By    Initials Name Provider Type    FRAN Bergeron PT Physical Therapist                     Exercises     Row Name 07/09/18 1115             Subjective Comments    Subjective Comments Reports Rt hand is going cold and thumb has tremors but pain in shoulder and chest is much better.   -MW         Subjective Pain    Able to rate subjective pain? yes  -MW      Pre-Treatment Pain Level 4  -MW      Post-Treatment Pain Level 4  -MW      Subjective Pain Comment Reports pain was aweful Sat night into Sunday morning; almost called EMS as pain was so intense, but managed to go back to sleep and woke up later with much less pain and generally feeling better.   -MW         Total Minutes    72316 - PT Manual Therapy Minutes 55  -MW         Exercise 1    Exercise Name 1 Rt shoulder, elbow AAROM with STM   -MW      Cueing 1 Tactile;Verbal  -MW      Reps 1 8  -MW        User Key  (r) = Recorded By, (t) = Taken By, (c) = Cosigned By    Initials Name Provider Type    FRAN Bergeron PT Physical Therapist                       Manual Rx (last 36 hours)      Manual Treatments     Row Name 07/09/18 1115             Total Minutes    08351 - PT Manual Therapy Minutes 55  -MW         Manual Rx 1    Manual Rx 1 Location RT axilla, pectoralis major and RUE   -MW      Manual Rx 1 Type STM, tissue bending, lifting and space correction techniques mixed with MLD   -MW      Manual Rx 1 Grade gentle with AAROM HBH, ER and elbow ext, forearm supination   -MW      Manual Rx 1 Duration 15  -MW         Manual Rx 2    Manual Rx 2 Location RT UT/ mid thoracic spine, leveator scap  -MW      Manual Rx 2 Type STM, tissue bending, lifting and space correction techniques mixed with MLD   -MW      Manual Rx 2 Grade gentle to moderate pressures   -MW      Manual Rx 2 Duration 20  -MW        User Key  (r) = Recorded By, (t) = Taken By, (c) = Cosigned By    Initials Name Provider Type    FRAN Bergeron PT Physical Therapist                              Time Calculation:   Start Time: 1115  Total Timed Code Minutes- PT: 55 minute(s)   Therapy Suggested Charges     Code   Minutes Charges    11528 (CPT®) Hc Pt Neuromusc Re Education Ea 15 Min      55480 (CPT®) Hc Pt Ther Proc Ea 15 Min      76262 (CPT®) Hc Gait Training Ea 15 Min      61773 (CPT®) Hc Pt Therapeutic Act Ea 15 Min      34239 (CPT®) Hc Pt Manual Therapy Ea 15 Min 55 4    33860 (CPT®) Hc Pt Ther Massage- Per 15 Min      05900 (CPT®) Hc Pt Iontophoresis Ea 15 Min      22069 (CPT®) Hc Pt Elec Stim Ea-Per 15 Min      89642 (CPT®) Hc Pt Ultrasound Ea 15 Min      37963 (CPT®) Hc Pt Self Care/Mgmt/Train Ea 15 Min      Total  55 4        Therapy Charges for Today     Code Description Service Date Service Provider Modifiers Qty    27426354524 HC PT MANUAL THERAPY EA 15 MIN 7/9/2018 Na Bergeron, PT GP 4                    Na Bergeron, PT  7/9/2018

## 2018-07-12 ENCOUNTER — HOSPITAL ENCOUNTER (OUTPATIENT)
Dept: PHYSICAL THERAPY | Facility: HOSPITAL | Age: 68
Setting detail: THERAPIES SERIES
Discharge: HOME | End: 2018-07-12
Attending: INTERNAL MEDICINE

## 2018-07-12 DIAGNOSIS — L90.5 SCAR CONDITIONS AND FIBROSIS OF SKIN: Primary | ICD-10-CM

## 2018-07-12 DIAGNOSIS — M79.601 RIGHT ARM PAIN: ICD-10-CM

## 2018-07-12 DIAGNOSIS — I89.0 LYMPHEDEMA OF RIGHT UPPER EXTREMITY: ICD-10-CM

## 2018-07-12 PROCEDURE — 97140 MANUAL THERAPY 1/> REGIONS: CPT | Performed by: PHYSICAL THERAPIST

## 2018-07-12 NOTE — THERAPY TREATMENT NOTE
"    Outpatient Physical Therapy Lymphedema Treatment Note  [facility]     Patient Name: Brianna Urrutia  : 1950  MRN: [id number]  Today's Date: 2018        Visit Date: 2018    Visit Dx:    ICD-10-CM ICD-9-CM   1. Scar conditions and fibrosis of skin L90.5 709.2   2. Lymphedema of right upper extremity I89.0 457.1   3. Right arm pain M79.601 729.5       Patient Active Problem List   Diagnosis   • Malignant neoplasm of overlapping sites of right female breast (CMS/HCC)   • Malignant neoplasm of upper-outer quadrant of right female breast (CMS/HCC)   • Degenerative disc disease, cervical   • Brachial plexopathy   • Lymphedema of right arm   • History of radiation therapy   • JACQUI (generalized anxiety disorder)   • Attention deficit hyperactivity disorder (ADHD)              Lymphedema     Row Name 18 1100          Able to rate subjective pain? yes  -MW    Pre-Treatment Pain Level 6  -MW    Post-Treatment Pain Level 7  -MW    Subjective Pain Comment C/o increased pain Rt breast, wrist and hand again  -MW          Subjective Comments Had a good birthday with son and granddaughter, but having more pain again; not as bad as last week but not as good as Monday  -MW          Ptting Edema Category By severity  -MW    Pitting Edema Mild  -MW    Edema Assessment Comment Mild fullness Rt elbow and forearm; mild in lateral trunk   -MW          Location/Assessment Upper Quadrant  -MW    Upper Extremity Conditions right:;intact;clean  -MW    Upper Extremity Color/Pigment right:;other (comment)   pale, \"waxy\" appearance of hand & forearm when \"cold\"   -MW    Upper Quadrant Conditions right:;intact;clean  -MW    Upper Quadrant Color/Pigment right:;other (comment);red;erythema   scar cont to fade; areas of hypervascularity stable   -MW    Upper Quadrant Skin Details Breast scar tissue more firm again; was softer and smaller on Monday.   -MW    Skin Observations Comment patches of hypervascularity on Rt breast " stable, but increased/brighter red post STM / ASTYM   -MW          Manual Lymphatic Drainage initial sequence;opened regional lymph nodes;opened anastamoses;extremity treatment;astym  -MW    Initial Sequence supraclavicular  -MW    Supraclavicular right;left  -MW    Shoulder Collectors right  -MW    Opened Regional Lymph Nodes axillary;inguinal  -MW    Axillary right  -MW    Inguinal right  -MW    Opened Anastamoses axillo-inguinal  -MW    Axillo-Inguinal right  -MW    Extremity Treatment extremity treatment focus on;other extremity treatment  -MW    MLD to Full Limb RUE MLD with STM (tissue bending and lifting techniques)   -MW    Extremity Treatment Focus On forearm thickness; softening with STM/ MLD   -MW    Astym anterior-lateral chest;upper traps  -MW    Anterior-Lateral Chest right  -MW    Anterior-Lateral Chest Comment In supine working superior-ant-lateral chest with evaluator and localizer; min course to fine soft tissue disruptions noted. Repositioned in shoulder flexion / ABD to open axilla (supported with pillows); evaluator and localizer. Extra strokes in axilla and along lateral pectoralis with localizer and isolator.  Minimal to moderate course soft tissue disruptions noted in axilla. Continued into lateral trunk adjacent to breast. Mild fine to course soft tissue disruptions noted. Additional strokes around breast scar with isolator.   -MW    Upper Traps Comment Initiated tx in sitting on stool with upper body on face craddle:  RT UT: Evaluator and localizer to UT / scapular region; minimal fine soft tissue disruptions noted through out. Extra strokes at RT teres region. Continued down over Rt posterior and lateral ribs wrapping around trunk; extra strokes at area of fullness/ thickened tissue in lateral trunk/ adjacent to breast.   -MW    Manual Lymphatic Drainage Comments MLD mixed with STM and after ASTYM   -MW          Wrapping Location upper extremity  -MW    Wrapping Location UE hand to axilla   -MW    Wrapping Comments assisted pt to don arm sleeve   -MW    Compression/Skin Care Comments assisted pt to apply topical pain cream to Rt scapular area, shoulder and UE   -MW      User Key  (r) = Recorded By, (t) = Taken By, (c) = Cosigned By    Initials Name Provider Type    FRAN Bergeron PT Physical Therapist                              PT Assessment/Plan     Row Name 07/12/18 1100          PT Assessment    Functional Limitations Performance in self-care ADL;Limitation in home management;Impaired gait  -MW     Impairments Pain;Impaired lymphatic circulation;Edema;Joint mobility;Muscle strength;Impaired flexibility;Motor function;Impaired muscle length   stair climbing   -MW     Assessment Comments Pt reports overall improvement in RUE and breast but pain is still persistent. Pt unsure of when her next brachial plexus block is scheduled. Rt shoulder with increased flexibility for positioning during manual therapy than last week. Edema is minimal but unsure of how much pt is using UE for functional tasks as she prefers to keep it in a sling to help decrease pain from movement.   -MW     Rehab Potential Fair  -MW     Patient/caregiver participated in establishment of treatment plan and goals Yes  -MW     Patient would benefit from skilled therapy intervention Yes  -MW        PT Plan    PT Frequency 2x/week  -MW     Physical Therapy Interventions (Optional Details) manual therapy techniques;manual lymphatic drainage;patient/family education;home exercise program;modalities;ROM (Range of Motion);strengthening;stretching  -MW     PT Plan Comments Cont STM/ASTYM with AAROM to attempt to improve function; MLD prn; modialites prn; progress ther exer   -MW       User Key  (r) = Recorded By, (t) = Taken By, (c) = Cosigned By    Initials Name Provider Type    FRAN Bergeron PT Physical Therapist                     Exercises     Row Name 07/12/18 1100             Subjective Comments    Subjective Comments Had  a good birthday with son and granddaughter, but having more pain again; not as bad as last week but not as good as Monday  -MW         Subjective Pain    Able to rate subjective pain? yes  -MW      Pre-Treatment Pain Level 6  -MW      Post-Treatment Pain Level 7  -MW      Subjective Pain Comment C/o increased pain Rt breast, wrist and hand again  -MW         Total Minutes    28112 - PT Manual Therapy Minutes 55  -MW         Exercise 1    Exercise Name 1 Rt shoulder, elbow AAROM with STM   -MW      Cueing 1 Tactile;Verbal  -MW      Reps 1 8  -MW        User Key  (r) = Recorded By, (t) = Taken By, (c) = Cosigned By    Initials Name Provider Type    FRAN Bergeron PT Physical Therapist                       Manual Rx (last 36 hours)      Manual Treatments     Row Name 07/12/18 1100             Total Minutes    46225 - PT Manual Therapy Minutes 55  -MW         Manual Rx 1    Manual Rx 1 Location RT axilla, pectoralis major and RUE   -MW      Manual Rx 1 Type STM, tissue bending, lifting and space correction techniques mixed with MLD   -MW      Manual Rx 1 Grade gentle with AAROM HBH, ER and forearm supination   -MW      Manual Rx 1 Duration 20  -MW         Manual Rx 2    Manual Rx 2 Location RT UT/ mid thoracic spine, leveator scap  -MW      Manual Rx 2 Type STM, tissue bending, lifting and space correction techniques mixed with MLD   -MW      Manual Rx 2 Grade gentle to moderate pressures   -MW      Manual Rx 2 Duration 15  -MW        User Key  (r) = Recorded By, (t) = Taken By, (c) = Cosigned By    Initials Name Provider Type    FRAN Bergeron PT Physical Therapist                             Time Calculation:   Start Time: 1100  Total Timed Code Minutes- PT: 55 minute(s)   Therapy Suggested Charges     Code   Minutes Charges    45003 (CPT®)  Pt Neuromusc Re Education Ea 15 Min      17916 (CPT®) Hc Pt Ther Proc Ea 15 Min      31338 (CPT®) Hc Gait Training Ea 15 Min      89217 (CPT®)  Pt Therapeutic Act  Ea 15 Min      23850 (CPT®) Hc Pt Manual Therapy Ea 15 Min 55 4    52765 (CPT®) Hc Pt Ther Massage- Per 15 Min      71026 (CPT®) Hc Pt Iontophoresis Ea 15 Min      21191 (CPT®) Hc Pt Elec Stim Ea-Per 15 Min      83968 (CPT®) Hc Pt Ultrasound Ea 15 Min      17235 (CPT®) Hc Pt Self Care/Mgmt/Train Ea 15 Min      Total  55 4        Therapy Charges for Today     Code Description Service Date Service Provider Modifiers Qty    32355289691 HC PT MANUAL THERAPY EA 15 MIN 7/12/2018 Na Bergeron, PT GP 4                    Na Bergeron, PT  7/12/2018

## 2018-07-16 ENCOUNTER — HOSPITAL ENCOUNTER (OUTPATIENT)
Dept: PHYSICAL THERAPY | Facility: HOSPITAL | Age: 68
Setting detail: THERAPIES SERIES
Discharge: HOME | End: 2018-07-16
Attending: INTERNAL MEDICINE

## 2018-07-16 DIAGNOSIS — I89.0 LYMPHEDEMA OF RIGHT UPPER EXTREMITY: ICD-10-CM

## 2018-07-16 DIAGNOSIS — M79.601 RIGHT ARM PAIN: ICD-10-CM

## 2018-07-16 DIAGNOSIS — L90.5 SCAR CONDITIONS AND FIBROSIS OF SKIN: Primary | ICD-10-CM

## 2018-07-16 PROCEDURE — 97140 MANUAL THERAPY 1/> REGIONS: CPT | Performed by: PHYSICAL THERAPIST

## 2018-07-16 RX ORDER — TIZANIDINE 4 MG/1
TABLET ORAL
Qty: 30 TABLET | Refills: 0 | Status: SHIPPED | OUTPATIENT
Start: 2018-07-16 | End: 2018-01-01 | Stop reason: ALTCHOICE

## 2018-07-16 NOTE — THERAPY TREATMENT NOTE
"    Outpatient Physical Therapy Lymphedema Treatment Note  [facility]     Patient Name: Brianna Urrutia  : 1950  MRN: [id number]  Today's Date: 2018        Visit Date: 2018    Visit Dx:    ICD-10-CM ICD-9-CM   1. Scar conditions and fibrosis of skin L90.5 709.2   2. Lymphedema of right upper extremity I89.0 457.1   3. Right arm pain M79.601 729.5       Patient Active Problem List   Diagnosis   • Malignant neoplasm of overlapping sites of right female breast (CMS/HCC)   • Malignant neoplasm of upper-outer quadrant of right female breast (CMS/HCC)   • Degenerative disc disease, cervical   • Brachial plexopathy   • Lymphedema of right arm   • History of radiation therapy   • JACQUI (generalized anxiety disorder)   • Attention deficit hyperactivity disorder (ADHD)              Lymphedema     Row Name 18 1110          Able to rate subjective pain? yes  -MW    Pre-Treatment Pain Level 5  -MW    Post-Treatment Pain Level 7  -MW          Subjective Comments Reports having pain in breast at scar tissue area, pulling into arm pit, tightness in back of shoulder, along shoulder blades; pain in hand and fingers.  Unsure of next appointment for nerve block and feels better now than in the week after, so not sure if she will have another one.   -MW          Ptting Edema Category By severity  -MW    Pitting Edema Mild  -MW    Edema Assessment Comment Mild fullness in Rt lateral trunk; mild in hand; mild to moderate in forearm and medial elbow.   -MW          Location/Assessment Upper Quadrant  -MW    Upper Extremity Conditions right:;intact;clean  -MW    Upper Extremity Color/Pigment right:;other (comment)   pale, \"waxy\" appearance of hand & forearm when \"cold\"   -MW    Upper Quadrant Conditions right:;intact;clean  -MW    Upper Quadrant Color/Pigment right:;other (comment);red;erythema   scar cont to fade; areas of hypervascularity stable   -MW    Skin Observations Comment patches of hypervascularity on Rt " breast stable, but increased/brighter red post STM / ASTYM   -MW          Manual Lymphatic Drainage initial sequence;opened regional lymph nodes;opened anastamoses;extremity treatment;astym  -MW    Initial Sequence supraclavicular  -MW    Supraclavicular right;left  -MW    Shoulder Collectors right  -MW    Opened Regional Lymph Nodes axillary;inguinal  -MW    Axillary right;left  -MW    Inguinal right  -MW    Opened Anastamoses axillo-inguinal;anterior axillo-axillary  -MW    Anterior Axillo-Axillary moving Rt to left   -MW    Axillo-Inguinal right  -MW    Extremity Treatment extremity treatment focus on;other extremity treatment  -MW    MLD to Full Limb RUE MLD with STM (tissue bending and lifting techniques)   -MW    Extremity Treatment Focus On forearm thickness; softening with STM/ MLD   -MW    Astym anterior-lateral chest;upper traps  -MW    Anterior-Lateral Chest right  -MW    Anterior-Lateral Chest Comment In supine working superior-ant-lateral chest with evaluator and localizer; min course to fine soft tissue disruptions noted. Repositioned in shoulder flexion / ABD to open axilla (supported with pillows); evaluator and localizer. Extra strokes in axilla and along lateral pectoralis with localizer and isolator.  Minimal to moderate course soft tissue disruptions noted in axilla. Continued into lateral trunk adjacent to breast. Mild fine to course soft tissue disruptions noted. Additional strokes around breast scar with isolator.   -MW    Upper Traps right  -MW    Upper Traps Comment Initiated tx in sitting on stool with upper body on face craddle:  RT UT: Evaluator and localizer to UT / scapular region; minimal fine soft tissue disruptions noted through out. Extra strokes at RT teres region. Continued down over Rt posterior and lateral ribs wrapping around trunk; extra strokes at area of fullness/ thickened tissue in lateral trunk/ adjacent to breast.   -MW    Manual Lymphatic Drainage Comments MLD post  ASTYM; mixed with STM   -MW          Wrapping Location upper extremity  -MW    Wrapping Location UE hand to axilla  -MW    Wrapping Comments assisted pt to don arm sleeve   -MW    Compression/Skin Care Comments assisted pt to apply topical pain cream to Rt scapular area, shoulder and UE   -MW      User Key  (r) = Recorded By, (t) = Taken By, (c) = Cosigned By    Initials Name Provider Type    FRAN Bergeron PT Physical Therapist                              PT Assessment/Plan     Row Name 07/16/18 1110          PT Assessment    Functional Limitations Performance in self-care ADL;Limitation in home management;Impaired gait  -MW     Impairments Pain;Impaired lymphatic circulation;Edema;Joint mobility;Muscle strength;Impaired flexibility;Motor function;Impaired muscle length   stair climbing   -MW     Assessment Comments Pt with increased tension noted in Rt leveator scap, mid traps and teres mm groups; improved post treatment. Rt breast scar tissue firm; area of hypervascularity stable. RUE with mixture of lymphedema and mm loss but appears to be stable with sleeve, pump and MLD. Pt with overhead reach available to don/ doff pull on clothing.   -MW     Rehab Potential Fair  -MW     Patient/caregiver participated in establishment of treatment plan and goals Yes  -MW     Patient would benefit from skilled therapy intervention Yes  -MW        PT Plan    PT Frequency 2x/week  -MW     Physical Therapy Interventions (Optional Details) manual therapy techniques;manual lymphatic drainage;patient/family education;home exercise program;modalities;ROM (Range of Motion);strengthening;stretching  -MW     PT Plan Comments Cont STM/ASTYM with AAROM to attempt to improve function; MLD prn; modialites prn; progress ther exer   -MW       User Key  (r) = Recorded By, (t) = Taken By, (c) = Cosigned By    Initials Name Provider Type    FRAN Bergeron, PT Physical Therapist                     Exercises     Row Name 07/16/18  1110             Subjective Comments    Subjective Comments Reports having pain in breast at scar tissue area, pulling into arm pit, tightness in back of shoulder, along shoulder blades; pain in hand and fingers.  Unsure of next appointment for nerve block and feels better now than in the week after, so not sure if she will have another one.   -MW         Subjective Pain    Able to rate subjective pain? yes  -MW      Pre-Treatment Pain Level 5  -MW      Post-Treatment Pain Level 7  -MW         Total Minutes    87505 - PT Manual Therapy Minutes 53  -MW         Exercise 1    Exercise Name 1 Rt shoulder, elbow AAROM with STM   -MW      Cueing 1 Tactile;Verbal  -MW      Reps 1 6  -MW        User Key  (r) = Recorded By, (t) = Taken By, (c) = Cosigned By    Initials Name Provider Type    FRAN Bergeron PT Physical Therapist                       Manual Rx (last 36 hours)      Manual Treatments     Row Name 07/16/18 1110             Total Minutes    28987 - PT Manual Therapy Minutes 53  -MW         Manual Rx 1    Manual Rx 1 Location RT axilla, pectoralis major and RUE   -MW      Manual Rx 1 Type STM, tissue bending, lifting and space correction techniques mixed with MLD   -MW      Manual Rx 1 Grade gentle with AAROM HBH, ER and forearm supination   -MW      Manual Rx 1 Duration 20  -MW         Manual Rx 2    Manual Rx 2 Location RT UT/ mid thoracic spine, leveator scap  -MW      Manual Rx 2 Type STM, tissue bending, lifting and space correction techniques mixed with MLD   -MW      Manual Rx 2 Grade gentle to moderate pressures   -MW      Manual Rx 2 Duration 20  -MW        User Key  (r) = Recorded By, (t) = Taken By, (c) = Cosigned By    Initials Name Provider Type    FRAN Bergeron PT Physical Therapist                             Time Calculation:   Start Time: 1110  Total Timed Code Minutes- PT: 53 minute(s)   Therapy Suggested Charges     Code   Minutes Charges    41778 (CPT®)  Pt Neuromusc Re Education  Ea 15 Min      74059 (CPT®) Hc Pt Ther Proc Ea 15 Min      93601 (CPT®) Hc Gait Training Ea 15 Min      84728 (CPT®) Hc Pt Therapeutic Act Ea 15 Min      57001 (CPT®) Hc Pt Manual Therapy Ea 15 Min 53 4    46568 (CPT®) Hc Pt Ther Massage- Per 15 Min      88743 (CPT®) Hc Pt Iontophoresis Ea 15 Min      81615 (CPT®) Hc Pt Elec Stim Ea-Per 15 Min      45356 (CPT®) Hc Pt Ultrasound Ea 15 Min      95914 (CPT®) Hc Pt Self Care/Mgmt/Train Ea 15 Min      Total  53 4        Therapy Charges for Today     Code Description Service Date Service Provider Modifiers Qty    55470257460 HC PT MANUAL THERAPY EA 15 MIN 7/16/2018 Na Bergeron, PT GP 4                    Na Bergeron, PT  7/16/2018

## 2018-07-19 ENCOUNTER — HOSPITAL ENCOUNTER (OUTPATIENT)
Dept: PHYSICAL THERAPY | Facility: HOSPITAL | Age: 68
Setting detail: THERAPIES SERIES
Discharge: HOME | End: 2018-07-19
Attending: INTERNAL MEDICINE

## 2018-07-19 DIAGNOSIS — I89.0 LYMPHEDEMA OF RIGHT UPPER EXTREMITY: ICD-10-CM

## 2018-07-19 DIAGNOSIS — M79.601 RIGHT ARM PAIN: ICD-10-CM

## 2018-07-19 DIAGNOSIS — L90.5 SCAR CONDITIONS AND FIBROSIS OF SKIN: Primary | ICD-10-CM

## 2018-07-19 DIAGNOSIS — N64.4 MASTODYNIA OF RIGHT BREAST: ICD-10-CM

## 2018-07-19 PROCEDURE — 97140 MANUAL THERAPY 1/> REGIONS: CPT | Performed by: PHYSICAL THERAPIST

## 2018-07-19 NOTE — THERAPY TREATMENT NOTE
"    Outpatient Physical Therapy Lymphedema Treatment Note  [facility]     Patient Name: Brianna Urrutia  : 1950  MRN: [id number]  Today's Date: 2018        Visit Date: 2018    Visit Dx:    ICD-10-CM ICD-9-CM   1. Scar conditions and fibrosis of skin L90.5 709.2   2. Lymphedema of right upper extremity I89.0 457.1   3. Right arm pain M79.601 729.5   4. Mastodynia of right breast N64.4 611.71       Patient Active Problem List   Diagnosis   • Malignant neoplasm of overlapping sites of right female breast (CMS/HCC)   • Malignant neoplasm of upper-outer quadrant of right female breast (CMS/HCC)   • Degenerative disc disease, cervical   • Brachial plexopathy   • Lymphedema of right arm   • History of radiation therapy   • JACQUI (generalized anxiety disorder)   • Attention deficit hyperactivity disorder (ADHD)              Lymphedema     Row Name 18 1100          Able to rate subjective pain? yes  -MW    Pre-Treatment Pain Level 6  -MW    Post-Treatment Pain Level 8  -MW    Subjective Pain Comment Rt breast, axilla down arm into wrist and hand  Pt requested moist heat to RUE post tx due to \"aching and cold\"   -MW          Subjective Comments Reports worked really hard yesterday to sew / fix a skirt. Some sewing by hand and some on machine; struggle to thread the needles   -MW          Ptting Edema Category By severity  -MW    Pitting Edema Mild;Moderate  -MW    Edema Assessment Comment Mild fullness in hand, but mild to moderate in forearm and mediall upper arm   -MW          Location/Assessment Upper Quadrant  -MW    Upper Extremity Conditions right:;intact;clean  -MW    Upper Extremity Color/Pigment right:;other (comment)   pale, \"waxy\" appearance of hand & forearm when \"cold\"   -MW    Upper Quadrant Conditions right:;intact;clean  -MW    Upper Quadrant Color/Pigment right:;other (comment);red;erythema   scar cont to fade; areas of hypervascularity stable   -MW    Skin Observations Comment patches " of hypervascularity on Rt breast stable, but increased/brighter red post STM / ASTYM   -MW          Manual Lymphatic Drainage initial sequence;opened regional lymph nodes;opened anastamoses;extremity treatment;astym  -MW    Initial Sequence supraclavicular  -MW    Supraclavicular right;left  -MW    Shoulder Collectors right  -MW    Opened Regional Lymph Nodes axillary;inguinal  -MW    Axillary right;left  -MW    Inguinal right  -MW    Opened Anastamoses axillo-inguinal;anterior axillo-axillary  -MW    Anterior Axillo-Axillary moving Rt to left   -MW    Axillo-Inguinal right  -MW    Extremity Treatment extremity treatment focus on;other extremity treatment  -MW    MLD to Full Limb RUE MLD with STM (tissue bending and lifting techniques)   -MW    Extremity Treatment Focus On forearm thickness and medial upper arm fullness; softening with STM/ MLD   -MW    Astym anterior-lateral chest;upper traps  -MW    Anterior-Lateral Chest right  -MW    Anterior-Lateral Chest Comment In supine working superior-ant-lateral chest with evaluator and localizer; min course to fine soft tissue disruptions noted. Repositioned in shoulder flexion / ABD to open axilla (supported with pillows); evaluator and localizer. Extra strokes in axilla and along lateral pectoralis with localizer and isolator.  Minimal to moderate course soft tissue disruptions noted in axilla. Continued into lateral trunk adjacent to breast. Mild fine to course soft tissue disruptions noted. Additional strokes around breast scar with isolator.   -MW    Upper Traps right  -MW    Upper Traps Comment Initiated tx in sitting on stool with upper body on face craddle:  RT UT: Evaluator and localizer to UT / scapular region; minimal fine soft tissue disruptions noted through out. Extra strokes at RT teres region. Continued down over Rt posterior and lateral ribs wrapping around trunk; extra strokes at area of fullness/ thickened tissue in lateral trunk/ adjacent to breast.    "-MW    Manual Lymphatic Drainage Comments MLD post ASTYM; mixed with STM   -MW          Wrapping Location upper extremity  -MW    Wrapping Location UE hand to axilla  -MW    Wrapping Comments assisted pt to don arm sleeve   -MW    Compression/Skin Care Comments assisted pt to apply topical pain cream to Rt scapular area, shoulder and UE   -MW      User Key  (r) = Recorded By, (t) = Taken By, (c) = Cosigned By    Initials Name Provider Type     Na Bergeron, PT Physical Therapist                              PT Assessment/Plan     Row Name 07/19/18 1100          PT Assessment    Functional Limitations Performance in self-care ADL;Limitation in home management;Impaired gait  -MW     Impairments Pain;Impaired lymphatic circulation;Edema;Joint mobility;Muscle strength;Impaired flexibility;Motor function;Impaired muscle length   stair climbing   -MW     Assessment Comments Pt c/o increased pain Rt breast and RUE but also worked \"really hard\" to fix a skirt. Encouraged pt to pace herself and spread out difficult activities over several days rather than pushing to finish same day. RUE with mild increase in swelling in forearm and medial upperarm Rt hand/ wrist more pale and \"waxy\" this session; cool to cold to touch but radial pulse is strong. Subtle improvement in Rt breast scar tissue mobility but remains very painful.   -MW     Rehab Potential Fair  -MW     Patient/caregiver participated in establishment of treatment plan and goals Yes  -MW     Patient would benefit from skilled therapy intervention Yes  -MW        PT Plan    PT Frequency 2x/week  -MW     Physical Therapy Interventions (Optional Details) manual therapy techniques;manual lymphatic drainage;patient/family education;home exercise program;modalities;ROM (Range of Motion);strengthening;stretching  -MW     PT Plan Comments Cont STM/ASTYM with AAROM to attempt to improve function; MLD prn; modialites prn; progress ther exer   -MW       User Key  (r) = " "Recorded By, (t) = Taken By, (c) = Cosigned By    Initials Name Provider Type    MW Na Bergeron PT Physical Therapist                     Exercises     Row Name 07/19/18 1100             Subjective Comments    Subjective Comments Reports worked really hard yesterday to sew / fix a skirt. Some sewing by hand and some on machine; struggle to thread the needles   -MW         Subjective Pain    Able to rate subjective pain? yes  -MW      Pre-Treatment Pain Level 6  -MW      Post-Treatment Pain Level 8  -MW      Subjective Pain Comment Rt breast, axilla down arm into wrist and hand  Pt requested moist heat to RUE post tx due to \"aching and cold\"   -MW         Total Minutes    85759 - PT Manual Therapy Minutes 55  -MW         Exercise 1    Exercise Name 1 Rt shoulder, elbow AAROM with STM   -MW      Cueing 1 Tactile;Verbal  -MW      Reps 1 8  -MW        User Key  (r) = Recorded By, (t) = Taken By, (c) = Cosigned By    Initials Name Provider Type    FRAN Bergeron PT Physical Therapist                       Manual Rx (last 36 hours)      Manual Treatments     Row Name 07/19/18 1100             Total Minutes    61210 - PT Manual Therapy Minutes 55  -MW         Manual Rx 1    Manual Rx 1 Location RT axilla, pectoralis major and RUE   -MW      Manual Rx 1 Type STM, tissue bending, lifting and space correction techniques mixed with MLD   -MW      Manual Rx 1 Grade gentle with AAROM HBH, ER and forearm supination   -MW      Manual Rx 1 Duration 20  -MW         Manual Rx 2    Manual Rx 2 Location RT UT/ mid thoracic spine, leveator scap  -MW      Manual Rx 2 Type STM, tissue bending, lifting and space correction techniques mixed with MLD   -MW      Manual Rx 2 Grade gentle to moderate pressures   -MW      Manual Rx 2 Duration 20  -MW        User Key  (r) = Recorded By, (t) = Taken By, (c) = Cosigned By    Initials Name Provider Type    FRAN Bergeron PT Physical Therapist                             Time " Calculation:   Start Time: 1100  Total Timed Code Minutes- PT: 55 minute(s)   Therapy Suggested Charges     Code   Minutes Charges    34883 (CPT®) Hc Pt Neuromusc Re Education Ea 15 Min      92958 (CPT®) Hc Pt Ther Proc Ea 15 Min      12194 (CPT®) Hc Gait Training Ea 15 Min      43749 (CPT®) Hc Pt Therapeutic Act Ea 15 Min      08696 (CPT®) Hc Pt Manual Therapy Ea 15 Min 55 4    40695 (CPT®) Hc Pt Ther Massage- Per 15 Min      90924 (CPT®) Hc Pt Iontophoresis Ea 15 Min      74160 (CPT®) Hc Pt Elec Stim Ea-Per 15 Min      23938 (CPT®) Hc Pt Ultrasound Ea 15 Min      62985 (CPT®) Hc Pt Self Care/Mgmt/Train Ea 15 Min      Total  55 4        Therapy Charges for Today     Code Description Service Date Service Provider Modifiers Qty    32652002023 HC PT MANUAL THERAPY EA 15 MIN 7/19/2018 Na Bergeron, PT GP 4                    Na Bergeron, PT  7/19/2018

## 2018-07-19 NOTE — TELEPHONE ENCOUNTER
It does not appear that we have ever written this for her, Baljinder shows a Dr. Meagan Akbar has written it.  Please check with patient and see if she is still seeing that doctor and if she is still under contract with them.

## 2018-07-19 NOTE — TELEPHONE ENCOUNTER
I must have lost the Baljinder can you please bring me another, I cannot see where we have filled this before in her chart

## 2018-07-20 RX ORDER — TRAMADOL HYDROCHLORIDE 50 MG/1
TABLET ORAL
Qty: 120 TABLET | Refills: 2 | OUTPATIENT
Start: 2018-07-20 | End: 2018-01-01 | Stop reason: ALTCHOICE

## 2018-07-20 NOTE — TELEPHONE ENCOUNTER
"Patient says she is taking Tramadol 50mg 1-2 po QID and its working well for pain, this medication doesn't make her \"loopy\"  "

## 2018-07-20 NOTE — TELEPHONE ENCOUNTER
Okay, please find out how often patient feels that she needs this medication, how many times a day.

## 2018-07-23 ENCOUNTER — HOSPITAL ENCOUNTER (OUTPATIENT)
Dept: PHYSICAL THERAPY | Facility: HOSPITAL | Age: 68
Setting detail: THERAPIES SERIES
Discharge: HOME | End: 2018-07-23
Attending: INTERNAL MEDICINE

## 2018-07-23 DIAGNOSIS — N64.4 MASTODYNIA OF RIGHT BREAST: ICD-10-CM

## 2018-07-23 DIAGNOSIS — M79.601 RIGHT ARM PAIN: ICD-10-CM

## 2018-07-23 DIAGNOSIS — L90.5 SCAR CONDITIONS AND FIBROSIS OF SKIN: Primary | ICD-10-CM

## 2018-07-23 DIAGNOSIS — I89.0 LYMPHEDEMA OF RIGHT UPPER EXTREMITY: ICD-10-CM

## 2018-07-23 PROCEDURE — G8985 CARRY GOAL STATUS: HCPCS | Performed by: PHYSICAL THERAPIST

## 2018-07-23 PROCEDURE — 97140 MANUAL THERAPY 1/> REGIONS: CPT | Performed by: PHYSICAL THERAPIST

## 2018-07-23 PROCEDURE — G8984 CARRY CURRENT STATUS: HCPCS | Performed by: PHYSICAL THERAPIST

## 2018-07-23 NOTE — THERAPY PROGRESS REPORT/RE-CERT
"    Outpatient Physical Therapy Lymphedema Progress Note  [facility]     Patient Name: Brianna Urrutia  : 1950  MRN: [id number]  Today's Date: 2018        Visit Date: 2018    Visit Dx:    ICD-10-CM ICD-9-CM   1. Scar conditions and fibrosis of skin L90.5 709.2   2. Lymphedema of right upper extremity I89.0 457.1   3. Right arm pain M79.601 729.5   4. Mastodynia of right breast N64.4 611.71       Patient Active Problem List   Diagnosis   • Malignant neoplasm of overlapping sites of right female breast (CMS/HCC)   • Malignant neoplasm of upper-outer quadrant of right female breast (CMS/HCC)   • Degenerative disc disease, cervical   • Brachial plexopathy   • Lymphedema of right arm   • History of radiation therapy   • JACQUI (generalized anxiety disorder)   • Attention deficit hyperactivity disorder (ADHD)              Lymphedema     Row Name 18 1100          Able to rate subjective pain? yes  -MW    Pre-Treatment Pain Level 8  -MW    Post-Treatment Pain Level 8  -MW    Subjective Pain Comment Rt breast radiating down arm, wrist, hand, across chest   -MW          Subjective Comments Reports pain has been terrible since last week. Tramidal not helping, pot not helping even topical cream \"felt weird\"; need some relief\"  -MW          Ptting Edema Category By severity  -MW    Pitting Edema Mild;Moderate  -MW    Edema Assessment Comment Mild fullness in MCP area of hand, mild to moderate fullness/ thickness forearm more on flexor aspect and medial elbow.   -MW          Location/Assessment Upper Quadrant  -MW    Upper Extremity Conditions right:;intact;clean  -MW    Upper Extremity Color/Pigment right:;other (comment)   pale, \"waxy\" appearance of hand & forearm when \"cold\"   -MW    Upper Quadrant Conditions right:;intact;clean  -MW    Upper Quadrant Color/Pigment right:;other (comment);red;erythema   scar white, pink, red; pink-red blotches stable (see photo)   -MW    Skin Observations Comment patches " "of hypervascularity on Rt breast stable, but increased/brighter red post STM / ASTYM   -MW          Lymphedema Sensation Comments RT hand/ wrist forearm cool to touch; pt reports feels \"cold\" also reports \"shards of glass\" sensations in finger tips. Shooting pains and electricity that \"shoots up the arm or down through the finger tips\"   -MW          Lymphedema Pulses/Capillary Refill upper extremity pulses  -MW    Radial Pulse right:;+2 normal  -MW    Capillary Refill --  -MW    Upper Extremity Capillary Refill right:;less than 3 seconds   difficult to assess when hand is cool and waxy   -MW          Manual Lymphatic Drainage initial sequence;opened regional lymph nodes;opened anastamoses;extremity treatment;astym  -MW    Initial Sequence supraclavicular  -MW    Supraclavicular right;left  -MW    Shoulder Collectors right  -MW    Opened Regional Lymph Nodes axillary;inguinal  -MW    Axillary right;left  -MW    Inguinal right  -MW    Opened Anastamoses axillo-inguinal;anterior axillo-axillary  -MW    Anterior Axillo-Axillary moving Rt to left   -MW    Axillo-Inguinal right  -MW    Extremity Treatment extremity treatment focus on;other extremity treatment  -MW    MLD to Full Limb RUE MLD with STM (tissue bending and lifting techniques)   -MW    Extremity Treatment Focus On forearm thickness and medial upper arm fullness; softening with STM/ MLD   -MW    Astym anterior-lateral chest;upper traps  -MW    Anterior-Lateral Chest right  -MW    Anterior-Lateral Chest Comment In supine working superior-anterior chest with evaluator and localizer, very gentle; min course to fine soft tissue disruptions noted. Repositioned in shoulder flexion / ABD to open axilla (supported with pillows); evaluator and localizer; very light strokes. Minimal to moderate course soft tissue disruptions noted in axilla. Continued into lateral trunk adjacent to breast. Mild fine to course soft tissue disruptions noted. Deferred scar tissue strokes " "due to c/o increased pain superior to primary CA site scar.   -MW    Upper Traps right  -MW    Upper Traps Comment Initiated tx in sitting on stool with upper body on face craddle:  RT UT: Evaluator and localizer to UT / scapular region; minimal fine soft tissue disruptions noted through out. Extra strokes at RT teres region. Continued down over Rt posterior and lateral ribs wrapping around trunk; extra strokes at area of fullness/ thickened tissue adjacent to breast.   -MW    Manual Lymphatic Drainage Comments MLD post ASTYM/ STM; focues on medial upper arm and forearm; then down Rt side to rework AIA.   -MW          Wrapping Location upper extremity  -MW    Wrapping Location UE hand to axilla  -MW    Wrapping Comments assisted pt to don arm sleeve   -MW    Compression/Skin Care Comments trial of silicon scar care sheet to primary CA site scar. Provided written and verbal instructions for use and care.   -MW      User Key  (r) = Recorded By, (t) = Taken By, (c) = Cosigned By    Initials Name Provider Type    MW Na Bergeron, PT Physical Therapist                   PT Ortho     Row Name 07/23/18 1100       Posture/Observations    Posture/Observations Comments Pt using scarf to create \"sling\" for RUE as this makes it \"hurt less.\"   -MW       Myotomal Screen- Upper Quarter Clearing    Shoulder flexion (C5) Right:;3- (Fair -)   c/o pain, pulling   -MW    Elbow flexion/wrist extension (C6) Right:;3+ (Fair +)   c/o pain   -MW    Elbow extension/wrist flexion (C7) Right:;3 (Fair)   c/o pain   -MW    Finger flexion/ (C8) Right:;2- (Poor -);1 (Trace)   approx 75% closure of fist; c/o pain   -MW    Finger abduction (T1) Right:;1 (Trace)   c/o pain   -MW     Right:;0 (Absent)   c/o pain with resistance   -MW       Cervical/Shoulder ROM Screen    Cervical lateral flexion Impaired   Rt & Lt SB due to pain Rt side   -MW       General ROM    RT Upper Ext Rt Elbow Supination;Rt Elbow Pronation  -MW       Right Upper " Ext    Rt Shoulder Abduction AROM 0-75 with c/o pain   -MW    Rt Shoulder Abduction PROM 0-145 in supine; c/o pain   -MW    Rt Shoulder Flexion AROM 0-85 with pain in standing   -MW    Rt Shoulder Flexion PROM 0-155 in supine with pain / pulling   -MW    Rt Shoulder External Rotation AROM 0-30 with pain   -MW    Rt Shoulder External Rotation PROM 0-45 with pain   -MW    Rt Shoulder Internal Rotation AROM hand to lower T spine   -MW    Rt Elbow Supination AROM Neutral to 20 deg; substituting   -MW    Rt Elbow Pronation AROM WFL with pain   -MW    Rt Upper Extremity Comments  c/o pain Rt lateral breast/ chest area adjacent to CA site scar.   -MW      User Key  (r) = Recorded By, (t) = Taken By, (c) = Cosigned By    Initials Name Provider Type    FRAN Bergeron PT Physical Therapist                        PT Assessment/Plan     Row Name 07/23/18 1100          Functional Limitations Performance in self-care ADL;Limitation in home management;Impaired gait  -MW    Impairments Pain;Impaired lymphatic circulation;Edema;Joint mobility;Muscle strength;Impaired flexibility;Motor function;Impaired muscle length   stair climbing   -    Assessment Comments Pt continues to report pain and weakness of RUE, with c/o increased pain since end of last week; was better earlier in July with less pain. Rt hand only able to close approximately 75%, but c/o increased pain. Rt hand with muscle wasting/ atrophy of thenar eminence, but also functionally of intrinsic muscles. Pt voices frustration with lack of pain control with oral and topical options; encouraged pt to follow up with MD's to discuss additional options. Overall, pt states temporary improvement with PT interventions, but unable to demonstrate functional improvements. Pt does demonstrate improved mobility of soft tissues, with less thickness of scar tissue in Rt breast, and less thickness in forearm edema. Skin of Rt breast with persistent red, slightly raised areas that  are reported to not itch but intermittently seem to be painful. Pt has learned to use her LT hand as her dominant hand due to loss of function of the RT hand. Overall prognosis is limited due to complex neuropathy resultingin pain and weakness. Will continue PT to provide continued pain management, manual therapy and anticipate progression of ther exercise if tolerated.   -MW    Rehab Potential Poor  -MW    Patient/caregiver participated in establishment of treatment plan and goals Yes  -MW    Patient would benefit from skilled therapy intervention Yes  -MW          PT Frequency 2x/week   trial of decrease frequency to attempt to transition to self  -MW    Predicted Duration of Therapy Intervention (Therapy Eval) 12 weeks   -MW    Planned CPT's? PT MANUAL THERAPY EA 15 MIN: 99617;PT THER PROC EA 15 MIN: 88334;PT ELECTRICAL STIM UNATTEND:   -MW    Physical Therapy Interventions (Optional Details) manual therapy techniques;manual lymphatic drainage;patient/family education;home exercise program;modalities;ROM (Range of Motion);strengthening;stretching  -MW    PT Plan Comments may benefit from follow up with pain / neurologist for improved pain management. Cont PT for pain management to maximize function.   -MW      User Key  (r) = Recorded By, (t) = Taken By, (c) = Cosigned By    Initials Name Provider Type    FRAN Bergeron PT Physical Therapist                 Modalities     Row Name 07/23/18 1500             Moist Heat    MH Applied Yes  -MW      Location Rt hand   -MW      Rx Minutes Other:   20  -MW      MH Prior to Rx Yes   warming hand during ASTYM to chest   -MW        User Key  (r) = Recorded By, (t) = Taken By, (c) = Cosigned By    Initials Name Provider Type    FRAN Bergeron PT Physical Therapist                     Manual Rx (last 36 hours)      Manual Treatments     Row Name 07/23/18 1100             Total Minutes    41107 - PT Manual Therapy Minutes 55  -MW         Manual Rx 1    Manual  Rx 1 Location RT axilla, pectoralis major and RUE   -MW      Manual Rx 1 Type STM, tissue bending, lifting and space correction techniques mixed with MLD   -MW      Manual Rx 1 Grade gentle with AAROM HBH, ER and forearm supination   -MW      Manual Rx 1 Duration 15  -MW         Manual Rx 2    Manual Rx 2 Location RT UT/ mid thoracic spine, leveator scap, teres mm group   -MW      Manual Rx 2 Type STM, tissue bending, lifting and space correction techniques mixed with MLD   -MW      Manual Rx 2 Grade gentle to moderate pressures   -MW      Manual Rx 2 Duration 25  -MW        User Key  (r) = Recorded By, (t) = Taken By, (c) = Cosigned By    Initials Name Provider Type    MW Na Bergeron, PT Physical Therapist                PT OP Goals     Row Name 07/23/18 1100          STG 1 Patient to report 25% improvement in RUE pain  -MW    STG 1 Progress Not Met  -MW    STG 2 RUE circumferential measurement reduction by >/= 2 cm to promote decrease in pain and improved function.   -MW    STG 2 Progress Partially Met  -MW    STG 2 Progress Comments RUE lymphedema stable; pt no longer concerned about further edema reduction.   -MW    STG 3 Pt independent with basic home lymph massage to promote fluid movement and decrease in pain.   -MW    STG 3 Progress Met  -MW    STG 4 Pt independent with HEP for LE flexibility and strength to improve function.   -MW    STG 4 Progress Met  -MW    STG 4 Progress Comments Pt no longer concerned with Rt hip issues.   -MW    STG 5 Pt to report decrease in sleep distrubance due to Rt buttock pain; 20% improvement.   -MW    STG 5 Progress Met  -MW          LTG 1 Patient able to actively raise  degrees or better to improve ability to complete daily activities including fixing her hair  -MW    LTG 1 Progress Partially Met  -MW    LTG 1 Progress Comments Able to reach overhead to don clothing   -MW    LTG 2 Patient to be measured and fit with compression sleeve  -MW    LTG 2 Progress Met   "-    LTG 3 Improved DASH score by 15 points to demonstrate improved function  /return to PLOF.   -MW    LTG 3 Progress Ongoing  -    LTG 4 Pt to demonstrate functional improvement of Rt hip/ buttock with improved LEFS by > 10 point.   -    LTG 4 Progress Ongoing  -    LTG 5 Pt to report improved stair climbing while carrying laundry or groceries.   -    LTG 5 Progress Ongoing  -    LTG 5 Progress Comments Laundry and groceries now limited by lack of ability to hold/ carry with RUE more than Rt hip pain or weakness.   -    LTG 6 pt to demonstrate 10% improvement in neck AROM for improved driving safety.   -    LTG 6 Progress Met  -    LTG 6 Progress Comments Crepitis with rotation, but AROM WFL   -          PT Goal Re-Cert Due Date 10/23/18  -      User Key  (r) = Recorded By, (t) = Taken By, (c) = Cosigned By    Initials Name Provider Type     Na Bergeron, PT Physical Therapist          Therapy Education  Education Details: Encouraged pt to follow up with PCP or MD's for additional support for pain management. Pt voiced frustration with constant level of pain which limits her QOL.. Reveiwed use and care of Silicon scar care product.   Given: Pain management, Symptoms/condition management  Program: Reinforced  How Provided: Verbal  Provided to: Patient  Level of Understanding: Verbalized    Outcome Measure Options: Disabilities of the Arm, Shoulder, and Hand (DASH) (\"I can hardly do anything\" )  DASH  Open a tight or new jar.: Unable  Write: Unable  Turn a key: Unable  Prepare a meal: Severe Difficulty  Push open a heavy door: Unable  Place an object on a shelf above your head: Unable  Do heavy household chores (e.g., wash walls, wash floors): Unable  Garden or do yard work: Unable  Make a bed: Severe Difficulty  Carry a shopping bag or briefcase: Unable  Carry a heavy object (over 10 lbs): Unable  Change a lightbulb overhead: Unable  Wash or blow dry your hair: Severe Difficulty  Wash " your back: Severe Difficulty  Put on a pullover sweater: Severe Difficulty  Use a knife to cut food: Unable  Recreational activities in which require little effort (e.g., cardplaying, knitting, etc.): Unable  Recreational activities in which you take some force or impact through your arm, should or hand (e.g. golf, hammering, tennis, etc.): Unable  Recreational Activities in which you move your arm freely (e.g., frisbee, badminton, etc.): Unable  Manage transportation needs (getting from one place to another): Severe Difficulty  During the past week, to what extent has your arm, shoulder, or hand problem interfered with your normal social activites with family, friends, neighbors or groups?: Extremely  During the past week, were you limited in your work or other regular daily activities as a result of your arm, shoulder or hand problem?: Unable  Arm, Shoulder, or hand pain: Extreme  Arm, shoulder or hand pain when you performed any specific activity: Extreme  Tingling (pins and needles) in your arm, shoulder, or hand: Severe  Weakness in your arm, shoulder or hand: Extreme  Stiffness in your arm, shoulder or hand: Moderate  During the past week, how much difficulty have you had sleeping because of the pain in your arm, shoulder or hand?: Severe Difficulty  I feel less capable, less confident or less useful because of my arm, shoulder or hand problem: Strongly Agree  DASH Sum : 135  Number of Questions Answered: 29  DASH Score: 91.38  DASH COMMENTS: RT hand pain and weakness limits majority of functions. Pt subsitutues with LT hand; LT hand now dominant hand due to limitations of RT.          Time Calculation:   Start Time: 1100  Total Timed Code Minutes- PT: 55 minute(s)   Therapy Suggested Charges     Code   Minutes Charges    27555 (CPT®) Hc Pt Neuromusc Re Education Ea 15 Min      67749 (CPT®) Hc Pt Ther Proc Ea 15 Min      45121 (CPT®) Hc Gait Training Ea 15 Min      48339 (CPT®) Hc Pt Therapeutic Act Ea 15 Min   "    05884 (CPT®) Hc Pt Manual Therapy Ea 15 Min 55 4    07804 (CPT®) Hc Pt Ther Massage- Per 15 Min      13858 (CPT®) Hc Pt Iontophoresis Ea 15 Min      25564 (CPT®) Hc Pt Elec Stim Ea-Per 15 Min      71062 (CPT®) Hc Pt Ultrasound Ea 15 Min      97774 (CPT®) Hc Pt Self Care/Mgmt/Train Ea 15 Min      Total  55 4        Therapy Charges for Today     Code Description Service Date Service Provider Modifiers Qty    80018099264 HC PT CARRY MOV HAND OBJ CURRENT 7/23/2018 Na eBrgeron, PT GP, CM 1    00812430862 HC PT CARRY MOV HAND OBJ PROJECTED 7/23/2018 Na Bergeron, PT GP, CL 1    64646940403 HC PT MANUAL THERAPY EA 15 MIN 7/23/2018 Na Bergeron, PT GP 4          PT G-Codes  PT Professional Judgement Used?: Yes  Outcome Measure Options: Disabilities of the Arm, Shoulder, and Hand (DASH) (\"I can hardly do anything\" )  Score: DASH raw score: 135 or 91% impaired   Functional Limitation: Carrying, moving and handling objects  Carrying, Moving and Handling Objects Current Status (): At least 80 percent but less than 100 percent impaired, limited or restricted  Carrying, Moving and Handling Objects Goal Status (): At least 60 percent but less than 80 percent impaired, limited or restricted         Na Bergeron, PT  7/23/2018     "

## 2018-07-24 ENCOUNTER — HOSPITAL ENCOUNTER (OUTPATIENT)
Dept: PHYSICAL THERAPY | Facility: HOSPITAL | Age: 68
Setting detail: THERAPIES SERIES
Discharge: HOME | End: 2018-07-24
Attending: INTERNAL MEDICINE

## 2018-07-24 DIAGNOSIS — I89.0 LYMPHEDEMA OF RIGHT UPPER EXTREMITY: ICD-10-CM

## 2018-07-24 DIAGNOSIS — L90.5 SCAR CONDITIONS AND FIBROSIS OF SKIN: Primary | ICD-10-CM

## 2018-07-24 DIAGNOSIS — N64.4 MASTODYNIA OF RIGHT BREAST: ICD-10-CM

## 2018-07-24 DIAGNOSIS — M79.601 RIGHT ARM PAIN: ICD-10-CM

## 2018-07-24 PROCEDURE — 97140 MANUAL THERAPY 1/> REGIONS: CPT | Performed by: PHYSICAL THERAPIST

## 2018-07-24 NOTE — THERAPY TREATMENT NOTE
"    Outpatient Physical Therapy Lymphedema Treatment Note  [facility]     Patient Name: Brianna Urrutia  : 1950  MRN: [id number]  Today's Date: 2018        Visit Date: 2018    Visit Dx:    ICD-10-CM ICD-9-CM   1. Scar conditions and fibrosis of skin L90.5 709.2   2. Lymphedema of right upper extremity I89.0 457.1   3. Right arm pain M79.601 729.5   4. Mastodynia of right breast N64.4 611.71       Patient Active Problem List   Diagnosis   • Malignant neoplasm of overlapping sites of right female breast (CMS/HCC)   • Malignant neoplasm of upper-outer quadrant of right female breast (CMS/HCC)   • Degenerative disc disease, cervical   • Brachial plexopathy   • Lymphedema of right arm   • History of radiation therapy   • JACQUI (generalized anxiety disorder)   • Attention deficit hyperactivity disorder (ADHD)              Lymphedema     Row Name 18 1300          Able to rate subjective pain? yes  -MW    Pre-Treatment Pain Level 3  -MW    Post-Treatment Pain Level 5  -MW    Subjective Pain Comment Pt reports finally got some relief from vape CBD/ THC combo.   -MW          Subjective Comments Pain better but was really bad until she saw her friend; vape was helpful. Pain presistent in Rt breast and through to back as well as down Rt arm. Hand cold and arm filling up.   -MW          Ptting Edema Category By severity  -MW    Pitting Edema Mild;Moderate  -MW          Location/Assessment Upper Quadrant  -MW    Upper Extremity Conditions right:;intact;clean  -MW    Upper Extremity Color/Pigment right:;other (comment)   pale, \"waxy\" appearance of hand & forearm when \"cold\"   -MW    Upper Quadrant Conditions right:;intact;clean  -MW    Upper Quadrant Color/Pigment right:;other (comment);red;erythema   scar white, pink, red; pink-red blotches stable (see photo)   -MW    Skin Observations Comment patches of hypervascularity on Rt breast stable, but increased/brighter red post STM / ASTYM   -MW          Manual " Lymphatic Drainage initial sequence;opened regional lymph nodes;opened anastamoses;extremity treatment;astym  -MW    Initial Sequence supraclavicular  -MW    Supraclavicular right;left  -MW    Shoulder Collectors right  -MW    Opened Regional Lymph Nodes axillary;inguinal  -MW    Axillary right;left  -MW    Inguinal right  -MW    Opened Anastamoses axillo-inguinal;anterior axillo-axillary  -MW    Anterior Axillo-Axillary moving Rt to left   -MW    Axillo-Inguinal right  -MW    Extremity Treatment extremity treatment focus on;other extremity treatment  -MW    MLD to Full Limb RUE MLD with STM (tissue bending and lifting techniques)   -MW    Astym anterior-lateral chest;upper traps  -MW    Anterior-Lateral Chest right  -MW    Anterior-Lateral Chest Comment In supine working superior-anterior chest with evaluator and localizer, very gentle; min course to fine soft tissue disruptions noted. Repositioned in shoulder flexion / ABD to open axilla (supported with pillows); evaluator and localizer. Minimal to moderate course soft tissue disruptions noted in axilla. Continued into lateral trunk adjacent to breast. Mild fine to course soft tissue disruptions noted. Extra strokes around scar, as well as along area of tightness in superior-lateral pectoralis.   -MW    Upper Traps right  -MW    Upper Traps Comment Initiated tx in sitting on stool with upper body on face craddle:  RT UT: Evaluator and localizer to UT / scapular region; minimal fine soft tissue disruptions noted through out. Extra strokes at RT teres region. Continued down over Rt posterior and lateral ribs wrapping around trunk; extra strokes at area of fullness/ thickened tissue adjacent to breast.   -MW    Manual Lymphatic Drainage Comments MLD post ASTYM/ STM; focues on medial upper arm and forearm; then down Rt side to rework AIA.   -MW          Wrapping Location upper extremity  -MW    Wrapping Location UE hand to axilla  -MW    Wrapping Comments assisted pt  "to don arm sleeve and wrist splint   -MW      User Key  (r) = Recorded By, (t) = Taken By, (c) = Cosigned By    Initials Name Provider Type    FRAN Bergeron PT Physical Therapist                  PT Ortho     Row Name 07/23/18 1100       Posture/Observations    Posture/Observations Comments Pt using scarf to create \"sling\" for RUE as this makes it \"hurt less.\"   -MW       Myotomal Screen- Upper Quarter Clearing    Shoulder flexion (C5) Right:;3- (Fair -)   c/o pain, pulling   -MW    Elbow flexion/wrist extension (C6) Right:;3+ (Fair +)   c/o pain   -MW    Elbow extension/wrist flexion (C7) Right:;3 (Fair)   c/o pain   -MW    Finger flexion/ (C8) Right:;2- (Poor -);1 (Trace)   approx 75% closure of fist; c/o pain   -MW    Finger abduction (T1) Right:;1 (Trace)   c/o pain   -MW     Right:;0 (Absent)   c/o pain with resistance   -MW       Cervical/Shoulder ROM Screen    Cervical lateral flexion Impaired   Rt & Lt SB due to pain Rt side   -MW       General ROM    RT Upper Ext Rt Elbow Supination;Rt Elbow Pronation  -MW       Right Upper Ext    Rt Shoulder Abduction AROM 0-75 with c/o pain   -MW    Rt Shoulder Abduction PROM 0-145 in supine; c/o pain   -MW    Rt Shoulder Flexion AROM 0-85 with pain in standing   -MW    Rt Shoulder Flexion PROM 0-155 in supine with pain / pulling   -MW    Rt Shoulder External Rotation AROM 0-30 with pain   -MW    Rt Shoulder External Rotation PROM 0-45 with pain   -MW    Rt Shoulder Internal Rotation AROM hand to lower T spine   -MW    Rt Elbow Supination AROM Neutral to 20 deg; substituting   -MW    Rt Elbow Pronation AROM WFL with pain   -MW    Rt Upper Extremity Comments  c/o pain Rt lateral breast/ chest area adjacent to CA site scar.   -MW      User Key  (r) = Recorded By, (t) = Taken By, (c) = Cosigned By    Initials Name Provider Type    FRAN Bergeron PT Physical Therapist                        PT Assessment/Plan     Row Name 07/24/18 1300          PT " Assessment    Functional Limitations Performance in self-care ADL;Limitation in home management;Impaired gait  -MW     Impairments Pain;Impaired lymphatic circulation;Edema;Joint mobility;Muscle strength;Impaired flexibility;Motor function;Impaired muscle length   stair climbing   -MW     Assessment Comments Pt with better pain control this visit; which allowed deeper STM with lifting and space correction techniques to primary scar site. Tissues looser post treatment but still with significant adherence and soft tissue restrictions. Pt seems to benefit from heat to relax and ice post treatment to decrease inflammation from soft tissue techniques.   -MW     Rehab Potential Fair  -MW     Patient/caregiver participated in establishment of treatment plan and goals Yes  -MW     Patient would benefit from skilled therapy intervention Yes  -MW        PT Plan    PT Frequency 2x/week;1x/week  -MW     Physical Therapy Interventions (Optional Details) manual therapy techniques;manual lymphatic drainage;patient/family education;home exercise program;modalities;ROM (Range of Motion);strengthening;stretching  -MW     PT Plan Comments reassess scar tissue next visit   -MW       User Key  (r) = Recorded By, (t) = Taken By, (c) = Cosigned By    Initials Name Provider Type    FRAN Bergeron, PT Physical Therapist                 Modalities     Row Name 07/24/18 1300             Moist Heat    MH Applied Yes  -MW      Location back   -MW      Rx Minutes Other:  -MW      MH Prior to Rx --   post ASTYM to UT/ scapular region; during ASTYM to chest   -MW         Ice    Ice Applied Yes  -MW      Location Rt breast   -MW      Rx Minutes 10 mins  -MW      Ice Prior to Rx No  -MW      Ice S/P Rx Yes  -MW        User Key  (r) = Recorded By, (t) = Taken By, (c) = Cosigned By    Initials Name Provider Type    FRAN Bergeron PT Physical Therapist                Exercises     Row Name 07/24/18 1300             Subjective Comments     Subjective Comments Pain better but was really bad until she saw her friend; vape was helpful. Pain presistent in Rt breast and through to back as well as down Rt arm. Hand cold and arm filling up.   -MW         Subjective Pain    Able to rate subjective pain? yes  -MW      Pre-Treatment Pain Level 3  -MW      Post-Treatment Pain Level 5  -MW      Subjective Pain Comment Pt reports finally got some relief from vape CBD/ THC combo.   -MW         Total Minutes    40477 - PT Manual Therapy Minutes 55  -MW         Exercise 1    Exercise Name 1 Rt shoulder, elbow AAROM with STM   -MW      Cueing 1 Tactile;Verbal  -MW      Reps 1 5  -MW        User Key  (r) = Recorded By, (t) = Taken By, (c) = Cosigned By    Initials Name Provider Type    FRAN Bergeron, MAT Physical Therapist                       Manual Rx       Manual Treatments     Row Name 07/24/18 1300          Total Minutes    17933 - PT Manual Therapy Minutes 55  -MW        Manual Rx 1    Manual Rx 1 Location RT axilla, pectoralis major and RUE   -MW     Manual Rx 1 Type STM, tissue bending, lifting and space correction techniques mixed with MLD   -MW     Manual Rx 1 Grade gentle with AAROM HBH, ER and forearm supination   -MW     Manual Rx 1 Duration 25  -MW        Manual Rx 2    Manual Rx 2 Location RT UT/ mid thoracic spine, leveator scap, teres mm group   -MW     Manual Rx 2 Type STM, tissue bending, lifting and space correction techniques mixed with MLD   -MW     Manual Rx 2 Grade gentle to moderate pressures   -MW     Manual Rx 2 Duration 20  -MW       User Key  (r) = Recorded By, (t) = Taken By, (c) = Cosigned By    Initials Name Provider Type    FRAN Bergeron, MAT Physical Therapist              Therapy Education  Education Details: Cont heat and ice prn pain relief. Retry silicone scar mangement sheet tomorrow.   Given: Pain management, Symptoms/condition management  Program: Reinforced  How Provided: Verbal  Provided to: Patient  Level of  Understanding: Verbalized              Time Calculation:   Start Time: 1300  Total Timed Code Minutes- PT: 55 minute(s)   Therapy Suggested Charges     Code   Minutes Charges    10493 (CPT®) Hc Pt Neuromusc Re Education Ea 15 Min      75126 (CPT®) Hc Pt Ther Proc Ea 15 Min      82760 (CPT®) Hc Gait Training Ea 15 Min      76272 (CPT®) Hc Pt Therapeutic Act Ea 15 Min      84640 (CPT®) Hc Pt Manual Therapy Ea 15 Min 55 4    62160 (CPT®) Hc Pt Ther Massage- Per 15 Min      95812 (CPT®) Hc Pt Iontophoresis Ea 15 Min      35719 (CPT®) Hc Pt Elec Stim Ea-Per 15 Min      84883 (CPT®) Hc Pt Ultrasound Ea 15 Min      81596 (CPT®) Hc Pt Self Care/Mgmt/Train Ea 15 Min      Total  55 4        Therapy Charges for Today     Code Description Service Date Service Provider Modifiers Qty    93352017888 HC PT MANUAL THERAPY EA 15 MIN 7/24/2018 Na Bergeron, PT GP 4                    Na Bergeron, PT  7/24/2018

## 2018-07-30 ENCOUNTER — HOSPITAL ENCOUNTER (OUTPATIENT)
Dept: PHYSICAL THERAPY | Facility: HOSPITAL | Age: 68
Setting detail: THERAPIES SERIES
Discharge: HOME | End: 2018-07-30
Attending: INTERNAL MEDICINE

## 2018-07-30 DIAGNOSIS — I89.0 LYMPHEDEMA OF RIGHT UPPER EXTREMITY: ICD-10-CM

## 2018-07-30 DIAGNOSIS — N64.4 MASTODYNIA OF RIGHT BREAST: ICD-10-CM

## 2018-07-30 DIAGNOSIS — M79.601 RIGHT ARM PAIN: ICD-10-CM

## 2018-07-30 DIAGNOSIS — L90.5 SCAR CONDITIONS AND FIBROSIS OF SKIN: Primary | ICD-10-CM

## 2018-07-30 PROCEDURE — 97140 MANUAL THERAPY 1/> REGIONS: CPT | Performed by: PHYSICAL THERAPIST

## 2018-07-30 NOTE — THERAPY TREATMENT NOTE
"    Outpatient Physical Therapy Lymphedema Treatment Note  [facility]     Patient Name: Brianna Urrutia  : 1950  MRN: [id number]  Today's Date: 2018        Visit Date: 2018    Visit Dx:    ICD-10-CM ICD-9-CM   1. Scar conditions and fibrosis of skin L90.5 709.2   2. Lymphedema of right upper extremity I89.0 457.1   3. Right arm pain M79.601 729.5   4. Mastodynia of right breast N64.4 611.71       Patient Active Problem List   Diagnosis   • Malignant neoplasm of overlapping sites of right female breast (CMS/HCC)   • Malignant neoplasm of upper-outer quadrant of right female breast (CMS/HCC)   • Degenerative disc disease, cervical   • Brachial plexopathy   • Lymphedema of right arm   • History of radiation therapy   • JACQUI (generalized anxiety disorder)   • Attention deficit hyperactivity disorder (ADHD)              Lymphedema     Row Name 18 1200          Able to rate subjective pain? yes  -MW    Pre-Treatment Pain Level 8  -MW    Post-Treatment Pain Level 8  -MW    Subjective Pain Comment Pt c/o severe pain in Rt arm/ elbow, wrist and hand as well as Rt breast/ arm pit area.   -MW          Subjective Comments Pt reports very sore from trip to [place]; just drove back and hasn't taken her regular medicines due to longer drive. Struggling to get laundry done and getting laundry in / out of car at son's home in [place].   -MW          Ptting Edema Category By severity  -MW    Pitting Edema Mild;Moderate  -MW    Edema Assessment Comment Mild to moderate fullness around elbow and in RT lateral trunk   -MW          Location/Assessment Upper Quadrant  -MW    Upper Extremity Conditions right:;intact;clean  -MW    Upper Extremity Color/Pigment right:;other (comment)   pale, \"waxy\" appearance of hand & forearm when \"cold\"   -MW    Upper Quadrant Conditions right:;intact;clean  -MW    Upper Quadrant Color/Pigment right:;other (comment);red;erythema   scar white, pink, red; pink-red blotches " "slightly increased   -MW    Skin Observations Comment patches of hypervascularity on Rt breast slightly increased in redness and heat prior to tx; held ASTYM to area   -MW          Manual Lymphatic Drainage initial sequence;opened regional lymph nodes;opened anastamoses;extremity treatment;astym  -MW    Initial Sequence supraclavicular  -MW    Supraclavicular right  -MW    Shoulder Collectors right  -MW    Opened Regional Lymph Nodes axillary;inguinal  -MW    Axillary right;left  -MW    Inguinal right  -MW    Opened Anastamoses axillo-inguinal  -MW    Axillo-Inguinal right  -MW    Extremity Treatment extremity treatment focus on;other extremity treatment  -MW    MLD to Full Limb RUE MLD with STM (tissue bending and lifting techniques)   -MW    Astym --  -MW    Anterior-Lateral Chest --  -MW    Upper Traps --  -MW    Manual Lymphatic Drainage Comments STM mixed with MLD; Rt chest, axillar, lateral trunk; then RUE focused on medial arm and forearm   -MW          Wrapping Location upper extremity  -MW    Wrapping Location UE hand to axilla  -MW    Wrapping Comments assisted pt to don arm sleeve  -MW    Compression/Skin Care Comments \"too much to do to use silicone over the weekend\"   -MW      User Key  (r) = Recorded By, (t) = Taken By, (c) = Cosigned By    Initials Name Provider Type    MW Na Bergeron, PT Physical Therapist                              PT Assessment/Plan     Row Name 07/30/18 1200          PT Assessment    Functional Limitations Performance in self-care ADL;Limitation in home management;Impaired gait  -MW     Impairments Pain;Impaired lymphatic circulation;Edema;Joint mobility;Muscle strength;Impaired flexibility;Motor function;Impaired muscle length   stair climbing   -MW     Assessment Comments Pt arrived with c/o severe pain RUE, Rt breast pain and increased heat. Skin changes persistent in area of original BrCA, but brighter red and more heat noted but not typical of cellulitis. Encouraged pt " to follow up with other health care providers for additional care of skin changes and intense presistent pain. Treatment limited by pain this visit; modified manual therapy per pt request (no ASTYM). Pt reports that she felt better after last week's session but not good today due to increased activity over the weekend/ driving to [place]. Soft tissue restrictions persist around Rt BrCA XRT scarring, as well as pain symptoms consistent with nerve impairment.   -MW     Rehab Potential Poor  -MW     Patient/caregiver participated in establishment of treatment plan and goals Yes  -MW     Patient would benefit from skilled therapy intervention Yes  -MW        PT Plan    PT Frequency 2x/week;1x/week  -MW     Physical Therapy Interventions (Optional Details) manual therapy techniques;manual lymphatic drainage;patient/family education;home exercise program;modalities;ROM (Range of Motion);strengthening;stretching  -MW     PT Plan Comments Scar stable; may benefit from follow up with pain / neuro MD's.   -MW       User Key  (r) = Recorded By, (t) = Taken By, (c) = Cosigned By    Initials Name Provider Type    FRAN Bergeron, PT Physical Therapist                 Modalities     Row Name 07/30/18 1200             Moist Heat    MH Applied Yes  -MW      Location RUE/ lateral trunk   -MW      Rx Minutes 15 mins  -MW      MH S/P Rx Yes   RUE during STM to scapular area then repositioned   -MW        User Key  (r) = Recorded By, (t) = Taken By, (c) = Cosigned By    Initials Name Provider Type    FRAN Bergeron, PT Physical Therapist                Exercises     Row Name 07/30/18 1200             Subjective Comments    Subjective Comments Pt reports very sore from trip to [place]; just drove back and hasn't taken her regular medicines due to longer drive. Struggling to get laundry done and getting laundry in / out of car at son's home in [place].   -MW         Subjective Pain    Able to rate subjective pain? yes   -MW      Pre-Treatment Pain Level 8  -MW      Post-Treatment Pain Level 8  -MW      Subjective Pain Comment Pt c/o severe pain in Rt arm/ elbow, wrist and hand as well as Rt breast/ arm pit area.   -MW         Total Minutes    74686 - PT Therapeutic Exercise Minutes 5  -MW      76486 - PT Manual Therapy Minutes 55  -MW         Exercise 1    Exercise Name 1 Rt shoulder, elbow AAROM with STM   -MW      Cueing 1 Tactile;Verbal  -MW      Reps 1 5  -MW         Exercise 2    Exercise Name 2 Rt ulnar, radial and median nerve gentle stretches   -MW      Cueing 2 Tactile;Verbal  -MW      Reps 2 1-2 each   -MW      Additional Comments VC for deep breathing   -MW        User Key  (r) = Recorded By, (t) = Taken By, (c) = Cosigned By    Initials Name Provider Type    FRAN Bergeron PT Physical Therapist                       Manual Rx (last 36 hours)      Manual Treatments     Row Name 07/30/18 1200             Total Minutes    81230 - PT Manual Therapy Minutes 55  -MW         Manual Rx 1    Manual Rx 1 Location RT axilla, pectoralis major and RUE   -MW      Manual Rx 1 Type STM, tissue bending, lifting and space correction techniques mixed with MLD   -MW      Manual Rx 1 Grade gentle with AAROM HBH, ER and forearm supination   -MW      Manual Rx 1 Duration 25  -MW         Manual Rx 2    Manual Rx 2 Location RT UT/ mid thoracic spine, leveator scap, teres mm group   -MW      Manual Rx 2 Type STM, tissue bending, lifting and space correction techniques mixed with MLD   -MW      Manual Rx 2 Grade gentle  -MW      Manual Rx 2 Duration 15  -MW        User Key  (r) = Recorded By, (t) = Taken By, (c) = Cosigned By    Initials Name Provider Type    FRAN Bergeron PT Physical Therapist              Therapy Education  Education Details: Encouraged pt to follow up with Dr Jacobson or Dr Montoya for additonal pain control options.   Given: Pain management, Symptoms/condition management  Program: Reinforced  How Provided:  Verbal  Provided to: Patient  Level of Understanding: Verbalized              Time Calculation:   Start Time: 1200  Total Timed Code Minutes- PT: 55 minute(s)   Therapy Suggested Charges     Code   Minutes Charges    34451 (CPT®) Hc Pt Neuromusc Re Education Ea 15 Min      88773 (CPT®) Hc Pt Ther Proc Ea 15 Min 5     86362 (CPT®) Hc Gait Training Ea 15 Min      15058 (CPT®) Hc Pt Therapeutic Act Ea 15 Min      34619 (CPT®) Hc Pt Manual Therapy Ea 15 Min 55 4    80195 (CPT®) Hc Pt Ther Massage- Per 15 Min      26413 (CPT®) Hc Pt Iontophoresis Ea 15 Min      86231 (CPT®) Hc Pt Elec Stim Ea-Per 15 Min      96293 (CPT®) Hc Pt Ultrasound Ea 15 Min      58631 (CPT®) Hc Pt Self Care/Mgmt/Train Ea 15 Min      Total  60 4        Therapy Charges for Today     Code Description Service Date Service Provider Modifiers Qty    78393532299 HC PT MANUAL THERAPY EA 15 MIN 7/30/2018 Na Bergeron, PT GP 4                    Na Bergeron, PT  7/30/2018

## 2018-08-02 ENCOUNTER — HOSPITAL ENCOUNTER (OUTPATIENT)
Dept: PHYSICAL THERAPY | Facility: HOSPITAL | Age: 68
Setting detail: THERAPIES SERIES
Discharge: HOME | End: 2018-08-02
Attending: INTERNAL MEDICINE

## 2018-08-02 DIAGNOSIS — I89.0 LYMPHEDEMA OF RIGHT UPPER EXTREMITY: ICD-10-CM

## 2018-08-02 DIAGNOSIS — L90.5 SCAR CONDITIONS AND FIBROSIS OF SKIN: Primary | ICD-10-CM

## 2018-08-02 DIAGNOSIS — N64.4 MASTODYNIA OF RIGHT BREAST: ICD-10-CM

## 2018-08-02 DIAGNOSIS — M79.601 RIGHT ARM PAIN: ICD-10-CM

## 2018-08-02 PROCEDURE — 97140 MANUAL THERAPY 1/> REGIONS: CPT | Performed by: PHYSICAL THERAPIST

## 2018-08-02 NOTE — THERAPY TREATMENT NOTE
"    Outpatient Physical Therapy Lymphedema Treatment Note  [facility]     Patient Name: Brianna Urrutia  : 1950  MRN: [id number]  Today's Date: 2018        Visit Date: 2018    Visit Dx:    ICD-10-CM ICD-9-CM   1. Scar conditions and fibrosis of skin L90.5 709.2   2. Lymphedema of right upper extremity I89.0 457.1   3. Right arm pain M79.601 729.5   4. Mastodynia of right breast N64.4 611.71       Patient Active Problem List   Diagnosis   • Malignant neoplasm of overlapping sites of right female breast (CMS/HCC)   • Malignant neoplasm of upper-outer quadrant of right female breast (CMS/HCC)   • Degenerative disc disease, cervical   • Brachial plexopathy   • Lymphedema of right arm   • History of radiation therapy   • JACQUI (generalized anxiety disorder)   • Attention deficit hyperactivity disorder (ADHD)              Lymphedema     Row Name 18 1100          Able to rate subjective pain? yes  -MW    Pre-Treatment Pain Level 6  -MW    Post-Treatment Pain Level 7  -MW    Subjective Pain Comment Requests no treatment to Rt breast; \"let's just work around it; it's like there's a nerve in there that can't connect and its just going at it.\"   -MW          Subjective Comments Reports taking pain meds but still not feeling able to drive so had volunteer [place] support  bring her. Forgot her phone.   -MW          Ptting Edema Category By severity  -MW    Pitting Edema Mild;Moderate  -MW    Edema Assessment Comment Mild to moderate fullness - thickness in ventral forearm and around elbow; mild fullness in RT lateral trunk   -MW          Location/Assessment Upper Quadrant  -MW    Upper Extremity Conditions right:;intact;clean  -MW    Upper Extremity Color/Pigment right:;other (comment)   pale, \"waxy\" appearance of hand & forearm when \"cold\"   -MW    Upper Quadrant Conditions right:;intact;clean  -MW    Upper Quadrant Color/Pigment right:;other (comment);red;erythema   scar white, pink, red; pink-red " blotches stable  -MW          Manual Lymphatic Drainage initial sequence;opened regional lymph nodes;opened anastamoses;extremity treatment;astym  -MW    Initial Sequence supraclavicular  -MW    Supraclavicular right  -MW    Shoulder Collectors right  -MW    Opened Regional Lymph Nodes axillary;inguinal  -MW    Axillary right;left  -MW    Inguinal right  -MW    Opened Anastamoses axillo-inguinal  -MW    Axillo-Inguinal right  -MW    Extremity Treatment extremity treatment focus on;other extremity treatment  -MW    MLD to Full Limb RUE MLD with STM (tissue bending and lifting techniques)   -MW    Astym upper traps  -MW    Upper Traps right  -MW    Upper Traps Comment Initiated tx in sitting on stool with upper body on face craddle:  RT UT: Evaluator and localizer to UT / scapular region; minimal fine soft tissue disruptions noted through out. Extra strokes at RT teres region. Continued down over Rt posterior and lateral ribs wrapping around trunk; extra strokes at area of fullness/ thickened tissue adjacent to breast. C/o pain at teres and lateral trunk area.   -MW    Manual Lymphatic Drainage Comments STM mixed with MLD RT upper back/ scapula and into lateral trunk; then RUE focused on medial arm and forearm   -MW          Wrapping Location upper extremity  -MW    Wrapping Location UE hand to axilla  -MW    Wrapping Comments assisted pt to don arm sleeve  -MW      User Key  (r) = Recorded By, (t) = Taken By, (c) = Cosigned By    Initials Name Provider Type    Na High, PT Physical Therapist                              PT Assessment/Plan     Row Name 08/02/18 1100          PT Assessment    Functional Limitations Performance in self-care ADL;Limitation in home management;Impaired gait  -MW     Impairments Pain;Impaired lymphatic circulation;Edema;Joint mobility;Muscle strength;Impaired flexibility;Motor function;Impaired muscle length   stair climbing   -MW     Assessment Comments Little change from last  visit; forearm thickness and fullness softer post treatment. Pt requested no treatment to breast / scar area this visit due to pain in area. Attempted IFC/ estim for pain relief but electrodes not sticking well post ASTYM due to cocoa butter. Pt seems more relaxed on moist heat. Overall prognosis unclear as pain remains main concern.   -MW     Rehab Potential Poor  -MW     Patient/caregiver participated in establishment of treatment plan and goals Yes  -MW     Patient would benefit from skilled therapy intervention Yes  -MW        PT Plan    PT Frequency 2x/week;1x/week   discuss decreasing freq to transition to self care   -MW     Physical Therapy Interventions (Optional Details) manual therapy techniques;manual lymphatic drainage;patient/family education;home exercise program;modalities;ROM (Range of Motion);strengthening;stretching  -MW     PT Plan Comments Try gentle AAROM prior to STM/ MLD  -MW       User Key  (r) = Recorded By, (t) = Taken By, (c) = Cosigned By    Initials Name Provider Type    Na High, PT Physical Therapist                 Modalities     Row Name 08/02/18 1100             Moist Heat    MH Applied Yes  -MW      Location back   -MW      Rx Minutes Other:   20 minutes during RUE MLD / STM   -MW      MH Prior to Rx Yes  -MW         ELECTRICAL STIMULATION    Attended/Unattended Unattended  -MW      Stimulation Type IFC  -MW      Max mAmp 15  -MW      Location/Electrode Placement/Other 2 channels bracketing Rt shoulder   -MW        User Key  (r) = Recorded By, (t) = Taken By, (c) = Cosigned By    Initials Name Provider Type    Na High, PT Physical Therapist                Exercises     Row Name 08/02/18 1100             Subjective Comments    Subjective Comments Reports taking pain meds but still not feeling able to drive so had volunteer [place] support  bring her. Forgot her phone.   -MW         Subjective Pain    Able to rate subjective pain? yes  -MW       "Pre-Treatment Pain Level 6  -MW      Post-Treatment Pain Level 7  -MW      Subjective Pain Comment Requests no treatment to Rt breast; \"let's just work around it; it's like there's a nerve in there that can't connect and its just going at it.\"   -MW         Total Minutes    60062 - PT Manual Therapy Minutes 53  -MW        User Key  (r) = Recorded By, (t) = Taken By, (c) = Cosigned By    Initials Name Provider Type    Na High, PT Physical Therapist                       Manual Rx (last 36 hours)      Manual Treatments     Row Name 08/02/18 1100             Total Minutes    02744 - PT Manual Therapy Minutes 53  -MW         Manual Rx 1    Manual Rx 1 Location RT axilla and RUE   -MW      Manual Rx 1 Type STM, tissue bending, lifting and space correction techniques mixed with MLD   -MW      Manual Rx 1 Grade gentle with AAROM forearm supination   -MW      Manual Rx 1 Duration 15  -MW         Manual Rx 2    Manual Rx 2 Location RT UT/ mid thoracic spine, leveator scap, teres mm group   -MW      Manual Rx 2 Type STM, tissue bending, lifting and space correction techniques mixed with MLD   -MW      Manual Rx 2 Grade gentle  -MW      Manual Rx 2 Duration 20  -MW        User Key  (r) = Recorded By, (t) = Taken By, (c) = Cosigned By    Initials Name Provider Type    Na High, PT Physical Therapist              Therapy Education  Education Details: Encouraged pt to follow up with Dr Jacobson for pain and functional changes, especially with pt reporting increased issues with Lt hand. Also encouraged pt to follow up with Dr Daniel.   Given: Pain management, Symptoms/condition management  Program: Reinforced  How Provided: Verbal  Provided to: Patient  Level of Understanding: Verbalized              Time Calculation:   Start Time: 1100  Total Timed Code Minutes- PT: 53 minute(s)   Therapy Suggested Charges     Code   Minutes Charges    46080 (CPT®) Hc Pt Neuromusc Re Education Ea 15 Min      50745 (CPT®) " Hc Pt Ther Proc Ea 15 Min      82532 (CPT®) Hc Gait Training Ea 15 Min      93385 (CPT®) Hc Pt Therapeutic Act Ea 15 Min      07101 (CPT®) Hc Pt Manual Therapy Ea 15 Min 53 4    10639 (CPT®) Hc Pt Ther Massage- Per 15 Min      61625 (CPT®) Hc Pt Iontophoresis Ea 15 Min      77921 (CPT®) Hc Pt Elec Stim Ea-Per 15 Min      29646 (CPT®) Hc Pt Ultrasound Ea 15 Min      23585 (CPT®) Hc Pt Self Care/Mgmt/Train Ea 15 Min      Total  53 4        Therapy Charges for Today     Code Description Service Date Service Provider Modifiers Qty    34815152775 HC PT MANUAL THERAPY EA 15 MIN 8/2/2018 Na Bergeron, PT GP 4                    Na Bergeron, PT  8/2/2018

## 2018-08-06 ENCOUNTER — HOSPITAL ENCOUNTER (OUTPATIENT)
Dept: PHYSICAL THERAPY | Facility: HOSPITAL | Age: 68
Setting detail: THERAPIES SERIES
Discharge: HOME | End: 2018-08-06
Attending: INTERNAL MEDICINE

## 2018-08-06 DIAGNOSIS — N64.4 MASTODYNIA OF RIGHT BREAST: ICD-10-CM

## 2018-08-06 DIAGNOSIS — I89.0 LYMPHEDEMA OF RIGHT UPPER EXTREMITY: ICD-10-CM

## 2018-08-06 DIAGNOSIS — L90.5 SCAR CONDITIONS AND FIBROSIS OF SKIN: Primary | ICD-10-CM

## 2018-08-06 DIAGNOSIS — M79.601 RIGHT ARM PAIN: ICD-10-CM

## 2018-08-06 PROCEDURE — 97140 MANUAL THERAPY 1/> REGIONS: CPT | Performed by: PHYSICAL THERAPIST

## 2018-08-06 NOTE — THERAPY TREATMENT NOTE
"    Outpatient Physical Therapy Lymphedema Treatment Note  [facility]     Patient Name: Brianna Urrutia  : 1950  MRN: [id number]  Today's Date: 2018        Visit Date: 2018    Visit Dx:    ICD-10-CM ICD-9-CM   1. Scar conditions and fibrosis of skin L90.5 709.2   2. Lymphedema of right upper extremity I89.0 457.1   3. Right arm pain M79.601 729.5   4. Mastodynia of right breast N64.4 611.71       Patient Active Problem List   Diagnosis   • Malignant neoplasm of overlapping sites of right female breast (CMS/HCC)   • Malignant neoplasm of upper-outer quadrant of right female breast (CMS/HCC)   • Degenerative disc disease, cervical   • Brachial plexopathy   • Lymphedema of right arm   • History of radiation therapy   • JACQUI (generalized anxiety disorder)   • Attention deficit hyperactivity disorder (ADHD)              Lymphedema     Row Name 18 1100          Able to rate subjective pain? yes  -MW    Pre-Treatment Pain Level 5  -MW    Post-Treatment Pain Level 6  -MW    Subjective Pain Comment Requests no treatment to Rt breast scar tissue; \"it was very painful and hot over the weekend\"   -MW          Subjective Comments Reports woke up Saturday night into  morning with intense dizziness and nausea. Eventually the pain in Rt breast/ chest, arm and hand was so intense she reports not noticing the dizziness any more. Better today but breast is still very painful.   -MW          Ptting Edema Category By severity  -MW    Pitting Edema Mild;Moderate  -MW    Edema Assessment Comment Mild to mod fullness Rt lateral trunk over lower ribs. Mild fullness RT UE around elbow and forearm; slowly increases while sleeve is off. Decreased post MLD/ STM   -MW          Location/Assessment Upper Quadrant  -MW    Upper Extremity Conditions right:;intact;clean  -MW    Upper Extremity Color/Pigment right:;other (comment)   pale, \"waxy\" appearance of hand & forearm when \"cold\"   -MW    Upper Quadrant Conditions " right:;intact;clean  -MW    Upper Quadrant Color/Pigment right:;other (comment);red;erythema   scar white, pink, red; pink-red blotches increased   -MW    Skin Observations Comment Areas of hypervascularity in Rt breast have increased over superior aspect of breast/ chest, but have decreased over mid lateral breast. Area warm   -MW          Manual Lymphatic Drainage initial sequence;opened regional lymph nodes;opened anastamoses;extremity treatment;astym  -MW    Initial Sequence supraclavicular;abdomen;shoulder collectors  -MW    Supraclavicular right  -MW    Shoulder Collectors right  -MW    Abdomen superficial  -MW    Abdomen Comment several rounds of quadrant circles and full [place]   -MW    Opened Regional Lymph Nodes axillary;inguinal  -MW    Axillary right  -MW    Inguinal right  -MW    Opened Anastamoses axillo-inguinal  -MW    Axillo-Inguinal right  -MW    Extremity Treatment extremity treatment focus on;other extremity treatment  -MW    MLD to Full Limb RUE MLD with STM (tissue bending and lifting techniques)   -MW    Other Extremity Treatment Focused first on lateral trunk clearing, axillary clearing   -MW    Astym upper traps  -MW    Anterior-Lateral Chest right  -MW    Upper Traps right  -MW    Upper Traps Comment Initiated tx in sitting on stool with upper body on face craddle:  RT UT: Evaluator and localizer to UT / scapular region; smooth, no soft tissue disruptions noted. Extra strokes at RT teres region, UT / leveator scapulae and mid traps. Continued down over Rt posterior and lateral ribs wrapping around trunk; extra strokes at area of fullness/ thickened tissue adjacent to breast. C/o pain at teres and lateral trunk area.   -MW    Manual Lymphatic Drainage Comments STM mixed with MLD RT upper back/ scapula and into lateral trunk; then RUE focused on medial arm and forearm   -MW          Wrapping Location upper extremity  -MW    Wrapping Location UE hand to axilla  -MW    Wrapping Comments  "assisted pt to don arm sleeve  -    Compression/Skin Care Comments Applied pt's own topical pain cream to scapular area, elbow, forearm and deltoid/ pectoral fold area.   -      User Key  (r) = Recorded By, (t) = Taken By, (c) = Cosigned By    Initials Name Provider Type    Na High, PT Physical Therapist                              PT Assessment/Plan     Row Name 08/06/18 1100          PT Assessment    Functional Limitations Performance in self-care ADL;Limitation in home management;Impaired gait  -     Impairments Pain;Impaired lymphatic circulation;Edema;Joint mobility;Muscle strength;Impaired flexibility;Motor function;Impaired muscle length   stair climbing   -     Assessment Comments Pt with reports of severe dizzy event over weekend which appears to have resolved. Continues to report pain and paresthesias in Rt breast, chest and upper extremity; hand hypersensitive with towel perceived as \"glass shards.\" RUE lymphedema softer post MLD. Rt breast skin texture and color changes moving into more superior aspect of breast / chest. Recommend pt follow up with Onc or Rad Onc.   -MW     Rehab Potential Fair  -MW     Patient/caregiver participated in establishment of treatment plan and goals Yes  -MW     Patient would benefit from skilled therapy intervention Yes  -MW        PT Plan    PT Frequency 2x/week;1x/week   transition to 1 x wk next week   -     Physical Therapy Interventions (Optional Details) manual therapy techniques;manual lymphatic drainage;patient/family education;home exercise program;modalities;ROM (Range of Motion);strengthening;stretching  -     PT Plan Comments Cont gentle AA-AROM with ball; cont manual   -       User Key  (r) = Recorded By, (t) = Taken By, (c) = Cosigned By    Initials Name Provider Type    Na High, PT Physical Therapist                     Exercises     Row Name 08/06/18 1100             Subjective Comments    Subjective Comments Reports " "woke up Saturday night into Sunday morning with intense dizziness and nausea. Eventually the pain in Rt breast/ chest, arm and hand was so intense she reports not noticing the dizziness any more. Better today but breast is still very painful.   -MW         Subjective Pain    Able to rate subjective pain? yes  -MW      Pre-Treatment Pain Level 5  -MW      Post-Treatment Pain Level 6  -MW      Subjective Pain Comment Requests no treatment to Rt breast scar tissue; \"it was very painful and hot over the weekend\"   -MW         Total Minutes    67711 - PT Therapeutic Exercise Minutes 5  -MW      03990 - PT Manual Therapy Minutes 55  -MW         Exercise 1    Exercise Name 1 Bilat forward flexion ball roll   -MW      Cueing 1 Verbal  -MW      Reps 1 10  -MW         Exercise 2    Exercise Name 2 Bilat hands on ball circles CW and CCW   -MW      Cueing 2 Demo;Tactile;Verbal  -MW      Reps 2 10 each way; 5 at a time   -MW        User Key  (r) = Recorded By, (t) = Taken By, (c) = Cosigned By    Initials Name Provider Type    MW Na Bergeron, PT Physical Therapist                       Manual Rx (last 36 hours)      Manual Treatments     Row Name 08/06/18 1100             Total Minutes    63534 - PT Manual Therapy Minutes 55  -MW         Manual Rx 1    Manual Rx 1 Location RT axilla and RUE   -MW      Manual Rx 1 Type STM, tissue bending, lifting and space correction techniques mixed with MLD   -MW      Manual Rx 1 Grade gentle with AAROM forearm supination   -MW      Manual Rx 1 Duration 20  -MW         Manual Rx 2    Manual Rx 2 Location RT UT/ mid thoracic spine, leveator scap, teres mm group   -MW      Manual Rx 2 Type STM, tissue bending, lifting and space correction techniques mixed with MLD   -MW      Manual Rx 2 Grade gentle  -MW      Manual Rx 2 Duration 25  -MW        User Key  (r) = Recorded By, (t) = Taken By, (c) = Cosigned By    Initials Name Provider Type    MW Na Bergeron, PT Physical Therapist    "           Therapy Education  Education Details: Reviewed POC; will see 2 x this week, then decrease to 1 x wk for 1 month and reassess status.   Given: Pain management, Symptoms/condition management, Edema management  Program: Modified  How Provided: Verbal  Provided to: Patient  Level of Understanding: Verbalized              Time Calculation:   Start Time: 1100  Total Timed Code Minutes- PT: 60 minute(s)   Therapy Suggested Charges     Code   Minutes Charges    52077 (CPT®) Hc Pt Neuromusc Re Education Ea 15 Min      31299 (CPT®) Hc Pt Ther Proc Ea 15 Min 5     48781 (CPT®) Hc Gait Training Ea 15 Min      58985 (CPT®) Hc Pt Therapeutic Act Ea 15 Min      18516 (CPT®) Hc Pt Manual Therapy Ea 15 Min 55 4    28477 (CPT®) Hc Pt Ther Massage- Per 15 Min      12894 (CPT®) Hc Pt Iontophoresis Ea 15 Min      54838 (CPT®) Hc Pt Elec Stim Ea-Per 15 Min      27054 (CPT®) Hc Pt Ultrasound Ea 15 Min      34132 (CPT®) Hc Pt Self Care/Mgmt/Train Ea 15 Min      Total  60 4        Therapy Charges for Today     Code Description Service Date Service Provider Modifiers Qty    15275522624 HC PT MANUAL THERAPY EA 15 MIN 8/6/2018 Na Bergeron, PT GP 4                    Na Bergeron, PT  8/6/2018

## 2018-08-16 NOTE — THERAPY TREATMENT NOTE
"    Outpatient Physical Therapy Lymphedema Treatment Note  [facility]     Patient Name: Brianna Urrutia  : 1950  MRN: [id number]  Today's Date: 2018        Visit Date: 2018    Visit Dx:    ICD-10-CM ICD-9-CM   1. Scar conditions and fibrosis of skin L90.5 709.2   2. Lymphedema of right upper extremity I89.0 457.1   3. Right arm pain M79.601 729.5   4. Mastodynia of right breast N64.4 611.71       Patient Active Problem List   Diagnosis   • Malignant neoplasm of overlapping sites of right female breast (CMS/HCC)   • Malignant neoplasm of upper-outer quadrant of right female breast (CMS/HCC)   • Degenerative disc disease, cervical   • Brachial plexopathy   • Lymphedema of right arm   • History of radiation therapy   • JACQUI (generalized anxiety disorder)   • Attention deficit hyperactivity disorder (ADHD)              Lymphedema     Row Name 18 1100          Able to rate subjective pain? yes  -MW    Pre-Treatment Pain Level 8  -MW    Post-Treatment Pain Level 8  -MW    Subjective Pain Comment Reports tramadol is kicking in so down to 8  -MW          Subjective Comments Reports has been using lymph pump \"alot\" as it is one of the only helpful things. Plans to schedule follow up with Dr Jacobson to talk about treatment options.   -MW          Ptting Edema Category By severity  -MW    Pitting Edema Mild;Moderate  -MW    Edema Assessment Comment Mild to mod fullness around elbow, mild fullness Rt lateral trunk   -MW          Location/Assessment Upper Quadrant  -MW    Upper Extremity Conditions right:;intact;clean  -MW    Upper Extremity Color/Pigment right:;other (comment)   pale, \"waxy\" appearance of hand & forearm when \"cold\"   -MW    Upper Quadrant Conditions right:;intact;clean  -MW    Upper Quadrant Color/Pigment right:;other (comment);red;erythema   scar white, pink, red; pink-red blotches   -MW    Skin Observations Comment Areas of hypervascularity in Rt breast have increased over superior " aspect of breast/ chest, but have decreased over mid lateral breast. Area warm to touch   -MW          Manual Lymphatic Drainage initial sequence;opened regional lymph nodes;opened anastamoses;extremity treatment;astym  -MW    Initial Sequence supraclavicular;shoulder collectors  -MW    Supraclavicular right  -MW    Shoulder Collectors right  -MW    Abdomen --  -MW    Opened Regional Lymph Nodes axillary;inguinal  -MW    Axillary right  -MW    Inguinal right  -MW    Opened Anastamoses axillo-inguinal  -MW    Axillo-Inguinal right  -MW    Extremity Treatment extremity treatment focus on;other extremity treatment  -MW    MLD to Full Limb Rt lateral-posterior trunk, RUE with STM (bending techniques to soft tissue restrictions in forearm)   -MW    Astym upper traps  -MW    Anterior-Lateral Chest right  -MW    Upper Traps right  -MW    Upper Traps Comment Initiated tx in sitting on stool with upper body on face craddle:  RT UT: Evaluator and localizer to UT / scapular region; smooth, min soft tissue disruptions noted. Extra strokes at RT teres region, UT / leveator scapulae and mid traps. Continued down over Rt posterior and lateral ribs wrapping around trunk; extra strokes at area of fullness/ thickened tissue adjacent to breast. C/o pain at teres, inferior angle of scapula, and lateral trunk area.   -MW    Manual Lymphatic Drainage Comments STM mixed with MLD RT upper back/ scapula and into lateral trunk; then RUE   -MW          Wrapping Location upper extremity  -MW    Wrapping Location UE hand to axilla  -MW    Wrapping Comments assisted pt to don arm sleeve  -MW      User Key  (r) = Recorded By, (t) = Taken By, (c) = Cosigned By    Initials Name Provider Type    Na High, PT Physical Therapist                              PT Assessment/Plan     Row Name 08/16/18 1100          PT Assessment    Functional Limitations Performance in self-care ADL;Limitation in home management;Impaired gait  -FRAN      "Impairments Pain;Impaired lymphatic circulation;Edema;Joint mobility;Muscle strength;Impaired flexibility;Motor function;Impaired muscle length   stair climbing   -MW     Assessment Comments Pt returns with 10 days since last PT visit; pain remains at 8 of 10. Lymphedema stable. Pt continues to do well with lymphedema management but also continues to have severe pain in Rt upper quarter as well as color and temperature changes in RUE which seem consistent with neuropathy or CRPS. Rt breast areas of hypervascularity / erythema have decreased in the lateral aspect but have increased in size but decreased in color over superior aspect of breast/ chest. No response from Onc after VM left.   -MW     Rehab Potential Fair  -MW     Patient/caregiver participated in establishment of treatment plan and goals Yes  -MW     Patient would benefit from skilled therapy intervention Yes  -MW        PT Plan    PT Frequency 2x/week;1x/week   attempt to taper to 1 x wk   -MW     Physical Therapy Interventions (Optional Details) manual therapy techniques;manual lymphatic drainage;patient/family education;home exercise program;modalities;ROM (Range of Motion);strengthening;stretching  -MW     PT Plan Comments Resume gentle AA-AROM try at end of session.   -MW       User Key  (r) = Recorded By, (t) = Taken By, (c) = Cosigned By    Initials Name Provider Type    Na High, PT Physical Therapist                     Exercises     Row Name 08/16/18 1100             Subjective Comments    Subjective Comments Reports has been using lymph pump \"alot\" as it is one of the only helpful things. Plans to schedule follow up with Dr Jacobson to talk about treatment options.   -MW         Subjective Pain    Able to rate subjective pain? yes  -MW      Pre-Treatment Pain Level 8  -MW      Post-Treatment Pain Level 8  -MW      Subjective Pain Comment Reports tramadol is kicking in so down to 8  -MW         Total Minutes    24412 - PT Manual " Therapy Minutes 55  -MW        User Key  (r) = Recorded By, (t) = Taken By, (c) = Cosigned By    Initials Name Provider Type    Na High, PT Physical Therapist                       Manual Rx (last 36 hours)      Manual Treatments     Row Name 08/16/18 1100             Total Minutes    76398 - PT Manual Therapy Minutes 55  -MW         Manual Rx 1    Manual Rx 1 Location RT axilla and RUE   -MW      Manual Rx 1 Type STM, tissue bending, lifting and space correction techniques mixed with MLD   -MW      Manual Rx 1 Grade gentle with AAROM forearm supination   -MW      Manual Rx 1 Duration 20  -MW         Manual Rx 2    Manual Rx 2 Location RT UT/ mid thoracic spine, leveator scap, teres mm group   -MW      Manual Rx 2 Type STM, tissue bending, lifting and space correction techniques mixed with MLD   -MW      Manual Rx 2 Grade gentle  -MW      Manual Rx 2 Duration 25  -MW        User Key  (r) = Recorded By, (t) = Taken By, (c) = Cosigned By    Initials Name Provider Type    Na High, PT Physical Therapist                             Time Calculation:   Start Time: 1100  Total Timed Code Minutes- PT: 55 minute(s)   Therapy Suggested Charges     Code   Minutes Charges    49358 (CPT®) Hc Pt Neuromusc Re Education Ea 15 Min      51335 (CPT®) Hc Pt Ther Proc Ea 15 Min      28701 (CPT®) Hc Gait Training Ea 15 Min      49633 (CPT®) Hc Pt Therapeutic Act Ea 15 Min      58966 (CPT®) Hc Pt Manual Therapy Ea 15 Min 55 4    12886 (CPT®) Hc Pt Ther Massage- Per 15 Min      72414 (CPT®) Hc Pt Iontophoresis Ea 15 Min      67010 (CPT®) Hc Pt Elec Stim Ea-Per 15 Min      84482 (CPT®) Hc Pt Ultrasound Ea 15 Min      73992 (CPT®) Hc Pt Self Care/Mgmt/Train Ea 15 Min      96509 (CPT®) Hc Pt Prosthetic (S) Train Initial Encounter, Each 15 Min      73360 (CPT®) Hc Orthotic(S) Mgmt/Train Initial Encounter, Each 15min      52643 (CPT®) Hc Pt Aquatic Therapy Ea 15 Min      34152 (CPT®) Hc Pt Orthotic(S)/Prosthetic(S)  Encounter, Each 15 Min      Total  55 4        Therapy Charges for Today     Code Description Service Date Service Provider Modifiers Qty    79253307154 HC PT MANUAL THERAPY EA 15 MIN 8/16/2018 Na Bergeron, PT GP 4                    Na Bergeron, PT  8/16/2018

## 2018-08-16 NOTE — TELEPHONE ENCOUNTER
----- Message from Diana Call sent at 8/16/2018 12:14 PM EDT -----  Contact: PT.  PT. SEE'S ISAÍAS.  PT. IS CURRENTLY OUT OF MED'S. OF: ONDANSETRON ODT, 8 MG. TABLET.  #PT. ONLY WANTS THIS MED. ORDERED THAT DISSOLVES ONE TABLET BY MOUTH EVERY 8 HRS. FOR NAUSEA & VOMITING.#  THIS WORKS FOR PT.!  (INSURANCE WILL PAY FOR THE DISSOLVING MED'S.).    RX=MARCELA/SHALONDA.    PT. CAN BE REACHED @ [facility] #.

## 2018-08-20 NOTE — THERAPY TREATMENT NOTE
"    Outpatient Physical Therapy Lymphedema Treatment Note  [facility]     Patient Name: Brianna Urrutia  : 1950  MRN: [id number]  Today's Date: 2018        Visit Date: 2018    Visit Dx:    ICD-10-CM ICD-9-CM   1. Scar conditions and fibrosis of skin L90.5 709.2   2. Lymphedema of right upper extremity I89.0 457.1   3. Right arm pain M79.601 729.5   4. Mastodynia of right breast N64.4 611.71   5. Cervicalgia M54.2 723.1       Patient Active Problem List   Diagnosis   • Malignant neoplasm of overlapping sites of right female breast (CMS/HCC)   • Malignant neoplasm of upper-outer quadrant of right female breast (CMS/HCC)   • Degenerative disc disease, cervical   • Brachial plexopathy   • Lymphedema of right arm   • History of radiation therapy   • JACQUI (generalized anxiety disorder)   • Attention deficit hyperactivity disorder (ADHD)              Lymphedema     Row Name 18 1410          Able to rate subjective pain? yes  -MW    Pre-Treatment Pain Level 8  -MW    Post-Treatment Pain Level 9  -MW    Subjective Pain Comment Pain in shoulder, breast, elbow, wrist, hand, fingers   -MW          Subjective Comments Pt tearful intermittently; voicing frustration with chronic pain; \"nothing is working\"  Voices frustration about lack of ability to care for herself due to Rt hand pain, loss of  and progressive pain Rt thumb. Encouraged pt to follow up with pain management or Dr Jacobson, etc. States she has appt with Ms Wade on Thursday.   -MW          Ptting Edema Category By severity  -MW    Pitting Edema Mild;Moderate  -MW    Edema Assessment Comment Mild fullness RUE and Rt lateral posterior trunk / ribs.   -MW          Location/Assessment Upper Quadrant  -MW    Upper Extremity Conditions right:;intact;clean  -MW    Upper Extremity Color/Pigment right:;other (comment)   pale, \"waxy\" appearance of hand & forearm when \"cold\"   -MW    Upper Quadrant Conditions right:;intact;clean;dry  -MW    Upper " Quadrant Color/Pigment right:;other (comment);red;erythema   scar white, pink, red; pink-red blotches   -MW    Skin Observations Comment Areas of hypervascularity/ erythema stable over Rt breast area. Area remains warm   -MW          Lymphedema Sensation Comments Pt report increased Rt hand pain as it gets colder.   -MW          Manual Lymphatic Drainage initial sequence;opened regional lymph nodes;opened anastamoses;extremity treatment;astym  -MW    Initial Sequence supraclavicular;shoulder collectors  -MW    Supraclavicular right  -MW    Shoulder Collectors right  -MW    Opened Regional Lymph Nodes axillary;inguinal  -MW    Axillary right  -MW    Inguinal right  -MW    Opened Anastamoses axillo-inguinal  -MW    Axillo-Inguinal right  -MW    Extremity Treatment extremity treatment focus on;other extremity treatment  -MW    MLD to Full Limb Rt lateral-posterior trunk, RUE with STM (bending techniques to soft tissue restrictions in forearm)   -MW    Extremity Treatment Focus On forearm   -MW    Astym upper traps  -MW    Anterior-Lateral Chest --  -MW    Upper Traps right;left  -MW    Upper Traps Comment Initiated tx in sitting on stool with upper body on face craddle:  RT UT: Evaluator and localizer to UT / scapular region; min soft tissue disruptions noted. Extra strokes at RT teres region, UT / leveator scapulae and mid traps. Continued down over Rt posterior and lateral ribs wrapping around trunk; extra strokes at area of fullness/ thickened tissue adjacent to breast. C/o pain at teres, inferior angle of scapula, and lateral trunk area. Repeated on LT scapular area with evaluator and localizer; extra strokes at UT/ leveator scap, mid traps. ASTYM followed by STM in same position.   -MW    Manual Lymphatic Drainage Comments STM mixed with MLD RT upper back/ scapula and into lateral trunk; then RUE   -MW          Wrapping Location upper extremity  -MW    Wrapping Location UE hand to axilla  -MW    Wrapping Comments  "size 4 compressogrip upper arm and forearm, size 3 to hand / wrist. \"Too tender to put on rough sleeve\"   -MW      User Key  (r) = Recorded By, (t) = Taken By, (c) = Cosigned By    Initials Name Provider Type    Na High, PT Physical Therapist                              PT Assessment/Plan     Row Name 08/20/18 1410          PT Assessment    Functional Limitations Performance in self-care ADL;Limitation in home management;Impaired gait  -MW     Impairments Pain;Impaired lymphatic circulation;Edema;Joint mobility;Muscle strength;Impaired flexibility;Motor function;Impaired muscle length   stair climbing   -MW     Assessment Comments RT upper quadrant pain at high level, pt tearful over pain level and voicing frustration at overall situation as she is having more and more difficulty caring for herself.  Overall, RUE lymphedema is softer and less full. Rt hand becamer progressively cooler to cold by end of session, which seemed to also cause increased pain reports. Encouraged pt to follow up with MD's to discuss additional pain management options. Pt also would benefit from follow up with Jodi for mental and emotional support.   -MW     Rehab Potential Poor  -MW     Patient/caregiver participated in establishment of treatment plan and goals Yes  -MW     Patient would benefit from skilled therapy intervention Yes  -MW        PT Plan    Physical Therapy Interventions (Optional Details) manual therapy techniques;manual lymphatic drainage;patient/family education;home exercise program;modalities;ROM (Range of Motion);strengthening;stretching  -MW     PT Plan Comments PT to call Dr Cope's office regarding Rt breast skin changes as no word back from Onc.   -MW       User Key  (r) = Recorded By, (t) = Taken By, (c) = Cosigned By    Initials Name Provider Type    Na High, PT Physical Therapist                    Manual Rx (last 36 hours)      Manual Treatments     Row Name 08/20/18 1410             " Total Minutes    00774 - PT Manual Therapy Minutes 60  -MW         Manual Rx 1    Manual Rx 1 Location RT axilla and RUE   -MW      Manual Rx 1 Type STM, tissue bending, lifting and space correction techniques mixed with MLD   -MW      Manual Rx 1 Grade gentle with AAROM forearm supination   -MW      Manual Rx 1 Duration 15  -MW         Manual Rx 2    Manual Rx 2 Location RT >>LT UT/ mid thoracic spine, leveator scap, teres mm group   -MW      Manual Rx 2 Type STM, tissue bending, lifting and space correction techniques mixed with MLD   -MW      Manual Rx 2 Grade gentle to moderate   -MW      Manual Rx 2 Duration 35  -MW        User Key  (r) = Recorded By, (t) = Taken By, (c) = Cosigned By    Initials Name Provider Type    Na High, PT Physical Therapist                             Time Calculation:   Start Time: 1410  Total Timed Code Minutes- PT: 60 minute(s)   Therapy Suggested Charges     Code   Minutes Charges    93134 (CPT®) Hc Pt Neuromusc Re Education Ea 15 Min      93930 (CPT®) Hc Pt Ther Proc Ea 15 Min      83709 (CPT®) Hc Gait Training Ea 15 Min      06603 (CPT®) Hc Pt Therapeutic Act Ea 15 Min      29958 (CPT®) Hc Pt Manual Therapy Ea 15 Min 60 4    49101 (CPT®) Hc Pt Ther Massage- Per 15 Min      10687 (CPT®) Hc Pt Iontophoresis Ea 15 Min      47617 (CPT®) Hc Pt Elec Stim Ea-Per 15 Min      11310 (CPT®) Hc Pt Ultrasound Ea 15 Min      56989 (CPT®) Hc Pt Self Care/Mgmt/Train Ea 15 Min      37670 (CPT®) Hc Pt Prosthetic (S) Train Initial Encounter, Each 15 Min      36904 (CPT®) Hc Orthotic(S) Mgmt/Train Initial Encounter, Each 15min      59566 (CPT®) Hc Pt Aquatic Therapy Ea 15 Min      25488 (CPT®) Hc Pt Orthotic(S)/Prosthetic(S) Encounter, Each 15 Min      Total  60 4        Therapy Charges for Today     Code Description Service Date Service Provider Modifiers Qty    13873884175 HC PT MANUAL THERAPY EA 15 MIN 8/20/2018 Na Bergeron, PT GP 4                    Na Bergeron,  PT  8/20/2018

## 2018-08-23 NOTE — THERAPY TREATMENT NOTE
"    Outpatient Physical Therapy Lymphedema Treatment Note  [facility]     Patient Name: Brianna Urrutia  : 1950  MRN: [id number]  Today's Date: 2018        Visit Date: 2018    Visit Dx:  No diagnosis found.    Patient Active Problem List   Diagnosis   • Malignant neoplasm of overlapping sites of right female breast (CMS/HCC)   • Malignant neoplasm of upper-outer quadrant of right female breast (CMS/HCC)   • Degenerative disc disease, cervical   • Brachial plexopathy   • Lymphedema of right arm   • History of radiation therapy   • JACQUI (generalized anxiety disorder)   • Attention deficit hyperactivity disorder (ADHD)              Lymphedema     Row Name 18 1100          Able to rate subjective pain? yes  -MW    Pre-Treatment Pain Level 8  -MW    Post-Treatment Pain Level 8  -MW    Subjective Pain Comment Pain in rt wrist, hand, forearm, chest/ breast.   -MW          Subjective Comments Reports new topical for breast/ chest rash, and new pain management consult per Ms. Wade. She states she has an accupunture appointment today at 1pm but feels too sick to go; \"just want to lay down and rest.\" Came via oncology volunteer . Electric has been turned off as she misread the bill and didn't call in time to ask for an extension.   -MW          Ptting Edema Category By severity  -MW    Pitting Edema Mild;Moderate  -MW    Edema Assessment Comment Mild fullness Rt lateral posterior trunk / ribs, mild to moderate fullness RUE (couldn't find her sleeve this morning).   -MW          Location/Assessment Upper Quadrant  -MW    Upper Extremity Conditions right:;intact;clean  -MW    Upper Extremity Color/Pigment right:;other (comment)   pale, \"waxy\" appearance of hand & forearm when \"cold\"   -MW    Upper Quadrant Conditions right:;intact;clean;dry  -MW    Upper Quadrant Color/Pigment right:;other (comment);red;erythema   scar white, pink, red; pink-red blotches   -MW    Skin Observations Comment Areas of " "hypervascularity/ erythema stable over Rt breast area. Area remains warm   -MW          Manual Lymphatic Drainage initial sequence;opened regional lymph nodes;opened anastamoses;extremity treatment;astym  -MW    Initial Sequence supraclavicular;shoulder collectors  -MW    Supraclavicular right  -MW    Shoulder Collectors right  -MW    Opened Regional Lymph Nodes axillary;inguinal  -MW    Axillary right  -MW    Inguinal right  -MW    Opened Anastamoses axillo-inguinal  -MW    Axillo-Inguinal right  -MW    Extremity Treatment extremity treatment focus on;other extremity treatment  -MW    MLD to Full Limb Rt lateral-posterior trunk, RUE with STM (bending techniques to soft tissue restrictions in forearm)   -MW    Astym upper traps  -MW    Upper Traps right  -MW    Upper Traps Comment Initiated tx in sitting on stool with upper body on face craddle:  RT UT: Evaluator and localizer to UT / scapular region; pt requesting more attention to neck (\"it goes crunch in there\"); transitioned to STM and deferred regular ASTYM protocol.   -MW    Manual Lymphatic Drainage Comments STM mixed with MLD RT upper back/ scapula and into lateral trunk; then RUE   -MW          Wrapping Location upper extremity  -MW    Wrapping Location UE hand to axilla  -MW    Wrapping Comments size 4 compressogrip upper arm and forearm, glove to wrist and hand.   -MW    Compression/Skin Care Comments Pt didn't have sleeve so provided compressogrip   -MW      User Key  (r) = Recorded By, (t) = Taken By, (c) = Cosigned By    Initials Name Provider Type    MW Na Bergeron, PT Physical Therapist                              PT Assessment/Plan     Row Name 08/23/18 1100          Functional Limitations Performance in self-care ADL;Limitation in home management;Impaired gait  -MW    Impairments Pain;Impaired lymphatic circulation;Edema;Joint mobility;Muscle strength;Impaired flexibility;Motor function;Impaired muscle length   stair climbing   -MW    " "Assessment Comments Pt arrived wearing mitten on Rt hand to decrease sensation of cold which also seems to be more painful. Pt in tears intermittently as she talks about her pain and frustration of her low QOL. Encouraged pt to follow up with Jodi King for additional support and problem solving support. RUE with mild increased fullness around elbow without compression sleeve in place. Pt c/o increased symptoms at neck and headaches; limited ability to relax during suboccipital release. Seemed to be in more pain post treatment. May benefit from accupuncture if she is able to set up appointment and afford the payment schedule.   -MW    Rehab Potential Poor  -MW    Patient/caregiver participated in establishment of treatment plan and goals Yes  -MW    Patient would benefit from skilled therapy intervention Yes  -MW          PT Frequency 2x/week;1x/week   attempt to taper to 1 x wk   -MW    Physical Therapy Interventions (Optional Details) manual therapy techniques;manual lymphatic drainage;patient/family education;home exercise program;modalities;ROM (Range of Motion);strengthening;stretching  -    PT Plan Comments Attempt gentle ball ther exer for movement and relaxation   -      User Key  (r) = Recorded By, (t) = Taken By, (c) = Cosigned By    Initials Name Provider Type    Na High, PT Physical Therapist                     Exercises     Row Name 08/23/18 1100             Subjective Comments    Subjective Comments Reports new topical for breast/ chest rash, and new pain management consult per Ms. Wade. She states she has an accupunture appointment today at 1pm but feels too sick to go; \"just want to lay down and rest.\" Came via oncology volunteer . Electric has been turned off as she misread the bill and didn't call in time to ask for an extension.   -MW         Subjective Pain    Able to rate subjective pain? yes  -MW      Pre-Treatment Pain Level 8  -MW      Post-Treatment Pain Level 8  " "-MW      Subjective Pain Comment Pain in rt wrist, hand, forearm, chest/ breast.   -MW         Total Minutes    23770 - PT Manual Therapy Minutes 55  -MW         Exercise 1    Exercise Name 1 Rt shoulder, elbow AAROM with STM   -MW      Cueing 1 Verbal;Tactile  -MW      Reps 1 6  -MW        User Key  (r) = Recorded By, (t) = Taken By, (c) = Cosigned By    Initials Name Provider Type    MW Na Bergeron, PT Physical Therapist                       Manual Rx (last 36 hours)      Manual Treatments     Row Name 08/23/18 1100             Total Minutes    19579 - PT Manual Therapy Minutes 55  -MW         Manual Rx 1    Manual Rx 1 Location RT axilla and RUE   -MW      Manual Rx 1 Type STM, tissue bending, lifting and space correction techniques mixed with MLD   -MW      Manual Rx 1 Grade gentle with AAROM forearm supination   -MW      Manual Rx 1 Duration 20  -MW         Manual Rx 2    Manual Rx 2 Location RT >>LT UT/ mid thoracic spine, leveator scap, teres mm group   -MW      Manual Rx 2 Type STM, tissue bending, lifting and space correction techniques mixed with MLD   -MW      Manual Rx 2 Grade gentle to moderate   -MW      Manual Rx 2 Duration 30  -MW         Manual Rx 3    Manual Rx 3 Location Supine suboccipital release   -MW      Manual Rx 3 Type manual traction   -MW      Manual Rx 3 Grade 2   -MW      Manual Rx 3 Duration 5 min; 4 rounds of manual traction. Good release with 2nd; 3rd very stiff/ no movement; 4th fair relaxation   -MW        User Key  (r) = Recorded By, (t) = Taken By, (c) = Cosigned By    Initials Name Provider Type    Na High, PT Physical Therapist              Therapy Education  Education Details: Encouraged pt to go to ED if \"too sick\" to travel.   Given: Pain management, Symptoms/condition management, Edema management  Program: Modified  How Provided: Verbal  Provided to: Patient  Level of Understanding: Verbalized              Time Calculation:   Start Time: 1100  Total " Timed Code Minutes- PT: 55 minute(s)   Therapy Suggested Charges     Code   Minutes Charges    74707 (CPT®) Hc Pt Neuromusc Re Education Ea 15 Min      77598 (CPT®) Hc Pt Ther Proc Ea 15 Min      52467 (CPT®) Hc Gait Training Ea 15 Min      49528 (CPT®) Hc Pt Therapeutic Act Ea 15 Min      39384 (CPT®) Hc Pt Manual Therapy Ea 15 Min 55 4    68246 (CPT®) Hc Pt Ther Massage- Per 15 Min      50844 (CPT®) Hc Pt Iontophoresis Ea 15 Min      06388 (CPT®) Hc Pt Elec Stim Ea-Per 15 Min      17737 (CPT®) Hc Pt Ultrasound Ea 15 Min      44642 (CPT®) Hc Pt Self Care/Mgmt/Train Ea 15 Min      25956 (CPT®) Hc Pt Prosthetic (S) Train Initial Encounter, Each 15 Min      45355 (CPT®) Hc Orthotic(S) Mgmt/Train Initial Encounter, Each 15min      87843 (CPT®) Hc Pt Aquatic Therapy Ea 15 Min      40471 (CPT®) Hc Pt Orthotic(S)/Prosthetic(S) Encounter, Each 15 Min      Total  55 4        Therapy Charges for Today     Code Description Service Date Service Provider Modifiers Qty    32935080969 HC PT MANUAL THERAPY EA 15 MIN 8/23/2018 Na Bergeron, PT GP 4                    Na Bergeron, PT  8/23/2018

## 2018-08-23 NOTE — PROGRESS NOTES
Reagan Urrutia is a 68 y.o. female  Dizziness (dizziness wth nausea x2 weeks ) and Arm Pain (follow up on right arm pain, Tramadol is not controlling pain.)      History of Present Illness  Patient comes in today for 2 separate issues which are uncontrolled.  She has chronic right arm pain in association with radiation treatment from breast cancer last year.  She is recently seen Dr. Bennett white for pain management states that she has some brachial plexus injections that did not help.  She states she is also on difficulty getting through to his office and would like see different pain management specialist.  She states she liked him a lot but just can't seem to work with his office staff regarding scheduling.  She also has a lot of irritation or rash on her right chest wall following radiation.  She states this is starting to make her feel very frustrated.  Additionally she's been experiencing sensation of dizziness and vertigo for the last 2 weeks.  She states it started when she awakened one morning and felt the room spinning around.  Blood pressures been well controlled.  The following portions of the patient's history were reviewed and updated as appropriate: allergies, current medications, past social history and problem list    Review of Systems   Constitutional: Negative for activity change and fever.   HENT: Negative for ear pain.    Respiratory: Negative for chest tightness and shortness of breath.    Cardiovascular: Negative for chest pain and palpitations.   Gastrointestinal: Negative for abdominal pain, diarrhea, nausea and vomiting.   Genitourinary: Negative for difficulty urinating and dysuria.   Musculoskeletal: Positive for arthralgias and myalgias. Negative for gait problem and joint swelling.   Skin: Positive for color change and rash.   Neurological: Positive for dizziness. Negative for seizures, syncope, facial asymmetry, weakness and numbness.   Psychiatric/Behavioral: The patient  is nervous/anxious.        Objective     Vitals:    08/23/18 0954   BP: 134/76   Pulse: 89   Temp: 97.8 °F (36.6 °C)   SpO2: 99%       Physical Exam   Constitutional: She is oriented to person, place, and time. She appears well-developed and well-nourished. No distress.   HENT:   Head: Normocephalic and atraumatic.   Right Ear: Hearing and tympanic membrane normal.   Left Ear: Hearing and tympanic membrane normal.   Eyes: Pupils are equal, round, and reactive to light.   Neck: Normal carotid pulses present. Carotid bruit is not present. No thyroid mass and no thyromegaly present.   Cardiovascular: Normal rate, regular rhythm, normal heart sounds and intact distal pulses.    Pulmonary/Chest: Effort normal and breath sounds normal. No respiratory distress.   Musculoskeletal: Normal range of motion. She exhibits edema and tenderness. She exhibits no deformity.     Patient's chronic lymphedema right upper extremity she is not wearing her compression sleeve today.  She has decreased sensation and decreased temperature along entire right upper extremity compared to left, nonerythematous, she does have an erythematous macular papular rash over her right upper chest wall consistent with radiation changes.   Neurological: She is alert and oriented to person, place, and time. She displays normal reflexes. A sensory deficit is present. No cranial nerve deficit. She exhibits normal muscle tone. Coordination normal.   Skin: Skin is warm and dry. Rash noted. She is not diaphoretic. There is erythema. No pallor.   Psychiatric: Her speech is normal and behavior is normal. Judgment and thought content normal. Her mood appears anxious. Her affect is not angry and not inappropriate. Cognition and memory are normal. She does not exhibit a depressed mood. She is attentive.   Nursing note and vitals reviewed.      Assessment/Plan     Diagnoses and all orders for this visit:    Chronic pain of right upper extremity  -     Ambulatory  Referral to Pain Management    Lymphedema of right arm    History of radiation therapy    JACQUI (generalized anxiety disorder)    Degenerative disc disease, cervical    Benign paroxysmal positional vertigo, unspecified laterality    Essential hypertension    Other orders  -     meclizine (ANTIVERT) 25 MG tablet; Take 1 tablet by mouth 3 (Three) Times a Day As Needed for dizziness.  -     ondansetron ODT (ZOFRAN ODT) 8 MG disintegrating tablet; Take 1 tablet by mouth Every 8 (Eight) Hours As Needed for Nausea or Vomiting.  -     triamcinolone (KENALOG) 0.1 % ointment; Apply  topically to the appropriate area as directed 3 (Three) Times a Day. To rash

## 2018-08-24 NOTE — TELEPHONE ENCOUNTER
----- Message from Katelynn Garcia sent at 8/24/2018  2:54 PM EDT -----  Contact: PT  SAW PEPPER YESTERDAY, REQUESTING HOME HEALTH SERVICES BECAUSE SHE CAN'T TAKE CARE OF HERSELF. SHE WAS SPEAKING SO JUMBLED, I COULD BARELY MAKE OUT A WORD SHE WAS SAYING. I TOLD HER I WOULD HAVE A NURSE/MA RETURN HER CALL [phone]

## 2018-08-24 NOTE — TELEPHONE ENCOUNTER
Insurance will not pay for home health for people better able to get out of their house, they're very strict on this I'm sorry.  They will not approve it.  If she is ill enough that she feels she cannot take care of herself she should go to the hospital ER for evaluation.

## 2018-08-24 NOTE — THERAPY TREATMENT NOTE
"    Outpatient Physical Therapy Lymphedema Treatment Note  [facility]     Patient Name: Brianna Urrutia  : 1950  MRN: [id number]  Today's Date: 2018        Visit Date: 2018    Visit Dx:    ICD-10-CM ICD-9-CM   1. Scar conditions and fibrosis of skin L90.5 709.2   2. Lymphedema of right upper extremity I89.0 457.1   3. Right arm pain M79.601 729.5   4. Cervicalgia M54.2 723.1       Patient Active Problem List   Diagnosis   • Malignant neoplasm of overlapping sites of right female breast   • Malignant neoplasm of upper-outer quadrant of right female breast   • Degenerative disc disease, cervical   • Neuropathy              Lymphedema     Row Name 18 1510          Able to rate subjective pain? yes  -MW    Pre-Treatment Pain Level 4  -MW    Post-Treatment Pain Level 3  -MW    Subjective Pain Comment Rt posterior shoulder/ teres area   -MW          Subjective Comments Pt reports feeling better after last session; estim helpful. Also has new pain med oral and topical from Dr Jacobson that has been helpful too. \"didn't take 3 hours to be able to function this morning\"   -MW          Ptting Edema Category By severity  -MW    Pitting Edema Moderate;Mild  -MW    Edema Assessment Comment Mild to moderate edema RUE, with mild to moderate edema Rt ribs posterior -lateral aspect with thickened tissues.   -MW          Location/Assessment Upper Quadrant  -MW    Upper Extremity Conditions right:;intact;clean  -MW    Upper Quadrant Conditions right:;intact;clean  -MW    Upper Quadrant Color/Pigment right:;other (comment);red;erythema   scar fading, pink and white, hypopigmented   -MW    Skin Observations Comment patches of erythema / increased vascularity with mild decrease especially over medial breast.  -MW          Manual Lymphatic Drainage initial sequence;opened regional lymph nodes;opened anastamoses;extremity treatment;astym  -MW    Initial Sequence supraclavicular;shoulder collectors  -MW    " Supraclavicular right  -MW    Shoulder Collectors right  -MW    Opened Regional Lymph Nodes axillary;inguinal  -MW    Axillary right  -MW    Inguinal right  -MW    Opened Anastamoses axillo-inguinal  -MW    Axillo-Inguinal right  -MW    Extremity Treatment extremity treatment focus on;other extremity treatment  -MW    MLD to Full Limb RUE, MLD with STM and myofascial stretches   -MW    Extremity Treatment Focus On RUE and Rt shoulder, posterior ribs/ trunk   -MW    Astym upper traps;anterior-lateral chest  -MW    Anterior-Lateral Chest right  -MW    Anterior-Lateral Chest Comment In supine working superior-ant-lateral chest (superior and lateral to scar) with evaluator and localizer; min soft tissue disruptions noted  -MW    Upper Traps right  -MW    Upper Traps Comment Initiated tx in sitting with UE propped on pillow:  RT UT: Evaluator and localizer to UT / scapular region; minimal fine soft tissue disruptions noted through out. Extra strokes at mid traps and leveator scapulea, and RT teres region. Continued down over Rt posterior and lateral ribs, with extra strokes at area of fullness/ thickened tissue. Repositioned in LSL for additional STM and MLD Rt posterior shoulder/ axilla and lateral trunk. Brief ASTYM to Lt UT / mid scapular region with evaluator and localizer.   -MW    Manual Lymphatic Drainage Comments MLD intersparsed with STM traps, RT lateral neck, shoulder, with focus on Rt scapula, posterior axilla, Rt lateral trunk and ribs and then RUE.   -MW          Wrapping Location upper extremity  -MW    Wrapping Location UE hand to axilla  -MW    Wrapping Comments assisted pt to don arm sleeve   -MW    Compression/Skin Care Comments Assisted pt to apply topical pain cream per 4gr dosing card.   -MW      User Key  (r) = Recorded By, (t) = Taken By, (c) = Cosigned By    Initials Name Provider Type    FRAN Bergeron, PT Physical Therapist                              PT Assessment/Plan     Row Name  05/17/18 1510          PT Assessment    Functional Limitations Performance in self-care ADL;Limitation in home management;Impaired gait  -MW     Impairments Pain;Impaired lymphatic circulation;Edema;Joint mobility;Muscle strength;Impaired flexibility;Motor function;Impaired muscle length   stair climbing   -MW     Assessment Comments Pt with reports of decreased pain post estim/ IFC and new pain meds. pt much less tender to palpation and with STM. Expect home TENS per MD may also improve her QOL and allow decrease in PT frequency. Progress activity and HEP as pain better controlled; assist pt with pacing of activities.   -MW     Rehab Potential Fair  -MW     Patient/caregiver participated in establishment of treatment plan and goals Yes  -MW     Patient would benefit from skilled therapy intervention Yes  -MW        PT Plan    PT Frequency 2x/week  -MW     Physical Therapy Interventions (Optional Details) manual therapy techniques;manual lymphatic drainage;patient/family education;home exercise program;modalities;ROM (Range of Motion);strengthening;stretching  -MW     PT Plan Comments Cont IFC, STM/MLD, ASTYM; ther exer as tolerated   -MW       User Key  (r) = Recorded By, (t) = Taken By, (c) = Cosigned By    Initials Name Provider Type    MW Na Bergeron, PT Physical Therapist                          Manual Rx (last 36 hours)      Manual Treatments     Row Name 05/17/18 1510             Manual Rx 1    Manual Rx 1 Location RT axilla and RUE   -MW      Manual Rx 1 Type STM, tissue bending, myofascial stretches   -MW      Manual Rx 1 Grade gentle to moderate   -MW      Manual Rx 1 Duration 15  -MW         Manual Rx 2    Manual Rx 2 Location RT and LT UT/ mid thoracic spine, leveator scap, Rt neck    -MW      Manual Rx 2 Type STM/ myofascial stretches- LS, UT/ mid traps, scapular borders into posterior axilla   -MW      Manual Rx 2 Grade long slow stretches   -MW      Manual Rx 2 Duration 15  -MW        User Key   (r) = Recorded By, (t) = Taken By, (c) = Cosigned By    Initials Name Provider Type    MW Na Bergeron, MAT Physical Therapist              Therapy Education  Education Details: Pt to follow up with MD for TENS unit; use meds as directed   Given: Pain management, Symptoms/condition management  Program: Reinforced  How Provided: Verbal  Provided to: Patient  Level of Understanding: Verbalized              Time Calculation:   Start Time: 1510  Total Timed Code Minutes- PT: 60 minute(s)     Therapy Charges for Today     Code Description Service Date Service Provider Modifiers Qty    41373559035 HC PT MANUAL THERAPY EA 15 MIN 5/17/2018 Na Bergeron, PT GP 4    01135598275 HC PT ELECTRICAL STIM UNATTENDED 5/17/2018 Na Bergeron, PT  1                    Na Bergeron PT  5/17/2018      Immediate family member

## 2018-08-27 NOTE — TELEPHONE ENCOUNTER
----- Message from Katelynn Garcia sent at 8/27/2018  8:36 AM EDT -----  Contact: PT  DEVELOPED A UTI OVER THE WKND, TOOK A COUPLE LEFTOVER MACROBID THAT SHE HAD FROM THE LAST TIME SHE HAD ONE, BUT NEEDS A NEW RX FOR A FULL COURSE OF IT    MARCELA LIZ
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
## 2018-09-04 NOTE — PROGRESS NOTES
GASTROENTEROLOGY OUTPATIENT NEW PATIENT NOTE  Patient: LOUIS JANG : 1950  Date of Service: 2018  Dear , Christina ALVAREZ, PA-C   Thank you for your referral of this patient for evaluation of hep c  CC: Hepatitis (new pt)  Assessment/Plan                                             ASSESSMENT & PLANS     Hep C w/o coma, chronic (CMS/HCC)  Liver enzyme elevation  -     Hepatitis A Antibody, IgM; Future  -     Hepatitis B Core Antibody, Total; Future  -     Hepatitis B Surface Antigen; Future  -     Hepatitis C Genotype; Future  -     US Liver; Future    Need for hepatitis B screening test  -     Hepatitis A Antibody, Total; Future  -     Hepatitis B Surface Antibody; Future    Encounter for screening for HIV  -     HIV-1 / O / 2 Ag / Antibody 4th Generation; Future    Encounter for therapeutic drug monitoring  -     Urine Drug Screen - Urine, Clean Catch; Future    Follow Up:Return in about 1 month (around 10/4/2018) for Recheck.      DISCUSSION: Patient is educated on hepatitis C infection, its mode of transmission. Pt is educated on ways to prevent transmission: Avoid sharing needles, avoiding direct exposure to blood or blood products, avoid sharing personal items that can potentially be exposed to blood (toothbrush, razors, nail clippers, scissors), avoid having tattoos or body piercing, and practice safe sex.  Pt is educated on potential long-term liver disease complication such as, but not limited to, endstage liver disease, cirrhosis, liver cancer, etc. Patient is educated on hepatitis C genotypes, viral load, and different level of liver fibrosis/cirrhosis.  Pt is counseled on potential denial for tx of Hep C from insurance companies, depending on fibrosis stages.  Patient is encouraged on getting vaccines to protect against hepatitis A and hepatitis B infection.   The above plan was delineated in details with patient and all questions and concerns were answered.  Patient is also given  contact information.  Patient is to return as scheduled or sooner if new problems arise  Subjective                                                     SUBJECTIVE   History of Present Illness  Ms. Brianna SUERO Renan is a 68 y.o. female presents to the clinic today for an evaluation of Hepatitis (new pt)  dx in .  Hep C. Did not seek tx d/t lack of insurance and delay  No prior blood transfusion.  No known Hep C intercourse exposure.  Used to work as lab, respiratory, and allergist technician  Smokes marijuana, but no IV recreation drug use.  No tattoos  Has breast tumor s/p XRT.      ROS:Review of Systems   HENT: Negative.    Eyes: Negative.    Respiratory: Positive for chest tightness and shortness of breath.    Cardiovascular: Negative.    Gastrointestinal: Positive for constipation and nausea.   Endocrine: Negative.    Genitourinary: Negative.    Musculoskeletal: Positive for neck pain.   Skin: Positive for rash.        Radiation   Allergic/Immunologic: Negative.    Neurological: Positive for dizziness and weakness.        Generalized weakness   Hematological: Negative.    Psychiatric/Behavioral: Negative.      Subjective   PAST MED HX: Pt  has a past medical history of ADHD; Breast injury; Chronic kidney disease; Generalized anxiety disorder; Hypertension; Lymphedema; and Malignant neoplasm of overlapping sites of right female breast (CMS/HCC) (2017).  PAST SURG HX: Pt  has a past surgical history that includes Tonsillectomy ();  section (); Cardiac catheterization (); Kidney stone surgery (); and Hip Biopsy (Left, 2014).  FAM HX: family history includes Esophageal cancer in her mother; Heart disease in her father; Liver cancer in her father.  SOC HX: Pt  reports that she has quit smoking. Her smoking use included Cigarettes. She has never used smokeless tobacco. She reports that she uses drugs, including Marijuana. She reports that she does not drink alcohol.  Objective                                                            OBJECTIVE   Allergy: Pt is allergic to iodinated diagnostic agents and penicillins.  MEDS: •  losartan (COZAAR) 25 MG tablet, Take 25 mg by mouth Daily., Disp: , Rfl:   •  nitrofurantoin, macrocrystal-monohydrate, (MACROBID) 100 MG capsule, Take 1 capsule by mouth 2 (Two) Times a Day., Disp: 14 capsule, Rfl: 0  •  ondansetron ODT (ZOFRAN ODT) 8 MG disintegrating tablet, Take 1 tablet by mouth Every 8 (Eight) Hours As Needed for Nausea or Vomiting., Disp: 12 tablet, Rfl: 2  •  tiZANidine (ZANAFLEX) 4 MG tablet, TAKE ONE-HALF TO ONE TABLET BY MOUTH EVERY 12 HOURS AS NEEDED FOR MUSCLE SPASMS, Disp: 30 tablet, Rfl: 0  •  traMADol (ULTRAM) 50 MG tablet, 1 po QID, Disp: 120 tablet, Rfl: 2  •  triamcinolone (KENALOG) 0.1 % ointment, Apply  topically to the appropriate area as directed 3 (Three) Times a Day. To rash, Disp: 80 g, Rfl: 2  Lab Results   Component Value Date    WBC 8.16 06/25/2018    WBC 3.68 12/11/2017    WBC 6.10 08/30/2017    EOSABS 0.05 06/25/2018    EOSABS 0.05 12/11/2017    HGB 14.9 06/25/2018    HGB 13.2 12/11/2017    HGB 13.9 08/30/2017    HCT 44.6 (H) 06/25/2018    HCT 39.7 12/11/2017    HCT 41.8 08/30/2017    MCV 89.7 06/25/2018    MCV 89.8 12/11/2017    MCV 88.8 08/30/2017    MCHC 33.4 06/25/2018    MCHC 33.2 12/11/2017    MCHC 33.3 08/30/2017     06/25/2018     12/11/2017     08/30/2017     Lab Results   Component Value Date     06/25/2018    K 4.2 06/25/2018     06/25/2018    CO2 31.0 06/25/2018    BUN 27 (H) 06/25/2018    CREATININE 1.00 06/25/2018    GLUCOSE 87 06/25/2018    CALCIUM 9.5 06/25/2018    ANIONGAP 9.0 06/25/2018     Lab Results   Component Value Date    AST 40 (H) 06/25/2018    AST 40 (H) 12/11/2017    AST 43 (H) 08/30/2017          ALT 52 (H) 06/25/2018    ALT 45 (H) 12/11/2017    ALT 59 (H) 08/30/2017    ALKPHOS 94 06/25/2018    ALKPHOS 67 12/11/2017    ALKPHOS 95 08/30/2017    BILITOT 0.6 06/25/2018     BILITOT 0.6 12/11/2017    BILITOT 0.5 08/30/2017    ALBUMIN 4.59 06/25/2018    ALBUMIN 4.20 12/11/2017    ALBUMIN 4.20 08/30/2017     Lab Results   Component Value Date    HEPCVIRUSABY Reactive (C) 06/25/2018     Lab Results   Component Value Date    HEPATITISC 006578 06/25/2018    SSMNVX81 5.977 06/25/2018     Lab Results   Component Value Date    THCURSCR Positive (A) 04/17/2018    PCPUR Negative 04/17/2018    COCAINEUR Negative 04/17/2018    METAMPSCNUR Negative 04/17/2018    LABOPIASCN Positive (A) 04/17/2018    AMPHETSCREEN Negative 04/17/2018    LABBENZSCN Negative 04/17/2018    TRICYCLICSCN Negative 04/17/2018    LABMETHSCN Negative 04/17/2018    BARBITSCNUR Negative 04/17/2018    OXYCODONESCN Negative 04/17/2018    PROPOXSCN Negative 04/17/2018    BUPRENORSCNU Negative 04/17/2018     Wt Readings from Last 5 Encounters:   09/04/18 52.4 kg (115 lb 8 oz)   08/28/18 52.8 kg (116 lb 6.4 oz)   08/23/18 52.2 kg (115 lb)   06/25/18 54 kg (119 lb)   05/29/18 57.5 kg (126 lb 12.8 oz)   body mass index is 19.81 kg/m².,Temp: 97.3 °F (36.3 °C),BP: 116/66,Heart Rate: 103   Physical Exam  General Well developed; well nourished; no acute distress.   ENT Oral mucosa pink and moist without thrush or lesions.    Neck Neck supple; trachea midline. No thyromegaly   Resp CTA; no rhonchi, rales, or wheezes.  Respiration effort normal  CV RRR; normal S1, S2; no M/R/G. No lower extremity edema  GI Abd soft, NT, ND, normal active bowel sounds.  No HSM.  No abd hernia  Skin No rash; no lesions; no bruises.  Skin turgor normal  Musc No clubbing; no cyanosis.  Gait steady  Psych Oriented to time, place, and person.  Appropriate affect  Thank you kindly for allowing me to be part of this patient’s care.    Pt care team: Louisa SEGUNDO & Kyung Benoit LPN  09/04/1811:18 AM  [facility]--Gastroenterology  [phone]    CC: , Christina ALVAREZ, PA-C  [address] [address]  FAX:[phone]

## 2018-09-04 NOTE — PATIENT INSTRUCTIONS
Hepatitis C  Hepatitis C is a liver infection. It is caused by a germ that can spread through blood and other bodily fluids. Your doctor will use blood and liver tests to:  · Check for this infection.  · Decide how to treat you.  · Check your health after treatment.    Follow these instructions at home:  · Rest.  · Do not take any medicine unless your doctor says it is okay. This includes over-the-counter medicine and birth control pills.  · Do not drink alcohol.  · Do not have sex until your doctor says it is okay.  · Do not share toothbrushes, nail clippers, razors, or needles.  · Take all medicines as told by your doctor.  Contact a doctor if:  · You have a fever.  · Your belly (abdomen) hurts.  · Your pee (urine) is dark.  · Your poop (bowel movement) is the color of jeanine.  · You have joint pain.  Get help right away if:  · You feel more and more tired (fatigued).  · You feel more and more weak.  · You do not feel like eating.  · You feel sick to your stomach (nauseous) or throw up (vomit).  · Your skin or the whites of your eyes turn yellow (jaundice) or turn more yellow than they were before.  · You bruise or bleed easily.  This information is not intended to replace advice given to you by your health care provider. Make sure you discuss any questions you have with your health care provider.  Document Released: 11/30/2009 Document Revised: 05/25/2017 Document Reviewed: 04/01/2015  [employer] Patient Education © 2017 [employer]

## 2018-09-11 NOTE — TELEPHONE ENCOUNTER
Dr. Jacobson called regarding Mr. Urrutia.  She had neck pain and he obtained MRI of the cervical spine.  She has developed multiple areas of signal abnormality throughout the cervical spine vital vertebral bodies as well as the upper thoracic vertebral bodies.  There was near complete replacement of T2 and anteriorly in T1.  There was abnormal signal in C6.  These findings are most consistent with metastatic disease.  There was mass effect on the thecal sac but no definite nerve root compromise.  The patient is coming to our office now for evaluation and discussion of management options.  Dr. Jacobson is arranging for Mrs. Urrutia to see one of our hematologist oncologist as well.

## 2018-09-11 NOTE — PROGRESS NOTES
I have reviewed the notes, by Dr. Jacobson I concur with her/his documentation of Brianna S Renan.
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
## 2018-09-11 NOTE — THERAPY PROGRESS REPORT/RE-CERT
"    Outpatient Physical Therapy Lymphedema Progress Note  [facility]     Patient Name: Brianna Urrutia  : 1950  MRN: [id number]  Today's Date: 2018        Visit Date: 2018    Visit Dx:    ICD-10-CM ICD-9-CM   1. Scar conditions and fibrosis of skin L90.5 709.2   2. Lymphedema of right upper extremity I89.0 457.1   3. Right arm pain M79.601 729.5   4. Mastodynia of right breast N64.4 611.71   5. Cervicalgia M54.2 723.1       Patient Active Problem List   Diagnosis   • Malignant neoplasm of overlapping sites of right female breast (CMS/HCC)   • Malignant neoplasm of upper-outer quadrant of right female breast (CMS/HCC)   • Degenerative disc disease, cervical   • Brachial plexopathy   • Lymphedema of right arm   • History of radiation therapy   • JACQUI (generalized anxiety disorder)   • Attention deficit hyperactivity disorder (ADHD)              Lymphedema     Row Name 18 1300          Able to rate subjective pain? yes  -MW    Pre-Treatment Pain Level 8  -MW    Post-Treatment Pain Level 8  -MW    Subjective Pain Comment Rt breast, chest, shoulder   -MW          Subjective Comments Has follow up with Dr Jacobson from neck MRI at 1:40 this afternoon. Hand was swollen after last visit with increased pain. Tearful intermittently.   -MW          Ptting Edema Category By severity  -MW    Pitting Edema Mild;Moderate  -MW    Edema Assessment Comment Mild fullness Rt posterior lateral trunk, mild to moderate swelling RUE including wrist. Hand back to baseline   -MW          Location/Assessment Upper Quadrant  -MW    Upper Extremity Conditions right:;intact;clean  -MW    Upper Extremity Color/Pigment right:;other (comment)   pale, \"waxy\" appearance of hand & forearm when \"cold\"   -MW    Upper Quadrant Conditions right:;intact;clean;dry  -MW    Upper Quadrant Color/Pigment right:;other (comment);red;erythema   scar pink, red  -MW    Skin Observations Comment Areas of erythema/ rash have increased in area " "over all of superior and lateral breast with area on sternum; posterior shoulder area clear.   -MW          Lymphedema Sensation Comments Pt continues to report pain and various sensation running down her neck, arm and hand; \"fire shooting out her fingers\" or \"water running out of the fingers\"   -MW          Lymphedema Pulses/Capillary Refill capillary refill  -MW    Upper Extremity Capillary Refill right:;less than 3 seconds  -MW          Manual Lymphatic Drainage initial sequence;opened regional lymph nodes;opened anastamoses;extremity treatment;astym  -MW    Initial Sequence supraclavicular;shoulder collectors  -MW    Supraclavicular right;left  -MW    Shoulder Collectors right  -MW    Opened Regional Lymph Nodes axillary  -MW    Axillary right  -MW    Inguinal --  -MW    Opened Anastamoses axillo-inguinal  -MW    Axillo-Inguinal right  -MW    Extremity Treatment extremity treatment focus on;other extremity treatment  -MW    MLD to Full Limb RUE   -MW    Extremity Treatment Focus On Elbow and forearm, but very gentle due to c/o increased sensitivity   -MW    Other Extremity Treatment Rt posterior-lateral trunk   -MW    Astym --  -MW    Anterior-Lateral Chest --  -MW    Upper Traps --  -MW    Manual Lymphatic Drainage Comments STM mixed with MLD to RT upper back/ scapula and into lateral trunk, then RUE  -MW          Wrapping Location upper extremity  -MW    Wrapping Location UE hand to axilla  -MW    Wrapping Comments assisted pt to don arm sleeve   -MW    Compression/Skin Care Comments residual cocoa butter Rt upper quarter and RUE   -MW      User Key  (r) = Recorded By, (t) = Taken By, (c) = Cosigned By    Initials Name Provider Type    Na High PT Physical Therapist                  PT Ortho     Row Name 09/11/18 1300       Right Upper Ext    Rt Upper Extremity Comments  ROM deferred due to limited time and pt in tears over pain.   -MW      User Key  (r) = Recorded By, (t) = Taken By, (c) = " Cosigned By    Initials Name Provider Type    Na High, PT Physical Therapist                        PT Assessment/Plan     Row Name 09/11/18 1300          Functional Limitations Performance in self-care ADL;Limitation in home management;Impaired gait  -    Impairments Pain;Impaired lymphatic circulation;Edema;Joint mobility;Muscle strength;Impaired flexibility;Motor function;Impaired muscle length;Impaired sensory integrity   stair climbing   -    Assessment Comments Pt reports hand swelling after last session. Pt continues to report high levels of pain and paresthesias in RUE as well as shoulder/ upper quarter. Recent neck MRI indicates migdalia mets in C6, T1 and T2 but pt has not yet seen MD to discuss findings. Suspect she will transition to palliative care, as well as have additional scans to determine extent of metastatic dz. Unsure of PT POC at this time; will await input from MD's; expect pt will request to continue for pain relief with manual therapy and moist heat.   -MW    Rehab Potential Poor  -MW    Patient/caregiver participated in establishment of treatment plan and goals Yes  -MW    Patient would benefit from skilled therapy intervention Yes  -MW          PT Frequency 2x/week;Other (comment)  -MW    Planned CPT's? PT MANUAL THERAPY EA 15 MIN: 79621;PT THER PROC EA 15 MIN: 84749  -MW    Physical Therapy Interventions (Optional Details) manual therapy techniques;manual lymphatic drainage;modalities;patient/family education   Palliative pain management   -    PT Plan Comments palliative pain management prn with newly dx bone mets.   -      User Key  (r) = Recorded By, (t) = Taken By, (c) = Cosigned By    Initials Name Provider Type    Na High, PT Physical Therapist                          Manual Rx (last 36 hours)      Manual Treatments     Row Name 09/11/18 1300          17964 - PT Manual Therapy Minutes 45  -MW          Manual Rx 1 Location RUE and briefly to lateral  pectoral border   -MW    Manual Rx 1 Type STM, tissue bending, and lifting techniques mixed with MLD   -MW    Manual Rx 1 Grade gentle to very gentle   -MW    Manual Rx 1 Duration 20  -MW          Manual Rx 2 Location RT UT/ mid thoracic spine, leveator scap, teres mm group   -MW    Manual Rx 2 Type STM, tissue bending, and lifting techniques mixed with MLD   -MW    Manual Rx 2 Grade very gentle to gentle   -MW    Manual Rx 2 Duration 25  -MW      User Key  (r) = Recorded By, (t) = Taken By, (c) = Cosigned By    Initials Name Provider Type    MW Na Bergeron, PT Physical Therapist                PT OP Goals     Row Name 09/11/18 1300          STG 1 Patient to report 25% improvement in RUE pain  -MW    STG 1 Progress Not Met  -MW    STG 2 RUE circumferential measurement reduction by >/= 2 cm to promote decrease in pain and improved function.   -MW    STG 2 Progress Partially Met  -MW    STG 3 Pt independent with basic home lymph massage to promote fluid movement and decrease in pain.   -MW    STG 3 Progress Met  -MW    STG 3 Progress Comments Pt continues to use home lymph pump   -MW    STG 4 Pt independent with HEP for LE flexibility and strength to improve function.   -MW    STG 4 Progress Met  -MW          LTG 1 Patient able to actively raise  degrees or better to improve ability to complete daily activities including fixing her hair  -MW    LTG 1 Progress Ongoing  -MW    LTG 2 Patient to be measured and fit with compression sleeve  -MW    LTG 2 Progress Met  -MW    LTG 3 Improved DASH score by 15 points to demonstrate improved function  /return to PLOF.   -MW    LTG 3 Progress Ongoing  -MW    LTG 4 Pt to demonstrate functional improvement of Rt hip/ buttock with improved LEFS by > 10 point.   -MW    LTG 4 Progress Ongoing  -MW    LTG 5 Pt to report improved stair climbing while carrying laundry or groceries.   -MW    LTG 5 Progress Ongoing  -MW          PT Goal Re-Cert Due Date --  -MW      User Key   (r) = Recorded By, (t) = Taken By, (c) = Cosigned By    Initials Name Provider Type    Na High PT Physical Therapist               Outcome Measure Options: Disabilities of the Arm, Shoulder, and Hand (DASH) (Pain limiting everything )  DASH  Open a tight or new jar.: Unable  Write: Unable  Turn a key: Unable  Prepare a meal: Unable  Push open a heavy door: Unable  Place an object on a shelf above your head: Unable  Do heavy household chores (e.g., wash walls, wash floors): Unable  Garden or do yard work: Unable  Make a bed: Severe Difficulty  Carry a shopping bag or briefcase: Severe Difficulty  Carry a heavy object (over 10 lbs): Unable  Change a lightbulb overhead: Unable  Wash or blow dry your hair: Severe Difficulty  Wash your back: Severe Difficulty  Put on a pullover sweater: Severe Difficulty  Use a knife to cut food: Unable  Recreational activities in which require little effort (e.g., cardplaying, knitting, etc.): Unable  Recreational activities in which you take some force or impact through your arm, should or hand (e.g. golf, hammering, tennis, etc.): Unable  Recreational Activities in which you move your arm freely (e.g., frisbee, badminton, etc.): Unable  Manage transportation needs (getting from one place to another): Severe Difficulty  During the past week, to what extent has your arm, shoulder, or hand problem interfered with your normal social activites with family, friends, neighbors or groups?: Extremely  During the past week, were you limited in your work or other regular daily activities as a result of your arm, shoulder or hand problem?: Unable  Arm, Shoulder, or hand pain: Extreme  Arm, shoulder or hand pain when you performed any specific activity: Extreme  Tingling (pins and needles) in your arm, shoulder, or hand: Severe  Weakness in your arm, shoulder or hand: Severe  Stiffness in your arm, shoulder or hand: Severe  During the past week, how much difficulty have you had  sleeping because of the pain in your arm, shoulder or hand?: Severe Difficulty  I feel less capable, less confident or less useful because of my arm, shoulder or hand problem: Strongly Agree  DASH Sum : 135  Number of Questions Answered: 29  DASH Score: 91.38  DASH COMMENTS: RUE pain limits function; pt does all tasks with Lt hand.          Time Calculation:   Start Time: 1300  Total Timed Code Minutes- PT: 45 minute(s)   Therapy Suggested Charges     Code   Minutes Charges    54413 (CPT®) Hc Pt Neuromusc Re Education Ea 15 Min      44554 (CPT®) Hc Pt Ther Proc Ea 15 Min      60425 (CPT®) Hc Gait Training Ea 15 Min      40277 (CPT®) Hc Pt Therapeutic Act Ea 15 Min      97848 (CPT®) Hc Pt Manual Therapy Ea 15 Min 45 3    30981 (CPT®) Hc Pt Ther Massage- Per 15 Min      98217 (CPT®) Hc Pt Iontophoresis Ea 15 Min      48926 (CPT®) Hc Pt Elec Stim Ea-Per 15 Min      04303 (CPT®) Hc Pt Ultrasound Ea 15 Min      80016 (CPT®) Hc Pt Self Care/Mgmt/Train Ea 15 Min      96711 (CPT®) Hc Pt Prosthetic (S) Train Initial Encounter, Each 15 Min      80838 (CPT®) Hc Orthotic(S) Mgmt/Train Initial Encounter, Each 15min      25820 (CPT®) Hc Pt Aquatic Therapy Ea 15 Min      02434 (CPT®) Hc Pt Orthotic(S)/Prosthetic(S) Encounter, Each 15 Min      Total  45 3        Therapy Charges for Today     Code Description Service Date Service Provider Modifiers Qty    87001583116 HC PT CARRY MOV HAND OBJ CURRENT 9/11/2018 Na Bergeron, PT GP, CM 1    12785792571 HC PT CARRY MOV HAND OBJ PROJECTED 9/11/2018 Na Bergeron, PT GP, CL 1    99003119469 HC PT MANUAL THERAPY EA 15 MIN 9/11/2018 Na Bergeron, PT GP 3          PT G-Codes  PT Professional Judgement Used?: Yes  Outcome Measure Options: Disabilities of the Arm, Shoulder, and Hand (DASH) (Pain limiting everything )  Functional Limitation: Carrying, moving and handling objects  Carrying, Moving and Handling Objects Current Status (): At least 80 percent but less than 100  percent impaired, limited or restricted  Carrying, Moving and Handling Objects Goal Status (): At least 60 percent but less than 80 percent impaired, limited or restricted         Na Bergeron, PT  9/11/2018

## 2018-09-12 NOTE — PROGRESS NOTES
"SW met with pt on 9/11/18 during her radiation oncology appointment to provide support and resources.  SW very familiar with pt from past treatment here at [facility].  Pt was seen by Dr. Jacobson and it appears that she has spinal metastasis.  Pt arm is in a sling.  Pt tearful and stated that \"she needs to get some help at home and help with meals\".  Pt also states that she would like to have a recliner to sleep in and assistance in finding another apartment, as it is becoming more difficult for her to manage the stairs in her current home.  Pt is also requesting a new lymphedema sleeve.  Pt is also requesting assistance with transportation.  SW informed pt of Meals on Wheels program, which costs approximately $30 per week.  Pt declines this option.  She is currently receiving food from [employer], but she states that she throws the food away because she does not like the food and cannot eat it.  SW informed pt of the limited housing options for seniors here in [place].  We have discussed these several times and pt always declines to move further because she wants to have a porch and be able to get \"fresh air\".  The low income senior options are high rise apartment buildings.  SW called [facility] to refer pt for the Medicaid Waiver program.  This could provide pt some services at home if pt qualifies.  LORENA called [place] [facility] to request a new lymphedema sleeve for pt.  SW informed pt that we can help with her transportation needs once her appointments are scheduled.  SW will refer to [facility] and also utilize [employer] if needed.  LORENA strongly encouraged pt to call her son and ask for some help.  He lives in [place] and she is very adamant about not wanting to bother him, but the amount of help that she is requesting is unlikely to be available within community resources.  SW available for ongoing support and resource needs.  "

## 2018-09-13 NOTE — THERAPY TREATMENT NOTE
"    Outpatient Physical Therapy Lymphedema Treatment Note  [facility]     Patient Name: Brianna Urrutia  : 1950  MRN: [id number]  Today's Date: 2018        Visit Date: 2018    Visit Dx:    ICD-10-CM ICD-9-CM   1. Scar conditions and fibrosis of skin L90.5 709.2   2. Lymphedema of right upper extremity I89.0 457.1   3. Right arm pain M79.601 729.5   4. Mastodynia of right breast N64.4 611.71       Patient Active Problem List   Diagnosis   • Malignant neoplasm of overlapping sites of right female breast (CMS/HCC)   • Malignant neoplasm of upper-outer quadrant of right female breast (CMS/HCC)   • Degenerative disc disease, cervical   • Brachial plexopathy   • Lymphedema of right arm   • History of radiation therapy   • JACQUI (generalized anxiety disorder)   • Attention deficit hyperactivity disorder (ADHD)              Lymphedema     Row Name 18 1100          Able to rate subjective pain? yes  -MW    Pre-Treatment Pain Level 6  -MW    Post-Treatment Pain Level 6  -MW    Subjective Pain Comment Rt scapula/ lateral border into posterior axilla, Rt breast, Rt UT/ neck.   -MW          Subjective Comments Reports had liver US yesterday and simulation appointment for XRT; not sure about when having body scan to check for CA other places. Has told her exhusband and asked him to tell their son. Reports she continues to trust God and will keep on going.   -MW          Ptting Edema Category By severity  -MW    Pitting Edema Mild;Moderate  -MW    Edema Assessment Comment Mild fullness Rt posterior lateral trunk, mild to moderate swelling RUE including wrist.   -MW          Location/Assessment Upper Quadrant  -MW    Upper Extremity Conditions right:;intact;clean  -MW    Upper Extremity Color/Pigment right:;other (comment)   pale, \"waxy\" appearance of hand & forearm when \"cold\"   -MW    Upper Quadrant Conditions right:;intact;clean;dry  -MW    Upper Quadrant Color/Pigment right:;other (comment);red;erythema " "  scar pink, red  -MW    Skin Observations Comment Areas of erythema/ rash have increased in area over all of superior and lateral breast with area on sternum; posterior shoulder area clear.   -MW          Lymphedema Sensation Comments Continues to report areas of \"fire\" in arm, neck and wrist - hand   -MW       Manual Lymphatic Drainage initial sequence;opened regional lymph nodes;opened anastamoses;extremity treatment;astym  -MW    Initial Sequence supraclavicular;shoulder collectors  -MW    Supraclavicular right  -MW    Shoulder Collectors right  -MW    Opened Regional Lymph Nodes axillary  -MW    Axillary right  -MW    Inguinal right  -MW    Opened Anastamoses axillo-inguinal  -MW    Axillo-Inguinal right  -MW    Extremity Treatment extremity treatment focus on;other extremity treatment  -MW    MLD to Full Limb RUE   -MW    Extremity Treatment Focus On Elbow and forearm  -MW    Other Extremity Treatment Rt posterior-lateral trunk   -MW    Astym upper traps  -MW    Anterior-Lateral Chest right  -MW    Upper Traps right  -MW    Upper Traps Comment Initiated tx in sitting on stool with upper body on face craddle:  RT UT: Evaluator and localizer to UT / scapular region; min soft tissue disruptions noted. Extra strokes at RT teres region, UT / leveator scapulae and mid traps. Continued down over Rt posterior and lateral ribs wrapping around trunk; extra strokes at area of fullness/ thickened tissue adjacent to breast. C/o pain at teres, inferior angle of scapula, and lateral trunk area. ASTYM followed by STM in same position; then CDT-MLD to post lateral trunk.   -MW    Manual Lymphatic Drainage Comments STM mixed with MLD to RT upper back/ scapula and into lateral trunk, then RUE  -MW          Wrapping Location upper extremity  -MW    Wrapping Location UE hand to axilla  -MW      User Key  (r) = Recorded By, (t) = Taken By, (c) = Cosigned By    Initials Name Provider Type    MW Na Bergeron, PT Physical " Therapist                  PT Ortho     Row Name 09/11/18 1300       Right Upper Ext    Rt Upper Extremity Comments  ROM deferred due to limited time and pt in tears over pain.   -MW      User Key  (r) = Recorded By, (t) = Taken By, (c) = Cosigned By    Initials Name Provider Type    Na High, PT Physical Therapist                        PT Assessment/Plan     Row Name 09/13/18 1100          PT Assessment    Functional Limitations Performance in self-care ADL;Limitation in home management;Impaired gait  -MW     Impairments Pain;Impaired lymphatic circulation;Edema;Joint mobility;Muscle strength;Impaired flexibility;Motor function;Impaired muscle length;Impaired sensory integrity  -MW     Assessment Comments Rt hand seems to be back to baseline; persistent mild fullness Rt wrist but elbow/ forearm and upper arm also back to baseline. Rt chest/ breast skin changes covering less area but seem to be generating more heat (does not appear bright red so not suspicious for cellulitis). Pt more calm and accepting of CA progression; seems hopeful to survive bone dz. Cont supportive care for pain management and QOL.   -MW     Rehab Potential Poor  -MW     Patient/caregiver participated in establishment of treatment plan and goals Yes  -MW     Patient would benefit from skilled therapy intervention Yes  -MW        PT Plan    PT Frequency 2x/week   depending on XRT, chemo, etc.   -MW     Physical Therapy Interventions (Optional Details) manual therapy techniques;manual lymphatic drainage;modalities;patient/family education   Palliative pain management   -MW     PT Plan Comments Cont STM, moist heat, ther exer as able to promote QOL.   -MW       User Key  (r) = Recorded By, (t) = Taken By, (c) = Cosigned By    Initials Name Provider Type    Na High, PT Physical Therapist                 Modalities     Row Name 09/13/18 1100             Moist Heat    MH Applied Yes  -MW      Location Rt shoulder / back    -MW      Rx Minutes Other:  -MW      MH Prior to Rx Yes  -MW      MH S/P Rx Yes   applied when transitioning from seated to supine  -MW        User Key  (r) = Recorded By, (t) = Taken By, (c) = Cosigned By    Initials Name Provider Type    Na High, PT Physical Therapist                Exercises     Row Name 09/13/18 1100             Subjective Comments    Subjective Comments Reports had liver US yesterday and simulation appointment for XRT; not sure about when having body scan to check for CA other places. Has told her exhusband and asked him to tell their son. Reports she continues to trust God and will keep on going.   -MW         Subjective Pain    Able to rate subjective pain? yes  -MW      Pre-Treatment Pain Level 6  -MW      Post-Treatment Pain Level 6  -MW      Subjective Pain Comment Rt scapula/ lateral border into posterior axilla, Rt breast, Rt UT/ neck.   -MW         Total Minutes    95681 - PT Manual Therapy Minutes 55  -MW        User Key  (r) = Recorded By, (t) = Taken By, (c) = Cosigned By    Initials Name Provider Type    Na High, PT Physical Therapist                       Manual Rx (last 36 hours)      Manual Treatments     Row Name 09/13/18 1100             Total Minutes    71582 - PT Manual Therapy Minutes 55  -MW         Manual Rx 1    Manual Rx 1 Location RUE and briefly to lateral pectoral border   -MW      Manual Rx 1 Type STM, tissue bending, and lifting techniques mixed with MLD   -MW      Manual Rx 1 Grade gentle to very gentle   -MW      Manual Rx 1 Duration 25 with focus on RUE   -MW         Manual Rx 2    Manual Rx 2 Location RT UT/ mid thoracic spine, leveator scap, teres mm group   -MW      Manual Rx 2 Type STM, tissue bending, and lifting techniques mixed with MLD   -MW      Manual Rx 2 Grade very gentle to gentle   -MW      Manual Rx 2 Duration 25  -MW        User Key  (r) = Recorded By, (t) = Taken By, (c) = Cosigned By    Initials Name Provider Type    FRAN  Na Bergeron, PT Physical Therapist              Therapy Education  Education Details: Encouraged pt to cont to follow up for MD appts; discuss multiple needs with MD's (Hep tx and XRT concurrentl? )   Given: Pain management, Symptoms/condition management, Edema management  How Provided: Verbal  Provided to: Patient  Level of Understanding: Verbalized              Time Calculation:   Start Time: 1100  Total Timed Code Minutes- PT: 55 minute(s)   Therapy Suggested Charges     Code   Minutes Charges    96961 (CPT®) Hc Pt Neuromusc Re Education Ea 15 Min      80560 (CPT®) Hc Pt Ther Proc Ea 15 Min      72906 (CPT®) Hc Gait Training Ea 15 Min      55570 (CPT®) Hc Pt Therapeutic Act Ea 15 Min      94574 (CPT®) Hc Pt Manual Therapy Ea 15 Min 55 4    06174 (CPT®) Hc Pt Ther Massage- Per 15 Min      04206 (CPT®) Hc Pt Iontophoresis Ea 15 Min      05737 (CPT®) Hc Pt Elec Stim Ea-Per 15 Min      82909 (CPT®) Hc Pt Ultrasound Ea 15 Min      77326 (CPT®) Hc Pt Self Care/Mgmt/Train Ea 15 Min      52612 (CPT®) Hc Pt Prosthetic (S) Train Initial Encounter, Each 15 Min      77451 (CPT®) Hc Orthotic(S) Mgmt/Train Initial Encounter, Each 15min      62325 (CPT®) Hc Pt Aquatic Therapy Ea 15 Min      40278 (CPT®) Hc Pt Orthotic(S)/Prosthetic(S) Encounter, Each 15 Min      Total  55 4        Therapy Charges for Today     Code Description Service Date Service Provider Modifiers Qty    34291961491 HC PT MANUAL THERAPY EA 15 MIN 9/13/2018 Na Bergeron, PT GP 4                    Na Bergeron, PT  9/13/2018

## 2018-09-14 NOTE — TELEPHONE ENCOUNTER
Kristin,  Pt was supposed to have a 1 month appt after she saw me.  She is yet as scheduled as of now.  Will you f/u?

## 2018-09-17 NOTE — PROGRESS NOTES
I spoke with Addie ([id number]) in Nuclear medicine and she said the order for patient is incorrect.  Patient needs a whole body scan and not a limited. After reading Dr. Jacobson note,  I gave her permission to change it in Epic.

## 2018-09-18 NOTE — THERAPY TREATMENT NOTE
"    Outpatient Physical Therapy Lymphedema Treatment Note  [facility]     Patient Name: Brianna Urrutia  : 1950  MRN: [id number]  Today's Date: 2018        Visit Date: 2018    Visit Dx:    ICD-10-CM ICD-9-CM   1. Scar conditions and fibrosis of skin L90.5 709.2   2. Lymphedema of right upper extremity I89.0 457.1   3. Right arm pain M79.601 729.5   4. Mastodynia of right breast N64.4 611.71       Patient Active Problem List   Diagnosis   • Malignant neoplasm of overlapping sites of right female breast (CMS/HCC)   • Malignant neoplasm of upper-outer quadrant of right female breast (CMS/HCC)   • Degenerative disc disease, cervical   • Brachial plexopathy   • Lymphedema of right arm   • History of radiation therapy   • JACQUI (generalized anxiety disorder)   • Attention deficit hyperactivity disorder (ADHD)              Lymphedema     Row Name 18 1100          Able to rate subjective pain? yes  -MW    Pre-Treatment Pain Level 6  -MW    Post-Treatment Pain Level 8  -MW    Subjective Pain Comment Rt pectoralis area, scapula and RUE   -MW          Subjective Comments Reports being up since 5am to get ready for 7:30 pickup for 8am injection for bone scan, but appt was really at 9am.   -MW          Ptting Edema Category By severity  -MW    Pitting Edema Mild;Moderate  -MW    Edema Assessment Comment Mod swelling RUE wrist to axilla; pt at early appt and did not get to use lymph pump this am.   -MW          Location/Assessment Upper Quadrant  -MW    Upper Extremity Conditions right:;intact;clean  -MW    Upper Extremity Color/Pigment right:;other (comment)   pale, \"waxy\" appearance of hand & forearm when \"cold\"   -MW    Upper Quadrant Conditions right:;intact;clean;dry  -MW    Upper Quadrant Color/Pigment right:;other (comment);red;erythema   scar pink, red  -MW    Skin Observations Comment Area of erythema / radiation rash stable over Rt chest / breast area   -MW          Manual Lymphatic Drainage " "initial sequence;opened regional lymph nodes;opened anastamoses;extremity treatment;astym  -MW    Initial Sequence supraclavicular;shoulder collectors  -MW    Supraclavicular right;left  -MW    Shoulder Collectors right;left  -MW    Opened Regional Lymph Nodes axillary  -MW    Axillary right  -MW    Inguinal right  -MW    Opened Anastamoses axillo-inguinal  -MW    Posterior Axillo-Axillary moving Rt to Lt   -MW    Axillo-Inguinal right  -MW    Extremity Treatment extremity treatment focus on;other extremity treatment  -MW    MLD to Full Limb RUE   -MW    Extremity Treatment Focus On Elbow and forearm  -MW    Other Extremity Treatment Rt posterior-lateral trunk   -MW    Astym --  -MW    Anterior-Lateral Chest --  -MW    Upper Traps --  -MW    Manual Lymphatic Drainage Comments STM mixed with MLD to RT upper back/ scapula and into lateral trunk, then RUE  -MW          Compression/Skin Care skin care  -MW    Skin Care moisturizing lotion applied   pt's own \"dry oil spray\"  -MW    Wrapping Location upper extremity  -MW    Wrapping Location UE right:;hand to axilla  -MW    Wrapping Comments compressogrip sizes, layers and overlapping to create gradient compression: size 4 forearm and upper arm, doubled on forearm, size 3.5 to hand and forearm, doubled on wrist and hand    -MW    Compression/Skin Care Comments Pt provided new massage oil   -MW      User Key  (r) = Recorded By, (t) = Taken By, (c) = Cosigned By    Initials Name Provider Type    Na High, PT Physical Therapist                              PT Assessment/Plan     Row Name 09/18/18 1100          PT Assessment    Functional Limitations Performance in self-care ADL;Limitation in home management;Impaired gait  -MW     Impairments Pain;Impaired lymphatic circulation;Edema;Joint mobility;Muscle strength;Impaired flexibility;Motor function;Impaired muscle length;Impaired sensory integrity  -MW     Assessment Comments RUE more full this visit but did not " "use lymph pump this morning due to multiple appts today. Skin remains stable. Pt provided new massage oil with \"healing ingredients\" for use for STM. Point tender at mid traps, teres mm group, forearm extensor group. Hand initially room temperature but became cooler during STM / MLD. Pt seems more comfortable and less full post MLD; pectoralis also less tense post tx. Cont PT for assist with pain management and QOL.   -MW     Rehab Potential Poor  -MW     Patient/caregiver participated in establishment of treatment plan and goals Yes  -MW     Patient would benefit from skilled therapy intervention Yes  -MW        PT Plan    PT Frequency 2x/week   as schedule allows   -MW     Physical Therapy Interventions (Optional Details) manual therapy techniques;manual lymphatic drainage;modalities;patient/family education  -     PT Plan Comments Cont STM, moist heat, ther exer as able to promote QOL.   -MW       User Key  (r) = Recorded By, (t) = Taken By, (c) = Cosigned By    Initials Name Provider Type    MW Na Bergeron, PT Physical Therapist                        Manual Rx (last 36 hours)      Manual Treatments     Row Name 09/18/18 1100             Total Minutes    83753 - PT Manual Therapy Minutes 55  -MW         Manual Rx 1    Manual Rx 1 Location RUE and briefly to lateral pectoral border   -MW      Manual Rx 1 Type STM, tissue bending, and lifting techniques mixed with MLD   -MW      Manual Rx 1 Grade gentle to moderate   -MW      Manual Rx 1 Duration 30 with focus on RUE   -MW         Manual Rx 2    Manual Rx 2 Location RT UT/ mid thoracic spine, leveator scap, teres mm group   -MW      Manual Rx 2 Type STM, tissue bending, and lifting techniques mixed with MLD   -MW      Manual Rx 2 Grade very gentle to gentle   -MW      Manual Rx 2 Duration 25  -MW        User Key  (r) = Recorded By, (t) = Taken By, (c) = Cosigned By    Initials Name Provider Type    Na High, PT Physical Therapist    "           Therapy Education  Education Details: Encouraged pt to cont to pursue assistance for household chores; cont self care; printed appt schedule for pt as she was unsure of next appt for XRT   Given: Pain management, Edema management, Symptoms/condition management  Program: Reinforced  How Provided: Verbal, Written (printed appt schedule from [facility] )  Provided to: Patient  Level of Understanding: Verbalized              Time Calculation:   Start Time: 1100  Total Timed Code Minutes- PT: 55 minute(s)   Therapy Suggested Charges     Code   Minutes Charges    37597 (CPT®) Hc Pt Neuromusc Re Education Ea 15 Min      77317 (CPT®) Hc Pt Ther Proc Ea 15 Min      17106 (CPT®) Hc Gait Training Ea 15 Min      28422 (CPT®) Hc Pt Therapeutic Act Ea 15 Min      00410 (CPT®) Hc Pt Manual Therapy Ea 15 Min 55 4    45128 (CPT®) Hc Pt Ther Massage- Per 15 Min      59722 (CPT®) Hc Pt Iontophoresis Ea 15 Min      58283 (CPT®) Hc Pt Elec Stim Ea-Per 15 Min      15463 (CPT®) Hc Pt Ultrasound Ea 15 Min      69965 (CPT®) Hc Pt Self Care/Mgmt/Train Ea 15 Min      82478 (CPT®) Hc Pt Prosthetic (S) Train Initial Encounter, Each 15 Min      28918 (CPT®) Hc Orthotic(S) Mgmt/Train Initial Encounter, Each 15min      87126 (CPT®) Hc Pt Aquatic Therapy Ea 15 Min      25862 (CPT®) Hc Pt Orthotic(S)/Prosthetic(S) Encounter, Each 15 Min      Total  55 4        Therapy Charges for Today     Code Description Service Date Service Provider Modifiers Qty    51223387294 HC PT MANUAL THERAPY EA 15 MIN 9/18/2018 Na Bergeron, PT GP 4                    Na Bergeron, PT  9/18/2018

## 2018-09-18 NOTE — TELEPHONE ENCOUNTER
----- Message from Luisito Cespedes sent at 9/18/2018  9:49 AM EDT -----  PT CAME IN OFFICE WANTING REFILL [facility] STATES THAT SHE WOULD LIKE MORE THAN LAST TIME   [phone]     PHARM MARCELA LIZ

## 2018-09-19 NOTE — TELEPHONE ENCOUNTER
LORENA referred pt to [employer] for the Medicaid Waiver program, that could provide help to pt at home, should she be eligible.  SW available for ongoing support and resource needs.

## 2018-09-20 NOTE — THERAPY TREATMENT NOTE
"    Outpatient Physical Therapy Lymphedema Treatment Note  [facility]     Patient Name: Brianna Urrutia  : 1950  MRN: [id number]  Today's Date: 2018        Visit Date: 2018    Visit Dx:    ICD-10-CM ICD-9-CM   1. Scar conditions and fibrosis of skin L90.5 709.2   2. Lymphedema of right upper extremity I89.0 457.1   3. Right arm pain M79.601 729.5   4. Mastodynia of right breast N64.4 611.71   5. Cervicalgia M54.2 723.1       Patient Active Problem List   Diagnosis   • Malignant neoplasm of overlapping sites of right female breast (CMS/HCC)   • Malignant neoplasm of upper-outer quadrant of right female breast (CMS/HCC)   • Degenerative disc disease, cervical   • Brachial plexopathy   • Lymphedema of right arm   • History of radiation therapy   • JACQUI (generalized anxiety disorder)   • Attention deficit hyperactivity disorder (ADHD)              Lymphedema     Row Name 18 1100          Able to rate subjective pain? yes  -MW    Pre-Treatment Pain Level 6  -MW    Post-Treatment Pain Level 6  -MW    Subjective Pain Comment Rt chest, shoulder, arm and wrist   -MW          Subjective Comments Reports Dr Jacobson tried to tell her about her bone scan; something about her sternum and ribs. Notes she had injury to her sternum many years ago and could have had broken ribs then. States she starts XRT for neck on Monday.   -MW          Ptting Edema Category By severity  -MW    Pitting Edema Mild;Moderate  -MW    Edema Assessment Comment Mild to moderate swelling RUE wrist to axilla, hand more mild. Pt wear hand and wrist compressogrip with wrist brace.   -MW          Location/Assessment Upper Quadrant  -MW    Upper Extremity Conditions right:;intact;clean  -MW    Upper Extremity Color/Pigment right:;other (comment)   pale, \"waxy\" appearance of hand & forearm when \"cold\"   -MW    Upper Quadrant Conditions right:;intact;clean;dry  -MW    Upper Quadrant Color/Pigment right:;other (comment);red;erythema   scar " "pink, red  -MW    Skin Observations Comment Area of erythema / radiation rash stable over Rt chest / breast area and over sternum   -MW          Lymphedema Sensation Comments Continues to report areas of \"fire\" in arm, neck and wrist - hand intermittently with STM/ MLD   -MW          Manual Lymphatic Drainage initial sequence;opened regional lymph nodes;opened anastamoses;extremity treatment;astym  -MW    Initial Sequence supraclavicular;shoulder collectors  -MW    Supraclavicular right;left  -MW    Shoulder Collectors right;left  -MW    Opened Regional Lymph Nodes axillary;inguinal;ribs  -MW    Axillary right  -MW    Inguinal right  -MW    Ribs right posterior and anterior   -MW    Opened Anastamoses axillo-inguinal  -MW    Axillo-Inguinal right  -MW    Extremity Treatment extremity treatment focus on;other extremity treatment  -MW    MLD to Full Limb RUE   -MW    Other Extremity Treatment Rt posterior-lateral trunk   -MW    Manual Lymphatic Drainage Comments STM mixed with MLD to RT upper back/ scapula and into lateral trunk, then RUE  -MW          Compression/Skin Care skin care  -MW    Skin Care moisturizing lotion applied   pt's own \"dry oil spray\"  -MW    Wrapping Location upper extremity  -MW    Wrapping Location UE right:;hand to axilla  -MW    Wrapping Comments compressogrip sizes, layers and overlapping to create gradient compression: size 4 forearm and upper arm, doubled on forearm, size 3.5 to hand and forearm, doubled on wrist and hand    -MW      User Key  (r) = Recorded By, (t) = Taken By, (c) = Cosigned By    Initials Name Provider Type    Na High, PT Physical Therapist                              PT Assessment/Plan     Row Name 09/20/18 1100          PT Assessment    Functional Limitations Performance in self-care ADL;Limitation in home management;Impaired gait  -MW     Impairments Pain;Impaired lymphatic circulation;Edema;Joint mobility;Muscle strength;Impaired flexibility;Motor " "function;Impaired muscle length;Impaired sensory integrity  -MW     Assessment Comments RUE with moderate fullness from mid upperarm to wrist; hand stable. Point tender lateral scapular border / posterior axilla, mid traps, pectoralis and posterior upper arm \"spot.\" RUE fullness improved post tx; pt seems less tense and more comfortable post manual therapy. Provided new temporary compression and faxed info to [place] [facility] for pt to obtain a new arm sleeve and possibly guantlet. Follow up prn as pt begins XRT next week for spinal mets.   -MW     Rehab Potential Poor  -MW     Patient/caregiver participated in establishment of treatment plan and goals Yes  -MW     Patient would benefit from skilled therapy intervention Yes  -MW        PT Plan    PT Frequency 2x/week;1x/week  -MW     Physical Therapy Interventions (Optional Details) manual therapy techniques;manual lymphatic drainage;modalities;patient/family education  -MW     PT Plan Comments Cont STM, moist heat, ther exer as able to promote QOL.   -MW       User Key  (r) = Recorded By, (t) = Taken By, (c) = Cosigned By    Initials Name Provider Type    Na High, PT Physical Therapist                 Modalities     Row Name 09/20/18 1100             Moist Heat    MH Applied Yes  -MW      Location Rt hand /wrist   -MW      Rx Minutes Other:  -MW      MH Prior to Rx Yes  -MW      MH S/P Rx Yes   intermittently throughout tx session   -MW        User Key  (r) = Recorded By, (t) = Taken By, (c) = Cosigned By    Initials Name Provider Type    Na High, PT Physical Therapist                Exercises     Row Name 09/20/18 1100             Subjective Comments    Subjective Comments Reports Dr Jacobson tried to tell her about her bone scan; something about her sternum and ribs. Notes she had injury to her sternum many years ago and could have had broken ribs then. States she starts XRT for neck on Monday.   -MW         Subjective Pain    Able to rate " subjective pain? yes  -MW      Pre-Treatment Pain Level 6  -MW      Post-Treatment Pain Level 6  -MW      Subjective Pain Comment Rt chest, shoulder, arm and wrist   -MW         Total Minutes    18196 - PT Manual Therapy Minutes 55  -MW        User Key  (r) = Recorded By, (t) = Taken By, (c) = Cosigned By    Initials Name Provider Type    Na High, PT Physical Therapist                       Manual Rx (last 36 hours)      Manual Treatments     Row Name 09/20/18 1100             Total Minutes    59485 - PT Manual Therapy Minutes 55  -MW         Manual Rx 1    Manual Rx 1 Location RUE and briefly to lateral pectoral border   -MW      Manual Rx 1 Type STM, tissue bending, and lifting techniques mixed with MLD   -MW      Manual Rx 1 Grade gentle to moderate   -MW      Manual Rx 1 Duration 30 with focus on RUE   -MW         Manual Rx 2    Manual Rx 2 Location RT UT/ mid thoracic spine, leveator scap, teres mm group   -MW      Manual Rx 2 Type STM, tissue bending, and lifting techniques mixed with MLD   -MW      Manual Rx 2 Grade very gentle to gentle   -MW      Manual Rx 2 Duration 25  -MW        User Key  (r) = Recorded By, (t) = Taken By, (c) = Cosigned By    Initials Name Provider Type    Na High, PT Physical Therapist                             Time Calculation:   Start Time: 1100  Total Timed Code Minutes- PT: 55 minute(s)   Therapy Suggested Charges     Code   Minutes Charges    40314 (CPT®) Hc Pt Neuromusc Re Education Ea 15 Min      63436 (CPT®) Hc Pt Ther Proc Ea 15 Min      18895 (CPT®) Hc Gait Training Ea 15 Min      84796 (CPT®) Hc Pt Therapeutic Act Ea 15 Min      91166 (CPT®) Hc Pt Manual Therapy Ea 15 Min 55 4    34093 (CPT®) Hc Pt Ther Massage- Per 15 Min      68971 (CPT®) Hc Pt Iontophoresis Ea 15 Min      17672 (CPT®) Hc Pt Elec Stim Ea-Per 15 Min      59294 (CPT®) Hc Pt Ultrasound Ea 15 Min      54610 (CPT®) Hc Pt Self Care/Mgmt/Train Ea 15 Min      02883 (CPT®) Hc Pt  Prosthetic (S) Train Initial Encounter, Each 15 Min      73635 (CPT®) Hc Orthotic(S) Mgmt/Train Initial Encounter, Each 15min      03042 (CPT®) Hc Pt Aquatic Therapy Ea 15 Min      82189 (CPT®) Hc Pt Orthotic(S)/Prosthetic(S) Encounter, Each 15 Min      Total  55 4        Therapy Charges for Today     Code Description Service Date Service Provider Modifiers Qty    13971971538 HC PT MANUAL THERAPY EA 15 MIN 9/20/2018 Na Bergeron, PT GP 4                    Na Bergeron, PT  9/20/2018

## 2018-10-08 NOTE — PROGRESS NOTES
Pt contacted us that over the weekend she began having trouble swallowing, she stated even bread. A script for Kaycee PETERS was called into [facility] at [facility] because her store is not a compounding pharmacy.  Pt. was notified. She cancelled tx. Today and will come tomorrow if only for her status check.

## 2018-10-10 NOTE — TELEPHONE ENCOUNTER
----- Message from Katelynn Garcia sent at 10/10/2018  2:17 PM EDT -----  Contact: PT  NEEDS LOSARTAN POTASSIUM 100MG CALLED IN TO     [facility] [address] [place] [address] [address] - [phone] PH - [phone] (Phone)  [phone] (Fax)      SAID THIS WAS PREVIOUSLY RXD BY FORMER DR, WAS TOLD TO CALL HERE WHEN SHE WAS OUT OF REFILLS AND NEEDED IT CALLED IN AGAIN.

## 2018-10-15 NOTE — TELEPHONE ENCOUNTER
Spoke to pharmacy, they confirmed patient is now taking losartan 100mg tablet once daily. Do you want this changed and sent to pharmacy?

## 2018-10-15 NOTE — TELEPHONE ENCOUNTER
----- Message from Katelynn Garcia sent at 10/15/2018 10:14 AM EDT -----  Contact: PT  STATES 25 MG LOSARTAN HAS BEEN BEING CALLED IN WHEN SHE TAKES 100MG. SHE HAS TO TAKE 4 PILLS.     REQUESTING 100MG LOSARTAN BE CALLED IN TO:    [facility]  [place], [place] - [address] - [phone]  - [phone] (Phone)  [phone] (Fax)

## 2018-10-15 NOTE — TELEPHONE ENCOUNTER
Please correct this with losartan 100 mg tablets one daily called in to pharmacy #[id number] with 6 refills

## 2018-10-18 NOTE — THERAPY PROGRESS REPORT/RE-CERT
"    Outpatient Physical Therapy Lymphedema Progress Note  [facility]     Patient Name: Brianna Urrutia  : 1950  MRN: [id number]  Today's Date: 10/18/2018        Visit Date: 10/18/2018    Visit Dx:    ICD-10-CM ICD-9-CM   1. Scar conditions and fibrosis of skin L90.5 709.2   2. Lymphedema of right upper extremity I89.0 457.1   3. Right arm pain M79.601 729.5   4. Mastodynia of right breast N64.4 611.71       Patient Active Problem List   Diagnosis   • Malignant neoplasm of overlapping sites of right female breast (CMS/HCC)   • Malignant neoplasm of upper-outer quadrant of right female breast (CMS/HCC)   • Degenerative disc disease, cervical   • Brachial plexopathy   • Lymphedema of right arm   • History of radiation therapy   • JACQUI (generalized anxiety disorder)   • Attention deficit hyperactivity disorder (ADHD)              Lymphedema     Row Name 10/18/18 1100          Able to rate subjective pain? yes  -MW    Pre-Treatment Pain Level 6  -MW    Post-Treatment Pain Level 4  -MW    Subjective Pain Comment Rt breast/ chest, shoulder arm (elbow, wrist, hand)   -MW          Subjective Comments Report has had several XRT to her neck, and has one more scheduled, but would do more if doctors thought it would be helpful. Overall feels she is making progress. Sleeping in recliner now. Still using pump daily for several hours.   -MW          Ptting Edema Category By severity  -MW    Pitting Edema Mild;Moderate  -MW    Edema Assessment Comment Mild Rt breast; mild to moderate lateral trunk, moderate RUE espeically around elbow  -MW          Location/Assessment Upper Quadrant  -MW    Upper Extremity Conditions right:;intact;clean  -MW    Upper Extremity Color/Pigment right:;other (comment)   pale, \"waxy\"/ blue appearance of hand / fingers when \"cold\"  -MW    Upper Quadrant Conditions right:;intact;clean;dry  -MW    Upper Quadrant Color/Pigment right:;other (comment);red;erythema   scar pink, red  -MW    Upper Quadrant " Skin Details Faint dusky red residual changes from XRT acrosss Rt breast; shoulder clear. But post STM noted brighter redness across Rt breast area, down lateral trunk and around posteiror axillary fold snd over mid scapula   -MW          Lymphedema Sensation Comments Multiple reports of pain, paresthesias involving Rt upper quarter; down arm into fingers and into breast/ chest and around scapula.   -MW          Lymphedema Pulses/Capillary Refill capillary refill  -MW    Upper Extremity Capillary Refill right:;left:;less than 3 seconds  -MW          Manual Lymphatic Drainage initial sequence;opened regional lymph nodes;opened anastamoses;extremity treatment;astym  -MW    Initial Sequence supraclavicular;shoulder collectors  -MW    Supraclavicular right  -MW    Shoulder Collectors right  -MW    Opened Regional Lymph Nodes axillary;inguinal;ribs  -MW    Axillary right  -MW    Inguinal right  -MW    Ribs right posterior and anterior   -MW    Opened Anastamoses axillo-inguinal  -MW    Axillo-Inguinal right  -MW    Extremity Treatment extremity treatment focus on;other extremity treatment  -MW    MLD to Full Limb RUE mixed with STM   -MW    Other Extremity Treatment Rt posterior-lateral trunk   -MW    Manual Lymphatic Drainage Comments Pt brought her Biotab pump with her to use post PT while waiting to go to XRT at 1:30   -MW          Compression/Skin Care skin care   pt requested bandaid to protect Rt nipple   -MW    Skin Care moisturizing lotion applied   cocoa butter for STM; washed breast for placement of KT  -MW    Wrapping Location upper extremity  -MW    Wrapping Location UE right:;hand to axilla  -MW    Wrapping Comments assisted pt to don compression sleeve.   -MW    Compression/Skin Care Comments Kinesiotape to breast scar area with tension centrall.   -MW      User Key  (r) = Recorded By, (t) = Taken By, (c) = Cosigned By    Initials Name Provider Type    Na High, PT Physical Therapist                   PT Ortho     Row Name 10/18/18 1100       Posture/Observations    Posture/Observations Comments RUE sling   -MW       Head/Neck/Trunk    Neck Extension AROM Deferred   -MW    Neck Flexion AROM 0-35 all in high C spine   -MW    Neck Lt Rotation AROM WFL   -MW    Neck Rt Rotation AROM WFL   -MW       Right Upper Ext    Rt Shoulder Abduction AROM 0-35  -MW    Rt Shoulder Abduction PROM 0-50  -MW    Rt Shoulder Flexion AROM 0-50  -MW    Rt Shoulder Flexion PROM 0-90  -MW    Rt Upper Extremity Comments   deferred; approx 50% closure of fist, unable to touch thumb to any finger tips.   -MW      User Key  (r) = Recorded By, (t) = Taken By, (c) = Cosigned By    Initials Name Provider Type    MW Na Bergeron, PT Physical Therapist                        PT Assessment/Plan     Row Name 10/18/18 1100          PT Assessment    Functional Limitations Performance in self-care ADL;Limitation in home management;Impaired gait  -MW     Impairments Pain;Impaired lymphatic circulation;Edema;Joint mobility;Muscle strength;Impaired flexibility;Motor function;Impaired muscle length;Impaired sensory integrity  -MW     Assessment Comments Pt returns after approximately 1 month of no PT due to pain, fatigue and transportation issues. Pt is now s/p 8 of 10 palliative XRT to neck metastatic dz. She reports decreased pain, but continues to report various paresthesias throughout Rt upper quarter. Her RUE lymphedema is moderately increased this visit but she had not yet used her home lymph pump today. She continues to wear a compression sleeve. She is limited in ADL's due to severe ROM impairments of her right shoulder, as well as weakness in Rt hand, and arm. She demonstrates functional ROM of her neck without complaints of increased pain or paresthesias, however, she deferred extension movement past neutral. Cont PT for manual therapy and ther exer, modalities prn for pain management and to promote functional return if possible.  Consider resuming OT post XRT for reassessment of hand function.   -MW     Rehab Potential Fair  -MW     Patient/caregiver participated in establishment of treatment plan and goals Yes  -MW     Patient would benefit from skilled therapy intervention Yes  -MW        PT Plan    PT Frequency 2x/week  -MW     Predicted Duration of Therapy Intervention (Therapy Eval) 12 weeks   -MW     Planned CPT's? PT MANUAL THERAPY EA 15 MIN: 11802;PT THER PROC EA 15 MIN: 28347;PT ELECTRICAL STIM UNATTEND: ;PT HOT OR COLD PACK TREAT MCARE;PT SELF CARE/HOME MGMT/TRAIN EA 15: 90282  -MW     Physical Therapy Interventions (Optional Details) manual therapy techniques;manual lymphatic drainage;modalities;patient/family education;stretching;strengthening  -MW     PT Plan Comments Cont manual therapy (STM / ASTYM / MLD), modalities prn, ther exer to promote maximum function and QOL.   -MW       User Key  (r) = Recorded By, (t) = Taken By, (c) = Cosigned By    Initials Name Provider Type    Na High, PT Physical Therapist                     Exercises     Row Name 10/18/18 1100             Subjective Comments    Subjective Comments Report has had several XRT to her neck, and has one more scheduled, but would do more if doctors thought it would be helpful. Overall feels she is making progress. Sleeping in recliner now. Still using pump daily for several hours.   -MW         Subjective Pain    Able to rate subjective pain? yes  -MW      Pre-Treatment Pain Level 6  -MW      Post-Treatment Pain Level 4  -MW      Subjective Pain Comment Rt breast/ chest, shoulder arm (elbow, wrist, hand)   -MW         Total Minutes    42464 - PT Manual Therapy Minutes 60  -MW        User Key  (r) = Recorded By, (t) = Taken By, (c) = Cosigned By    Initials Name Provider Type    Na High, PT Physical Therapist                       Manual Rx (last 36 hours)      Manual Treatments     Row Name 10/18/18 1100             Total Minutes     03139 - PT Manual Therapy Minutes 60  -MW         Manual Rx 1    Manual Rx 1 Location RUE   -MW      Manual Rx 1 Type STM, tissue bending, and lifting techniques mixed with MLD   -MW      Manual Rx 1 Grade moderate   -MW      Manual Rx 1 Duration 15  -MW         Manual Rx 2    Manual Rx 2 Location RT UT/ mid thoracic spine, leveator scap, teres mm group   -MW      Manual Rx 2 Type STM, tissue bending, and lifting techniques mixed with MLD   -MW      Manual Rx 2 Grade very gentle to gentle   -MW      Manual Rx 2 Duration 30  -MW         Manual Rx 3    Manual Rx 3 Location Rt chest/ axilla (pect major / minor), teres and subscap   -MW      Manual Rx 3 Type STM: tissue bending, lifting and trigger point release   -MW      Manual Rx 3 Grade gentle to moderate   -MW      Manual Rx 3 Duration 15  -MW        User Key  (r) = Recorded By, (t) = Taken By, (c) = Cosigned By    Initials Name Provider Type    MW Na Bergeron, PT Physical Therapist                PT OP Goals     Row Name 10/18/18 1100          STG 1 Patient to report 25% improvement in RUE pain  -MW    STG 1 Progress Ongoing;Progressing  -MW    STG 2 RUE circumferential measurement reduction by >/= 2 cm to promote decrease in pain and improved function.   -MW    STG 2 Progress Partially Met  -MW    STG 2 Progress Comments RUE more full this visit; improved post tx   -MW    STG 3 Pt to report decreased numbness / tingling RUE by 25%   -MW    STG 3 Progress New  -MW          LTG 1 Patient able to actively raise  degrees or better to improve ability to complete daily activities including fixing her hair  -MW    LTG 1 Progress Ongoing  -MW    LTG 2 Patient to be measured and fit with compression sleeve  -MW    LTG 2 Progress Met  -MW    LTG 3 Improved DASH score by 15 points to demonstrate improved function  /return to PLOF.   -MW    LTG 3 Progress Ongoing  -MW    LTG 4 Pt to demonstrate functional improvement of Rt hip/ buttock with improved LEFS by > 10  point.   -MW    LTG 4 Progress Ongoing  -MW    LTG 5 Pt to report decreased numbness / tingling RUE; DASH score to be 4 or less.   -MW    LTG 5 Progress New  -MW          PT Goal Re-Cert Due Date 01/18/19  -MW      User Key  (r) = Recorded By, (t) = Taken By, (c) = Cosigned By    Initials Name Provider Type    Na High, PT Physical Therapist               Outcome Measure Options: Disabilities of the Arm, Shoulder, and Hand (DASH) (Pain limiting everything )  DASH  Open a tight or new jar.: Unable  Write: Unable  Turn a key: Unable  Prepare a meal: Unable  Push open a heavy door: Unable  Place an object on a shelf above your head: Unable  Do heavy household chores (e.g., wash walls, wash floors): Unable  Garden or do yard work: Unable  Make a bed: Severe Difficulty  Carry a shopping bag or briefcase: Severe Difficulty  Carry a heavy object (over 10 lbs): Unable  Change a lightbulb overhead: Unable  Wash or blow dry your hair: Severe Difficulty  Wash your back: Severe Difficulty  Put on a pullover sweater: Severe Difficulty  Use a knife to cut food: Unable  Recreational activities in which require little effort (e.g., cardplaying, knitting, etc.): Unable  Recreational activities in which you take some force or impact through your arm, should or hand (e.g. golf, hammering, tennis, etc.): Unable  Recreational Activities in which you move your arm freely (e.g., frisbee, badminton, etc.): Unable  Manage transportation needs (getting from one place to another): Severe Difficulty  During the past week, to what extent has your arm, shoulder, or hand problem interfered with your normal social activites with family, friends, neighbors or groups?: Quite a bit  During the past week, were you limited in your work or other regular daily activities as a result of your arm, shoulder or hand problem?: Unable  Arm, Shoulder, or hand pain: Severe  Arm, shoulder or hand pain when you performed any specific activity:  Extreme  Tingling (pins and needles) in your arm, shoulder, or hand: Severe  Weakness in your arm, shoulder or hand: Extreme  Stiffness in your arm, shoulder or hand: Extreme  During the past week, how much difficulty have you had sleeping because of the pain in your arm, shoulder or hand?: Severe Difficulty  I feel less capable, less confident or less useful because of my arm, shoulder or hand problem: Strongly Agree  DASH Sum : 135  Number of Questions Answered: 29  DASH Score: 91.38  DASH COMMENTS: RUE pain, weakness limiting activities; modified to become Lt handed          Time Calculation:   Start Time: 1100  Total Timed Code Minutes- PT: 60 minute(s)   Therapy Suggested Charges     Code   Minutes Charges    58821 (CPT®) Hc Pt Neuromusc Re Education Ea 15 Min      70610 (CPT®) Hc Pt Ther Proc Ea 15 Min      12987 (CPT®) Hc Gait Training Ea 15 Min      90096 (CPT®) Hc Pt Therapeutic Act Ea 15 Min      22872 (CPT®) Hc Pt Manual Therapy Ea 15 Min 60 4    91185 (CPT®) Hc Pt Ther Massage- Per 15 Min      37564 (CPT®) Hc Pt Iontophoresis Ea 15 Min      85454 (CPT®) Hc Pt Elec Stim Ea-Per 15 Min      22714 (CPT®) Hc Pt Ultrasound Ea 15 Min      59458 (CPT®) Hc Pt Self Care/Mgmt/Train Ea 15 Min      91164 (CPT®) Hc Pt Prosthetic (S) Train Initial Encounter, Each 15 Min      39881 (CPT®) Hc Orthotic(S) Mgmt/Train Initial Encounter, Each 15min      85047 (CPT®) Hc Pt Aquatic Therapy Ea 15 Min      23081 (CPT®) Hc Pt Orthotic(S)/Prosthetic(S) Encounter, Each 15 Min       (CPT®) Hc Pt Electrical Stim Unattended      Total  60 4        Therapy Charges for Today     Code Description Service Date Service Provider Modifiers Qty    49358494196 HC PT CARRY MOV HAND OBJ CURRENT 10/18/2018 Na Bergeron, PT GP, CM 1    82266519549 HC PT CARRY MOV HAND OBJ PROJECTED 10/18/2018 Na Bergeron, PT GP, CL 1    50804363736 HC PT MANUAL THERAPY EA 15 MIN 10/18/2018 Na Bergeron, PT GP 4          PT G-Codes  PT  Professional Judgement Used?: Yes  Outcome Measure Options: Disabilities of the Arm, Shoulder, and Hand (DASH) (Pain limiting everything )  Functional Limitation: Carrying, moving and handling objects  Carrying, Moving and Handling Objects Current Status (): At least 80 percent but less than 100 percent impaired, limited or restricted  Carrying, Moving and Handling Objects Goal Status (): At least 60 percent but less than 80 percent impaired, limited or restricted         Na Bergeron, PT  10/18/2018

## 2018-10-26 NOTE — THERAPY TREATMENT NOTE
"    Outpatient Physical Therapy Lymphedema Treatment Note  [facility]     Patient Name: Brianna Urrutia  : 1950  MRN: [id number]  Today's Date: 10/26/2018        Visit Date: 10/25/2018    Visit Dx:    ICD-10-CM ICD-9-CM   1. Scar conditions and fibrosis of skin L90.5 709.2   2. Lymphedema of right upper extremity I89.0 457.1   3. Right arm pain M79.601 729.5   4. Mastodynia of right breast N64.4 611.71       Patient Active Problem List   Diagnosis   • Malignant neoplasm of overlapping sites of right female breast (CMS/HCC)   • Malignant neoplasm of upper-outer quadrant of right female breast (CMS/HCC)   • Degenerative disc disease, cervical   • Brachial plexopathy   • Lymphedema of right arm   • History of radiation therapy   • JACQUI (generalized anxiety disorder)   • Attention deficit hyperactivity disorder (ADHD)              Lymphedema     Row Name 10/25/18 1100          Able to rate subjective pain? yes  -MW    Pre-Treatment Pain Level 8  -MW    Post-Treatment Pain Level 8  -MW    Subjective Pain Comment Rt hand/ wrist cold and painful   -MW          Subjective Comments Reports fell last week; stepped wrong on Rt foot and landed on RUE/ Rt side.   -MW          Ptting Edema Category By severity  -MW    Pitting Edema Mild;Moderate  -MW    Edema Assessment Comment Mild Rt breast and posterior-lateral trunk; moderate RUE except hand.   -MW          Location/Assessment Upper Quadrant  -MW    Upper Extremity Conditions right:;intact;clean  -MW    Upper Extremity Color/Pigment right:;other (comment)   pale, \"waxy\"/ blue appearance of hand / fingers when \"cold\"  -MW    Upper Quadrant Conditions right:;intact;clean;dry  -MW    Upper Quadrant Color/Pigment right:;other (comment);red;erythema   scar pink, red  -MW    Upper Quadrant Skin Details Mild to moderate dusky red skin changes over Rt breast area; brighter post STM and noted red rash like changes posterior trunk / scapular area and down medial upper arm to " elbow post STM MLD   -MW    Skin Observations Comment Rt hand pale and cold; cold over distal 1/3 of forearm; temperature improved post STM/ MLD but pt reports that it still feels cold to her and is very painful   -MW          Lymphedema Pulses/Capillary Refill --  -MW    Upper Extremity Capillary Refill --  -MW          Manual Lymphatic Drainage initial sequence;opened regional lymph nodes;opened anastamoses;extremity treatment;astym  -MW    Initial Sequence supraclavicular;shoulder collectors  -MW    Supraclavicular right  -MW    Shoulder Collectors right  -MW    Opened Regional Lymph Nodes axillary;inguinal;ribs  -MW    Axillary right  -MW    Inguinal right  -MW    Ribs right posterior and anterior   -MW    Opened Anastamoses axillo-inguinal  -MW    Axillo-Inguinal right  -MW    Extremity Treatment extremity treatment focus on;other extremity treatment  -MW    MLD to Full Limb RUE mixed with STM   -MW    Other Extremity Treatment Rt posterior-lateral trunk   -MW    Manual Lymphatic Drainage Comments STM mixed with MLD to RT upper back/ scapula and into lateral trunk, then RUE; used pt's own spray oil for STM   -MW          Compression/Skin Care skin care  -MW    Skin Care moisturizing lotion applied   used pt's own spray oil/ moisturizer   -MW    Wrapping Location upper extremity  -MW    Wrapping Location UE right:;hand to axilla  -MW    Wrapping Comments assisted pt to don compression sleeve.   -MW    Compression/Skin Care Comments pt declined KT to scar today   -MW      User Key  (r) = Recorded By, (t) = Taken By, (c) = Cosigned By    Initials Name Provider Type    Na High, PT Physical Therapist                              PT Assessment/Plan     Row Name 10/25/18 1100          PT Assessment    Functional Limitations Performance in self-care ADL;Limitation in home management;Impaired gait  -MW     Impairments Pain;Impaired lymphatic circulation;Edema;Joint mobility;Muscle strength;Impaired  flexibility;Motor function;Impaired muscle length;Impaired sensory integrity  -MW     Assessment Comments Pt reports frustration with getting laundry done and expense of laundramat. Pain and paresthesias seem increased this visit; noted hand colder and coldness extends into distal 1/3 of forearm (this is more area than previously noted). Continued muslce wasting noted in rt hand / thenar and hypothenar areas. Continue supportive and palliative PT.   -MW     Rehab Potential Poor  -MW     Patient/caregiver participated in establishment of treatment plan and goals Yes  -MW     Patient would benefit from skilled therapy intervention Yes  -MW        PT Plan    PT Frequency 2x/week  -MW     Physical Therapy Interventions (Optional Details) manual therapy techniques;manual lymphatic drainage;modalities;patient/family education;stretching;strengthening  -MW     PT Plan Comments Cont manual therapy (STM / ASTYM / MLD), modalities prn, ther exer to promote maximum function and QOL.   -MW       User Key  (r) = Recorded By, (t) = Taken By, (c) = Cosigned By    Initials Name Provider Type    MW Na Bergeron, PT Physical Therapist                     Exercises     Row Name 10/25/18 1100       Subjective Comments    Subjective Comments Reports fell last week; stepped wrong on Rt foot and landed on RUE/ Rt side.   -MW       Subjective Pain    Able to rate subjective pain? yes  -MW    Pre-Treatment Pain Level 8  -MW    Post-Treatment Pain Level 8  -MW    Subjective Pain Comment Rt hand/ wrist cold and painful   -MW       Total Minutes    42576 - PT Manual Therapy Minutes 60  -MW       Exercise 1    Exercise Name 1 HBH: chicken wings with STM   -MW    Cueing 1 Tactile;Verbal  -MW    Reps 1 6  -MW    Additional Comments very gentle; with deep breathing   -MW       Exercise 2    Exercise Name 2 Rt median nerve glides and gentle stretches   -MW    Cueing 2 Tactile;Verbal  -MW    Reps 2 3   -MW    Additional Comments moving through  elbow flex/ ext, forearm pronation/ supination and wrist flex/ ext.   -MW       Exercise 3    Exercise Name 3 Rt radial nerve glides and stretches   -MW    Cueing 3 Tactile;Verbal  -MW    Reps 3 2  -MW    Additional Comments deep breathing with gentle traction   -MW      User Key  (r) = Recorded By, (t) = Taken By, (c) = Cosigned By    Initials Name Provider Type    MW Na Bergeron, PT Physical Therapist                       Manual Rx (last 36 hours)      Manual Treatments     Row Name 10/25/18 1100       Total Minutes    65069 - PT Manual Therapy Minutes 60  -MW       Manual Rx 1    Manual Rx 1 Location RUE   -MW    Manual Rx 1 Type STM, tissue bending, and lifting techniques mixed with MLD   -MW    Manual Rx 1 Grade moderate   -MW    Manual Rx 1 Duration 15  -MW       Manual Rx 2    Manual Rx 2 Location RT UT/ mid thoracic spine, leveator scap, teres mm group   -MW    Manual Rx 2 Type STM, tissue bending, and lifting techniques mixed with MLD   -MW    Manual Rx 2 Grade very gentle to gentle   -MW    Manual Rx 2 Duration 30  -MW       Manual Rx 3    Manual Rx 3 Location Rt chest/ axilla (pect major / minor), teres and subscap   -MW    Manual Rx 3 Type STM: tissue bending, lifting and trigger point release   -MW    Manual Rx 3 Grade gentle to moderate   -MW    Manual Rx 3 Duration 15  -MW      User Key  (r) = Recorded By, (t) = Taken By, (c) = Cosigned By    Initials Name Provider Type    MW Na Bergeron, PT Physical Therapist                             Time Calculation:   Start Time: 1110  Total Timed Code Minutes- PT: 60 minute(s)   Therapy Suggested Charges     Code   Minutes Charges    22895 (CPT®) Hc Pt Neuromusc Re Education Ea 15 Min      55077 (CPT®) Hc Pt Ther Proc Ea 15 Min      51527 (CPT®) Hc Gait Training Ea 15 Min      75486 (CPT®) Hc Pt Therapeutic Act Ea 15 Min      52946 (CPT®) Hc Pt Manual Therapy Ea 15 Min 60 4    16790 (CPT®) Hc Pt Ther Massage- Per 15 Min      02799 (CPT®) Hc Pt  Iontophoresis Ea 15 Min      79191 (CPT®) Hc Pt Elec Stim Ea-Per 15 Min      95341 (CPT®) Hc Pt Ultrasound Ea 15 Min      33125 (CPT®) Hc Pt Self Care/Mgmt/Train Ea 15 Min      46565 (CPT®) Hc Pt Prosthetic (S) Train Initial Encounter, Each 15 Min      44944 (CPT®) Hc Orthotic(S) Mgmt/Train Initial Encounter, Each 15min      26176 (CPT®) Hc Pt Aquatic Therapy Ea 15 Min      51590 (CPT®) Hc Pt Orthotic(S)/Prosthetic(S) Encounter, Each 15 Min       (CPT®) Hc Pt Electrical Stim Unattended      Total  60 4        Therapy Charges for Today     Code Description Service Date Service Provider Modifiers Qty    99242936557 HC PT MANUAL THERAPY EA 15 MIN 10/25/2018 Na Bergeron, PT GP 4                    Na Bergeron, PT  10/26/2018

## 2018-10-29 NOTE — THERAPY DISCHARGE NOTE
COMPLETION NOTE    PATIENT:   Brianna Urrutia  :    1950  COMPLETION DATE:   10/18/2018  DIAGNOSIS:   Stage IV metastatic breast cancer to bone    Mrs. Urrutia has been treated multiple times in our department for metastatic breast cancer.  She most recently underwent palliative radiotherapy to the cervical thoracic spine as follows.  TREATMENT COURSE:   -10/18/2018 C5-T3 receive 30 Gray in 10 fractions with 10 MV photons        TOLERANCE:   Mrs. Urrutia took a break during therapy.  She had mild esophagitis.  She needed much social assistance which was provided by [employer] Escalera.  She continued to have much lymphedema in the right arm.      DISPOSITION:  At the completion of therapy an appointment was made for her to return on 2018 at 2:15 PM.  She knows to call if she has any problems sooner.        Gema Edmonds MD    Errors in dictation may reflect use of voice recognition software and not all errors in transcription may have been detected prior to signing.

## 2018-10-29 NOTE — THERAPY TREATMENT NOTE
"    Outpatient Physical Therapy Lymphedema Treatment Note  [facility]     Patient Name: Brianna Urrutia  : 1950  MRN: [id number]  Today's Date: 10/29/2018        Visit Date: 10/29/2018    Visit Dx:    ICD-10-CM ICD-9-CM   1. Scar conditions and fibrosis of skin L90.5 709.2   2. Lymphedema of right upper extremity I89.0 457.1   3. Right arm pain M79.601 729.5   4. Mastodynia of right breast N64.4 611.71       Patient Active Problem List   Diagnosis   • Malignant neoplasm of overlapping sites of right female breast (CMS/HCC)   • Malignant neoplasm of upper-outer quadrant of right female breast (CMS/HCC)   • Degenerative disc disease, cervical   • Brachial plexopathy   • Lymphedema of right arm   • History of radiation therapy   • JACQUI (generalized anxiety disorder)   • Attention deficit hyperactivity disorder (ADHD)              Lymphedema     Row Name 10/29/18 1400          Able to rate subjective pain? yes  -MW    Pre-Treatment Pain Level 6  -MW    Post-Treatment Pain Level 7  -MW    Subjective Pain Comment Rt breast, scapula, medial arm, elbow, wrist and hand   -MW          Subjective Comments Pt reports still very frustrated about difficulty of daily life due to pain and impairment of Rt hand/ arm. \"Sometimes my right hand hits me in the head\" (if lifted up Rt hand with Lt and then lets go, Rt hand inconsistent in strength to maintain position)  -MW          Ptting Edema Category By severity  -MW    Pitting Edema Mild;Moderate  -MW    Edema Assessment Comment Mild Rt breast and posterior-lateral trunk; moderate RUE except hand.   -MW          Location/Assessment Upper Quadrant  -MW    Upper Extremity Conditions right:;intact;clean  -MW    Upper Extremity Color/Pigment right:;other (comment)   pale, \"waxy\"/ blue appearance of hand / fingers when \"cold\"  -MW    Upper Quadrant Conditions right:;intact;clean;dry  -MW    Upper Quadrant Color/Pigment right:;other (comment);red;erythema   scar pink, red  -MW    " Upper Quadrant Skin Details Mild to moderate dusky red skin changes over Rt breast area; brighter post STM and noted red rash like changes posterior trunk / scapular area and down medial upper arm to elbow post STM MLD   -MW    Skin Observations Comment Rt hand cool to cold during tx; better post MLD/ STM   -MW          Manual Lymphatic Drainage initial sequence;opened regional lymph nodes;opened anastamoses;extremity treatment;astym  -MW    Initial Sequence supraclavicular;shoulder collectors  -MW    Supraclavicular right;left  -MW    Shoulder Collectors right  -MW    Opened Regional Lymph Nodes axillary;inguinal;ribs  -MW    Axillary right  -MW    Inguinal right  -MW    Ribs right posterior   -MW    Opened Anastamoses axillo-inguinal  -MW    Axillo-Inguinal right  -MW    Extremity Treatment extremity treatment focus on;other extremity treatment  -MW    MLD to Full Limb RUE mixed with STM   -MW    Other Extremity Treatment Rt posterior-lateral trunk   -MW    Manual Lymphatic Drainage Comments STM mixed with MLD to RT upper back/ scapula and into lateral trunk, then RUE; used pt's own essential oil lemongrass for STM   -MW          Compression/Skin Care skin care  -MW    Skin Care moisturizing lotion applied   used pt's own oil/ moisturizer   -MW    Wrapping Location upper extremity  -MW    Wrapping Location UE right:;hand to axilla  -MW    Wrapping Comments assisted pt to don compression sleeve.   -MW      User Key  (r) = Recorded By, (t) = Taken By, (c) = Cosigned By    Initials Name Provider Type    Na High, PT Physical Therapist                              PT Assessment/Plan     Row Name 10/29/18 1400          PT Assessment    Functional Limitations Performance in self-care ADL;Limitation in home management;Impaired gait  -MW     Impairments Pain;Impaired lymphatic circulation;Edema;Joint mobility;Muscle strength;Impaired flexibility;Motor function;Impaired muscle length;Impaired sensory integrity   "-MW     Assessment Comments Pt returns with more focused c/o pain in Rt breast area; area is very tight and warm; some improvement in tissue mobility and quality post STM / manual therapy techniques. RUE lymphedema firm / packed in distal arm and throughout forearm; also softer and more mobile post tx. Pt now demonstrating more proximal weakness/ neuropathy of RUE with signifiant weakness in elbow ext/ triceps (3- to 3/5); suspect this is related to metastatic dz in spine and/ or XRT side effect. Cont to monitor RUE function; encouraging pt to continue to use RUE as much as possible to minimize disuse atrophy. Cont PT for assistance with pain control, lymphedema management and overall QOL.   -MW     Rehab Potential Poor  -MW     Patient/caregiver participated in establishment of treatment plan and goals Yes  -MW     Patient would benefit from skilled therapy intervention Yes  -MW        PT Plan    PT Frequency 2x/week  -MW     Physical Therapy Interventions (Optional Details) manual therapy techniques;manual lymphatic drainage;modalities;patient/family education;stretching;strengthening  -MW     PT Plan Comments Cont manual therapy (STM / ASTYM / MLD), modalities prn, ther exer to promote maximum function and QOL.   -MW       User Key  (r) = Recorded By, (t) = Taken By, (c) = Cosigned By    Initials Name Provider Type    Na High, PT Physical Therapist                     Exercises     Row Name 10/29/18 1400             Subjective Comments    Subjective Comments Pt reports still very frustrated about difficulty of daily life due to pain and impairment of Rt hand/ arm. \"Sometimes my right hand hits me in the head\" (if lifted up Rt hand with Lt and then lets go, Rt hand inconsistent in strength to maintain position)  -MW         Subjective Pain    Able to rate subjective pain? yes  -MW      Pre-Treatment Pain Level 6  -MW      Post-Treatment Pain Level 7  -MW      Subjective Pain Comment Rt breast, " scapula, medial arm, elbow, wrist and hand   -MW         Total Minutes    88755 - PT Manual Therapy Minutes 55  -MW         Exercise 1    Exercise Name 1 HBH: chicken wings with STM   -MW      Cueing 1 Tactile;Verbal  -MW      Reps 1 6  -MW      Additional Comments very gentle; with deep breathing   -MW         Exercise 3    Exercise Name 3 Rt radial nerve glides and stretches   -MW      Cueing 3 Tactile;Verbal  -MW      Reps 3 2  -MW      Additional Comments with gentle traction   -MW        User Key  (r) = Recorded By, (t) = Taken By, (c) = Cosigned By    Initials Name Provider Type    MW Na Bergeron, PT Physical Therapist                       Manual Rx (last 36 hours)      Manual Treatments     Row Name 10/29/18 1400             Total Minutes    64908 - PT Manual Therapy Minutes 55  -MW         Manual Rx 1    Manual Rx 1 Location RUE   -MW      Manual Rx 1 Type STM, tissue bending, and lifting techniques mixed with MLD   -MW      Manual Rx 1 Grade moderate   -MW      Manual Rx 1 Duration 15  -MW         Manual Rx 2    Manual Rx 2 Location RT>> LT UT/ mid thoracic spine, leveator scap, teres mm group   -MW      Manual Rx 2 Type STM, tissue bending, and lifting techniques mixed with MLD   -MW      Manual Rx 2 Grade gentle to moderate pressure   -MW      Manual Rx 2 Duration 30  -MW         Manual Rx 3    Manual Rx 3 Location Rt chest/ axilla (pect major / minor), teres and subscap   -MW      Manual Rx 3 Type STM: tissue bending, lifting and trigger point release   -MW      Manual Rx 3 Grade gentle to moderate   -MW      Manual Rx 3 Duration 10  -MW        User Key  (r) = Recorded By, (t) = Taken By, (c) = Cosigned By    Initials Name Provider Type    MW Na Bergeron, PT Physical Therapist                             Time Calculation:   Start Time: 1400  Total Timed Code Minutes- PT: 55 minute(s)   Therapy Suggested Charges     Code   Minutes Charges    98884 (CPT®) Hc Pt Neuromusc Re Education Ea 15 Min       33178 (CPT®) Hc Pt Ther Proc Ea 15 Min      67735 (CPT®) Hc Gait Training Ea 15 Min      85915 (CPT®) Hc Pt Therapeutic Act Ea 15 Min      56840 (CPT®) Hc Pt Manual Therapy Ea 15 Min 55 4    20195 (CPT®) Hc Pt Ther Massage- Per 15 Min      08348 (CPT®) Hc Pt Iontophoresis Ea 15 Min      42397 (CPT®) Hc Pt Elec Stim Ea-Per 15 Min      03310 (CPT®) Hc Pt Ultrasound Ea 15 Min      04070 (CPT®) Hc Pt Self Care/Mgmt/Train Ea 15 Min      74800 (CPT®) Hc Pt Prosthetic (S) Train Initial Encounter, Each 15 Min      70780 (CPT®) Hc Orthotic(S) Mgmt/Train Initial Encounter, Each 15min      11865 (CPT®) Hc Pt Aquatic Therapy Ea 15 Min      88910 (CPT®) Hc Pt Orthotic(S)/Prosthetic(S) Encounter, Each 15 Min       (CPT®) Hc Pt Electrical Stim Unattended      Total  55 4        Therapy Charges for Today     Code Description Service Date Service Provider Modifiers Qty    67864859953 HC PT MANUAL THERAPY EA 15 MIN 10/29/2018 Na Bergeron, PT GP 4                    Na Bergeron, PT  10/29/2018

## 2018-11-07 NOTE — PROGRESS NOTES
"FOLLOW UP NOTE    PATIENT:                                                      Brianna Urrutia  MEDICAL RECORD #:                        [id number]  :                                                          1950  COMPLETION DATE:   10/18/2018  DIAGNOSIS:     Cancer Staging  Malignant neoplasm of overlapping sites of right female breast (CMS/HCC)  Staging form: Breast, AJCC V7  - Clinical stage from 2017: Stage IIIA (T3, N1, M0) - Signed by Delicia Ibarra MD on 2017        BRIEF HISTORY:    Mrs. Urrutia has been treated multiple times in our department for metastatic breast cancer.  She most recently underwent palliative radiotherapy to the cervical thoracic spine of: C5-T3 receive 30 Gray in 10 fractions.  Ms. Urrutia took a break during therapy.  She had mild esophagitis.  She needed much social assistance which was provided by [employer] Escal[employer].  She continued to have much lymphedema in the right arm.  She has no new complaints today.  She is unhappy with her living arrangements and has ideas for post treatment care and end of life care.         MEDICATIONS: Medication reconciliation for the patient was reviewed and confirmed in the electronic medical record.    Review of Systems   Neurological: Positive for extremity weakness (RUE in sling.).   All other systems reviewed and are negative.      KPS 90%    Physical Exam   Constitutional: She appears well-developed and well-nourished.   Pulmonary/Chest: Effort normal.   Nursing note and vitals reviewed.  Her right arm is in a sling and glove    VITAL SIGNS:   Vitals:    18 1455   BP: 134/81   Pulse: 110   Resp: 16   Temp: 98.2 °F (36.8 °C)   Weight: 53.3 kg (117 lb 9.6 oz)   Height: 162.6 cm (64.02\")   PainSc: 0-No pain       The following portions of the patient's history were reviewed and updated as appropriate: allergies, current medications, past family history, past medical history, past social history, past surgical history and problem " list.               IMPRESSION:  Ms. Urrutia appears to be physically stable at this time    RECOMMENDATIONS: Ms. Urrutia is seeing palliative care so I have nothing to add at this time.  She is working up a proposal for cancer patient care.  She brought a nena poem she had written in 1981.  We will see her back as needed.      Gema Edmonds MD    Errors in dictation may reflect use of voice recognition software and not all errors in transcription may have been detected prior to signing.

## 2018-11-08 PROBLEM — C79.51 BONE METASTASES: Status: ACTIVE | Noted: 2018-01-01
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
## 2018-11-12 NOTE — THERAPY TREATMENT NOTE
Outpatient Physical Therapy Lymphedema Treatment Note  [facility]     Patient Name: Brianna Urrutia  : 1950  MRN: [id number]  Today's Date: 2018        Visit Date: 2018    Visit Dx:    ICD-10-CM ICD-9-CM   1. Scar conditions and fibrosis of skin L90.5 709.2   2. Lymphedema of right upper extremity I89.0 457.1   3. Right arm pain M79.601 729.5   4. Mastodynia of right breast N64.4 611.71       Patient Active Problem List   Diagnosis   • Malignant neoplasm of overlapping sites of right female breast (CMS/HCC)   • Malignant neoplasm of upper-outer quadrant of right female breast (CMS/HCC)   • Degenerative disc disease, cervical   • Brachial plexopathy   • Lymphedema of right arm   • History of radiation therapy   • JACQUI (generalized anxiety disorder)   • Attention deficit hyperactivity disorder (ADHD)   • Bone metastases (CMS/HCC)        Lymphedema     Row Name 18 1100          Able to rate subjective pain?  yes  -MW    Pre-Treatment Pain Level  6  -MW    Post-Treatment Pain Level  7  -MW    Subjective Pain Comment  RUE, breast, upper back   -MW          Subjective Comments  Pt reports Ms Wade was putting in another order for PT for strengthening and general conditioning. Pt asking if she can ride the stationary bike for LE conditioning at her next appointment. Reports she has been told she is not allowed to return to [facility] oncology clinic but is looking into this with the Oncology . Pt voicing frustrations about transportation issues as well. Notes arm is more full as she left the house for her 8:45am appt and didn't have time to use the machine much.   -MW          Edema Assessment Comment  Mild to moderate Rt breast with moderate edema of the nipple; RUE mod to severe in forearm, mild to mod in upper arm; hand mild.   -MW          Upper Extremity Conditions  right:;intact;clean  -MW    Upper Extremity Color/Pigment  right:;normal  -MW    Upper Quadrant Color/Pigment   "right:;red;erythema  -MW    Upper Quadrant Skin Details  Mild to moderate dusky red skin changes over Rt breast area; brighter post STM and noted red rash like changes posterior trunk / scapular area and down medial upper arm to elbow post STM MLD   -MW          Manual Lymphatic Drainage  initial sequence;opened regional lymph nodes;opened anastamoses;extremity treatment  -MW    Initial Sequence  supraclavicular;shoulder collectors  -MW    Supraclavicular  right;left  -MW    Shoulder Collectors  right;left  -MW    Opened Regional Lymph Nodes  axillary;inguinal;ribs  -MW    Axillary  right  -MW    Inguinal  right  -MW    Ribs  right posterior  -MW    Opened Anastamoses  axillo-inguinal  -MW    Axillo-Inguinal  right  -MW    Extremity Treatment  MLD to full limb  -MW    MLD to Full Limb  RUE   -MW    Other Extremity Treatment  Rt posterior-lateral trunk   -MW    Manual Lymphatic Drainage Comments  Rt posterior-lateral trunk   -MW          Compression/Skin Care  compression garment  -MW    Skin Care  moisturizing lotion applied used pt's own oil moisturizer spray   -MW    Compression Garment Comments  assisted pt to don compression sleeve.   -MW    Compression/Skin Care Comments  provided 1/2\" grey foam [place] with cut out for nipple wrapped in compressogrip to attempt to decrease nipple fullness.   -MW      User Key  (r) = Recorded By, (t) = Taken By, (c) = Cosigned By    Initials Name Provider Type    Na High, PT Physical Therapist                        PT Assessment/Plan     Row Name 11/12/18 1100          PT Assessment    Functional Limitations  Performance in self-care ADL;Limitation in home management;Impaired gait  -MW     Impairments  Pain;Impaired lymphatic circulation;Edema;Joint mobility;Muscle strength;Impaired flexibility;Motor function;Impaired muscle length;Impaired sensory integrity  -MW     Assessment Comments  Pt presents with severe fullness Rt forearm, firm packed edema; softer post " MLD and STM. 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 Pt provided with foam compression to attempt to decrease fullness in nipple and breast area. Cont PT to promote pain control, lymphedema management and ROM/ Strength at able.   -MW     Rehab Potential  Poor  -MW     Patient/caregiver participated in establishment of treatment plan and goals  Yes  -MW     Patient would benefit from skilled therapy intervention  Yes  -MW        PT Plan    PT Frequency  2x/week  -MW     Physical Therapy Interventions (Optional Details)  manual therapy techniques;manual lymphatic drainage;modalities;patient/family education;stretching;strengthening  -MW     PT Plan Comments  Cont manual therapy (STM / ASTYM / MLD), modalities prn, ther exer to promote maximum function and QOL.   -MW       User Key  (r) = Recorded By, (t) = Taken By, (c) = Cosigned By    Initials Name Provider Type    MW Na Bergeron, PT Physical Therapist               Exercises     Row Name 11/12/18 1100             Subjective Comments    Subjective Comments  Pt reports Ms Wade was putting in another order for PT for strengthening and general conditioning. Pt asking if she can ride the stationary bike for LE conditioning at her next appointment. Reports she has been told she is not allowed to return to [facility] oncology clinic but is looking into this with the Oncology . Pt voicing frustrations about transportation issues as well. Notes arm is more full as she left the house for her 8:45am appt and didn't have time to use the machine much.   -MW         Subjective Pain    Able to rate subjective pain?  yes  -MW      Pre-Treatment Pain Level  6  -MW      Post-Treatment Pain Level  7  -MW      Subjective Pain Comment  RUE, breast, upper back   -MW         Total Minutes    52667 - PT Therapeutic Exercise Minutes  5  -MW      46692 -  PT Manual Therapy Minutes  55  -MW         Exercise 1    Exercise Name 1  HBH: chicken wings with STM   -MW      Cueing 1  Tactile;Verbal  -MW      Reps 1  6  -MW         Exercise 2    Exercise Name 2  AAROM shoulder flexion   -MW      Cueing 2  Verbal;Tactile  -MW      Reps 2  5  -MW      Additional Comments  LUE assisting RUE   -MW         Exercise 3    Exercise Name 3  Rt radial nerve glides and stretches   -MW      Cueing 3  Tactile;Verbal  -MW      Reps 3  2  -MW      Additional Comments  with gentle traction   -MW         Exercise 4    Exercise Name 4  AAROM tricep ext   -MW      Cueing 4  Verbal;Tactile  -MW      Reps 4  5  -MW      Additional Comments  LUE assisting RUE   -MW        User Key  (r) = Recorded By, (t) = Taken By, (c) = Cosigned By    Initials Name Provider Type    MW Na Bergeron, PT Physical Therapist                       Manual Rx (last 36 hours)      Manual Treatments     Row Name 11/12/18 1100             Total Minutes    34130 - PT Manual Therapy Minutes  55  -MW         Manual Rx 1    Manual Rx 1 Location  RUE   -MW      Manual Rx 1 Type  STM, tissue bending, and lifting techniques mixed with MLD   -MW      Manual Rx 1 Grade  moderate   -MW      Manual Rx 1 Duration  25  -MW         Manual Rx 2    Manual Rx 2 Location  RT>> LT UT/ mid thoracic spine, leveator scap, teres mm group   -MW      Manual Rx 2 Type  STM, tissue bending, and lifting techniques mixed with MLD   -MW      Manual Rx 2 Grade  gentle to moderate pressure   -MW      Manual Rx 2 Duration  25  -MW         Manual Rx 3    Manual Rx 3 Location  Rt chest/ axilla (pect major / minor), teres and subscap   -MW      Manual Rx 3 Type  STM: tissue bending, lifting and trigger point release   -MW      Manual Rx 3 Grade  gentle to moderate   -MW      Manual Rx 3 Duration  5  -MW        User Key  (r) = Recorded By, (t) = Taken By, (c) = Cosigned By    Initials Name Provider Type    MW Na Bergeron, PT Physical Therapist               Therapy Education  Education Details: Added AAROM triceps ext, shoulder flex, horiz ABD/ADD LUE assisting RUE. Reviewed use of foam in compressogrip for nipple and breast compression for edema management.   Given: HEP, Symptoms/condition management  Program: Progressed  How Provided: Verbal, Demonstration  Provided to: Patient  Level of Understanding: Verbalized, Teach back education performed              Time Calculation:   Start Time: 1100  Total Timed Code Minutes- PT: 60 minute(s)   Therapy Suggested Charges     Code   Minutes Charges    03259 (CPT®) Hc Pt Neuromusc Re Education Ea 15 Min      19980 (CPT®) Hc Pt Ther Proc Ea 15 Min 5     60701 (CPT®) Hc Gait Training Ea 15 Min      51218 (CPT®) Hc Pt Therapeutic Act Ea 15 Min      84677 (CPT®) Hc Pt Manual Therapy Ea 15 Min 55 4    08382 (CPT®) Hc Pt Ther Massage- Per 15 Min      68631 (CPT®) Hc Pt Iontophoresis Ea 15 Min      32384 (CPT®) Hc Pt Elec Stim Ea-Per 15 Min      39943 (CPT®) Hc Pt Ultrasound Ea 15 Min      65621 (CPT®) Hc Pt Self Care/Mgmt/Train Ea 15 Min      33576 (CPT®) Hc Pt Prosthetic (S) Train Initial Encounter, Each 15 Min      18397 (CPT®) Hc Orthotic(S) Mgmt/Train Initial Encounter, Each 15min      48326 (CPT®) Hc Pt Aquatic Therapy Ea 15 Min      77409 (CPT®) Hc Pt Orthotic(S)/Prosthetic(S) Encounter, Each 15 Min       (CPT®) Hc Pt Electrical Stim Unattended      Total  60 4        Therapy Charges for Today     Code Description Service Date Service Provider Modifiers Qty    75511567584 HC PT MANUAL THERAPY EA 15 MIN 11/12/2018 Na Bergeron, PT GP 4                    Na Bergeron, PT  11/12/2018

## 2018-11-12 NOTE — PROGRESS NOTES
Reagan Urrutia is a 68 y.o. female  Follow-up      History of Present Illness  Patient comes in today for follow-up unfortunately she is recently been diagnosed with metastatic breast cancer to the bone.  She has been receiving radiation treatments and chemotherapy treatments currently.  She states her pain is stable, controlled at this time.  She is seen radiation oncology and is in process.  Back into her oncologist but is having some difficulties in determining who she will be seen.  She states overall she feels better mentally she states in fact after starting the chemotherapy/radiation treatments she feels clear minded.  She does need refills on tramadol today.  The following portions of the patient's history were reviewed and updated as appropriate: allergies, current medications, past social history and problem list    Review of Systems   Constitutional: Negative for appetite change and fatigue.   HENT: Negative for sore throat, trouble swallowing and voice change.    Respiratory: Negative for chest tightness and shortness of breath.    Gastrointestinal: Negative for abdominal pain, diarrhea and nausea.   Musculoskeletal: Positive for arthralgias, myalgias, neck pain and neck stiffness.   Skin: Negative for rash and wound.   Neurological: Negative for dizziness, tremors, weakness, light-headedness and headaches.   Hematological: Negative for adenopathy.   Psychiatric/Behavioral: Positive for sleep disturbance. Negative for agitation, behavioral problems, confusion, decreased concentration, dysphoric mood and suicidal ideas. The patient is nervous/anxious ( Mild anxiety, stable at this time).        Objective     Vitals:    11/12/18 0923   BP: 138/76   Pulse: 89   Temp: 98.1 °F (36.7 °C)   SpO2: 99%       Physical Exam   Constitutional: She appears well-developed and well-nourished. No distress.   Neck: No JVD present.   Cardiovascular: Normal rate, regular rhythm, normal heart sounds and intact  distal pulses.   No murmur heard.  Pulmonary/Chest: Effort normal and breath sounds normal. No respiratory distress. She exhibits no tenderness.   Abdominal: Soft. She exhibits no distension. There is no tenderness.   Musculoskeletal: She exhibits no edema or tenderness.   Skin: Skin is warm and dry. She is not diaphoretic. No erythema. No pallor.   Psychiatric: She has a normal mood and affect.   Nursing note and vitals reviewed.      Assessment/Plan     Diagnoses and all orders for this visit:    Malignant neoplasm of overlapping sites of right female breast, unspecified estrogen receptor status (CMS/HCC)    Lymphedema of right arm    Bone metastases (CMS/HCC)    Degenerative disc disease, cervical    Other orders  -     losartan (COZAAR) 100 MG tablet; Take 1 tablet by mouth Daily.    Refilled tramadol ×1 month with 3 refills discussed abuse potential this medication.

## 2018-11-19 NOTE — THERAPY TREATMENT NOTE
"    Outpatient Physical Therapy Lymphedema Treatment Note  [facility]     Patient Name: Brianna Urrutia  : 1950  MRN: [id number]  Today's Date: 2018        Visit Date: 2018    Visit Dx:    ICD-10-CM ICD-9-CM   1. Scar conditions and fibrosis of skin L90.5 709.2   2. Lymphedema of right upper extremity I89.0 457.1   3. Right arm pain M79.601 729.5   4. Mastodynia of right breast N64.4 611.71   5. Cervicalgia M54.2 723.1       Patient Active Problem List   Diagnosis   • Malignant neoplasm of overlapping sites of right female breast (CMS/HCC)   • Malignant neoplasm of upper-outer quadrant of right female breast (CMS/HCC)   • Degenerative disc disease, cervical   • Brachial plexopathy   • Lymphedema of right arm   • History of radiation therapy   • JACQUI (generalized anxiety disorder)   • Attention deficit hyperactivity disorder (ADHD)   • Bone metastases (CMS/HCC)        Lymphedema     Row Name 18 1100          Able to rate subjective pain?  yes  -MW    Pre-Treatment Pain Level  5  -MW    Post-Treatment Pain Level  5  -MW    Subjective Pain Comment  Rt breast, shoulder, arm, wrist, hand   -MW          Subjective Comments  Pt states most of the issue is here (Rt axilla, and lateral trunk); need to get lymph moving to fix it. Reports that \"we did too much on the arm last time;\" had more pain later in the arm. \"Need to be more gentle even though the deep work feels good too.\"  -MW          Pitting Edema  Mild;Moderate;Severe  -MW    Edema Assessment Comment  Mild in Rt breast with mild to moderate edema of the nipple; RUE mod to severe in forearm, mild to mod in upper arm and hand. Arm is soft but full.   -MW          Upper Extremity Conditions  right:;intact;clean  -MW    Upper Extremity Color/Pigment  right:;normal  -MW    Upper Quadrant Conditions  intact;clean;dry;inflamed  -MW    Upper Quadrant Color/Pigment  right:;red;erythema;radiation fibrosis rough texture to skin   -MW    Upper Quadrant " Skin Details  Noted mutliple small bumps / superficial nodules Rt chest, adjacent to sternum.   -MW    Skin Observations Comment  Rash like inflammation increased post ASTYM/ STM; areas include Rt breast, Rt lateral trunk, RT medial arm and distal to the elbow.   -MW          Lymphedema Sensation Comments  pain and paresthesias RUE  -MW          Manual Lymphatic Drainage  initial sequence;opened regional lymph nodes;opened anastamoses;extremity treatment  -MW    Initial Sequence  supraclavicular;shoulder collectors  -MW    Supraclavicular  right;left  -MW    Shoulder Collectors  right;left  -MW    Opened Regional Lymph Nodes  axillary;inguinal;ribs  -MW    Axillary  right  -MW    Inguinal  right  -MW    Ribs  anterior and posterior   -MW    Opened Anastamoses  axillo-inguinal  -MW    Axillo-Inguinal  right  -MW    Extremity Treatment  MLD to full limb  -MW    MLD to Full Limb  RUE   -MW    Other Extremity Treatment  Rt posterior-lateral trunk   -MW    Astym  anterior-lateral chest;scar(s) / incision line(s);upper traps  -MW    Scar(s) / Incision Line(s)  In supine, evaluator and localizer to anterior- lateral chest. Course soft tissue disruptions over lateral pectoralis area. Extra strokes over scar. Working over lateral trunk and ribs with evaluator and localizer. Repositioned with HBH to open axilla; working with evaluator and localizer. Moderate rough, course soft tissue disruptions noted.   -MW    Anterior-Lateral Chest  right  -MW    Upper Traps  right  -MW    Upper Traps Comment  Initiated tx in sitting on stool with upper body on face craddle:  RT UT: Evaluator and localizer to UT / scapular region; min soft tissue disruptions noted. Extra strokes at RT teres region, UT / leveator scapulae and mid traps. Continued down over Rt posterior and lateral ribs wrapping around trunk; extra strokes at area of fullness/ thickened tissue adjacent to breast. ASTYM followed by STM in same position; then CDT-MLD to post  lateral trunk.   -MW    Manual Lymphatic Drainage Comments  Rt posterior-lateral trunk   -MW          Compression/Skin Care  compression garment  -MW    Skin Care  moisturizing lotion applied;washed/dried cocoa butter   -MW    Compression Garment Comments  assisted pt to don compression sleeve.   -MW    Compression/Skin Care Comments  washed and dried tumor scar area. Placed 2 pieced half width tape to lift scar tissue.   -      User Key  (r) = Recorded By, (t) = Taken By, (c) = Cosigned By    Initials Name Provider Type    MW Na Bergeron, PT Physical Therapist                        PT Assessment/Plan     Row Name 11/19/18 1100          PT Assessment    Functional Limitations  Performance in self-care ADL;Limitation in home management;Impaired gait  -     Impairments  Pain;Impaired lymphatic circulation;Edema;Joint mobility;Muscle strength;Impaired flexibility;Motor function;Impaired muscle length;Impaired sensory integrity  -     Assessment Comments  Pt presents with soft but full RUE, forearm severe, dorsum of hand moderate. Rt breast seems more contracted this visit, but pt able to tolerate increased STM to Rt breast scar tissue to improve flexibility of Rt shoulder. RUE lymphedema softer post MLD. Persistent neuropathy of RUE with mild retraction of shoulder girdle toward spine; reports traction feels good, but unsure of longterm benefits. Overall seems more comfortable this visit.   -MW     Rehab Potential  Fair  -MW     Patient/caregiver participated in establishment of treatment plan and goals  Yes  -MW     Patient would benefit from skilled therapy intervention  Yes  -MW        PT Plan    PT Frequency  2x/week  -MW     Physical Therapy Interventions (Optional Details)  manual therapy techniques;manual lymphatic drainage;modalities;patient/family education;stretching;strengthening;taping  -     PT Plan Comments  Cont manual therapy (STM / ASTYM / MLD), modalities prn, ther exer to promote  "maximum function and QOL.   -MW       User Key  (r) = Recorded By, (t) = Taken By, (c) = Cosigned By    Initials Name Provider Type    Na High PT Physical Therapist               Exercises     Row Name 11/19/18 1100             Subjective Comments    Subjective Comments  Pt states most of the issue is here (Rt axilla, and lateral trunk); need to get lymph moving to fix it. Reports that \"we did too much on the arm last time;\" had more pain later in the arm. \"Need to be more gentle even though the deep work feels good too.\"  -MW         Subjective Pain    Able to rate subjective pain?  yes  -MW      Pre-Treatment Pain Level  5  -MW      Post-Treatment Pain Level  5  -MW      Subjective Pain Comment  Rt breast, shoulder, arm, wrist, hand   -MW         Total Minutes    55439 - PT Manual Therapy Minutes  60  -MW         Exercise 1    Exercise Name 1  HBH: chicken wings with STM   -MW      Cueing 1  Tactile;Verbal  -MW      Reps 1  6  -MW      Additional Comments  concurrent with STM   -MW         Exercise 2    Exercise Name 2  AAROM shoulder flexion   -MW      Cueing 2  Verbal;Tactile  -MW      Reps 2  5  -MW      Additional Comments  LUE assisting RUE   -MW         Exercise 3    Exercise Name 3  Rt radial nerve glides and stretches   -MW      Cueing 3  Tactile;Verbal  -MW      Reps 3  2  -MW      Additional Comments  with gentle traction   -MW         Exercise 4    Exercise Name 4  RUE self traction with double loop of compressogrip   -MW      Cueing 4  Verbal;Tactile  -MW      Reps 4  2  -MW      Additional Comments  RLE assisting to provide traction   -MW        User Key  (r) = Recorded By, (t) = Taken By, (c) = Cosigned By    Initials Name Provider Type    Na High, PT Physical Therapist                       Manual Rx (last 36 hours)      Manual Treatments     Row Name 11/19/18 1100             Total Minutes    78953 - PT Manual Therapy Minutes  60  -MW         Manual Rx 1    Manual Rx 1 " Location  RUE   -MW      Manual Rx 1 Type  STM, tissue bending, and lifting techniques mixed with MLD   -MW      Manual Rx 1 Grade  mild   -MW      Manual Rx 1 Duration  5  -MW         Manual Rx 2    Manual Rx 2 Location  RT>> LT UT/ mid thoracic spine, leveator scap, teres mm group   -MW      Manual Rx 2 Type  STM, tissue bending, and lifting techniques mixed with MLD   -MW      Manual Rx 2 Grade  gentle to moderate pressure   -MW      Manual Rx 2 Duration  15  -MW         Manual Rx 3    Manual Rx 3 Location  Rt chest/ axilla (pect major / minor), teres and subscap   -MW      Manual Rx 3 Type  STM: tissue bending, lifting and trigger point release   -MW      Manual Rx 3 Grade  gentle to moderate   -MW      Manual Rx 3 Duration  15  -MW         Manual Rx 4    Manual Rx 4 Location  Neck / bilat   -MW      Manual Rx 4 Type  STM   -MW      Manual Rx 4 Duration  5  -MW        User Key  (r) = Recorded By, (t) = Taken By, (c) = Cosigned By    Initials Name Provider Type    Na High, PT Physical Therapist                             Time Calculation:   Start Time: 1100  Total Timed Code Minutes- PT: 60 minute(s)   Therapy Suggested Charges     Code   Minutes Charges    90192 (CPT®) Hc Pt Neuromusc Re Education Ea 15 Min      92554 (CPT®) Hc Pt Ther Proc Ea 15 Min      38435 (CPT®) Hc Gait Training Ea 15 Min      23323 (CPT®) Hc Pt Therapeutic Act Ea 15 Min      86719 (CPT®) Hc Pt Manual Therapy Ea 15 Min 60 4    23841 (CPT®) Hc Pt Ther Massage- Per 15 Min      07902 (CPT®) Hc Pt Iontophoresis Ea 15 Min      04611 (CPT®) Hc Pt Elec Stim Ea-Per 15 Min      29349 (CPT®) Hc Pt Ultrasound Ea 15 Min      72180 (CPT®) Hc Pt Self Care/Mgmt/Train Ea 15 Min      87820 (CPT®) Hc Pt Prosthetic (S) Train Initial Encounter, Each 15 Min      14824 (CPT®) Hc Orthotic(S) Mgmt/Train Initial Encounter, Each 15min      24074 (CPT®) Hc Pt Aquatic Therapy Ea 15 Min      25901 (CPT®) Hc Pt Orthotic(S)/Prosthetic(S) Encounter, Each 15  Min       (CPT®) Hc Pt Electrical Stim Unattended      Total  60 4        Therapy Charges for Today     Code Description Service Date Service Provider Modifiers Qty    [id number] HC PT MANUAL THERAPY EA 15 MIN 11/19/2018 Na Bergeron, PT GP 4                    Na Bergeron, PT  11/19/2018
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
## 2018-11-21 NOTE — DISCHARGE INSTRUCTIONS
Hold any other controlled substances including your pain medication until you complete the current prescription of pain medications.  Multiple pain medication usage can cause significant complications including overdose and possibly death.    CONTROLLED SUBSTANCE(S) EDUCATION  Controlled Substances have been prescribed by your provider to treat your medical condition and associated symptoms. Although Controlled Substances can be effective in relieving your pain or other symptoms, they may also cause serious adverse effects. It is important that you understand how to safely and appropriately take these medications.  Proper Use  1. Carefully following instructions for use, including timing of doses, whether to take the  medication with or without food, and any foods or other medications to avoid while taking the medication;  2. If you have low or impaired vision you should wear glasses when taking the medication and not take the medication in the dark;  3. You should read the prescription container label each time to confirm the dosage;  4. You should never use the medication after the expiration date;  5. You must never share the medication with others;  6. You must not take the medication with alcohol or other sedatives;  7. You should not take the medication to help you sleep;  8. You should never break, crush or chew the medication;  9. If you have been prescribed a skin patch (transdermal), external heat, fever and exertion can increase the absorption of these products, leading to potentially fatal overdose;  10. You should immediately contact the physician?s office to report any adverse reaction and,  11. It is illegal to share, sell or give away Controlled Substances.  Driving and Work Safety  1. Controlled Substances may cause sleepiness, clouded thinking, decreased concentration, slower reflexes, or incoordination, all of which may create a danger to you and others when driving or operating certain type of  machinery;  2. Avoid, if possible, driving or engaging in other potentially dangerous work or other activities, for a specific period of time until the initial effects of the Controlled Substances no longer create such dangers; and,  3. Ingesting other substances, such as alcohol, benzodiazepines or some cold remedies, at the same time you are taking the Controlled Substances prescribed or dispensed may increase cognitive and motor impairment.  Pregnancy  If you are pregnant or nursing a baby, avoid using Controlled Substances, or use them on a minimal basis in strict accordance with your provider?s instructions.  Potential for Overdose and Response  1. The use of Controlled Substances creates a risk of respiratory depression, which may result in serious harm or death. You and others should be watchful for the following warning signs of overmedication:  ? intoxicated behavior, such as confusion, slurred speech, or stumbling;  ? feeling dizzy or faint;  ? acting very drowsy or groggy;  ? unusual snoring, gasping, or snorting during sleep;  ? and/or difficulty waking up from sleep or difficulty in staying awake.  2. Immediately call ?911? or an emergency service upon you or your caregivers observing or experiencing any of the following conditions:  ? you cannot be aroused or waken, or are unable to talk after being awakened;  ? you have shortness of breath, slow or light breathing, or stopped breathing;  ? gurgling noises coming from your mouth or throat;  ? your body is limp, seems lifeless;  ? your face is pale or clammy;  ? your fingernails or lips are turning purple or blue; and/or  ? your heartbeat is slow, unusual or stopped  Safe Storage of Controlled Substances  1. If your Controlled Substances are not stored in a safe manner there is a potential that  partners, family members or others may improperly obtain your Controlled Substances;  2. Always keep the Controlled Substances in the original container;  3.  Store Controlled Substances in a locked cabinet or other secure storage unit, that is cool, dry and out of direct sunlight, such as:  ? an existing safe;  ? a cut-proof travel bag;  ? a portable lock box designed for travel; or,  ? a locking medical box.  4. Do not store Controlled Substances in:  ? an unlocked medicine cabinet;  ? in your car; or,  ? in a refrigerator or freezer unless specifically recommended by the prescriber or  pharmacist; and  5. Immediately notify your provider if any Controlled Substances prescribed or dispensed by the provider are stolen or improperly taken by another individual.  Proper Disposal  1. It is important to safely and appropriately dispose of unused Controlled Substances that had been prescribed or dispensed by your provider;  2. Promptly dispose of unused Controlled Substances after the expiration date of the  prescription or after you no longer require the Controlled Substances to treat your medical condition;  3. In order to safely dispose of Controlled Substances, you should turn in the unused Controlled Substances as part of an approved governmental drug take-back program. The [place] Office of Drug Control Policy has a listing of [place] Permanent Drug Disposal Locations at http://www.odcp.[place].gov - click on the [place] Prescriptions Drug Drop Map and Location on the left side of the page.  4. You should not flush Controlled Substances down the toilet; and,  5. You should personally remove any identifying information, including the prescription number, from an empty Controlled Substance container and then properly dispose of the empty container.  CONSENT FOR TREATMENT WITH CONTROLLED SUBSTANCE(S)  (This is for an initial prescription)  1. Controlled Substances  Controlled Substances are prescribed to treat a variety of conditions, including the relief  of chronic pain, to provide stimulation, promote weight loss, and treat mood disorders.  Pain relief is an important  medical reason to take Controlled Substances.  Controlled Substances are drugs or chemical substances whose possession and use are  regulated under the Controlled Substances Act. The law requires that patient are informed of the risks, benefits, and alternatives of taking Controlled Substances.  2. Adverse Effects  As with any medication, there are risks and adverse effects associated with the use of  Controlled Substances. Common adverse effects of pain medicines could include, but are not limited to: sedation or sleepiness, nausea, vomiting, constipation, pruritus (itching), confusion, respiratory depression, and urinary retention. Some of these effects may make it unsafe for you to drive a vehicle, operate heavy machinery, or perform other tasks that require concentration and coordination. Excessive use of these Controlled Substances can lead to profound sedation, respiratory depression, coma, and/or death. Regarding stimulants, adverse effects could include, but are not limited to: drug dependency, neuropsychiatric symptoms such as psychosis and tangela, weight loss, cardiovascular events such as heart attack and stroke, insomnia, hypertension, and agitation. Any questions you have regarding the Controlled Substance(s) should be discussed with the prescribing provider.  3. Physical Dependence, Tolerance, and Addiction  Although uncommon when used for their clinical indications, both pain relievers and  stimulants can cause physical dependence, tolerance, and/or addiction when used for a  prolonged period. Maintenance therapy with these Controlled Substances can cause  physical dependence. This means that if these medications are abruptly stopped, or  decreased significantly over a short period of time, a patient may experience withdrawal  symptoms such as: nervousness, irritability, insomnia, sweating, abdominal cramping,  nausea, vomiting, and diarrhea. Tolerance occurs when the effects of these  Controlled  Substances are decreased over a period of prolonged use making it necessary to increase the dosage. Physical dependence and tolerance are different than addiction. Addiction is a complex disease characterized by compulsive craving or seeking and use of a substance despite its extreme negatives on a person. The risk of addiction may be increased in a patient with a history of alcoholism or other addiction.  4. Alternatives  Controlled Substances are routinely prescribed to treat moderate to severe pain or other  medical conditions. Other medicines are available to treat these conditions that are not  associated with tolerance or addiction, however, are associated with a lower level of pain  relief or stimulation. It may also be an alternative to not take any medicine to treat these  conditions, or to use alternative modalities, other than medicine to treat these conditions.  I voluntarily consent to the receipt of the above-named Controlled Substance(s) as prescribed by my provider. I have been informed of the benefits, risks, and alternatives to taking these medications. I acknowledge that I have read and understood all of the information above and I have had the opportunity to ask questions and have them answered to my satisfaction.
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
## 2018-11-29 NOTE — TELEPHONE ENCOUNTER
Patient is requesting someone to come help bathe her.  Her diagnoses are lymphedema of her right arm and metastatic breast cancer to the bone.  Patient requested [facility] health, can call to [employer] and see if they will be able to assist her.

## 2018-11-29 NOTE — PROGRESS NOTES
Reagan Urrutia is a 68 y.o. female  Shoulder Pain (right shoulder pain due to fracture, req pain meds ) and Med Refill (med refill on Nystatin Magic Mouthwash )      History of Present Illness  Patient comes in today for evaluation of right shoulder pain in association with diagnosis of fracture of humerus in the ER 2 days ago.  She states she is in excruciating pain unable to sleep at night with a 10 out of 10 pain level.  She states she also has some problems with thrush right now needs refill for Magic mouthwash.  She needs referral to orthopedics as she states she is unable to get into orthopedics herself.  She is also requests referral to home health she states she's having difficulty bathing herself due to her fracture and her chronic shoulder and chest wall pain due to radiation therapy associated with breast cancer.  The following portions of the patient's history were reviewed and updated as appropriate: allergies, current medications, past social history and problem list    Review of Systems   Constitutional: Positive for activity change.   HENT: Positive for sore throat. Negative for mouth sores.    Musculoskeletal: Positive for arthralgias and myalgias. Negative for gait problem and joint swelling.   Skin: Negative.    Neurological: Negative for weakness and numbness.   Psychiatric/Behavioral: Positive for sleep disturbance.       Objective     Vitals:    11/27/18 1025   BP: 144/68   Pulse: 101   Temp: 98.3 °F (36.8 °C)   SpO2: 99%       Physical Exam   Constitutional: She is oriented to person, place, and time. She appears well-developed and well-nourished. No distress.   HENT:   White coating on tongue consistent with oral candidiasis   Neck: No thyroid mass and no thyromegaly present.   Cardiovascular: Normal rate, regular rhythm and normal heart sounds.   Pulmonary/Chest: Effort normal.   Neurological: She is alert and oriented to person, place, and time. No cranial nerve deficit or  sensory deficit. She exhibits normal muscle tone. Coordination normal.   Neurovascular status intact right upper extremity, patient is holding it in a sling and is tender over her right anterior shoulder   Skin: She is not diaphoretic.   Psychiatric: Her speech is normal and behavior is normal. Judgment and thought content normal. Her mood appears anxious. Her affect is not angry and not inappropriate. Cognition and memory are normal. She does not exhibit a depressed mood. She is attentive.   Nursing note and vitals reviewed.      Assessment/Plan     Diagnoses and all orders for this visit:    Closed displaced fracture of surgical neck of right humerus with delayed healing, unspecified fracture morphology, subsequent encounter  -     Ambulatory Referral to Orthopedic Surgery  -     Ambulatory Referral to Home Health    Bone metastases (CMS/[facility])  -     Ambulatory Referral to Home Health    Malignant neoplasm of overlapping sites of right female breast, unspecified estrogen receptor status (CMS/[facility])  -     Ambulatory Referral to Home Health    Oral candidiasis    Prescription given for Percocet 10/325 one every 6 hours as needed for acute pain for short-term usage for one week's supply discussed abuse potential this medication and plan for this being only for short-term for one week.  We'll refer to orthopedics will see her today.

## 2018-11-29 NOTE — TELEPHONE ENCOUNTER
----- Message from Linda Millard sent at 11/29/2018  4:07 PM EST -----  Patient needs home health but [facility] is not in network with her [employer] insurance. Patient would need to be referred to [facility]. A medical assistant will have to call and give them clinical information as well as verbal confirmation to set this up for Occupational and physical therapy within the home. [facility] number  is [phone]..  ThanksHarvey.

## 2018-11-29 NOTE — TELEPHONE ENCOUNTER
Spoke to Melia, I ok 'd the OT/PT, however she doesn't have any info on the patient. She will call back tomorrow during normal business hours to let me know if she needs additional info to start services

## 2018-12-07 NOTE — TELEPHONE ENCOUNTER
I believe we have ordered home health for her, I am quite sure if it.if not please make sure this is placed again.  I did not give her any hydrocodone I gave her 1 week supply of oxycodone because of a fracture and referred her to orthopedics.  If patient herself feels that she needs an antidepressant she will need to come in to discuss this further as well or I would be glad to get her referral to either a psychologist or psychiatrist if she prefers.

## 2018-12-07 NOTE — TELEPHONE ENCOUNTER
----- Message from Sania Isbell sent at 12/7/2018 11:15 AM EST -----  Contact: [facility]  PLEASE CALL BACK RUBEN AT [facility] TO DISCUSS SOME ISSUES WITH PATIENTS MEDICATIONS.    [phone]

## 2018-12-07 NOTE — TELEPHONE ENCOUNTER
Spoke to Jazmin at [facility].   She had a few concerns regarding the patient.     1. She is very upset and would not let them do a home visit today. She told [facility] that she needs Home Health to help her with daily activities and this hasn't been ordered.    2. Jazmin thinks patient is having increased depression and thinks she may benefit from an antidepressant     3. Patient received 180 hydrocodone and Christina added additional Hydrocodone at visit

## 2018-12-07 NOTE — TELEPHONE ENCOUNTER
Spoke to tete at [facility], there was a miscommunication, however all info was refaxed over to [facility] to start services

## 2018-12-12 NOTE — TELEPHONE ENCOUNTER
----- Message from Diana Call sent at 12/12/2018  9:28 AM EST -----  Contact: SOLIS HANNA HLDINA.MAUREEN, [phone]=CELL PHONE #  MAUREEN ELY, IS WANTING TO KNOW THAT SHE HAS STARTED HOME HLTH. IN HOME=OT; WANTING TO ADD A BATH AID & WANTING TO ORDER FOR 2 WEEK/4.  PT. IS HAVING SEVERE PAIN; HAS TRAMADOL LEFT/NOT TAKING.  PT. HAS A FEW PERICOTS LEFT; WHICH ARE WORKING.  REFILL THIS OR GO OVER TO TRAMADOL WHEN PERICOTS ARE GONE?  RX=[facility] LOCATION.  PT. HAD PICKED UP A MAGIC MOUTHWASH; DOES NOT TASTE LIKE SHE HAD BEFORE & A SMALLER DOSAGE; ADVISED TO CONTACT RX RE. THIS ?. (PT. WAS GIVEN A SUBSTITUTE & NOT HAPPY WITH THIS.  CONTACT MAUREEN @ ABOVE #.

## 2018-12-12 NOTE — TELEPHONE ENCOUNTER
Spoke to Michelle    1. Ok'd bath aide and OT for 2 times weekly for 4 weeks   2. Notified Michelle that if patient is wanting Percocet, she will need to make an appt for a follow up.  3. Unfortunately, there is nothing I can do about the taste of the Magic mouthwash, she can contact her pharmacy and see if a flavoring can be added.
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
## 2019-01-01 ENCOUNTER — TELEPHONE (OUTPATIENT)
Dept: SOCIAL WORK | Facility: HOSPITAL | Age: 69
End: 2019-01-01

## 2019-01-01 ENCOUNTER — APPOINTMENT (OUTPATIENT)
Dept: CT IMAGING | Facility: HOSPITAL | Age: 69
End: 2019-01-01

## 2019-01-01 ENCOUNTER — HOSPITAL ENCOUNTER (OUTPATIENT)
Dept: RADIATION ONCOLOGY | Facility: HOSPITAL | Age: 69
Setting detail: RADIATION/ONCOLOGY SERIES
Discharge: HOME | End: 2019-05-02

## 2019-01-01 ENCOUNTER — APPOINTMENT (OUTPATIENT)
Dept: GENERAL RADIOLOGY | Facility: HOSPITAL | Age: 69
End: 2019-01-01

## 2019-01-01 ENCOUNTER — OFFICE VISIT (OUTPATIENT)
Dept: NEUROSURGERY | Facility: CLINIC | Age: 69
End: 2019-01-01

## 2019-01-01 ENCOUNTER — TRANSCRIBE ORDERS (OUTPATIENT)
Dept: FAMILY MEDICINE CLINIC | Facility: CLINIC | Age: 69
End: 2019-01-01

## 2019-01-01 ENCOUNTER — OFFICE VISIT (OUTPATIENT)
Dept: ORTHOPEDIC SURGERY | Facility: CLINIC | Age: 69
End: 2019-01-01

## 2019-01-01 ENCOUNTER — HOSPITAL ENCOUNTER (OUTPATIENT)
Dept: RADIATION ONCOLOGY | Facility: HOSPITAL | Age: 69
Discharge: HOME | End: 2019-04-18

## 2019-01-01 ENCOUNTER — HOSPITAL ENCOUNTER (OUTPATIENT)
Dept: NUCLEAR MEDICINE | Facility: HOSPITAL | Age: 69
Discharge: HOME | End: 2019-04-12
Admitting: NEUROLOGICAL SURGERY

## 2019-01-01 ENCOUNTER — TELEPHONE (OUTPATIENT)
Dept: NEUROSURGERY | Facility: CLINIC | Age: 69
End: 2019-01-01

## 2019-01-01 ENCOUNTER — HOSPITAL ENCOUNTER (OUTPATIENT)
Dept: PHYSICAL THERAPY | Facility: HOSPITAL | Age: 69
Setting detail: THERAPIES SERIES
Discharge: HOME | End: 2019-03-07
Attending: INTERNAL MEDICINE

## 2019-01-01 ENCOUNTER — HOSPITAL ENCOUNTER (INPATIENT)
Facility: HOSPITAL | Age: 69
LOS: 5 days | End: 2019-08-14
Attending: EMERGENCY MEDICINE | Admitting: INTERNAL MEDICINE

## 2019-01-01 ENCOUNTER — HOSPITAL ENCOUNTER (OUTPATIENT)
Dept: RADIATION ONCOLOGY | Facility: HOSPITAL | Age: 69
Setting detail: RADIATION/ONCOLOGY SERIES
Discharge: HOME | End: 2019-04-12

## 2019-01-01 ENCOUNTER — HOSPITAL ENCOUNTER (OUTPATIENT)
Dept: PHYSICAL THERAPY | Facility: HOSPITAL | Age: 69
Setting detail: THERAPIES SERIES
Discharge: HOME | End: 2019-01-07
Attending: INTERNAL MEDICINE

## 2019-01-01 ENCOUNTER — TELEPHONE (OUTPATIENT)
Dept: FAMILY MEDICINE CLINIC | Facility: CLINIC | Age: 69
End: 2019-01-01

## 2019-01-01 ENCOUNTER — HOSPITAL ENCOUNTER (OUTPATIENT)
Dept: PHYSICAL THERAPY | Facility: HOSPITAL | Age: 69
Setting detail: THERAPIES SERIES
Discharge: HOME | End: 2019-02-07
Attending: INTERNAL MEDICINE

## 2019-01-01 ENCOUNTER — HOSPITAL ENCOUNTER (OUTPATIENT)
Dept: RADIATION ONCOLOGY | Facility: HOSPITAL | Age: 69
Discharge: HOME | End: 2019-05-07

## 2019-01-01 ENCOUNTER — APPOINTMENT (OUTPATIENT)
Dept: NUCLEAR MEDICINE | Facility: HOSPITAL | Age: 69
End: 2019-01-01

## 2019-01-01 ENCOUNTER — HOSPITAL ENCOUNTER (EMERGENCY)
Facility: HOSPITAL | Age: 69
Discharge: HOME | End: 2019-04-09
Attending: EMERGENCY MEDICINE | Admitting: EMERGENCY MEDICINE

## 2019-01-01 ENCOUNTER — HOSPITAL ENCOUNTER (OUTPATIENT)
Dept: RADIATION ONCOLOGY | Facility: HOSPITAL | Age: 69
Discharge: HOME | End: 2019-05-02

## 2019-01-01 ENCOUNTER — HOSPITAL ENCOUNTER (OUTPATIENT)
Dept: MRI IMAGING | Facility: HOSPITAL | Age: 69
Discharge: HOME | End: 2019-04-12

## 2019-01-01 ENCOUNTER — HOSPITAL ENCOUNTER (INPATIENT)
Facility: HOSPITAL | Age: 69
End: 2019-01-01
Attending: INTERNAL MEDICINE | Admitting: INTERNAL MEDICINE

## 2019-01-01 ENCOUNTER — HOSPITAL ENCOUNTER (OUTPATIENT)
Dept: NUCLEAR MEDICINE | Facility: HOSPITAL | Age: 69
Discharge: HOME | End: 2019-04-12

## 2019-01-01 ENCOUNTER — HOSPITAL ENCOUNTER (OUTPATIENT)
Dept: RADIATION ONCOLOGY | Facility: HOSPITAL | Age: 69
Discharge: HOME | End: 2019-05-06

## 2019-01-01 ENCOUNTER — HOSPITAL ENCOUNTER (OUTPATIENT)
Dept: PET IMAGING | Facility: HOSPITAL | Age: 69
End: 2019-01-01

## 2019-01-01 ENCOUNTER — HOSPITAL ENCOUNTER (OUTPATIENT)
Dept: CT IMAGING | Facility: HOSPITAL | Age: 69
End: 2019-01-01

## 2019-01-01 ENCOUNTER — APPOINTMENT (OUTPATIENT)
Dept: PET IMAGING | Facility: HOSPITAL | Age: 69
End: 2019-01-01

## 2019-01-01 ENCOUNTER — HOSPITAL ENCOUNTER (EMERGENCY)
Facility: HOSPITAL | Age: 69
Discharge: NURSING, REHAB OR LONG-TERM CARE | End: 2019-07-30
Attending: EMERGENCY MEDICINE | Admitting: EMERGENCY MEDICINE

## 2019-01-01 ENCOUNTER — HOSPITAL ENCOUNTER (OUTPATIENT)
Dept: PHYSICAL THERAPY | Facility: HOSPITAL | Age: 69
Setting detail: THERAPIES SERIES
Discharge: HOME | End: 2019-01-28
Attending: INTERNAL MEDICINE

## 2019-01-01 ENCOUNTER — HOSPITAL ENCOUNTER (OUTPATIENT)
Dept: PHYSICAL THERAPY | Facility: HOSPITAL | Age: 69
Setting detail: THERAPIES SERIES
Discharge: HOME | End: 2019-04-09
Attending: INTERNAL MEDICINE

## 2019-01-01 ENCOUNTER — HOSPITAL ENCOUNTER (EMERGENCY)
Facility: HOSPITAL | Age: 69
Discharge: NURSING, REHAB OR LONG-TERM CARE | End: 2019-07-31
Attending: EMERGENCY MEDICINE | Admitting: EMERGENCY MEDICINE

## 2019-01-01 ENCOUNTER — TELEPHONE (OUTPATIENT)
Dept: ONCOLOGY | Facility: CLINIC | Age: 69
End: 2019-01-01

## 2019-01-01 ENCOUNTER — CONSULT (OUTPATIENT)
Dept: ONCOLOGY | Facility: CLINIC | Age: 69
End: 2019-01-01

## 2019-01-01 ENCOUNTER — HOSPITAL ENCOUNTER (OUTPATIENT)
Dept: PHYSICAL THERAPY | Facility: HOSPITAL | Age: 69
Setting detail: THERAPIES SERIES
Discharge: HOME | End: 2019-01-16
Attending: INTERNAL MEDICINE

## 2019-01-01 ENCOUNTER — HOSPITAL ENCOUNTER (OUTPATIENT)
Dept: PHYSICAL THERAPY | Facility: HOSPITAL | Age: 69
Setting detail: THERAPIES SERIES
Discharge: HOME | End: 2019-01-03
Attending: INTERNAL MEDICINE

## 2019-01-01 ENCOUNTER — PREP FOR SURGERY (OUTPATIENT)
Dept: OTHER | Facility: HOSPITAL | Age: 69
End: 2019-01-01

## 2019-01-01 ENCOUNTER — OFFICE VISIT (OUTPATIENT)
Dept: RADIATION ONCOLOGY | Facility: HOSPITAL | Age: 69
End: 2019-01-01

## 2019-01-01 ENCOUNTER — LAB (OUTPATIENT)
Dept: LAB | Facility: HOSPITAL | Age: 69
End: 2019-01-01

## 2019-01-01 ENCOUNTER — HOSPITAL ENCOUNTER (OUTPATIENT)
Dept: PHYSICAL THERAPY | Facility: HOSPITAL | Age: 69
Setting detail: THERAPIES SERIES
Discharge: HOME | End: 2019-02-04
Attending: INTERNAL MEDICINE

## 2019-01-01 ENCOUNTER — HOSPITAL ENCOUNTER (OUTPATIENT)
Dept: PHYSICAL THERAPY | Facility: HOSPITAL | Age: 69
Setting detail: THERAPIES SERIES
Discharge: HOME | End: 2019-02-11
Attending: INTERNAL MEDICINE

## 2019-01-01 ENCOUNTER — HOSPITAL ENCOUNTER (OUTPATIENT)
Dept: RADIATION ONCOLOGY | Facility: HOSPITAL | Age: 69
Discharge: HOME | End: 2019-05-03

## 2019-01-01 VITALS
TEMPERATURE: 97.4 F | WEIGHT: 162 LBS | RESPIRATION RATE: 18 BRPM | DIASTOLIC BLOOD PRESSURE: 75 MMHG | BODY MASS INDEX: 34 KG/M2 | HEIGHT: 58 IN | SYSTOLIC BLOOD PRESSURE: 110 MMHG | HEART RATE: 113 BPM

## 2019-01-01 VITALS
RESPIRATION RATE: 18 BRPM | SYSTOLIC BLOOD PRESSURE: 154 MMHG | HEIGHT: 64 IN | BODY MASS INDEX: 21.85 KG/M2 | HEART RATE: 94 BPM | OXYGEN SATURATION: 98 % | TEMPERATURE: 98.1 F | DIASTOLIC BLOOD PRESSURE: 88 MMHG | WEIGHT: 128 LBS

## 2019-01-01 VITALS
OXYGEN SATURATION: 94 % | DIASTOLIC BLOOD PRESSURE: 87 MMHG | SYSTOLIC BLOOD PRESSURE: 125 MMHG | TEMPERATURE: 97.4 F | WEIGHT: 105 LBS | HEIGHT: 64 IN | RESPIRATION RATE: 20 BRPM | HEART RATE: 70 BPM | BODY MASS INDEX: 17.93 KG/M2

## 2019-01-01 VITALS
TEMPERATURE: 97.3 F | BODY MASS INDEX: 33.58 KG/M2 | HEART RATE: 104 BPM | RESPIRATION RATE: 16 BRPM | HEIGHT: 58 IN | WEIGHT: 160 LBS | SYSTOLIC BLOOD PRESSURE: 116 MMHG | DIASTOLIC BLOOD PRESSURE: 76 MMHG

## 2019-01-01 VITALS
HEART RATE: 89 BPM | RESPIRATION RATE: 18 BRPM | WEIGHT: 111.5 LBS | SYSTOLIC BLOOD PRESSURE: 137 MMHG | OXYGEN SATURATION: 85 % | BODY MASS INDEX: 20.52 KG/M2 | TEMPERATURE: 97 F | DIASTOLIC BLOOD PRESSURE: 76 MMHG | HEIGHT: 62 IN

## 2019-01-01 VITALS
DIASTOLIC BLOOD PRESSURE: 84 MMHG | OXYGEN SATURATION: 94 % | TEMPERATURE: 97.9 F | HEART RATE: 120 BPM | SYSTOLIC BLOOD PRESSURE: 134 MMHG | HEIGHT: 64 IN | WEIGHT: 131 LBS | RESPIRATION RATE: 16 BRPM | BODY MASS INDEX: 22.36 KG/M2

## 2019-01-01 VITALS — WEIGHT: 121.25 LBS | OXYGEN SATURATION: 98 % | BODY MASS INDEX: 20.7 KG/M2 | HEIGHT: 64 IN | HEART RATE: 98 BPM

## 2019-01-01 VITALS
TEMPERATURE: 97.7 F | SYSTOLIC BLOOD PRESSURE: 119 MMHG | HEIGHT: 64 IN | BODY MASS INDEX: 17.75 KG/M2 | DIASTOLIC BLOOD PRESSURE: 69 MMHG | HEART RATE: 71 BPM | RESPIRATION RATE: 20 BRPM | WEIGHT: 104 LBS | OXYGEN SATURATION: 93 %

## 2019-01-01 VITALS — HEIGHT: 64 IN | TEMPERATURE: 98.9 F | WEIGHT: 128 LBS | BODY MASS INDEX: 21.85 KG/M2

## 2019-01-01 VITALS
WEIGHT: 162.6 LBS | HEIGHT: 55 IN | DIASTOLIC BLOOD PRESSURE: 72 MMHG | BODY MASS INDEX: 37.63 KG/M2 | TEMPERATURE: 96.9 F | SYSTOLIC BLOOD PRESSURE: 148 MMHG

## 2019-01-01 DIAGNOSIS — C79.51 BONE METASTASES: Chronic | ICD-10-CM

## 2019-01-01 DIAGNOSIS — I89.0 LYMPHEDEMA OF RIGHT UPPER EXTREMITY: Primary | ICD-10-CM

## 2019-01-01 DIAGNOSIS — R29.6 MULTIPLE FALLS: Primary | ICD-10-CM

## 2019-01-01 DIAGNOSIS — C50.811 MALIGNANT NEOPLASM OF OVERLAPPING SITES OF RIGHT BREAST IN FEMALE, ESTROGEN RECEPTOR NEGATIVE (HCC): Chronic | ICD-10-CM

## 2019-01-01 DIAGNOSIS — I89.0 LYMPHEDEMA OF RIGHT UPPER EXTREMITY: ICD-10-CM

## 2019-01-01 DIAGNOSIS — Z78.9 IMPAIRED MOBILITY AND ADLS: ICD-10-CM

## 2019-01-01 DIAGNOSIS — C79.51 BONE METASTASES: Primary | ICD-10-CM

## 2019-01-01 DIAGNOSIS — E86.0 DEHYDRATION: ICD-10-CM

## 2019-01-01 DIAGNOSIS — Z74.09 IMPAIRED MOBILITY AND ADLS: ICD-10-CM

## 2019-01-01 DIAGNOSIS — S42.291D CLOSED 3-PART FRACTURE OF PROXIMAL END OF RIGHT HUMERUS WITH ROUTINE HEALING, SUBSEQUENT ENCOUNTER: Primary | ICD-10-CM

## 2019-01-01 DIAGNOSIS — S32.010A CLOSED COMPRESSION FRACTURE OF FIRST LUMBAR VERTEBRA, INITIAL ENCOUNTER: Primary | ICD-10-CM

## 2019-01-01 DIAGNOSIS — S32.000S LUMBAR COMPRESSION FRACTURE, SEQUELA: ICD-10-CM

## 2019-01-01 DIAGNOSIS — S32.010A CLOSED COMPRESSION FRACTURE OF FIRST LUMBAR VERTEBRA, INITIAL ENCOUNTER: ICD-10-CM

## 2019-01-01 DIAGNOSIS — S01.01XA SCALP LACERATION, INITIAL ENCOUNTER: ICD-10-CM

## 2019-01-01 DIAGNOSIS — S42.291D CLOSED 3-PART FRACTURE OF PROXIMAL END OF RIGHT HUMERUS WITH ROUTINE HEALING, SUBSEQUENT ENCOUNTER: ICD-10-CM

## 2019-01-01 DIAGNOSIS — I89.0 LYMPHEDEMA OF RIGHT ARM: Primary | ICD-10-CM

## 2019-01-01 DIAGNOSIS — M79.601 PAIN OF RIGHT UPPER EXTREMITY: ICD-10-CM

## 2019-01-01 DIAGNOSIS — L90.5 SCAR CONDITIONS AND FIBROSIS OF SKIN: ICD-10-CM

## 2019-01-01 DIAGNOSIS — M79.601 RIGHT ARM PAIN: ICD-10-CM

## 2019-01-01 DIAGNOSIS — C79.51 PAIN FROM BONE METASTASES (HCC): Primary | ICD-10-CM
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
## 2019-01-01 DIAGNOSIS — N64.4 MASTODYNIA OF RIGHT BREAST: ICD-10-CM

## 2019-01-01 DIAGNOSIS — Z92.3 HISTORY OF RADIATION THERAPY: ICD-10-CM

## 2019-01-01 DIAGNOSIS — C79.51 BONE METASTASES: ICD-10-CM

## 2019-01-01 DIAGNOSIS — C50.811 MALIGNANT NEOPLASM OF OVERLAPPING SITES OF RIGHT FEMALE BREAST, UNSPECIFIED ESTROGEN RECEPTOR STATUS (HCC): ICD-10-CM

## 2019-01-01 DIAGNOSIS — C80.0 WIDESPREAD METASTATIC MALIGNANT NEOPLASTIC DISEASE (HCC): Primary | ICD-10-CM

## 2019-01-01 DIAGNOSIS — Z51.11 ENCOUNTER FOR ANTINEOPLASTIC CHEMOTHERAPY: ICD-10-CM

## 2019-01-01 DIAGNOSIS — N17.9 AKI (ACUTE KIDNEY INJURY) (HCC): ICD-10-CM

## 2019-01-01 DIAGNOSIS — I89.0 LYMPHEDEMA OF RIGHT ARM: ICD-10-CM

## 2019-01-01 DIAGNOSIS — Z17.1 MALIGNANT NEOPLASM OF OVERLAPPING SITES OF RIGHT BREAST IN FEMALE, ESTROGEN RECEPTOR NEGATIVE (HCC): Chronic | ICD-10-CM

## 2019-01-01 DIAGNOSIS — G54.0 BRACHIAL PLEXOPATHY: ICD-10-CM

## 2019-01-01 DIAGNOSIS — M84.48XA PATHOLOGICAL FRACTURE OF RIB, INITIAL ENCOUNTER: ICD-10-CM

## 2019-01-01 DIAGNOSIS — M54.16 ACUTE RADICULAR LOW BACK PAIN: Primary | ICD-10-CM

## 2019-01-01 DIAGNOSIS — S22.000A CLOSED COMPRESSION FRACTURE OF THORACIC VERTEBRA, INITIAL ENCOUNTER (HCC): ICD-10-CM

## 2019-01-01 DIAGNOSIS — Z74.09 IMPAIRED MOBILITY: ICD-10-CM
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
## 2019-01-01 DIAGNOSIS — N39.0 URINARY TRACT INFECTION WITHOUT HEMATURIA, SITE UNSPECIFIED: ICD-10-CM

## 2019-01-01 DIAGNOSIS — C79.51 BONE METASTASES: Primary | Chronic | ICD-10-CM

## 2019-01-01 DIAGNOSIS — G89.3 CHRONIC PAIN DUE TO NEOPLASM: ICD-10-CM

## 2019-01-01 DIAGNOSIS — T45.1X5S: ICD-10-CM

## 2019-01-01 DIAGNOSIS — S32.010G CLOSED COMPRESSION FRACTURE OF L1 LUMBAR VERTEBRA WITH DELAYED HEALING, SUBSEQUENT ENCOUNTER: Primary | ICD-10-CM

## 2019-01-01 DIAGNOSIS — C50.811 MALIGNANT NEOPLASM OF OVERLAPPING SITES OF RIGHT FEMALE BREAST, UNSPECIFIED ESTROGEN RECEPTOR STATUS (HCC): Chronic | ICD-10-CM

## 2019-01-01 DIAGNOSIS — J90 PLEURAL EFFUSION: ICD-10-CM

## 2019-01-01 DIAGNOSIS — M89.8X9 BONE PAIN: ICD-10-CM

## 2019-01-01 DIAGNOSIS — G89.3 PAIN FROM BONE METASTASES (HCC): Primary | ICD-10-CM

## 2019-01-01 DIAGNOSIS — L89.159 PRESSURE INJURY OF SKIN OF SACRAL REGION, UNSPECIFIED INJURY STAGE: ICD-10-CM

## 2019-01-01 DIAGNOSIS — C80.0 WIDESPREAD METASTATIC MALIGNANT NEOPLASTIC DISEASE (HCC): ICD-10-CM

## 2019-01-01 DIAGNOSIS — G89.3 PAIN FROM BONE METASTASES (HCC): ICD-10-CM

## 2019-01-01 DIAGNOSIS — Z86.79 HISTORY OF HYPERTENSION: ICD-10-CM

## 2019-01-01 DIAGNOSIS — C79.51 PAIN FROM BONE METASTASES (HCC): ICD-10-CM

## 2019-01-01 DIAGNOSIS — C50.811 MALIGNANT NEOPLASM OF OVERLAPPING SITES OF RIGHT FEMALE BREAST, UNSPECIFIED ESTROGEN RECEPTOR STATUS (HCC): Primary | Chronic | ICD-10-CM
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
## 2019-01-01 DIAGNOSIS — M89.8X9 BONE PAIN: Primary | ICD-10-CM

## 2019-01-01 DIAGNOSIS — Z91.199 HISTORY OF NONCOMPLIANCE WITH MEDICAL TREATMENT: ICD-10-CM

## 2019-01-01 LAB
ALBUMIN SERPL-MCNC: 3.5 G/DL (ref 3.5–5.2)
ALBUMIN SERPL-MCNC: 4.13 G/DL (ref 3.2–4.8)
ALBUMIN/GLOB SERPL: 1.1 G/DL
ALBUMIN/GLOB SERPL: 1.5 G/DL (ref 1.5–2.5)
ALP SERPL-CCNC: 118 U/L (ref 25–100)
ALP SERPL-CCNC: 192 U/L (ref 39–117)
ALT SERPL W P-5'-P-CCNC: 39 U/L (ref 1–33)
ALT SERPL W P-5'-P-CCNC: 41 U/L (ref 7–40)
AMYLASE FLD-CCNC: 49 U/L
ANION GAP SERPL CALCULATED.3IONS-SCNC: 10 MMOL/L (ref 5–15)
ANION GAP SERPL CALCULATED.3IONS-SCNC: 12 MMOL/L (ref 5–15)
ANION GAP SERPL CALCULATED.3IONS-SCNC: 12 MMOL/L (ref 5–15)
ANION GAP SERPL CALCULATED.3IONS-SCNC: 6 MMOL/L (ref 3–11)
ANION GAP SERPL CALCULATED.3IONS-SCNC: 9 MMOL/L (ref 5–15)
APPEARANCE FLD: CLEAR
APTT PPP: 26.2 SECONDS (ref 24–37)
ARTERIAL PATENCY WRIST A: ABNORMAL
AST SERPL-CCNC: 52 U/L (ref 0–33)
AST SERPL-CCNC: 72 U/L (ref 1–32)
ATMOSPHERIC PRESS: ABNORMAL MMHG
BACTERIA FLD CULT: NORMAL
BACTERIA SPEC AEROBE CULT: NORMAL
BACTERIA SPEC AEROBE CULT: NORMAL
BACTERIA UR QL AUTO: NORMAL /HPF
BASE EXCESS BLDA CALC-SCNC: -0.8 MMOL/L (ref 0–2)
BASOPHILS # BLD AUTO: 0.01 10*3/MM3 (ref 0–0.2)
BASOPHILS # BLD AUTO: 0.02 10*3/MM3 (ref 0–0.2)
BASOPHILS NFR BLD AUTO: 0.1 % (ref 0–1.5)
BASOPHILS NFR BLD AUTO: 0.2 % (ref 0–1.5)
BDY SITE: ABNORMAL
BILIRUB SERPL-MCNC: 0.3 MG/DL (ref 0.2–1.2)
BILIRUB SERPL-MCNC: 0.4 MG/DL (ref 0.3–1.2)
BILIRUB UR QL STRIP: NEGATIVE
BODY TEMPERATURE: 98.6 C
BUN BLD-MCNC: 17 MG/DL (ref 8–23)
BUN BLD-MCNC: 21 MG/DL (ref 9–23)
BUN BLD-MCNC: 23 MG/DL (ref 8–23)
BUN BLD-MCNC: 38 MG/DL (ref 8–23)
BUN BLD-MCNC: 48 MG/DL (ref 8–23)
BUN/CREAT SERPL: 20.2 (ref 7–25)
BUN/CREAT SERPL: 26.9 (ref 7–25)
BUN/CREAT SERPL: 28.8 (ref 7–25)
BUN/CREAT SERPL: 30.2 (ref 7–25)
BUN/CREAT SERPL: 33.6 (ref 7–25)
CALCIUM SPEC-SCNC: 8.6 MG/DL (ref 8.6–10.5)
CALCIUM SPEC-SCNC: 9.1 MG/DL (ref 8.6–10.5)
CALCIUM SPEC-SCNC: 9.5 MG/DL (ref 8.6–10.5)
CALCIUM SPEC-SCNC: 9.5 MG/DL (ref 8.7–10.4)
CALCIUM SPEC-SCNC: 9.6 MG/DL (ref 8.6–10.5)
CANCER AG15-3 SERPL-ACNC: 38.6 U/ML (ref 0–25)
CANCER AG27-29 SERPL-ACNC: 49 U/ML (ref 0–38.6)
CHLORIDE SERPL-SCNC: 103 MMOL/L (ref 99–109)
CHLORIDE SERPL-SCNC: 104 MMOL/L (ref 98–107)
CHLORIDE SERPL-SCNC: 108 MMOL/L (ref 98–107)
CHLORIDE SERPL-SCNC: 110 MMOL/L (ref 98–107)
CHLORIDE SERPL-SCNC: 98 MMOL/L (ref 98–107)
CLARITY UR: CLEAR
CO2 BLDA-SCNC: 33.6 MMOL/L (ref 23–27)
CO2 SERPL-SCNC: 25 MMOL/L (ref 22–29)
CO2 SERPL-SCNC: 26 MMOL/L (ref 22–29)
CO2 SERPL-SCNC: 26 MMOL/L (ref 22–29)
CO2 SERPL-SCNC: 30 MMOL/L (ref 20–31)
CO2 SERPL-SCNC: 31 MMOL/L (ref 22–29)
COHGB MFR BLD: 1.1 % (ref 0–2)
COLOR FLD: YELLOW
COLOR UR: ABNORMAL
CREAT BLD-MCNC: 0.78 MG/DL (ref 0.6–1.3)
CREAT BLD-MCNC: 0.8 MG/DL (ref 0.57–1)
CREAT BLD-MCNC: 0.84 MG/DL (ref 0.57–1)
CREAT BLD-MCNC: 1.26 MG/DL (ref 0.57–1)
CREAT BLD-MCNC: 1.43 MG/DL (ref 0.57–1)
D-LACTATE SERPL-SCNC: 2 MMOL/L (ref 0.5–2)
DEPRECATED RDW RBC AUTO: 61.9 FL (ref 37–54)
DEPRECATED RDW RBC AUTO: 62.9 FL (ref 37–54)
DEPRECATED RDW RBC AUTO: 65.4 FL (ref 37–54)
EOSINOPHIL # BLD AUTO: 0 10*3/MM3 (ref 0–0.4)
EOSINOPHIL # BLD AUTO: 0.02 10*3/MM3 (ref 0–0.4)
EOSINOPHIL NFR BLD AUTO: 0 % (ref 0.3–6.2)
EOSINOPHIL NFR BLD AUTO: 0.2 % (ref 0.3–6.2)
ERYTHROCYTE [DISTWIDTH] IN BLOOD BY AUTOMATED COUNT: 12.3 % (ref 11.3–14.5)
ERYTHROCYTE [DISTWIDTH] IN BLOOD BY AUTOMATED COUNT: 17.3 % (ref 12.3–15.4)
ERYTHROCYTE [DISTWIDTH] IN BLOOD BY AUTOMATED COUNT: 17.4 % (ref 12.3–15.4)
ERYTHROCYTE [DISTWIDTH] IN BLOOD BY AUTOMATED COUNT: 17.5 % (ref 12.3–15.4)
GFR SERPL CREATININE-BSD FRML MDRD: 37 ML/MIN/1.73
GFR SERPL CREATININE-BSD FRML MDRD: 42 ML/MIN/1.73
GFR SERPL CREATININE-BSD FRML MDRD: 68 ML/MIN/1.73
GFR SERPL CREATININE-BSD FRML MDRD: 72 ML/MIN/1.73
GFR SERPL CREATININE-BSD FRML MDRD: 74 ML/MIN/1.73
GLOBULIN UR ELPH-MCNC: 2.8 GM/DL
GLOBULIN UR ELPH-MCNC: 3.2 GM/DL
GLUCOSE BLD-MCNC: 103 MG/DL (ref 65–99)
GLUCOSE BLD-MCNC: 116 MG/DL (ref 65–99)
GLUCOSE BLD-MCNC: 119 MG/DL (ref 65–99)
GLUCOSE BLD-MCNC: 93 MG/DL (ref 70–100)
GLUCOSE BLD-MCNC: 98 MG/DL (ref 65–99)
GLUCOSE UR STRIP-MCNC: NEGATIVE MG/DL
GRAM STN SPEC: NORMAL
HCO3 BLDA-SCNC: 30.9 MMOL/L (ref 20–26)
HCT VFR BLD AUTO: 38.6 % (ref 34–46.6)
HCT VFR BLD AUTO: 41.2 % (ref 34.5–44)
HCT VFR BLD AUTO: 45.8 % (ref 34–46.6)
HCT VFR BLD AUTO: 48.5 % (ref 34–46.6)
HCT VFR BLD CALC: 43.5 %
HGB BLD-MCNC: 11.9 G/DL (ref 12–15.9)
HGB BLD-MCNC: 14 G/DL (ref 11.5–15.5)
HGB BLD-MCNC: 14.3 G/DL (ref 12–15.9)
HGB BLD-MCNC: 15.4 G/DL (ref 12–15.9)
HGB BLDA-MCNC: 14.2 G/DL (ref 14–18)
HGB UR QL STRIP.AUTO: NEGATIVE
HOLD SPECIMEN: NORMAL
HOLD SPECIMEN: NORMAL
HOROWITZ INDEX BLD+IHG-RTO: 80 %
HYALINE CASTS UR QL AUTO: NORMAL /LPF
IMM GRANULOCYTES # BLD AUTO: 0.18 10*3/MM3 (ref 0–0.05)
IMM GRANULOCYTES # BLD AUTO: 0.2 10*3/MM3 (ref 0–0.05)
IMM GRANULOCYTES NFR BLD AUTO: 2 % (ref 0–0.5)
IMM GRANULOCYTES NFR BLD AUTO: 2.3 % (ref 0–0.5)
INR PPP: 0.99 (ref 0.85–1.16)
KETONES UR QL STRIP: NEGATIVE
LAB AP CASE REPORT: NORMAL
LDH FLD-CCNC: 160 U/L
LDH SERPL-CCNC: 516 U/L (ref 135–214)
LEUKOCYTE ESTERASE UR QL STRIP.AUTO: ABNORMAL
LIPASE SERPL-CCNC: 27 U/L (ref 13–60)
LYMPHOCYTES # BLD AUTO: 0.42 10*3/MM3 (ref 0.7–3.1)
LYMPHOCYTES # BLD AUTO: 0.7 10*3/MM3 (ref 0.6–4.8)
LYMPHOCYTES # BLD AUTO: 0.91 10*3/MM3 (ref 0.7–3.1)
LYMPHOCYTES NFR BLD AUTO: 10.2 % (ref 19.6–45.3)
LYMPHOCYTES NFR BLD AUTO: 10.7 % (ref 24–44)
LYMPHOCYTES NFR BLD AUTO: 4.9 % (ref 19.6–45.3)
LYMPHOCYTES NFR FLD MANUAL: 48 %
Lab: ABNORMAL
MACROPHAGE FLUID: 48 %
MCH RBC QN AUTO: 30.9 PG (ref 26.6–33)
MCH RBC QN AUTO: 30.9 PG (ref 26.6–33)
MCH RBC QN AUTO: 31.2 PG (ref 26.6–33)
MCH RBC QN AUTO: 32.8 PG (ref 27–31)
MCHC RBC AUTO-ENTMCNC: 30.8 G/DL (ref 31.5–35.7)
MCHC RBC AUTO-ENTMCNC: 31.2 G/DL (ref 31.5–35.7)
MCHC RBC AUTO-ENTMCNC: 31.8 G/DL (ref 31.5–35.7)
MCHC RBC AUTO-ENTMCNC: 34 G/DL (ref 32–36)
MCV RBC AUTO: 101.3 FL (ref 79–97)
MCV RBC AUTO: 96.3 FL (ref 80–99)
MCV RBC AUTO: 97.2 FL (ref 79–97)
MCV RBC AUTO: 98.9 FL (ref 79–97)
METHGB BLD QL: 0.8 % (ref 0–1.5)
MODALITY: ABNORMAL
MONOCYTES # BLD AUTO: 0.23 10*3/MM3 (ref 0.1–0.9)
MONOCYTES # BLD AUTO: 0.3 10*3/MM3 (ref 0–1)
MONOCYTES # BLD AUTO: 0.59 10*3/MM3 (ref 0.1–0.9)
MONOCYTES NFR BLD AUTO: 2.7 % (ref 5–12)
MONOCYTES NFR BLD AUTO: 4.8 % (ref 0–12)
MONOCYTES NFR BLD AUTO: 6.6 % (ref 5–12)
MONOCYTES NFR FLD: 4 %
NEUTROPHILS # BLD AUTO: 5.6 10*3/MM3 (ref 1.5–8.3)
NEUTROPHILS # BLD AUTO: 7.23 10*3/MM3 (ref 1.7–7)
NEUTROPHILS # BLD AUTO: 7.7 10*3/MM3 (ref 1.7–7)
NEUTROPHILS NFR BLD AUTO: 80.8 % (ref 42.7–76)
NEUTROPHILS NFR BLD AUTO: 84.5 % (ref 41–71)
NEUTROPHILS NFR BLD AUTO: 90 % (ref 42.7–76)
NITRITE UR QL STRIP: NEGATIVE
NOTE: ABNORMAL
NOTIFIED BY: ABNORMAL
NOTIFIED WHO: ABNORMAL
NRBC BLD AUTO-RTO: 0 /100 WBC (ref 0–0.2)
NRBC BLD AUTO-RTO: 0 /100 WBC (ref 0–0.2)
NT-PROBNP SERPL-MCNC: 1817 PG/ML (ref 5–900)
NT-PROBNP SERPL-MCNC: 4775 PG/ML (ref 5–900)
OXYHGB MFR BLDV: 86.9 % (ref 94–99)
PATH REPORT.FINAL DX SPEC: NORMAL
PCO2 BLDA: 88.8 MM HG (ref 35–45)
PCO2 TEMP ADJ BLD: 88.8 MM HG (ref 35–45)
PH BLDA: 7.15 PH UNITS (ref 7.35–7.45)
PH UR STRIP.AUTO: <=5 [PH] (ref 5–8)
PH, TEMP CORRECTED: 7.15 PH UNITS
PLATELET # BLD AUTO: 194 10*3/MM3 (ref 140–450)
PLATELET # BLD AUTO: 275 10*3/MM3 (ref 140–450)
PLATELET # BLD AUTO: 311 10*3/MM3 (ref 140–450)
PLATELET # BLD AUTO: 323 10*3/MM3 (ref 150–450)
PMV BLD AUTO: 10.2 FL (ref 6–12)
PMV BLD AUTO: 10.3 FL (ref 6–12)
PMV BLD AUTO: 10.5 FL (ref 6–12)
PMV BLD AUTO: 6.7 FL (ref 6–12)
PO2 BLDA: 60.4 MM HG (ref 83–108)
PO2 TEMP ADJ BLD: 60.4 MM HG (ref 83–108)
POTASSIUM BLD-SCNC: 4 MMOL/L (ref 3.5–5.2)
POTASSIUM BLD-SCNC: 4.1 MMOL/L (ref 3.5–5.2)
POTASSIUM BLD-SCNC: 4.2 MMOL/L (ref 3.5–5.2)
POTASSIUM BLD-SCNC: 4.6 MMOL/L (ref 3.5–5.2)
POTASSIUM BLD-SCNC: 4.6 MMOL/L (ref 3.5–5.5)
PROT FLD-MCNC: 3.8 G/DL
PROT SERPL-MCNC: 6.7 G/DL (ref 6–8.5)
PROT SERPL-MCNC: 6.9 G/DL (ref 5.7–8.2)
PROT UR QL STRIP: NEGATIVE
PROTHROMBIN TIME: 12.6 SECONDS (ref 11.2–14.3)
RBC # BLD AUTO: 3.81 10*6/MM3 (ref 3.77–5.28)
RBC # BLD AUTO: 4.28 10*6/MM3 (ref 3.89–5.14)
RBC # BLD AUTO: 4.63 10*6/MM3 (ref 3.77–5.28)
RBC # BLD AUTO: 4.99 10*6/MM3 (ref 3.77–5.28)
RBC # FLD AUTO: <2000 /MM3
RBC # UR: NORMAL /HPF
REF LAB TEST METHOD: NORMAL
SODIUM BLD-SCNC: 139 MMOL/L (ref 132–146)
SODIUM BLD-SCNC: 141 MMOL/L (ref 136–145)
SODIUM BLD-SCNC: 142 MMOL/L (ref 136–145)
SODIUM BLD-SCNC: 143 MMOL/L (ref 136–145)
SODIUM BLD-SCNC: 145 MMOL/L (ref 136–145)
SP GR UR STRIP: 1.01 (ref 1–1.03)
SQUAMOUS #/AREA URNS HPF: NORMAL /HPF
TRIGL FLD-MCNC: 24 MG/DL
TROPONIN T SERPL-MCNC: 0.03 NG/ML (ref 0–0.03)
TROPONIN T SERPL-MCNC: 0.05 NG/ML (ref 0–0.03)
TROPONIN T SERPL-MCNC: <0.01 NG/ML (ref 0–0.03)
UROBILINOGEN UR QL STRIP: ABNORMAL
VENTILATOR MODE: ABNORMAL
WBC # FLD AUTO: 51 /MM3
WBC NRBC COR # BLD: 6.6 10*3/MM3 (ref 3.5–10.8)
WBC NRBC COR # BLD: 7.8 10*3/MM3 (ref 3.4–10.8)
WBC NRBC COR # BLD: 8.56 10*3/MM3 (ref 3.4–10.8)
WBC NRBC COR # BLD: 8.95 10*3/MM3 (ref 3.4–10.8)
WBC UR QL AUTO: NORMAL /HPF
WHOLE BLOOD HOLD SPECIMEN: NORMAL
WHOLE BLOOD HOLD SPECIMEN: NORMAL

## 2019-01-01 PROCEDURE — 25010000002 ONDANSETRON PER 1 MG

## 2019-01-01 PROCEDURE — 75989 ABSCESS DRAINAGE UNDER X-RAY: CPT

## 2019-01-01 PROCEDURE — 97140 MANUAL THERAPY 1/> REGIONS: CPT | Performed by: PHYSICAL THERAPIST

## 2019-01-01 PROCEDURE — 25010000002 MORPHINE PER 10 MG: Performed by: INTERNAL MEDICINE

## 2019-01-01 PROCEDURE — 94799 UNLISTED PULMONARY SVC/PX: CPT

## 2019-01-01 PROCEDURE — 63710000001 DEXAMETHASONE PER 0.25 MG: Performed by: INTERNAL MEDICINE

## 2019-01-01 PROCEDURE — 83615 LACTATE (LD) (LDH) ENZYME: CPT | Performed by: INTERNAL MEDICINE

## 2019-01-01 PROCEDURE — 99233 SBSQ HOSP IP/OBS HIGH 50: CPT | Performed by: INTERNAL MEDICINE

## 2019-01-01 PROCEDURE — 71045 X-RAY EXAM CHEST 1 VIEW: CPT

## 2019-01-01 PROCEDURE — 25010000002 HYDROMORPHONE PER 4 MG: Performed by: NURSE PRACTITIONER

## 2019-01-01 PROCEDURE — 83880 ASSAY OF NATRIURETIC PEPTIDE: CPT | Performed by: EMERGENCY MEDICINE

## 2019-01-01 PROCEDURE — 83690 ASSAY OF LIPASE: CPT | Performed by: EMERGENCY MEDICINE

## 2019-01-01 PROCEDURE — 99232 SBSQ HOSP IP/OBS MODERATE 35: CPT | Performed by: INTERNAL MEDICINE

## 2019-01-01 PROCEDURE — 99283 EMERGENCY DEPT VISIT LOW MDM: CPT

## 2019-01-01 PROCEDURE — 84478 ASSAY OF TRIGLYCERIDES: CPT | Performed by: INTERNAL MEDICINE

## 2019-01-01 PROCEDURE — 72192 CT PELVIS W/O DYE: CPT

## 2019-01-01 PROCEDURE — 73502 X-RAY EXAM HIP UNI 2-3 VIEWS: CPT

## 2019-01-01 PROCEDURE — 97535 SELF CARE MNGMENT TRAINING: CPT

## 2019-01-01 PROCEDURE — 99024 POSTOP FOLLOW-UP VISIT: CPT | Performed by: PHYSICIAN ASSISTANT

## 2019-01-01 PROCEDURE — 97162 PT EVAL MOD COMPLEX 30 MIN: CPT

## 2019-01-01 PROCEDURE — 71250 CT THORAX DX C-: CPT

## 2019-01-01 PROCEDURE — 80053 COMPREHEN METABOLIC PANEL: CPT

## 2019-01-01 PROCEDURE — A9577 INJ MULTIHANCE: HCPCS | Performed by: NEUROLOGICAL SURGERY

## 2019-01-01 PROCEDURE — 77412 RADIATION TX DELIVERY LVL 3: CPT | Performed by: RADIOLOGY

## 2019-01-01 PROCEDURE — 87040 BLOOD CULTURE FOR BACTERIA: CPT | Performed by: PHYSICIAN ASSISTANT

## 2019-01-01 PROCEDURE — 99285 EMERGENCY DEPT VISIT HI MDM: CPT

## 2019-01-01 PROCEDURE — 81001 URINALYSIS AUTO W/SCOPE: CPT | Performed by: PHYSICIAN ASSISTANT

## 2019-01-01 PROCEDURE — 25010000002 HYDROMORPHONE PER 4 MG: Performed by: EMERGENCY MEDICINE

## 2019-01-01 PROCEDURE — 85610 PROTHROMBIN TIME: CPT | Performed by: INTERNAL MEDICINE

## 2019-01-01 PROCEDURE — 82805 BLOOD GASES W/O2 SATURATION: CPT

## 2019-01-01 PROCEDURE — 85025 COMPLETE CBC W/AUTO DIFF WBC: CPT | Performed by: EMERGENCY MEDICINE

## 2019-01-01 PROCEDURE — 86300 IMMUNOASSAY TUMOR CA 15-3: CPT

## 2019-01-01 PROCEDURE — 96372 THER/PROPH/DIAG INJ SC/IM: CPT

## 2019-01-01 PROCEDURE — 78300 BONE IMAGING LIMITED AREA: CPT

## 2019-01-01 PROCEDURE — 77336 RADIATION PHYSICS CONSULT: CPT | Performed by: RADIOLOGY

## 2019-01-01 PROCEDURE — 93005 ELECTROCARDIOGRAM TRACING: CPT | Performed by: PHYSICIAN ASSISTANT

## 2019-01-01 PROCEDURE — P9047 ALBUMIN (HUMAN), 25%, 50ML: HCPCS | Performed by: INTERNAL MEDICINE

## 2019-01-01 PROCEDURE — 99213 OFFICE O/P EST LOW 20 MIN: CPT | Performed by: NEUROLOGICAL SURGERY

## 2019-01-01 PROCEDURE — 77307 TELETHX ISODOSE PLAN CPLX: CPT | Performed by: RADIOLOGY

## 2019-01-01 PROCEDURE — 72158 MRI LUMBAR SPINE W/O & W/DYE: CPT

## 2019-01-01 PROCEDURE — 83605 ASSAY OF LACTIC ACID: CPT | Performed by: PHYSICIAN ASSISTANT

## 2019-01-01 PROCEDURE — 83880 ASSAY OF NATRIURETIC PEPTIDE: CPT | Performed by: NURSE PRACTITIONER

## 2019-01-01 PROCEDURE — 84484 ASSAY OF TROPONIN QUANT: CPT | Performed by: EMERGENCY MEDICINE

## 2019-01-01 PROCEDURE — 25010000002 HEPARIN (PORCINE) PER 1000 UNITS: Performed by: NURSE PRACTITIONER

## 2019-01-01 PROCEDURE — 0 TECHNETIUM MEDRONATE KIT: Performed by: NEUROLOGICAL SURGERY

## 2019-01-01 PROCEDURE — 72128 CT CHEST SPINE W/O DYE: CPT

## 2019-01-01 PROCEDURE — 77280 THER RAD SIMULAJ FIELD SMPL: CPT | Performed by: RADIOLOGY

## 2019-01-01 PROCEDURE — 36415 COLL VENOUS BLD VENIPUNCTURE: CPT

## 2019-01-01 PROCEDURE — A9503 TC99M MEDRONATE: HCPCS | Performed by: NEUROLOGICAL SURGERY

## 2019-01-01 PROCEDURE — 99223 1ST HOSP IP/OBS HIGH 75: CPT | Performed by: INTERNAL MEDICINE

## 2019-01-01 PROCEDURE — 85025 COMPLETE CBC W/AUTO DIFF WBC: CPT | Performed by: NURSE PRACTITIONER

## 2019-01-01 PROCEDURE — 36600 WITHDRAWAL OF ARTERIAL BLOOD: CPT

## 2019-01-01 PROCEDURE — 25010000002 HYDROMORPHONE 1 MG/ML SOLUTION: Performed by: EMERGENCY MEDICINE

## 2019-01-01 PROCEDURE — 99239 HOSP IP/OBS DSCHRG MGMT >30: CPT | Performed by: INTERNAL MEDICINE

## 2019-01-01 PROCEDURE — 87075 CULTR BACTERIA EXCEPT BLOOD: CPT | Performed by: INTERNAL MEDICINE

## 2019-01-01 PROCEDURE — 97140 MANUAL THERAPY 1/> REGIONS: CPT

## 2019-01-01 PROCEDURE — 87015 SPECIMEN INFECT AGNT CONCNTJ: CPT | Performed by: INTERNAL MEDICINE

## 2019-01-01 PROCEDURE — 72131 CT LUMBAR SPINE W/O DYE: CPT

## 2019-01-01 PROCEDURE — 97110 THERAPEUTIC EXERCISES: CPT | Performed by: PHYSICAL THERAPIST

## 2019-01-01 PROCEDURE — 99215 OFFICE O/P EST HI 40 MIN: CPT | Performed by: INTERNAL MEDICINE

## 2019-01-01 PROCEDURE — 80048 BASIC METABOLIC PNL TOTAL CA: CPT | Performed by: NURSE PRACTITIONER

## 2019-01-01 PROCEDURE — 96374 THER/PROPH/DIAG INJ IV PUSH: CPT

## 2019-01-01 PROCEDURE — 97530 THERAPEUTIC ACTIVITIES: CPT

## 2019-01-01 PROCEDURE — 80053 COMPREHEN METABOLIC PANEL: CPT | Performed by: EMERGENCY MEDICINE

## 2019-01-01 PROCEDURE — 25010000002 METHYLPREDNISOLONE PER 40 MG: Performed by: PHYSICIAN ASSISTANT

## 2019-01-01 PROCEDURE — 80048 BASIC METABOLIC PNL TOTAL CA: CPT | Performed by: INTERNAL MEDICINE

## 2019-01-01 PROCEDURE — 77290 THER RAD SIMULAJ FIELD CPLX: CPT | Performed by: RADIOLOGY

## 2019-01-01 PROCEDURE — 85730 THROMBOPLASTIN TIME PARTIAL: CPT | Performed by: INTERNAL MEDICINE

## 2019-01-01 PROCEDURE — C1729 CATH, DRAINAGE: HCPCS

## 2019-01-01 PROCEDURE — 25010000002 DIPHENHYDRAMINE PER 50 MG: Performed by: PHYSICIAN ASSISTANT

## 2019-01-01 PROCEDURE — 84484 ASSAY OF TROPONIN QUANT: CPT | Performed by: NURSE PRACTITIONER

## 2019-01-01 PROCEDURE — 94640 AIRWAY INHALATION TREATMENT: CPT

## 2019-01-01 PROCEDURE — 97166 OT EVAL MOD COMPLEX 45 MIN: CPT

## 2019-01-01 PROCEDURE — 89051 BODY FLUID CELL COUNT: CPT | Performed by: INTERNAL MEDICINE

## 2019-01-01 PROCEDURE — 74176 CT ABD & PELVIS W/O CONTRAST: CPT

## 2019-01-01 PROCEDURE — G0463 HOSPITAL OUTPT CLINIC VISIT: HCPCS

## 2019-01-01 PROCEDURE — 0W993ZZ DRAINAGE OF RIGHT PLEURAL CAVITY, PERCUTANEOUS APPROACH: ICD-10-PCS | Performed by: RADIOLOGY

## 2019-01-01 PROCEDURE — 84484 ASSAY OF TROPONIN QUANT: CPT | Performed by: PHYSICIAN ASSISTANT

## 2019-01-01 PROCEDURE — 82150 ASSAY OF AMYLASE: CPT | Performed by: INTERNAL MEDICINE

## 2019-01-01 PROCEDURE — 0W9B3ZZ DRAINAGE OF LEFT PLEURAL CAVITY, PERCUTANEOUS APPROACH: ICD-10-PCS | Performed by: RADIOLOGY

## 2019-01-01 PROCEDURE — 93005 ELECTROCARDIOGRAM TRACING: CPT | Performed by: EMERGENCY MEDICINE

## 2019-01-01 PROCEDURE — 77334 RADIATION TREATMENT AID(S): CPT | Performed by: RADIOLOGY

## 2019-01-01 PROCEDURE — 25010000002 ONDANSETRON PER 1 MG: Performed by: NURSE PRACTITIONER

## 2019-01-01 PROCEDURE — 0 GADOBENATE DIMEGLUMINE 529 MG/ML SOLUTION: Performed by: NEUROLOGICAL SURGERY

## 2019-01-01 PROCEDURE — 84157 ASSAY OF PROTEIN OTHER: CPT | Performed by: INTERNAL MEDICINE

## 2019-01-01 PROCEDURE — 85027 COMPLETE CBC AUTOMATED: CPT | Performed by: INTERNAL MEDICINE

## 2019-01-01 PROCEDURE — 87070 CULTURE OTHR SPECIMN AEROBIC: CPT | Performed by: INTERNAL MEDICINE

## 2019-01-01 PROCEDURE — 25010000002 ONDANSETRON PER 1 MG: Performed by: PHYSICIAN ASSISTANT

## 2019-01-01 PROCEDURE — 85025 COMPLETE CBC W/AUTO DIFF WBC: CPT

## 2019-01-01 PROCEDURE — 97110 THERAPEUTIC EXERCISES: CPT

## 2019-01-01 PROCEDURE — 25010000002 ALBUMIN HUMAN 25% PER 50 ML: Performed by: INTERNAL MEDICINE

## 2019-01-01 PROCEDURE — 87205 SMEAR GRAM STAIN: CPT | Performed by: INTERNAL MEDICINE

## 2019-01-01 PROCEDURE — 99284 EMERGENCY DEPT VISIT MOD MDM: CPT

## 2019-01-01 PROCEDURE — 88112 CYTOPATH CELL ENHANCE TECH: CPT | Performed by: INTERNAL MEDICINE

## 2019-01-01 RX ORDER — ALBUTEROL SULFATE 2.5 MG/3ML
2.5 SOLUTION RESPIRATORY (INHALATION) EVERY 4 HOURS PRN
COMMUNITY

## 2019-01-01 RX ORDER — OXYCODONE AND ACETAMINOPHEN 10; 325 MG/1; MG/1
TABLET ORAL
COMMUNITY
Start: 2019-01-01 | End: 2019-01-01

## 2019-01-01 RX ORDER — MORPHINE SULFATE 30 MG/1
30 TABLET ORAL EVERY 4 HOURS PRN
Status: DISCONTINUED | OUTPATIENT
Start: 2019-01-01 | End: 2019-01-01 | Stop reason: HOSPADM

## 2019-01-01 RX ORDER — FENTANYL 50 UG/H
1 PATCH TRANSDERMAL
Status: DISCONTINUED | OUTPATIENT
Start: 2019-01-01 | End: 2019-01-01

## 2019-01-01 RX ORDER — TC 99M MEDRONATE 20 MG/10ML
21.4 INJECTION, POWDER, LYOPHILIZED, FOR SOLUTION INTRAVENOUS
Status: COMPLETED | OUTPATIENT
Start: 2019-01-01 | End: 2019-01-01

## 2019-01-01 RX ORDER — FENTANYL 25 UG/H
1 PATCH TRANSDERMAL
COMMUNITY

## 2019-01-01 RX ORDER — ACETAMINOPHEN 500 MG
500 TABLET ORAL EVERY 4 HOURS PRN
COMMUNITY

## 2019-01-01 RX ORDER — MORPHINE SULFATE 30 MG/1
30 TABLET ORAL EVERY 4 HOURS PRN
COMMUNITY
End: 2019-01-01

## 2019-01-01 RX ORDER — MORPHINE SULFATE 2 MG/ML
2 INJECTION, SOLUTION INTRAMUSCULAR; INTRAVENOUS ONCE
Status: COMPLETED | OUTPATIENT
Start: 2019-01-01 | End: 2019-01-01

## 2019-01-01 RX ORDER — NITROFURANTOIN 25; 75 MG/1; MG/1
CAPSULE ORAL
Qty: 14 CAPSULE | Refills: 0 | OUTPATIENT
Start: 2019-01-01 | End: 2019-01-01

## 2019-01-01 RX ORDER — LACTULOSE 10 G/15ML
10 SOLUTION ORAL 2 TIMES DAILY PRN
Status: DISCONTINUED | OUTPATIENT
Start: 2019-01-01 | End: 2019-01-01

## 2019-01-01 RX ORDER — ACETAMINOPHEN 500 MG
500 TABLET ORAL EVERY 4 HOURS PRN
Status: DISCONTINUED | OUTPATIENT
Start: 2019-01-01 | End: 2019-01-01

## 2019-01-01 RX ORDER — DEXAMETHASONE 4 MG/1
2 TABLET ORAL
COMMUNITY

## 2019-01-01 RX ORDER — BISACODYL 10 MG
10 SUPPOSITORY, RECTAL RECTAL DAILY
COMMUNITY

## 2019-01-01 RX ORDER — CALCIUM CARBONATE 200(500)MG
2 TABLET,CHEWABLE ORAL EVERY 6 HOURS PRN
Status: DISCONTINUED | OUTPATIENT
Start: 2019-01-01 | End: 2019-01-01

## 2019-01-01 RX ORDER — SODIUM CHLORIDE 0.9 % (FLUSH) 0.9 %
10 SYRINGE (ML) INJECTION AS NEEDED
Status: DISCONTINUED | OUTPATIENT
Start: 2019-01-01 | End: 2019-01-01 | Stop reason: HOSPADM

## 2019-01-01 RX ORDER — PREDNISONE 50 MG/1
TABLET ORAL
Qty: 3 TABLET | Refills: 0 | Status: SHIPPED | OUTPATIENT
Start: 2019-01-01 | End: 2019-01-01

## 2019-01-01 RX ORDER — SODIUM CHLORIDE 9 MG/ML
75 INJECTION, SOLUTION INTRAVENOUS CONTINUOUS
Status: DISCONTINUED | OUTPATIENT
Start: 2019-01-01 | End: 2019-01-01

## 2019-01-01 RX ORDER — CYCLOBENZAPRINE HCL 10 MG
TABLET ORAL
COMMUNITY
Start: 2018-01-01 | End: 2019-01-01

## 2019-01-01 RX ORDER — ONDANSETRON 4 MG/1
4 TABLET, ORALLY DISINTEGRATING ORAL ONCE
Status: COMPLETED | OUTPATIENT
Start: 2019-01-01 | End: 2019-01-01

## 2019-01-01 RX ORDER — SENNA AND DOCUSATE SODIUM 50; 8.6 MG/1; MG/1
2 TABLET, FILM COATED ORAL 2 TIMES DAILY
Status: DISCONTINUED | OUTPATIENT
Start: 2019-01-01 | End: 2019-01-01

## 2019-01-01 RX ORDER — ACETAMINOPHEN 650 MG/1
650 SUPPOSITORY RECTAL EVERY 4 HOURS PRN
Status: DISCONTINUED | OUTPATIENT
Start: 2019-01-01 | End: 2019-01-01 | Stop reason: HOSPADM

## 2019-01-01 RX ORDER — DULOXETIN HYDROCHLORIDE 30 MG/1
30 CAPSULE, DELAYED RELEASE ORAL DAILY
Status: DISCONTINUED | OUTPATIENT
Start: 2019-01-01 | End: 2019-01-01

## 2019-01-01 RX ORDER — CHLORHEXIDINE GLUCONATE 0.12 MG/ML
15 RINSE ORAL EVERY 12 HOURS SCHEDULED
Status: CANCELLED | OUTPATIENT
Start: 2019-01-01

## 2019-01-01 RX ORDER — DIAZEPAM 5 MG/1
5 TABLET ORAL EVERY 6 HOURS PRN
Status: DISCONTINUED | OUTPATIENT
Start: 2019-01-01 | End: 2019-01-01

## 2019-01-01 RX ORDER — LIDOCAINE HYDROCHLORIDE 10 MG/ML
10 INJECTION, SOLUTION EPIDURAL; INFILTRATION; INTRACAUDAL; PERINEURAL ONCE
Status: COMPLETED | OUTPATIENT
Start: 2019-01-01 | End: 2019-01-01

## 2019-01-01 RX ORDER — SODIUM CHLORIDE 9 MG/ML
75 INJECTION, SOLUTION INTRAVENOUS CONTINUOUS
Status: ACTIVE | OUTPATIENT
Start: 2019-01-01 | End: 2019-01-01

## 2019-01-01 RX ORDER — MORPHINE SULFATE 15 MG/1
7.5 TABLET ORAL EVERY 4 HOURS PRN
Status: ON HOLD | COMMUNITY
End: 2019-01-01

## 2019-01-01 RX ORDER — ONDANSETRON 2 MG/ML
4 INJECTION INTRAMUSCULAR; INTRAVENOUS EVERY 6 HOURS PRN
Status: DISCONTINUED | OUTPATIENT
Start: 2019-01-01 | End: 2019-01-01

## 2019-01-01 RX ORDER — CASTOR OIL AND BALSAM, PERU 788; 87 MG/G; MG/G
OINTMENT TOPICAL EVERY 12 HOURS SCHEDULED
Status: DISCONTINUED | OUTPATIENT
Start: 2019-01-01 | End: 2019-01-01

## 2019-01-01 RX ORDER — HYDROMORPHONE HYDROCHLORIDE 2 MG/1
2 TABLET ORAL EVERY 4 HOURS PRN
COMMUNITY

## 2019-01-01 RX ORDER — DOCUSATE SODIUM 100 MG/1
100 CAPSULE, LIQUID FILLED ORAL 2 TIMES DAILY
Status: DISCONTINUED | OUTPATIENT
Start: 2019-01-01 | End: 2019-01-01

## 2019-01-01 RX ORDER — ACETAMINOPHEN 325 MG/1
650 TABLET ORAL EVERY 4 HOURS PRN
Status: DISCONTINUED | OUTPATIENT
Start: 2019-01-01 | End: 2019-01-01

## 2019-01-01 RX ORDER — SENNA AND DOCUSATE SODIUM 50; 8.6 MG/1; MG/1
2 TABLET, FILM COATED ORAL 2 TIMES DAILY
COMMUNITY

## 2019-01-01 RX ORDER — PANTOPRAZOLE SODIUM 40 MG/1
40 TABLET, DELAYED RELEASE ORAL DAILY
COMMUNITY

## 2019-01-01 RX ORDER — IPRATROPIUM BROMIDE AND ALBUTEROL SULFATE 2.5; .5 MG/3ML; MG/3ML
3 SOLUTION RESPIRATORY (INHALATION)
Status: DISCONTINUED | OUTPATIENT
Start: 2019-01-01 | End: 2019-01-01

## 2019-01-01 RX ORDER — ALBUTEROL SULFATE 2.5 MG/3ML
2.5 SOLUTION RESPIRATORY (INHALATION) EVERY 4 HOURS PRN
Status: DISCONTINUED | OUTPATIENT
Start: 2019-01-01 | End: 2019-01-01 | Stop reason: HOSPADM

## 2019-01-01 RX ORDER — HYDROMORPHONE HYDROCHLORIDE 1 MG/ML
0.5 INJECTION, SOLUTION INTRAMUSCULAR; INTRAVENOUS; SUBCUTANEOUS
Status: DISCONTINUED | OUTPATIENT
Start: 2019-01-01 | End: 2019-01-01

## 2019-01-01 RX ORDER — SODIUM CHLORIDE 0.9 % (FLUSH) 0.9 %
3-10 SYRINGE (ML) INJECTION AS NEEDED
Status: DISCONTINUED | OUTPATIENT
Start: 2019-01-01 | End: 2019-01-01 | Stop reason: HOSPADM

## 2019-01-01 RX ORDER — MORPHINE SULFATE 2 MG/ML
1 INJECTION, SOLUTION INTRAMUSCULAR; INTRAVENOUS
Status: DISCONTINUED | OUTPATIENT
Start: 2019-01-01 | End: 2019-01-01 | Stop reason: HOSPADM

## 2019-01-01 RX ORDER — GLYCOPYRROLATE 0.2 MG/ML
0.1 INJECTION INTRAMUSCULAR; INTRAVENOUS EVERY 4 HOURS PRN
Status: DISCONTINUED | OUTPATIENT
Start: 2019-01-01 | End: 2019-01-01 | Stop reason: HOSPADM

## 2019-01-01 RX ORDER — DEXAMETHASONE 4 MG/1
2 TABLET ORAL
Status: DISCONTINUED | OUTPATIENT
Start: 2019-01-01 | End: 2019-01-01

## 2019-01-01 RX ORDER — CALCIUM CARBONATE 200(500)MG
2 TABLET,CHEWABLE ORAL EVERY 6 HOURS PRN
COMMUNITY

## 2019-01-01 RX ORDER — ONDANSETRON 4 MG/1
4 TABLET, FILM COATED ORAL EVERY 6 HOURS PRN
Status: DISCONTINUED | OUTPATIENT
Start: 2019-01-01 | End: 2019-01-01

## 2019-01-01 RX ORDER — LORAZEPAM 2 MG/ML
1 INJECTION INTRAMUSCULAR
Status: DISCONTINUED | OUTPATIENT
Start: 2019-01-01 | End: 2019-01-01 | Stop reason: HOSPADM

## 2019-01-01 RX ORDER — DULOXETIN HYDROCHLORIDE 60 MG/1
60 CAPSULE, DELAYED RELEASE ORAL DAILY
Qty: 30 CAPSULE | Refills: 1 | Status: SHIPPED | OUTPATIENT
Start: 2019-01-01

## 2019-01-01 RX ORDER — LIDOCAINE HYDROCHLORIDE 10 MG/ML
10 INJECTION, SOLUTION EPIDURAL; INFILTRATION; INTRACAUDAL; PERINEURAL ONCE
Status: DISCONTINUED | OUTPATIENT
Start: 2019-01-01 | End: 2019-01-01

## 2019-01-01 RX ORDER — DIPHENHYDRAMINE HYDROCHLORIDE 50 MG/ML
50 INJECTION INTRAMUSCULAR; INTRAVENOUS ONCE
Status: COMPLETED | OUTPATIENT
Start: 2019-01-01 | End: 2019-01-01

## 2019-01-01 RX ORDER — ONDANSETRON 2 MG/ML
4 INJECTION INTRAMUSCULAR; INTRAVENOUS EVERY 6 HOURS PRN
Status: DISCONTINUED | OUTPATIENT
Start: 2019-01-01 | End: 2019-01-01 | Stop reason: HOSPADM

## 2019-01-01 RX ORDER — SODIUM CHLORIDE 0.9 % (FLUSH) 0.9 %
3 SYRINGE (ML) INJECTION EVERY 12 HOURS SCHEDULED
Status: DISCONTINUED | OUTPATIENT
Start: 2019-01-01 | End: 2019-01-01 | Stop reason: HOSPADM

## 2019-01-01 RX ORDER — DIAZEPAM 2 MG/1
2 TABLET ORAL EVERY 6 HOURS PRN
Status: DISCONTINUED | OUTPATIENT
Start: 2019-01-01 | End: 2019-01-01

## 2019-01-01 RX ORDER — DULOXETIN HYDROCHLORIDE 60 MG/1
60 CAPSULE, DELAYED RELEASE ORAL DAILY
Status: DISCONTINUED | OUTPATIENT
Start: 2019-01-01 | End: 2019-01-01

## 2019-01-01 RX ORDER — MIRTAZAPINE 15 MG/1
15 TABLET, FILM COATED ORAL NIGHTLY
Status: DISCONTINUED | OUTPATIENT
Start: 2019-01-01 | End: 2019-01-01

## 2019-01-01 RX ORDER — METHYLPREDNISOLONE SODIUM SUCCINATE 40 MG/ML
40 INJECTION, POWDER, LYOPHILIZED, FOR SOLUTION INTRAMUSCULAR; INTRAVENOUS
Status: DISCONTINUED | OUTPATIENT
Start: 2019-01-01 | End: 2019-01-01

## 2019-01-01 RX ORDER — FENTANYL 50 UG/H
1 PATCH TRANSDERMAL
Status: DISCONTINUED | OUTPATIENT
Start: 2019-01-01 | End: 2019-01-01 | Stop reason: HOSPADM

## 2019-01-01 RX ORDER — NALOXONE HCL 0.4 MG/ML
0.4 VIAL (ML) INJECTION
Status: DISCONTINUED | OUTPATIENT
Start: 2019-01-01 | End: 2019-01-01

## 2019-01-01 RX ORDER — ONDANSETRON 4 MG/1
4 TABLET, ORALLY DISINTEGRATING ORAL EVERY 4 HOURS PRN
Status: DISCONTINUED | OUTPATIENT
Start: 2019-01-01 | End: 2019-01-01

## 2019-01-01 RX ORDER — BISACODYL 5 MG/1
5 TABLET, DELAYED RELEASE ORAL DAILY PRN
Status: DISCONTINUED | OUTPATIENT
Start: 2019-01-01 | End: 2019-01-01

## 2019-01-01 RX ORDER — HEPARIN SODIUM 5000 [USP'U]/ML
5000 INJECTION, SOLUTION INTRAVENOUS; SUBCUTANEOUS ONCE
Status: COMPLETED | OUTPATIENT
Start: 2019-01-01 | End: 2019-01-01

## 2019-01-01 RX ORDER — CYCLOBENZAPRINE HCL 5 MG
5 TABLET ORAL 3 TIMES DAILY PRN
COMMUNITY

## 2019-01-01 RX ORDER — CYCLOBENZAPRINE HCL 10 MG
5 TABLET ORAL EVERY 8 HOURS SCHEDULED
Status: DISCONTINUED | OUTPATIENT
Start: 2019-01-01 | End: 2019-01-01

## 2019-01-01 RX ORDER — CHLORHEXIDINE GLUCONATE 4 G/100ML
SOLUTION TOPICAL
Qty: 120 ML | Refills: 0 | OUTPATIENT
Start: 2019-01-01 | End: 2019-01-01

## 2019-01-01 RX ORDER — HEPARIN SODIUM 5000 [USP'U]/ML
5000 INJECTION, SOLUTION INTRAVENOUS; SUBCUTANEOUS EVERY 12 HOURS SCHEDULED
Status: DISCONTINUED | OUTPATIENT
Start: 2019-01-01 | End: 2019-01-01

## 2019-01-01 RX ORDER — SODIUM CHLORIDE 0.9 % (FLUSH) 0.9 %
3-10 SYRINGE (ML) INJECTION AS NEEDED
Status: CANCELLED | OUTPATIENT
Start: 2019-01-01

## 2019-01-01 RX ORDER — HYDROMORPHONE HYDROCHLORIDE 1 MG/ML
0.5 INJECTION, SOLUTION INTRAMUSCULAR; INTRAVENOUS; SUBCUTANEOUS
Status: COMPLETED | OUTPATIENT
Start: 2019-01-01 | End: 2019-01-01

## 2019-01-01 RX ORDER — DIPHENHYDRAMINE HYDROCHLORIDE 50 MG/ML
50 INJECTION INTRAMUSCULAR; INTRAVENOUS ONCE
Status: DISCONTINUED | OUTPATIENT
Start: 2019-01-01 | End: 2019-01-01 | Stop reason: HOSPADM

## 2019-01-01 RX ORDER — ZOLPIDEM TARTRATE 10 MG/1
10 TABLET ORAL NIGHTLY PRN
COMMUNITY

## 2019-01-01 RX ORDER — FENTANYL 25 UG/H
1 PATCH TRANSDERMAL
Status: DISCONTINUED | OUTPATIENT
Start: 2019-01-01 | End: 2019-01-01

## 2019-01-01 RX ORDER — PANTOPRAZOLE SODIUM 40 MG/1
40 TABLET, DELAYED RELEASE ORAL
Status: DISCONTINUED | OUTPATIENT
Start: 2019-01-01 | End: 2019-01-01

## 2019-01-01 RX ORDER — HYDROMORPHONE HYDROCHLORIDE 1 MG/ML
0.5 INJECTION, SOLUTION INTRAMUSCULAR; INTRAVENOUS; SUBCUTANEOUS ONCE
Status: COMPLETED | OUTPATIENT
Start: 2019-01-01 | End: 2019-01-01

## 2019-01-01 RX ORDER — ONDANSETRON 2 MG/ML
4 INJECTION INTRAMUSCULAR; INTRAVENOUS ONCE
Status: COMPLETED | OUTPATIENT
Start: 2019-01-01 | End: 2019-01-01

## 2019-01-01 RX ORDER — VANCOMYCIN HYDROCHLORIDE 1 G/200ML
15 INJECTION, SOLUTION INTRAVENOUS ONCE
Status: CANCELLED | OUTPATIENT
Start: 2019-01-01

## 2019-01-01 RX ORDER — ONDANSETRON 4 MG/1
4 TABLET, FILM COATED ORAL EVERY 4 HOURS PRN
Status: DISCONTINUED | OUTPATIENT
Start: 2019-01-01 | End: 2019-01-01

## 2019-01-01 RX ORDER — CYCLOBENZAPRINE HCL 10 MG
5 TABLET ORAL 3 TIMES DAILY PRN
Status: DISCONTINUED | OUTPATIENT
Start: 2019-01-01 | End: 2019-01-01

## 2019-01-01 RX ORDER — BISACODYL 5 MG/1
5 TABLET, DELAYED RELEASE ORAL DAILY PRN
COMMUNITY

## 2019-01-01 RX ORDER — SODIUM CHLORIDE, SODIUM LACTATE, POTASSIUM CHLORIDE, CALCIUM CHLORIDE 600; 310; 30; 20 MG/100ML; MG/100ML; MG/100ML; MG/100ML
100 INJECTION, SOLUTION INTRAVENOUS CONTINUOUS
Status: CANCELLED | OUTPATIENT
Start: 2019-01-01

## 2019-01-01 RX ORDER — BISACODYL 10 MG
10 SUPPOSITORY, RECTAL RECTAL DAILY PRN
Status: DISCONTINUED | OUTPATIENT
Start: 2019-01-01 | End: 2019-01-01

## 2019-01-01 RX ORDER — HYDROMORPHONE HYDROCHLORIDE 2 MG/1
2 TABLET ORAL EVERY 4 HOURS PRN
Status: DISCONTINUED | OUTPATIENT
Start: 2019-01-01 | End: 2019-01-01

## 2019-01-01 RX ORDER — ALBUMIN (HUMAN) 12.5 G/50ML
25 SOLUTION INTRAVENOUS ONCE
Status: COMPLETED | OUTPATIENT
Start: 2019-01-01 | End: 2019-01-01

## 2019-01-01 RX ORDER — SODIUM CHLORIDE 0.9 % (FLUSH) 0.9 %
3 SYRINGE (ML) INJECTION EVERY 12 HOURS SCHEDULED
Status: CANCELLED | OUTPATIENT
Start: 2019-01-01

## 2019-01-01 RX ORDER — HYDROMORPHONE HYDROCHLORIDE 2 MG/1
2 TABLET ORAL EVERY 8 HOURS SCHEDULED
Status: DISCONTINUED | OUTPATIENT
Start: 2019-01-01 | End: 2019-01-01

## 2019-01-01 RX ORDER — MIRTAZAPINE 15 MG/1
15 TABLET, FILM COATED ORAL NIGHTLY
COMMUNITY

## 2019-01-01 RX ORDER — HYDROCHLOROTHIAZIDE 12.5 MG/1
12.5 TABLET ORAL DAILY
COMMUNITY

## 2019-01-01 RX ORDER — BACLOFEN 20 MG/1
20 TABLET ORAL 3 TIMES DAILY
Qty: 90 TABLET | Refills: 0 | OUTPATIENT
Start: 2019-01-01 | End: 2019-01-01

## 2019-01-01 RX ADMIN — DEXAMETHASONE 2 MG: 4 TABLET ORAL at 10:52

## 2019-01-01 RX ADMIN — HYDROMORPHONE HYDROCHLORIDE 0.5 MG: 1 INJECTION, SOLUTION INTRAMUSCULAR; INTRAVENOUS; SUBCUTANEOUS at 12:04

## 2019-01-01 RX ADMIN — HYDROMORPHONE HYDROCHLORIDE 0.5 MG: 1 INJECTION, SOLUTION INTRAMUSCULAR; INTRAVENOUS; SUBCUTANEOUS at 23:59

## 2019-01-01 RX ADMIN — MORPHINE SULFATE 1 MG: 2 INJECTION, SOLUTION INTRAMUSCULAR; INTRAVENOUS at 08:43

## 2019-01-01 RX ADMIN — HYDROMORPHONE HYDROCHLORIDE 2 MG: 2 TABLET ORAL at 08:55

## 2019-01-01 RX ADMIN — HYDROMORPHONE HYDROCHLORIDE 2 MG: 2 TABLET ORAL at 05:40

## 2019-01-01 RX ADMIN — IPRATROPIUM BROMIDE AND ALBUTEROL SULFATE 3 ML: 2.5; .5 SOLUTION RESPIRATORY (INHALATION) at 19:40

## 2019-01-01 RX ADMIN — HYDROMORPHONE HYDROCHLORIDE 0.5 MG: 1 INJECTION, SOLUTION INTRAMUSCULAR; INTRAVENOUS; SUBCUTANEOUS at 05:17

## 2019-01-01 RX ADMIN — METHYLPREDNISOLONE SODIUM SUCCINATE 40 MG: 40 INJECTION, POWDER, FOR SOLUTION INTRAMUSCULAR; INTRAVENOUS at 23:59

## 2019-01-01 RX ADMIN — HYDROMORPHONE HYDROCHLORIDE 0.5 MG: 1 INJECTION, SOLUTION INTRAMUSCULAR; INTRAVENOUS; SUBCUTANEOUS at 07:03

## 2019-01-01 RX ADMIN — METHYLPREDNISOLONE SODIUM SUCCINATE 40 MG: 40 INJECTION, POWDER, FOR SOLUTION INTRAMUSCULAR; INTRAVENOUS at 10:50

## 2019-01-01 RX ADMIN — LIDOCAINE HYDROCHLORIDE 10 ML: 10 INJECTION, SOLUTION EPIDURAL; INFILTRATION; INTRACAUDAL; PERINEURAL at 12:00

## 2019-01-01 RX ADMIN — SENNOSIDES,DOCUSATE SODIUM 2 TABLET: 8.6; 5 TABLET, FILM COATED ORAL at 08:55

## 2019-01-01 RX ADMIN — MIRTAZAPINE 15 MG: 15 TABLET, FILM COATED ORAL at 20:57

## 2019-01-01 RX ADMIN — IPRATROPIUM BROMIDE AND ALBUTEROL SULFATE 3 ML: 2.5; .5 SOLUTION RESPIRATORY (INHALATION) at 11:50

## 2019-01-01 RX ADMIN — HYDROMORPHONE HYDROCHLORIDE 0.5 MG: 1 INJECTION, SOLUTION INTRAMUSCULAR; INTRAVENOUS; SUBCUTANEOUS at 00:12

## 2019-01-01 RX ADMIN — SODIUM CHLORIDE, PRESERVATIVE FREE 3 ML: 5 INJECTION INTRAVENOUS at 20:14

## 2019-01-01 RX ADMIN — DEXAMETHASONE 2 MG: 4 TABLET ORAL at 08:42

## 2019-01-01 RX ADMIN — IPRATROPIUM BROMIDE AND ALBUTEROL SULFATE 3 ML: 2.5; .5 SOLUTION RESPIRATORY (INHALATION) at 20:10

## 2019-01-01 RX ADMIN — IPRATROPIUM BROMIDE AND ALBUTEROL SULFATE 3 ML: 2.5; .5 SOLUTION RESPIRATORY (INHALATION) at 08:04

## 2019-01-01 RX ADMIN — DOCUSATE SODIUM 100 MG: 100 CAPSULE, LIQUID FILLED ORAL at 08:09

## 2019-01-01 RX ADMIN — SODIUM CHLORIDE, PRESERVATIVE FREE 3 ML: 5 INJECTION INTRAVENOUS at 20:56

## 2019-01-01 RX ADMIN — SODIUM CHLORIDE, PRESERVATIVE FREE 3 ML: 5 INJECTION INTRAVENOUS at 20:42

## 2019-01-01 RX ADMIN — HYDROMORPHONE HYDROCHLORIDE 2 MG: 2 TABLET ORAL at 16:22

## 2019-01-01 RX ADMIN — METHYLPREDNISOLONE SODIUM SUCCINATE 40 MG: 40 INJECTION, POWDER, FOR SOLUTION INTRAMUSCULAR; INTRAVENOUS at 20:14

## 2019-01-01 RX ADMIN — DOCUSATE SODIUM 100 MG: 100 CAPSULE, LIQUID FILLED ORAL at 19:47

## 2019-01-01 RX ADMIN — HYDROMORPHONE HYDROCHLORIDE 0.5 MG: 1 INJECTION, SOLUTION INTRAMUSCULAR; INTRAVENOUS; SUBCUTANEOUS at 19:46

## 2019-01-01 RX ADMIN — SODIUM CHLORIDE 75 ML/HR: 9 INJECTION, SOLUTION INTRAVENOUS at 18:11

## 2019-01-01 RX ADMIN — IPRATROPIUM BROMIDE AND ALBUTEROL SULFATE 3 ML: 2.5; .5 SOLUTION RESPIRATORY (INHALATION) at 12:10

## 2019-01-01 RX ADMIN — FENTANYL 1 PATCH: 25 PATCH TRANSDERMAL at 10:53

## 2019-01-01 RX ADMIN — GLYCOPYRROLATE 0.1 MG: 0.2 INJECTION, SOLUTION INTRAMUSCULAR; INTRAVITREAL at 11:40

## 2019-01-01 RX ADMIN — SENNOSIDES,DOCUSATE SODIUM 2 TABLET: 8.6; 5 TABLET, FILM COATED ORAL at 08:40

## 2019-01-01 RX ADMIN — DIAZEPAM 5 MG: 5 TABLET ORAL at 08:55

## 2019-01-01 RX ADMIN — SENNOSIDES,DOCUSATE SODIUM 2 TABLET: 8.6; 5 TABLET, FILM COATED ORAL at 20:42

## 2019-01-01 RX ADMIN — SODIUM CHLORIDE 75 ML/HR: 9 INJECTION, SOLUTION INTRAVENOUS at 16:22

## 2019-01-01 RX ADMIN — SENNOSIDES,DOCUSATE SODIUM 2 TABLET: 8.6; 5 TABLET, FILM COATED ORAL at 08:43

## 2019-01-01 RX ADMIN — SENNOSIDES,DOCUSATE SODIUM 2 TABLET: 8.6; 5 TABLET, FILM COATED ORAL at 21:38

## 2019-01-01 RX ADMIN — DOCUSATE SODIUM 100 MG: 100 CAPSULE, LIQUID FILLED ORAL at 20:42

## 2019-01-01 RX ADMIN — DOCUSATE SODIUM 100 MG: 100 CAPSULE, LIQUID FILLED ORAL at 20:14

## 2019-01-01 RX ADMIN — CASTOR OIL AND BALSAM, PERU: 788; 87 OINTMENT TOPICAL at 21:11

## 2019-01-01 RX ADMIN — IPRATROPIUM BROMIDE AND ALBUTEROL SULFATE 3 ML: 2.5; .5 SOLUTION RESPIRATORY (INHALATION) at 12:35

## 2019-01-01 RX ADMIN — CASTOR OIL AND BALSAM, PERU: 788; 87 OINTMENT TOPICAL at 13:15

## 2019-01-01 RX ADMIN — METHYLPREDNISOLONE SODIUM SUCCINATE 40 MG: 40 INJECTION, POWDER, FOR SOLUTION INTRAMUSCULAR; INTRAVENOUS at 18:37

## 2019-01-01 RX ADMIN — IPRATROPIUM BROMIDE AND ALBUTEROL SULFATE 3 ML: 2.5; .5 SOLUTION RESPIRATORY (INHALATION) at 08:15

## 2019-01-01 RX ADMIN — MIRTAZAPINE 15 MG: 15 TABLET, FILM COATED ORAL at 21:38

## 2019-01-01 RX ADMIN — IPRATROPIUM BROMIDE AND ALBUTEROL SULFATE 3 ML: 2.5; .5 SOLUTION RESPIRATORY (INHALATION) at 16:02

## 2019-01-01 RX ADMIN — GADOBENATE DIMEGLUMINE 12 ML: 529 INJECTION, SOLUTION INTRAVENOUS at 12:27

## 2019-01-01 RX ADMIN — DOCUSATE SODIUM 100 MG: 100 CAPSULE, LIQUID FILLED ORAL at 08:55

## 2019-01-01 RX ADMIN — SODIUM CHLORIDE, PRESERVATIVE FREE 3 ML: 5 INJECTION INTRAVENOUS at 21:11

## 2019-01-01 RX ADMIN — ONDANSETRON 4 MG: 2 INJECTION INTRAMUSCULAR; INTRAVENOUS at 07:15

## 2019-01-01 RX ADMIN — ONDANSETRON 4 MG: 2 INJECTION INTRAMUSCULAR; INTRAVENOUS at 18:46

## 2019-01-01 RX ADMIN — SODIUM CHLORIDE, PRESERVATIVE FREE 3 ML: 5 INJECTION INTRAVENOUS at 08:11

## 2019-01-01 RX ADMIN — DULOXETINE HYDROCHLORIDE 60 MG: 60 CAPSULE, DELAYED RELEASE ORAL at 08:55

## 2019-01-01 RX ADMIN — CYCLOBENZAPRINE HYDROCHLORIDE 5 MG: 10 TABLET, FILM COATED ORAL at 19:46

## 2019-01-01 RX ADMIN — DIAZEPAM 5 MG: 5 TABLET ORAL at 10:43

## 2019-01-01 RX ADMIN — DOCUSATE SODIUM 100 MG: 100 CAPSULE, LIQUID FILLED ORAL at 08:41

## 2019-01-01 RX ADMIN — HYDROMORPHONE HYDROCHLORIDE 0.5 MG: 1 INJECTION, SOLUTION INTRAMUSCULAR; INTRAVENOUS; SUBCUTANEOUS at 18:46

## 2019-01-01 RX ADMIN — DOCUSATE SODIUM 100 MG: 100 CAPSULE, LIQUID FILLED ORAL at 20:57

## 2019-01-01 RX ADMIN — HYDROMORPHONE HYDROCHLORIDE 0.5 MG: 1 INJECTION, SOLUTION INTRAMUSCULAR; INTRAVENOUS; SUBCUTANEOUS at 10:02

## 2019-01-01 RX ADMIN — CYCLOBENZAPRINE HYDROCHLORIDE 5 MG: 10 TABLET, FILM COATED ORAL at 13:15

## 2019-01-01 RX ADMIN — ONDANSETRON 4 MG: 4 TABLET, ORALLY DISINTEGRATING ORAL at 16:22

## 2019-01-01 RX ADMIN — FENTANYL 1 PATCH: 50 PATCH TRANSDERMAL at 08:40

## 2019-01-01 RX ADMIN — DOCUSATE SODIUM 100 MG: 100 CAPSULE, LIQUID FILLED ORAL at 08:40

## 2019-01-01 RX ADMIN — HYDROMORPHONE HYDROCHLORIDE 2 MG: 2 TABLET ORAL at 20:50

## 2019-01-01 RX ADMIN — IPRATROPIUM BROMIDE AND ALBUTEROL SULFATE 3 ML: 2.5; .5 SOLUTION RESPIRATORY (INHALATION) at 15:38

## 2019-01-01 RX ADMIN — IPRATROPIUM BROMIDE AND ALBUTEROL SULFATE 3 ML: 2.5; .5 SOLUTION RESPIRATORY (INHALATION) at 16:26

## 2019-01-01 RX ADMIN — ONDANSETRON 4 MG: 4 TABLET, ORALLY DISINTEGRATING ORAL at 22:55

## 2019-01-01 RX ADMIN — SENNOSIDES,DOCUSATE SODIUM 2 TABLET: 8.6; 5 TABLET, FILM COATED ORAL at 20:56

## 2019-01-01 RX ADMIN — SENNOSIDES,DOCUSATE SODIUM 2 TABLET: 8.6; 5 TABLET, FILM COATED ORAL at 21:10

## 2019-01-01 RX ADMIN — HYDROMORPHONE HYDROCHLORIDE 2 MG: 2 TABLET ORAL at 21:02

## 2019-01-01 RX ADMIN — CASTOR OIL AND BALSAM, PERU: 788; 87 OINTMENT TOPICAL at 20:56

## 2019-01-01 RX ADMIN — MORPHINE SULFATE 2 MG: 2 INJECTION, SOLUTION INTRAMUSCULAR; INTRAVENOUS at 07:58

## 2019-01-01 RX ADMIN — DEXAMETHASONE 2 MG: 4 TABLET ORAL at 11:51

## 2019-01-01 RX ADMIN — IPRATROPIUM BROMIDE AND ALBUTEROL SULFATE 3 ML: 2.5; .5 SOLUTION RESPIRATORY (INHALATION) at 20:29

## 2019-01-01 RX ADMIN — HYDROMORPHONE HYDROCHLORIDE 2 MG: 2 TABLET ORAL at 08:54

## 2019-01-01 RX ADMIN — IPRATROPIUM BROMIDE AND ALBUTEROL SULFATE 3 ML: 2.5; .5 SOLUTION RESPIRATORY (INHALATION) at 08:23

## 2019-01-01 RX ADMIN — DEXAMETHASONE 2 MG: 4 TABLET ORAL at 13:15

## 2019-01-01 RX ADMIN — MIRTAZAPINE 15 MG: 15 TABLET, FILM COATED ORAL at 20:42

## 2019-01-01 RX ADMIN — CASTOR OIL AND BALSAM, PERU: 788; 87 OINTMENT TOPICAL at 08:55

## 2019-01-01 RX ADMIN — SODIUM CHLORIDE 75 ML/HR: 9 INJECTION, SOLUTION INTRAVENOUS at 20:56

## 2019-01-01 RX ADMIN — HYDROMORPHONE HYDROCHLORIDE 2 MG: 2 TABLET ORAL at 01:08

## 2019-01-01 RX ADMIN — DULOXETINE HYDROCHLORIDE 60 MG: 60 CAPSULE, DELAYED RELEASE ORAL at 08:43

## 2019-01-01 RX ADMIN — HYDROMORPHONE HYDROCHLORIDE 1 MG: 1 INJECTION, SOLUTION INTRAMUSCULAR; INTRAVENOUS; SUBCUTANEOUS at 16:23

## 2019-01-01 RX ADMIN — METOPROLOL TARTRATE 25 MG: 25 TABLET ORAL at 10:52

## 2019-01-01 RX ADMIN — DIAZEPAM 5 MG: 5 TABLET ORAL at 22:28

## 2019-01-01 RX ADMIN — ONDANSETRON 4 MG: 2 INJECTION INTRAMUSCULAR; INTRAVENOUS at 10:50

## 2019-01-01 RX ADMIN — HYDROMORPHONE HYDROCHLORIDE 0.5 MG: 1 INJECTION, SOLUTION INTRAMUSCULAR; INTRAVENOUS; SUBCUTANEOUS at 03:41

## 2019-01-01 RX ADMIN — SODIUM CHLORIDE 250 ML: 9 INJECTION, SOLUTION INTRAVENOUS at 15:18

## 2019-01-01 RX ADMIN — METOPROLOL TARTRATE 25 MG: 25 TABLET ORAL at 08:41

## 2019-01-01 RX ADMIN — DIAZEPAM 2 MG: 2 TABLET ORAL at 01:08

## 2019-01-01 RX ADMIN — CYCLOBENZAPRINE HYDROCHLORIDE 5 MG: 10 TABLET, FILM COATED ORAL at 21:01

## 2019-01-01 RX ADMIN — SODIUM CHLORIDE 1428 ML: 9 INJECTION, SOLUTION INTRAVENOUS at 10:47

## 2019-01-01 RX ADMIN — DULOXETINE HYDROCHLORIDE 60 MG: 60 CAPSULE, DELAYED RELEASE ORAL at 08:40

## 2019-01-01 RX ADMIN — IPRATROPIUM BROMIDE AND ALBUTEROL SULFATE 3 ML: 2.5; .5 SOLUTION RESPIRATORY (INHALATION) at 16:13

## 2019-01-01 RX ADMIN — FENTANYL 1 PATCH: 25 PATCH TRANSDERMAL at 18:38

## 2019-01-01 RX ADMIN — BISACODYL 5 MG: 5 TABLET, COATED ORAL at 13:15

## 2019-01-01 RX ADMIN — METOPROLOL TARTRATE 25 MG: 25 TABLET ORAL at 20:57

## 2019-01-01 RX ADMIN — IPRATROPIUM BROMIDE AND ALBUTEROL SULFATE 3 ML: 2.5; .5 SOLUTION RESPIRATORY (INHALATION) at 13:29

## 2019-01-01 RX ADMIN — DEXAMETHASONE 2 MG: 4 TABLET ORAL at 12:23

## 2019-01-01 RX ADMIN — HYDROMORPHONE HYDROCHLORIDE 0.5 MG: 1 INJECTION, SOLUTION INTRAMUSCULAR; INTRAVENOUS; SUBCUTANEOUS at 15:30

## 2019-01-01 RX ADMIN — SODIUM CHLORIDE 75 ML/HR: 9 INJECTION, SOLUTION INTRAVENOUS at 02:41

## 2019-01-01 RX ADMIN — DOCUSATE SODIUM 100 MG: 100 CAPSULE, LIQUID FILLED ORAL at 21:10

## 2019-01-01 RX ADMIN — HYDROMORPHONE HYDROCHLORIDE 0.5 MG: 1 INJECTION, SOLUTION INTRAMUSCULAR; INTRAVENOUS; SUBCUTANEOUS at 11:08

## 2019-01-01 RX ADMIN — DULOXETINE HYDROCHLORIDE 30 MG: 30 CAPSULE, DELAYED RELEASE ORAL at 10:52

## 2019-01-01 RX ADMIN — Medication 21.4 MILLICURIE: at 12:20

## 2019-01-01 RX ADMIN — DEXAMETHASONE 2 MG: 4 TABLET ORAL at 08:40

## 2019-01-01 RX ADMIN — HYDROMORPHONE HYDROCHLORIDE 2 MG: 2 TABLET ORAL at 13:15

## 2019-01-01 RX ADMIN — DIAZEPAM 2 MG: 2 TABLET ORAL at 07:02

## 2019-01-01 RX ADMIN — HYDROMORPHONE HYDROCHLORIDE 0.5 MG: 1 INJECTION, SOLUTION INTRAMUSCULAR; INTRAVENOUS; SUBCUTANEOUS at 11:00

## 2019-01-01 RX ADMIN — DOCUSATE SODIUM 100 MG: 100 CAPSULE, LIQUID FILLED ORAL at 21:38

## 2019-01-01 RX ADMIN — PANTOPRAZOLE SODIUM 40 MG: 40 TABLET, DELAYED RELEASE ORAL at 10:52

## 2019-01-01 RX ADMIN — PANTOPRAZOLE SODIUM 40 MG: 40 TABLET, DELAYED RELEASE ORAL at 05:40

## 2019-01-01 RX ADMIN — HEPARIN SODIUM 5000 UNITS: 5000 INJECTION INTRAVENOUS; SUBCUTANEOUS at 20:14

## 2019-01-01 RX ADMIN — HYDROMORPHONE HYDROCHLORIDE 0.5 MG: 1 INJECTION, SOLUTION INTRAMUSCULAR; INTRAVENOUS; SUBCUTANEOUS at 08:09

## 2019-01-01 RX ADMIN — DIPHENHYDRAMINE HYDROCHLORIDE 50 MG: 50 INJECTION INTRAMUSCULAR; INTRAVENOUS at 10:51

## 2019-01-01 RX ADMIN — HYDROMORPHONE HYDROCHLORIDE 0.5 MG: 1 INJECTION, SOLUTION INTRAMUSCULAR; INTRAVENOUS; SUBCUTANEOUS at 05:22

## 2019-01-01 RX ADMIN — MORPHINE SULFATE 1 MG: 2 INJECTION, SOLUTION INTRAMUSCULAR; INTRAVENOUS at 11:35

## 2019-01-01 RX ADMIN — PANTOPRAZOLE SODIUM 40 MG: 40 TABLET, DELAYED RELEASE ORAL at 08:43

## 2019-01-01 RX ADMIN — MIRTAZAPINE 15 MG: 15 TABLET, FILM COATED ORAL at 21:10

## 2019-01-01 RX ADMIN — ONDANSETRON 4 MG: 4 TABLET, ORALLY DISINTEGRATING ORAL at 01:03

## 2019-01-01 RX ADMIN — IPRATROPIUM BROMIDE AND ALBUTEROL SULFATE 3 ML: 2.5; .5 SOLUTION RESPIRATORY (INHALATION) at 19:46

## 2019-01-01 RX ADMIN — ALBUMIN HUMAN 25 G: 0.25 SOLUTION INTRAVENOUS at 02:48

## 2019-01-01 RX ADMIN — METOPROLOL TARTRATE 25 MG: 25 TABLET ORAL at 08:40

## 2019-01-01 RX ADMIN — IPRATROPIUM BROMIDE AND ALBUTEROL SULFATE 3 ML: 2.5; .5 SOLUTION RESPIRATORY (INHALATION) at 07:39

## 2019-01-01 RX ADMIN — HYDROMORPHONE HYDROCHLORIDE 2 MG: 2 TABLET ORAL at 05:17

## 2019-01-01 RX ADMIN — SENNOSIDES,DOCUSATE SODIUM 2 TABLET: 8.6; 5 TABLET, FILM COATED ORAL at 10:52

## 2019-01-01 RX ADMIN — HYDROMORPHONE HYDROCHLORIDE 1 MG: 1 INJECTION, SOLUTION INTRAMUSCULAR; INTRAVENOUS; SUBCUTANEOUS at 17:51

## 2019-01-01 RX ADMIN — MORPHINE SULFATE 30 MG: 30 TABLET ORAL at 12:03

## 2019-01-01 RX ADMIN — MORPHINE SULFATE 1 MG: 2 INJECTION, SOLUTION INTRAMUSCULAR; INTRAVENOUS at 10:00

## 2019-01-01 RX ADMIN — PANTOPRAZOLE SODIUM 40 MG: 40 TABLET, DELAYED RELEASE ORAL at 05:18

## 2019-01-01 RX ADMIN — ONDANSETRON 4 MG: 2 INJECTION INTRAMUSCULAR; INTRAVENOUS at 20:56

## 2019-01-01 RX ADMIN — SODIUM CHLORIDE 75 ML/HR: 9 INJECTION, SOLUTION INTRAVENOUS at 11:14

## 2019-01-01 RX ADMIN — IPRATROPIUM BROMIDE AND ALBUTEROL SULFATE 3 ML: 2.5; .5 SOLUTION RESPIRATORY (INHALATION) at 08:08

## 2019-01-01 RX ADMIN — DEXAMETHASONE 2 MG: 4 TABLET ORAL at 08:55

## 2019-01-01 RX ADMIN — IPRATROPIUM BROMIDE AND ALBUTEROL SULFATE 3 ML: 2.5; .5 SOLUTION RESPIRATORY (INHALATION) at 20:20
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
## 2019-02-04 NOTE — PATIENT INSTRUCTIONS
After seeing palliative care and Dr. Jose Santana    Call Dr. Jacobson on a Monday with an update.    Ask for Dyan () and leave a message for  Dr. Jacobson.   He will call you back at the end of the day as soon as he can.     [phone]
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
## 2019-02-04 NOTE — THERAPY TREATMENT NOTE
Outpatient Physical Therapy Ortho / Lymphedema Treatment Note   [facility]     Patient Name: Brianna Urrutia  : 1950  MRN: [id number]  Today's Date: 2019        Visit Date: 2019    Visit Dx:    ICD-10-CM ICD-9-CM   1. Lymphedema of right upper extremity I89.0 457.1   2. Pain of right upper extremity M79.601 729.5   3. Closed 3-part fracture of proximal end of right humerus with routine healing, subsequent encounter S42.291D V54.11   4. Scar conditions and fibrosis of skin L90.5 709.2   5. Right arm pain M79.601 729.5   6. Mastodynia of right breast N64.4 611.71       Patient Active Problem List   Diagnosis   • Malignant neoplasm of overlapping sites of right female breast (CMS/HCC)   • Malignant neoplasm of upper-outer quadrant of right female breast (CMS/HCC)   • Degenerative disc disease, cervical   • Brachial plexopathy   • Lymphedema of right arm   • History of radiation therapy   • JACQUI (generalized anxiety disorder)   • Attention deficit hyperactivity disorder (ADHD)   • Bone metastases (CMS/HCC)        Lymphedema     Row Name 19 1500          Able to rate subjective pain?  yes  -MW    Pre-Treatment Pain Level  8  -MW    Post-Treatment Pain Level  10  -MW    Subjective Pain Comment  Rt arm, shoulder, chest; wrist and hand   -MW          Subjective Comments  Pt reports feels better after PT; would like to come more often as the home lymph pump doesn't seem to work as well right now. Chest is so tight; fluid not moving   -MW          Rt Upper Extremity Comments   Skin tight limiting elbow flex and ext; wrist flex/ ext and supination   -MW          Ptting Edema Category  By severity  -MW    Pitting Edema  Very Severe  -MW    Edema Assessment Comment  Firm pitting Rt chest, over scapula into shoulder, axilla and RUE; soft pitting hand and fingers.   -MW          Upper Extremity Conditions  right:;intact;clean  -MW    Upper Extremity Color/Pigment  right:;erythema;woody increased medial  "erythema \"rash like\" post MLD   -MW    Upper Quadrant Conditions  right:;intact;dry  -MW    Upper Quadrant Color/Pigment  right:;hyperpigmented;woody;radiation fibrosis  -MW       Manual Lymphatic Drainage  initial sequence;opened regional lymph nodes;opened anastamoses;extremity treatment  -MW    Initial Sequence  supraclavicular;shoulder collectors;abdomen  -MW    Supraclavicular  right;left  -MW    Shoulder Collectors  right  -MW    Abdomen  --  -MW    Opened Regional Lymph Nodes  axillary;inguinal  -MW    Axillary  right;left  -MW    Inguinal  right  -MW    Ribs  Rt posterior   -MW    Opened Anastamoses  anterior axillo-axillary;axillo-inguinal;posterior axillo-axillary  -MW    Anterior Axillo-Axillary  working right to left  -MW    Posterior Axillo-Axillary  working right to left  -MW    Axillo-Inguinal  right  -MW    Extremity Treatment  MLD to full limb;extremity treatment focus on  -MW    MLD to Full Limb  Focused on clearing posterior shoulder working LAURA and AIA in sitting; then chest and arm in reclined position   -MW    Manual Therapy  STM with tissue bending at lateral pectoral border; as well as across superior aspect of chest   -MW          Compression/Skin Care  wrapping location;bandaging  -MW    Skin Care  moisturizing lotion applied Eucerin skin calming to Rt upper quarter & RUE   -MW    Bandage Layers  cotton elastic stocking- single layer (comment size)  -MW    Bandaging Comments  compressogrip sizes, overlapping and layered for gradient compression   -MW    Bandaging Technique  light compression  -MW    Compression Garment Comments  size 4 hand and forearm, doubled on hand & wrist; size 5 forearm to upper arm, doubled on foerarm   -MW    Compression/Skin Care Comments  assisted pt to don shirt and coat.   -MW      User Key  (r) = Recorded By, (t) = Taken By, (c) = Cosigned By    Initials Name Provider Type    MW Na Bergeron, PT Physical Therapist                        PT Assessment/Plan  "    Row Name 02/04/19 1500          PT Assessment    Functional Limitations  Performance in self-care ADL;Limitation in home management;Performance in leisure activities  -     Impairments  Pain;Impaired lymphatic circulation;Edema;Joint mobility;Muscle strength;Impaired flexibility;Motor function;Impaired muscle length;Impaired sensory integrity;Range of motion  -     Assessment Comments  Pt continues to report severe pain and dysfunction due to swelling and tightness of tissues Rt arm, shoulder and chest. Posterior right upper quarter with severe pitting edema which softened with CDT-MLD; RUE also softer post treatment. Home lymph pump seems to be providing little relief at this time due to trunk/ shoulder congestion and fibrotic tissue changes on chest. Shoulder ROM limited by pain as well as soft tissue restrictions especially at lateral aspect of pectoralis / radiation fibrosis of chest/ breast. Consider increasing frequency or adding OT referral to increase treatment options. Pt also limited by pain and difficulty with transportation.   -MW     Rehab Potential  Fair  -MW     Patient/caregiver participated in establishment of treatment plan and goals  Yes  -MW     Patient would benefit from skilled therapy intervention  Yes  -MW        PT Plan    PT Frequency  2x/week pt requesting to come more frequently   -     Physical Therapy Interventions (Optional Details)  ROM (Range of Motion);stretching;manual therapy techniques;manual lymphatic drainage;home exercise program;patient/family education  -     PT Plan Comments  Cont ROM with STM and CDT-MLD; skin care and compression   -       User Key  (r) = Recorded By, (t) = Taken By, (c) = Cosigned By    Initials Name Provider Type    Na High, PT Physical Therapist               Exercises     Row Name 02/04/19 1500          Subjective Comments    Subjective Comments  Pt reports feels better after PT; would like to come more often as the home  lymph pump doesn't seem to work as well right now. Chest is so tight; fluid not moving   -MW        Subjective Pain    Able to rate subjective pain?  yes  -MW     Pre-Treatment Pain Level  8  -MW     Post-Treatment Pain Level  10  -MW     Subjective Pain Comment  Rt arm, shoulder, chest; wrist and hand   -MW        Total Minutes    63827 - PT Manual Therapy Minutes  60        Exercise 1    Exercise Name 1  PROM to AAROM: shoulder flex, ABD, ER   -MW     Reps 1  8  -MW     Additional Comments  intermittently during STM / MLD   -MW        Exercise 2    Exercise Name 2  AAROM elbow flex/ ext   -MW     Reps 2  8  -MW     Additional Comments  intermittently during STM / MLD; limited by soft tissue approximation and/ or tightness   -MW        Exercise 3    Exercise Name 3  AAROM forearm pronation/ supination   -MW     Reps 3  8  -MW     Additional Comments  intermittently during STM / MLD; limited by soft tissue approximation and/ or tightness   -MW       User Key  (r) = Recorded By, (t) = Taken By, (c) = Cosigned By    Initials Name Provider Type    Na High, PT Physical Therapist                                           Time Calculation:   Start Time: 1500  Total Timed Code Minutes- PT: 60 minute(s)   Therapy Suggested Charges     Code   Minutes Charges    01103 (CPT®) Hc Pt Neuromusc Re Education Ea 15 Min      95981 (CPT®) Hc Pt Ther Proc Ea 15 Min      58308 (CPT®) Hc Gait Training Ea 15 Min      43928 (CPT®) Hc Pt Therapeutic Act Ea 15 Min      99325 (CPT®) Hc Pt Manual Therapy Ea 15 Min 60 4    32499 (CPT®) Hc Pt Ther Massage- Per 15 Min      47992 (CPT®) Hc Pt Iontophoresis Ea 15 Min      98276 (CPT®) Hc Pt Elec Stim Ea-Per 15 Min      58993 (CPT®) Hc Pt Ultrasound Ea 15 Min      31905 (CPT®) Hc Pt Self Care/Mgmt/Train Ea 15 Min      08212 (CPT®) Hc Pt Prosthetic (S) Train Initial Encounter, Each 15 Min      65858 (CPT®) Hc Orthotic(S) Mgmt/Train Initial Encounter, Each 15min      60992 (CPT®) Hc Pt  Aquatic Therapy Ea 15 Min      53372 (CPT®) Hc Pt Orthotic(S)/Prosthetic(S) Encounter, Each 15 Min       (CPT®) Hc Pt Electrical Stim Unattended      Total  60 4        Therapy Charges for Today     Code Description Service Date Service Provider Modifiers Qty    52842509463 HC PT MANUAL THERAPY EA 15 MIN 2/4/2019 Na Bergeron, PT GP 4                    Na Bergeron, PT  2/4/2019

## 2019-02-11 NOTE — THERAPY TREATMENT NOTE
"    Outpatient Physical Therapy Lymphedema Treatment Note  [facility]     Patient Name: Brianna Urrutia  : 1950  MRN: [id number]  Today's Date: 2019        Visit Date: 2019    Visit Dx:  No diagnosis found.    Patient Active Problem List   Diagnosis   • Malignant neoplasm of overlapping sites of right female breast (CMS/HCC)   • Malignant neoplasm of upper-outer quadrant of right female breast (CMS/HCC)   • Degenerative disc disease, cervical   • Brachial plexopathy   • Lymphedema of right arm   • History of radiation therapy   • JACQUI (generalized anxiety disorder)   • Attention deficit hyperactivity disorder (ADHD)   • Bone metastases (CMS/HCC)        Lymphedema     Row Name 19 1300          Able to rate subjective pain?  yes  -MW    Pre-Treatment Pain Level  8  -MW    Post-Treatment Pain Level  10  -MW    Subjective Pain Comment  Rt wrist / arm   -MW          Subjective Comments  \"I want to work on this (chest) and my arm is so painful.\"  Reports had a rough weekend; pain and trying to cook, etc.   -MW          Ptting Edema Category  By severity  -MW    Pitting Edema  Very Severe  -MW    Edema Assessment Comment  Firm pitting Rt chest across sternum and along Lt clavicle; extending posteriorly over scapula into shoulder, axilla and RUE; soft pitting hand and fingers. Arm softer, skin with some movement prior to CDT-MLD.   -MW          Upper Extremity Conditions  right:;intact;clean  -MW    Upper Extremity Color/Pigment  right:;erythema;woody increased medial erythema \"rash like\" post MLD   -MW    Upper Quadrant Conditions  right:;intact;dry  -MW    Upper Quadrant Color/Pigment  right:;hyperpigmented;woody;radiation fibrosis  -MW    Skin Observations Comment  Rash like erythema persists across Rt chest extending across sternum, lateral posterior shoulder, medial upper arm.   -MW          Manual Lymphatic Drainage  initial sequence;opened regional lymph nodes;opened anastamoses;extremity " treatment  -MW    Initial Sequence  supraclavicular;shoulder collectors;abdomen  -MW    Supraclavicular  right;left  -MW    Shoulder Collectors  right  -MW    Abdomen  superficial  -MW    Opened Regional Lymph Nodes  axillary;inguinal  -MW    Axillary  right;left  -MW    Inguinal  right  -MW    Ribs  Rt posterior and anterior   -MW    Opened Anastamoses  anterior axillo-axillary;axillo-inguinal;posterior axillo-axillary  -MW    Anterior Axillo-Axillary  working right to left  -MW    Posterior Axillo-Axillary  working right to left  -MW    Axillo-Inguinal  right  -MW    Extremity Treatment  MLD to full limb;extremity treatment focus on  -MW    MLD to Full Limb  Focused on clearing posterior shoulder working LAURA and AIA in sitting; then chest and arm in reclined position   -MW    Manual Therapy  STM with tissue bending at lateral pectoral border; as well as across superior aspect of chest   -MW          Compression/Skin Care  wrapping location;bandaging  -MW    Skin Care  moisturizing lotion applied Eucerin and cocoa butter per pt request   -MW    Bandage Layers  cotton elastic stocking- single layer (comment size)  -MW    Bandaging Comments  compressogrip sizes, overlapping and layered for gradient compression   -MW    Bandaging Technique  light compression  -MW    Compression Garment Comments  size 4 hand and forearm, doubled on hand & wrist; size 5 forearm to upper arm, doubled on foerarm   -MW    Compression/Skin Care Comments  assisted pt to don shirt  -      User Key  (r) = Recorded By, (t) = Taken By, (c) = Cosigned By    Initials Name Provider Type    MW Na Bergeron, PT Physical Therapist                        PT Assessment/Plan     Row Name 02/11/19 1300          PT Assessment    Functional Limitations  Performance in self-care ADL;Limitation in home management;Performance in leisure activities  -MW     Impairments  Pain;Impaired lymphatic circulation;Edema;Joint mobility;Muscle strength;Impaired  "flexibility;Motor function;Impaired muscle length;Impaired sensory integrity;Range of motion  -     Assessment Comments  Rt chest pitting edema very tight; pitting extends across sternum and along Lt clavicle as well. Posterior trunk/ shoulder less full this visit; as well as arm softer but still severe lymphedema. Pt provided pulley to use at home to assist with Rt UE stretching and \"traction\" to improve ROM and function. Cont PT as able; more pain reported post tx this session.   -MW     Rehab Potential  Fair  -MW     Patient/caregiver participated in establishment of treatment plan and goals  Yes  -MW     Patient would benefit from skilled therapy intervention  Yes  -MW        PT Plan    PT Frequency  2x/week  -     Physical Therapy Interventions (Optional Details)  ROM (Range of Motion);stretching;manual therapy techniques;manual lymphatic drainage;home exercise program;patient/family education  -     PT Plan Comments  Cont ROM with STM and CDT-MLD; skin care and compression   -       User Key  (r) = Recorded By, (t) = Taken By, (c) = Cosigned By    Initials Name Provider Type     Na Bergeron, PT Physical Therapist               Exercises     Row Name 02/11/19 1706 02/11/19 1300          Subjective Comments    Subjective Comments  --  \"I want to work on this (chest) and my arm is so painful.\"  Reports had a rough weekend; pain and trying to cook, etc.   -        Subjective Pain    Able to rate subjective pain?  --  yes  -MW     Pre-Treatment Pain Level  --  8  -MW     Post-Treatment Pain Level  --  10  -MW     Subjective Pain Comment  --  Rt wrist / arm   -MW        Total Minutes    83136 - PT Therapeutic Exercise Minutes  10  -MW  --     79983 - PT Manual Therapy Minutes  50  -MW  --        Exercise 1    Exercise Name 1  --  PROM to AAROM: shoulder flex, ABD, ER   -MW     Reps 1  --  8  -MW     Additional Comments  --  intermittently during STM / MLD; traction improves tolerance to flexion    " -MW        Exercise 2    Exercise Name 2  --  AAROM elbow flex/ ext   -MW     Reps 2  --  4  -MW     Additional Comments  --  intermittently during STM / MLD; limited by soft tissue approximation and/ or tightness   -MW        Exercise 3    Exercise Name 3  --  AAROM forearm pronation/ supination   -MW     Reps 3  --  6  -MW     Additional Comments  --  intermittently during STM / MLD; limited by soft tissue approximation and/ or tightness   -MW       User Key  (r) = Recorded By, (t) = Taken By, (c) = Cosigned By    Initials Name Provider Type    Na High, PT Physical Therapist                            Therapy Education  Education Details: Issued pulleys to practice forward flexion and provide gentle traction for comfort.   Given: HEP, Symptoms/condition management  Program: Modified, Progressed  How Provided: Verbal  Provided to: Patient  Level of Understanding: Verbalized              Time Calculation:   Start Time: 1300  Total Timed Code Minutes- PT: 60 minute(s)   Therapy Suggested Charges     Code   Minutes Charges    75684 (CPT®) Hc Pt Neuromusc Re Education Ea 15 Min      50948 (CPT®) Hc Pt Ther Proc Ea 15 Min 10 1    58960 (CPT®) Hc Gait Training Ea 15 Min      82080 (CPT®) Hc Pt Therapeutic Act Ea 15 Min      42166 (CPT®) Hc Pt Manual Therapy Ea 15 Min 50 3    62633 (CPT®) Hc Pt Ther Massage- Per 15 Min      83987 (CPT®) Hc Pt Iontophoresis Ea 15 Min      28837 (CPT®) Hc Pt Elec Stim Ea-Per 15 Min      10853 (CPT®) Hc Pt Ultrasound Ea 15 Min      96425 (CPT®) Hc Pt Self Care/Mgmt/Train Ea 15 Min      72904 (CPT®) Hc Pt Prosthetic (S) Train Initial Encounter, Each 15 Min      48353 (CPT®) Hc Orthotic(S) Mgmt/Train Initial Encounter, Each 15min      04334 (CPT®) Hc Pt Aquatic Therapy Ea 15 Min      34836 (CPT®) Hc Pt Orthotic(S)/Prosthetic(S) Encounter, Each 15 Min       (CPT®) Hc Pt Electrical Stim Unattended      Total  60 4        Therapy Charges for Today     Code Description Service  Date Service Provider Modifiers Qty    93067853568  PT THER PROC EA 15 MIN 2/11/2019 Na Bergeron, PT GP 1    74068650376 HC PT MANUAL THERAPY EA 15 MIN 2/11/2019 Na Bergeron, PT GP 3                    Na Bergeron, PT  2/11/2019

## 2019-02-13 NOTE — TELEPHONE ENCOUNTER
----- Message from Linda Millard sent at 2/13/2019 10:08 AM EST -----  Patient has a UTI/burning when urinating, frequent urination, and slight fever. She wants to know if nitrofurantoin, macrocrystal-monohydrate, (MACROBID) 100 MG capsules can be called in since they helped before. Her pharmacy is [facility] [place] [address] [address] - [phone]  - [phone]..  ThanksRocio

## 2019-02-28 PROBLEM — T45.1X5S: Status: ACTIVE | Noted: 2019-01-01

## 2019-02-28 PROBLEM — Z51.11 ENCOUNTER FOR ANTINEOPLASTIC CHEMOTHERAPY: Chronic | Status: ACTIVE | Noted: 2019-01-01

## 2019-02-28 PROBLEM — C50.811 MALIGNANT NEOPLASM OF OVERLAPPING SITES OF RIGHT FEMALE BREAST (HCC): Chronic | Status: ACTIVE | Noted: 2017-06-13

## 2019-02-28 PROBLEM — Z51.11 ENCOUNTER FOR ANTINEOPLASTIC CHEMOTHERAPY: Status: ACTIVE | Noted: 2019-01-01

## 2019-02-28 PROBLEM — C79.51 BONE METASTASES: Chronic | Status: ACTIVE | Noted: 2018-01-01
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
## 2019-03-04 NOTE — TELEPHONE ENCOUNTER
----- Message from Jose Santana MD sent at 3/4/2019  1:44 PM EST -----  Regarding: RE: [facility] - UV LIGHT PROCEDURE   Contact: [phone]  No  ----- Message -----  From: Hailee Dillon RN  Sent: 3/4/2019   1:39 PM  To: Jose Santana MD  Subject: FW: S[facility] - UV LIGHT PROCEDURE                       ----- Message -----  From: Linda Freire  Sent: 3/4/2019   1:29 PM  To: [facility] Nurse Pool  Subject: [facility] - UV LIGHT PROCEDURE                       PATIENT WANTED TO KNOW IF [facility] DOES A PROCEDURE INVOLVING ULTRA VIOLET LIGHT AND BLOOD.
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
## 2019-03-12 NOTE — TELEPHONE ENCOUNTER
Called patient with premedication instructions for CT scan:    Prednisone 50 mg ORAL 13 hours, 7 hours, and 1 hour prior to exam  Diphenhydramine 50 mg ORAL 1 hour prior to exam     Will send rx for prednisone.  Instructed to take benadryl OTC.  Verbalized understanding.  States she will have to reschedule CT tomorrow due to transportation issues and will need to delay office visit.  Will send message to Carlos regarding appointment changes.

## 2019-03-13 NOTE — TELEPHONE ENCOUNTER
"SW received a phone call from pt on 3/12.  She was very upset and crying and stated that she went to start her car to come in for her appointment today, and the battery was dead.  Also, one of her friends from [facility] was planning to come down and be with her today, but she cancelled due to sickness.  Pt stated that she \"was not up for getting ready and coming in today\".  SW informed pt that Faith could definitely assist her getting her a ride here for her appointments through [employer].  Pt verbalized understanding but stated that she did not want to be here for several hours sitting alone between tests.  SW provided support and active listening with pt.  Pt stated that she was going to call and cancel her appointments and then reschedule for next week.  SW instructed her to call as soon as possible and transportation arrangements will be made.  SW also placed a referral to [facility] program for future oncology related appointments.  SW available for ongoing support and resource needs.  "
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
## 2019-04-09 NOTE — THERAPY PROGRESS REPORT/RE-CERT
"    Outpatient Physical Therapy Lymphedema Progress Note   [place]     Patient Name: Brianna Urrutia  : 1950  MRN: [id number]  Today's Date: 2019        Visit Date: 2019    Visit Dx:    ICD-10-CM ICD-9-CM   1. Lymphedema of right upper extremity I89.0 457.1   2. Closed 3-part fracture of proximal end of right humerus with routine healing, subsequent encounter S42.291D V54.11   3. Scar conditions and fibrosis of skin L90.5 709.2   4. Pain of right upper extremity M79.601 729.5   5. Mastodynia of right breast N64.4 611.71   6. Right arm pain M79.601 729.5       Patient Active Problem List   Diagnosis   • Malignant neoplasm of overlapping sites of right female breast (CMS/HCC)   • Malignant neoplasm of upper-outer quadrant of right female breast (CMS/HCC)   • Degenerative disc disease, cervical   • Brachial plexopathy   • Lymphedema of right arm   • History of radiation therapy   • JACQUI (generalized anxiety disorder)   • Attention deficit hyperactivity disorder (ADHD)   • Bone metastases (CMS/HCC)   • Encounter for antineoplastic chemotherapy   • Antineoplastic antibiotics causing adverse effect in therapeutic use, sequela        Lymphedema     Row Name 19 1400          Able to rate subjective pain?  yes  -MW    Pre-Treatment Pain Level  8  -MW    Post-Treatment Pain Level  8  -MW    Subjective Pain Comment  RUE/ shoulder, wrist; also Rt back/ hip / groin   -MW          Subjective Comments  Pt reports fell a while back and her something pop, hasn't been ok since. Also reports RLE giving out recently. Notes pain \"in this muscle\" (fullness just superior to Rt inguinal ligament, ? hernia?).   -MW          Alignment Options  Forward head;Rounded shoulders;Scapular elevation  -MW    Forward Head  Mild  -MW    Thoracic Kyphosis  Mild;Moderate;Increased  -MW    Rounded Shoulders  Right:;Moderate;Left:;Mild  -MW    Scapular Elevation  Right:;Mild;Moderate  -MW          Rt Shoulder Abduction PROM  " "0-45; with pain; tissue tightness chest/ axilla   -MW    Rt Shoulder Flexion PROM  0-80; with pain   -MW    Rt Shoulder External Rotation PROM  lacks 10 deg to neutral with arm at 30 deg ABD   -MW    Rt Upper Extremity Comments   AROM elbow lacks ~10 deg ext, flex WFL but with soft tissue approximation; pronation full, supination to neutral passively; wrist flex/ Ext passively WFL but limited by pain and soft tissue tightness; palm flattened due to loss of intrinsic mm and mod to severe edema; unable to approximate thumb to any finger actively   -MW          Ptting Edema Category  By severity  -MW    Pitting Edema  Very Severe;Severe  -MW    Edema Assessment Comment  Moderate to severe pitting Rt chest across sternum and mild along Lt clavicle; moderate pitting edema extending posteriorly over scapula into shoulder, axilla; RUE severe to very severe upper arm, forearm and hand.   -MW          Upper Extremity Conditions  right:;intact;clean  -MW    Upper Extremity Color/Pigment  right:;erythema;woody increased medial erythema \"rash like\" post MLD   -MW    Upper Quadrant Conditions  right:;intact;dry  -MW    Upper Quadrant Color/Pigment  right:;hyperpigmented;woody;radiation fibrosis  -MW    Skin Observations Comment  Rash like erythema persists across Rt chest extending across sternum, lateral posterior shoulder, medial upper arm. More erythema post MLD   -MW          Measurement Type(s)  Quick Girth  -MW    Quick Girth Areas  Upper extremities  -MW          Axilla  37.3 cm  -MW    Mid upper arm  35 cm  -MW    Elbow  32 cm  -MW    Mid forearm  28 cm  -MW    Wrist crease  19 cm  -MW    Web space  19.6 cm  -MW    Met-heads  19.3 cm  -MW    RUE Quick Girth Total  190.2  -MW          Manual Lymphatic Drainage  initial sequence;opened regional lymph nodes;opened anastamoses;extremity treatment  -MW    Initial Sequence  supraclavicular;shoulder collectors  -MW    Supraclavicular  right;left  -MW    Shoulder Collectors  " right  -MW    Abdomen Comment  deferred due to RLQ pain / ? hernia   -MW    Opened Regional Lymph Nodes  axillary  -MW    Axillary  right  -MW    Inguinal  --  -MW    Opened Anastamoses  anterior axillo-axillary;axillo-inguinal  -MW    Anterior Axillo-Axillary  working right to left  -MW    Axillo-Inguinal  right  -MW    Extremity Treatment  MLD to full limb;extremity treatment focus on  -MW    MLD to Full Limb  Focused on clearing lateral trunk and chest, supraclavicular nodes   -MW          Compression/Skin Care  wrapping location;bandaging  -MW    Compression Garment Comments  assisted pt to don arm sleeve with attached gauntlet   -MW    Compression/Skin Care Comments  assisted pt to don shirt  -MW      User Key  (r) = Recorded By, (t) = Taken By, (c) = Cosigned By    Initials Name Provider Type    Na High, PT Physical Therapist                  PT Assessment/Plan     Row Name 04/09/19 1400          PT Assessment    Functional Limitations  Performance in self-care ADL;Limitation in home management;Performance in leisure activities  -     Impairments  Pain;Impaired lymphatic circulation;Edema;Joint mobility;Muscle strength;Impaired flexibility;Motor function;Impaired muscle length;Impaired sensory integrity;Range of motion  -     Assessment Comments  Pt again returns to PT after approximately 1 month without PT due to pain and transportation issues. Pt with new areas of pain in back and Lt groin/ hip for which she was transported to ED for evaluation. RUE lymphedema remains severe to very severe with firm pitting edema throughout UE and spongy edema in palm of hand. Pt continues to report paresthesias throughout RUE including wrist and hand. She is unable to approximate her thumb to any finger this visit due to combination of mm weakness and soft tissue restrictions / edema. Shoulder PROM has decreased over past month without PT interventions; pt has attempted to continue HEP for self AAROM but  "pain, soft tissue restriction from edema and scar tissue limit her. RUE edema softens with MLD, as well as skin across Rt chest also more mobile post MLD. Recommend continue PT to maximize QOL and to attempt to improve ROM for improved skin care/ hygiene, and decrease RUE edema.   -MW     Rehab Potential  Fair  -MW     Patient/caregiver participated in establishment of treatment plan and goals  Yes  -MW     Patient would benefit from skilled therapy intervention  Yes  -MW        PT Plan    PT Frequency  2x/week  -MW     Predicted Duration of Therapy Intervention (Therapy Eval)  12 visits   -MW     Planned CPT's?  PT MANUAL THERAPY EA 15 MIN: 07948;PT THER PROC EA 15 MIN: 41361;PT THER ACT EA 15 MIN: 65269;PT NEUROMUSC RE-EDUCATION EA 15 MIN: 09181;PT SELF CARE/HOME MGMT/TRAIN EA 15: 73869;PT HOT OR COLD PACK TREAT MCARE  -MW     Physical Therapy Interventions (Optional Details)  ROM (Range of Motion);stretching;manual therapy techniques;manual lymphatic drainage;home exercise program;patient/family education  -MW     PT Plan Comments  Cont ROM/ stretching with CDT-MLD, compression, STM as tolerated.   -MW       User Key  (r) = Recorded By, (t) = Taken By, (c) = Cosigned By    Initials Name Provider Type    MW Na Bergeron, PT Physical Therapist             Exercises     Row Name 04/09/19 1400             Subjective Comments    Subjective Comments  Pt reports fell a while back and her something pop, hasn't been ok since. Also reports RLE giving out recently. Notes pain \"in this muscle\" (fullness just superior to Rt inguinal ligament, ? hernia?).   -MW         Subjective Pain    Able to rate subjective pain?  yes  -MW      Pre-Treatment Pain Level  8  -MW      Post-Treatment Pain Level  8  -MW      Subjective Pain Comment  RUE/ shoulder, wrist; also Rt back/ hip / groin   -MW         Total Minutes    69019 - PT Manual Therapy Minutes  45  -MW         Exercise 1    Exercise Name 1  PROM to AAROM: shoulder flex, " ABD, ER   -MW      Reps 1  5  -MW      Additional Comments  intermittently during MLD; adding traction intermittently for comfort   -MW         Exercise 2    Exercise Name 2  AAROM elbow flex/ ext   -MW      Reps 2  4  -MW      Additional Comments  intermittently during MLD  -MW         Exercise 3    Exercise Name 3  AAROM forearm pronation/ supination   -MW      Reps 3  4  -MW      Additional Comments  intermittently during MLD  -MW        User Key  (r) = Recorded By, (t) = Taken By, (c) = Cosigned By    Initials Name Provider Type    Na High, PT Physical Therapist                     Manual Rx (last 36 hours)      Manual Treatments     Row Name 04/09/19 1400             Total Minutes    34417 - PT Manual Therapy Minutes  45  -MW        User Key  (r) = Recorded By, (t) = Taken By, (c) = Cosigned By    Initials Name Provider Type    Na High PT Physical Therapist          PT OP Goals     Row Name 04/09/19 1400          STG 1  Improve right shoulder ROM to allow patient to complete underarm hygiene  -MW    STG 1 Progress  Partially Met  -MW    STG 2  Patient able to return to wearing her UE compression garments.  -MW    STG 2 Progress  Partially Met  -MW    STG 3  Patient to report 25% improvement right UE pain  -MW    STG 3 Progress  Ongoing  -MW    STG 4  Patient independent with HEP for UE AA/PROM  -MW    STG 4 Progress  Partially Met;Ongoing  -MW          LTG 1  Patient independent with management RUE lymphedema to include MLD, compression pump, compression garments, skin care.  -MW    LTG 1 Progress  Ongoing;Progressing  -MW    LTG 2  Patient able to actively raise RUE 90 degrees or better to improve ability to complete daily tasks including washing/fixing her hair.  -MW    LTG 2 Progress  Ongoing  -MW    LTG 3  Patient to report 4/5 on DASH for putting on a pullover sweater..  -MW    LTG 3 Progress  Ongoing;Partially Met  -MW    LTG 4  RUE circumferential measurements decreased >/= 2  cm to promote decreased pain and improved function.  -MW    LTG 4 Progress  Ongoing  -MW    LTG 4 Progress Comments  RUE measurements have increased in past month, however, pt has not been seen since last PN on 3/7/19  -MW          PT Goal Re-Cert Due Date  07/09/19  -MW      User Key  (r) = Recorded By, (t) = Taken By, (c) = Cosigned By    Initials Name Provider Type    Na High PT Physical Therapist          Therapy Education  Education Details: Pt to ED for evaluation of back/ groin pain. Cont compression as able. Call for transportation assistance for appointments.   Given: Symptoms/condition management, Edema management  Program: Reinforced  How Provided: Verbal  Provided to: Patient  Level of Understanding: Verbalized    Outcome Measure Options: Disabilities of the Arm, Shoulder, and Hand (DASH)  DASH  Open a tight or new jar.: Unable  Write: Severe Difficulty  Turn a key: Severe Difficulty  Prepare a meal: Severe Difficulty  Push open a heavy door: Unable  Place an object on a shelf above your head: Unable  Do heavy household chores (e.g., wash walls, wash floors): Unable  Garden or do yard work: Unable  Make a bed: Severe Difficulty  Carry a shopping bag or briefcase: Unable  Carry a heavy object (over 10 lbs): Unable  Change a lightbulb overhead: Unable  Wash or blow dry your hair: Unable  Wash your back: Unable  Put on a pullover sweater: Severe Difficulty  Use a knife to cut food: Unable  Recreational activities in which require little effort (e.g., cardplaying, knitting, etc.): Unable  Recreational activities in which you take some force or impact through your arm, should or hand (e.g. golf, hammering, tennis, etc.): Unable  Recreational Activities in which you move your arm freely (e.g., frisbee, badminton, etc.): Unable  Manage transportation needs (getting from one place to another): Severe Difficulty  During the past week, to what extent has your arm, shoulder, or hand problem interfered  with your normal social activites with family, friends, neighbors or groups?: Extremely  During the past week, were you limited in your work or other regular daily activities as a result of your arm, shoulder or hand problem?: Very limited  Arm, Shoulder, or hand pain: Extreme  Arm, shoulder or hand pain when you performed any specific activity: Extreme  Tingling (pins and needles) in your arm, shoulder, or hand: Severe  Weakness in your arm, shoulder or hand: Extreme  Stiffness in your arm, shoulder or hand: Extreme  During the past week, how much difficulty have you had sleeping because of the pain in your arm, shoulder or hand?: Severe Difficulty  I feel less capable, less confident or less useful because of my arm, shoulder or hand problem: Strongly Agree  DASH Sum : 136  Number of Questions Answered: 29  DASH Score: 92.24         Time Calculation:   Start Time: 1400  Total Timed Code Minutes- PT: 45 minute(s)   Therapy Charges for Today     Code Description Service Date Service Provider Modifiers Qty    80489857562 HC PT MANUAL THERAPY EA 15 MIN 4/9/2019 Na Bergeron, PT GP 3          PT G-Codes  Outcome Measure Options: Disabilities of the Arm, Shoulder, and Hand (DASH)         Na Bergeron, PT  4/9/2019
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
## 2019-04-11 PROBLEM — S32.010A CLOSED COMPRESSION FRACTURE OF L1 LUMBAR VERTEBRA: Status: ACTIVE | Noted: 2019-01-01
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
## 2019-04-15 NOTE — TELEPHONE ENCOUNTER
Harper, cancel her surgery, Radiation is going to see her today and they are calling her. Does not need surgery. DC per BB

## 2019-04-15 NOTE — TELEPHONE ENCOUNTER
Please call her and tell her the the Radiation doctor is going to call her and try to schedule her for today.

## 2019-04-15 NOTE — TELEPHONE ENCOUNTER
Provider:  Kavon  Caller: pt  Time of call:   9:50  Phone #:  [phone]  Surgery:  TOMORROW  Surgery Date:  KyphoplastyL1  Last visit:   04/11/19  Next visit: none    MONICA:         Reason for call:     Patient states she still with hospice.  She is in increased pain. She said they are planning on doing a radiation treatment after her surgery tomorrow.  Patient is crying and upset because she will have no one at home to help her. She has about 10 steps outside the home and a set of steps inside.  She wants to know if she can stay overnight.  She's afraid she wont' be able to manage on her own after the surgery.  She is talking about postponing the surgery until she can get some help.

## 2019-04-15 NOTE — TELEPHONE ENCOUNTER
Patient notified that her surgery is going to be canceled.  She ask if she could speak with a PA today.  She thought her radiation treatment was tomorrow.

## 2019-04-16 NOTE — TELEPHONE ENCOUNTER
Patient called at 11:24 and said that she is still having arm pain and hip pain.  She called Hospice this morning and they told her to call us back.  Patient is trying to get some relief for her pain.  Not sure why Hospice can't help.

## 2019-04-16 NOTE — TELEPHONE ENCOUNTER
Spoke with Ms. Urrutia for quite a while today. She lives alone and is scared. Didn't understand her MRI and bone scan results. Reviewed them with her on the phone.   She understands Hospice will be writing for her pain medications, states she has some currently that she is using. She was afraid Hospice would kick her out for having radiation therapy. Explained it is palliative treatment.  She has an appointment for radiation on 4/18/2019.    FLETCHER

## 2019-04-17 NOTE — TELEPHONE ENCOUNTER
SW completed a referral to [facility] for pt for 4/18,  time 8:00 am.  SW available for ongoing support and resource needs.
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
## 2019-04-19 NOTE — TELEPHONE ENCOUNTER
Provider:  Kavon  Caller: pt  Time of call:   10:45  Phone #:  [phone]  Surgery:  Kyphoplasty  L1  Surgery Date:  04/16/19  Last visit:   Same  Next visit: None    MONICA:         Reason for call:     Patient is asking for a back brace to help support her.    I have pended an order if approved.  Thanks

## 2019-04-19 NOTE — TELEPHONE ENCOUNTER
[facility] contacted and they do not take RX's for back braces because they are not able to bill the Insurance company. Patient can still  a brace.

## 2019-04-19 NOTE — TELEPHONE ENCOUNTER
I called [facility] back and told them we decided to go with [facility]. (Informaton sent to [facility].) They will fit her for the brace and bill her Insurance.  All the information was faxed to patient as she is not feeling well today.

## 2019-04-25 NOTE — TELEPHONE ENCOUNTER
LORENA completed and faxed in a referral for [facility] for pt.  Her radiation treatments start on 5/2 at 8:00 am.  LORENA called pt to notify of arrangements.  SW available for ongoing support and resource needs.

## 2019-05-05 NOTE — PROGRESS NOTES
Patient a/o x4. Vitals stable. Pain managed with oral medication. Tolerating ambulation well with walker and gait belt. Will continue to monitor. Please notify patient the nerve conduction study shows some abnormalities that are probably consistent with her cervical spine issues.  I believe Dr. Jacobson ordered this and she should be following up with him for further explanation but I wanted her to know the general findings.

## 2019-05-10 NOTE — TELEPHONE ENCOUNTER
"SW received a phone call from pt requesting assistance with her transportation to her appointments next week in radiation.  She informed that her appointment time has changed to 11:30.  SW placed the referral with [facility].  SW received a message back from [facility] stating that they would be unable to meet the needs of the pt due to needing additional help in getting in and out of her apartment as well as the vehicle and help getting downstairs of the cancer center.  Lyft would not be an option either, due to the same reason. SW called pt to inform her that she will need to find a way here with a friend or her son.  Pt stated that she was \"feeling nauseous\" and quickly got off the phone.    "

## 2019-06-17 NOTE — TELEPHONE ENCOUNTER
Please call the patient and tell her that Dr. Jacobson has instructed me to provide her with physical therapy for her lymphedema, that order is in epic, find out where she wants it faxed to or if she is going to a [facility] facility it already in the system all she has to do is call the physical therapist office and schedule her appointment.  Dr. Jacobson would like her to call him after she attends physical therapy.  Thank you, DC

## 2019-06-17 NOTE — TELEPHONE ENCOUNTER
Provider:  Kavon  Caller: pt  Time of call:  857  Phone #:  [phone]  Surgery:  Kyphoplasty  L1  Surgery Date:  04/16/19  Last visit:  4/11/19  Next visit: None    Reason for call:         Pt called stating that she called Thursday and never received a callback. She wants to speak with Dr. Jacobson for, among other things, to get a new PT order for lymphedema in her arm and a f/u with Dr. Jacobson.

## 2019-06-17 NOTE — TELEPHONE ENCOUNTER
Pt would like an order for transportation, states that she cannot traverse her stairs at her residence and is thus confined to her home.

## 2019-06-17 NOTE — TELEPHONE ENCOUNTER
Please show this to Kavon.   Does he want us to direct her to her PCP?  Does he want to see her or just me call her?

## 2019-06-18 NOTE — TELEPHONE ENCOUNTER
These are issues that she needs to discuss with her PCP. Please call her ane let her know.  JOSÉ LUIS

## 2019-06-19 NOTE — TELEPHONE ENCOUNTER
Pt aware. She will avoid putting it on her chest.     Pt wants to come see Kavon but is unable to at the moment due to transportation.     Pt also voiced concerns about her hospice care. She feels as if its a scam.  She states she was placed under their care on 4/10/19 and they just now visited her yesterday.

## 2019-06-19 NOTE — TELEPHONE ENCOUNTER
"Contacted pt to inform her that she needs to contact her PCP in regards to the transportation order. Pt verbalized understanding. I asked pt where she would be going for physical therapy and she has made an appointment within [facility] for PT.    Pt informed me that last night 6/18/19 she applied a 4% lidocaine patch on her chest on the right side near her shoulder, she woke up and described feeling \"confused\" and wanted to know if the lidocaine patch could've caused that.  "

## 2019-07-01 PROBLEM — M89.8X9 BONE PAIN: Status: ACTIVE | Noted: 2019-01-01

## 2019-08-09 PROBLEM — C80.0 WIDESPREAD METASTATIC MALIGNANT NEOPLASTIC DISEASE (HCC): Status: ACTIVE | Noted: 2019-01-01

## 2019-08-09 PROBLEM — R06.02 SHORTNESS OF BREATH: Status: ACTIVE | Noted: 2019-01-01

## 2019-08-09 PROBLEM — R60.0 EDEMA OF BOTH LOWER EXTREMITIES: Status: ACTIVE | Noted: 2019-01-01

## 2019-08-09 PROBLEM — I89.0 LYMPHEDEMA: Chronic | Status: ACTIVE | Noted: 2019-01-01

## 2019-08-09 PROBLEM — N17.9 ACUTE-ON-CHRONIC KIDNEY INJURY (HCC): Status: ACTIVE | Noted: 2019-01-01

## 2019-08-09 PROBLEM — N18.9 CHRONIC KIDNEY DISEASE: Chronic | Status: ACTIVE | Noted: 2019-01-01

## 2019-08-09 PROBLEM — J90 PLEURAL EFFUSION: Status: ACTIVE | Noted: 2019-01-01

## 2019-08-09 PROBLEM — Z99.81 CHRONIC RESPIRATORY FAILURE WITH HYPOXIA, ON HOME OXYGEN THERAPY (HCC): Status: ACTIVE | Noted: 2019-01-01

## 2019-08-09 PROBLEM — R52 INTRACTABLE PAIN: Status: ACTIVE | Noted: 2019-01-01

## 2019-08-09 PROBLEM — J96.11 CHRONIC RESPIRATORY FAILURE WITH HYPOXIA, ON HOME OXYGEN THERAPY (HCC): Status: ACTIVE | Noted: 2019-01-01

## 2019-08-09 PROBLEM — N18.9 ACUTE-ON-CHRONIC KIDNEY INJURY (HCC): Status: ACTIVE | Noted: 2019-01-01

## 2019-08-09 NOTE — H&P
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 bilaterally with associated  small bilateral pleural effusions with associated atelectasis. This  obscures evaluation of the cardiomediastinal silhouette. Mild pulmonary  vascular congestion is identified. There is abnormal soft tissue density  involving the right lung apex with irregularity of the rib which  corresponds to a destructive process identified on this CT of the  thoracic spine performed 07/30/2019.           Impression:       Small bilateral pleural effusions with associated  atelectasis. There is redemonstration of destructive appearing changes  involving the left lateral rib cage and right apical region corresponds  to the CT of thoracic spine performed 07/30/2019 consistent with the  known multiple metastatic lesions.     D:  08/09/2019  E:  08/09/2019           Results for orders placed during the hospital encounter of 06/16/17   Adult Transthoracic Echo Complete    Narrative · Left ventricular systolic function is normal. Estimated EF = 70%.  · Left ventricular diastolic dysfunction (grade I) consistent with   impaired relaxation.  · The cardiac valves are normal.          Assessment/Plan   Assessment / Plan     Active Hospital Problems    Diagnosis POA   • **Widespread  metastatic malignant neoplastic disease (CMS/HCC) [C80.0] Yes     Priority: High   • Pleural effusion [J90] Yes     Priority: High   • Intractable pain [R52] Unknown     Priority: High     To back, chest, arm, hip and breast      • Shortness of breath [R06.02] Unknown     Priority: High   • Acute-on-chronic kidney injury (CMS/HCC) [N17.9, N18.9] Unknown     Priority: High   • Lymphedema [I89.0] Unknown     RUE      • Edema of both lower extremities [R60.0] Unknown   • Chronic respiratory failure with hypoxia, on home oxygen therapy (CMS/HCC) [J96.11, Z99.81] Not Applicable   • Fall [W19.XXXA] Yes   • JACQUI (generalized anxiety disorder) [F41.1] Yes     69-year-old female with known history of right breast cancer approximately 2 years ago, status post patient declining surgical intervention.  Underwent 1 round of chemo and refused further.  Completed 30 treatments of palliative radiation to her chest, neck, ribs, and right hip.  Has stage IV metastatic breast cancer with multiple soft tissue and bony destructive lesions.  Had recently been at [facility] hospice inpatient unit twice and discharged to [facility].  Multiple falls recently.  Worsening debility.  Presents today to ER via EMS due to worsening chest pain, back pain, lower extremity edema and shortness of breath.  To have bilateral moderate to large pleural effusions, extensive metastatic disease, YOLETTE on CKD.  Patient was dehydrated.  Will admit to hospital medicine service.  She is requesting a palliative thoracentesis.  Agrees to palliative medicine following but declines hospice at this time.    Assessment/plan:    Chest pain/back pain  Shortness of air  Chronic respiratory failure with 2 LNC oxygen use at all times  bilateral moderate-large pleural effusions  --Oxygen, nebs, pain medication  --Palliative left thoracentesis (at patient's request)  --Initial troponin mildly elevated, trending down.  We will continue to trend to third  "value.  --Monitor a.m. labs    Widespread metastatic breast cancer  Numerous pathologic fractures of her ribs  Bony pain, intractable  Soft tissue and bony destructive lesions  --Consult palliative medicine for goals of care, pain management  --Patient declines hospice care at this time.  Reports bad experience in the past and felt they were \"no good to her\".  --Requesting palliative thoracentesis, see above  --Sees Delicia Bosch MD.  Consider consult as needed  --Followed by Dr. Gema Edmonds radiation oncology outpatient, reports she completed last palliative XRT in May of this year  -- consult    YOLETTE/CKD  Dehydration  --IVF hydration, monitor labs    Falls/debility  --Up with assist, PT/OT consult  --CM for discharge planning  --Likely in great detail at least related to her advanced metastatic disease.    Pressure ulcer sacrum (POA)  --WOC to follow    DVT prophylaxis:    Teds/seqs  Heparin for 1 dose tonight. HOLD in am for poss palliative thoracentesis     CODE STATUS:    Code Status and Medical Interventions:   Ordered at: 08/09/19 1830     Level Of Support Discussed With:    Patient     Code Status:    No CPR     Medical Interventions (Level of Support Prior to Arrest):    Comfort Measures       Admission Status:  I believe this patient meets INPATIENT status due to the need for care which can only be reasonably provided in an hospital setting requiring close clinical monitoring of shortness of breath and pleural effusions with thoracentesis scheduled this admission.  As such, I feel patient’s risk for adverse outcomes and need for care warrant INPATIENT evaluation and predict the patient’s care encounter to likely last beyond 2 midnights.        Electronically signed by RATNA Jose, 08/09/19, 6:31 PM.      Brief Attending Admission Attestation     I have seen and examined the patient, performing an independent face-to-face diagnostic evaluation with plan of care reviewed and developed " with the advanced practice clinician (APC).      Brief Summary Statement:   Brianna Urrutia is a 69 y.o. female with widely metastatic breast cancer, previously followed by Dr Ibarra and then Dr Santana with Heme Onc, Dr Edmonds with Radiation Oncology and Dr Jacobson with Neurosurgery presents with chest and back pain as well as shortness of breath. Patient states she has been followed by Palliative Care, with enrollment in Hospice twice in the past. CT scans of the chest and abdomen reveal widely metastatic osseous disease and bilateral pleural effusions.  Patient was seen by Hospice in the ED today but declines their services. She feels her pain is not adequately managed at her care facility, however.     Remainder of detailed HPI is as noted above and has been reviewed and/or edited by me for completeness.      Attending Physical Exam:  Constitutional -appears somewhat uncomfortable, in bed  HEENT-NCAT, mucous membranes moist  CV-RRR, S1 S2 normal, no m/r/g  Resp-diminished at base  Abd-soft, non-tender, non-distended, normo active bowel sounds  Ext-No lower extremity cyanosis, clubbing or edema bilaterally  Neuro-alert and oriented x 3, speech clear, moves all extremities   Psych-normal affect   Skin- No rash on exposed UE or LE bilaterally        Brief Assessment/Plan :    Dyspnea  - likely due to bilateral pleural effusions, but rib fractures may contribute -- discussed possible thoracentesis with patient, along with the likelihood these are malignant and may recur despite drainage. She would like to proceed with thoracentesis in the am.    Pleural Effusions  - suspect malignant  - left appears slightly larger to right to my eye  - left thoracentesis in am    Metastatic Breast Cancer  - multiple bone lesions with fractures  - appears some non-compliance regarding follow-up with her providers, and goals of care somewhat unclear   - Palliative Care consultation for Goals of Care and pain control  - if needed for  further clarification, consider Heme Onc consultation.        See above for further detailed assessment and plan developed with APC which I have reviewed and/or edited for completeness.      Electronically signed by Hoang Moreno MD, 08/09/19, 10:55 PM.
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
## 2019-08-09 NOTE — ED NOTES
"Pt sitting on bedpan. Offered to remove bedpan, pt declined stated \" NO, I am comfortable, I dont want you to take it out from under me.\" Notified Gale Bee  08/09/19 9504    "

## 2019-08-10 NOTE — NURSING NOTE
Pt. Brought to CT 3 for Left sided-CT guided thoracentesis. Patient placed on monitor.  VSS.  NSR  Procedure done without incident.  1000ml of straw colored fluid removed.  Patient tolerated well. Increased O2 to 4LNC during procedure for decreased SATS to 88%. SATS now 92%.  Report called to Amelie Bowden RN on 6B.  To leave patient on 4L on floor for 1 hour post thora per Dr. Brunson-relayed to Amelie FLEMING. Patient sent back to room via transport.

## 2019-08-10 NOTE — PLAN OF CARE
Problem: Palliative Care (Adult)  Intervention: Promote Informed Decision Making and Goal Setting   08/10/19 1502   Coping/Psychosocial Interventions   Life Transition/Adjustment advance care planning facilitated;decision-making facilitated;end-of-life care initiated;palliative care discussed;palliative care initiated     Intervention: Support/Optimize Psychosocial Response   08/10/19 1502   Coping/Psychosocial Interventions   Supportive Measures active listening utilized;decision-making supported;goal setting facilitated;self-care encouraged;verbalization of feelings encouraged;self-reflection promoted

## 2019-08-10 NOTE — CONSULTS
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 which may relate to mesenteric radiculitis. Sigmoid colon  diverticulosis without convincing evidence of diverticulitis, although  the sigmoid colon is thickened. Trace free fluid is noted in the pelvis.     There is redemonstration of pathologic destructive fracture involving L1  with mild posterior retropulsion, similar to the lumbar spine CT  performed 07/30/2019. The remainder of the vertebral bodies appear to  maintain height. Multilevel disc degeneration and posterior disc  osteophyte complexes are suggested involving the lumbar spine.  Multifocal mottled appearance of the bony pelvis identified suggestive  of multifocal possible small lytic metastases particularly involving the  left iliac crest, similar to prior.       Impression:       1. Extensive multifocal osseous metastatic disease with associated  severe osseous destruction involving the right anterior first and second  ribs, left posterior-lateral fifth, sixth, seventh, and eighth rib  fractures, inferior sternum, and numerous multifocal lower cervical and  upper thoracic destructive lesions. Overall lack of  intravenous contrast  limits evaluation for underlying soft tissue, although underlying soft  tissue abnormalities are present at all of these destructive sites with  soft tissue extension into the anterior mediastinum at the sternum, soft  tissue lesions associated with the destructive rib lesions, and likely  soft tissue lesions present involving the cervical and thoracic spine  pathologic fractures and destructive osseous changes. Evaluation of  spinal canal compromise is not evaluated on this study secondary to lack  of intravenous contrast, although these findings appear similar to  slightly worsened when compared to the CT of the thoracic spine  performed 07/30/2019. Follow-up MRI would be more definitive,  particularly for spinal canal compromise if clinically warranted.     2. Soft tissue lesions involving the right breast which may relate to  the site of primary carcinoma.     3. Moderate bilateral pleural effusions identified with associated  multifocal airspace disease throughout the lungs as described above.     4. Hypoattenuated right 2.5 cm hepatic lobe lesion concerning for  metastatic focus with additional prominent mesenteric and  retroperitoneal lymph nodes which also could relate to sites of  metastatic disease.     5. Nonspecific diffuse mesenteric infiltration with sigmoid  diverticulosis with associated distal sigmoid wall thickening and  pericolonic fluid suggestive of possible underlying sigmoid  diverticulitis.     D:  08/09/2019  E:  08/09/2019           Active Hospital Problems     Diagnosis   POA   • **Widespread metastatic malignant neoplastic disease (CMS/HCC) [C80.0]   Yes   • Pleural effusion [J90]   Yes   • Lymphedema [I89.0]   Unknown   • Intractable pain [R52]   Unknown   • Shortness of breath [R06.02]   Unknown   • Acute-on-chronic kidney injury (CMS/HCC) [N17.9, N18.9]   Unknown   • Edema of both lower extremities [R60.0]   Unknown   • Chronic respiratory failure with hypoxia, on  home oxygen therapy (CMS/[facility]) [J96.11, Z99.81]   Not Applicable   • Fall [W19.XXXA]   Yes   • JACQUI (generalized anxiety disorder) [F41.1]         Impression:  70 yo female with advanced, wide spread metastatic breast cancer.  Hospitalized with uncontrolled dyspnea and pain. Prior hospice admits x2 but patient has revoked and defers Hospice Care though repeatedly offered.  Followed by Palliative Care at [facility].     Symptoms:  Dyspnea -- bilateral pleural effusions, s/p left thoracentesis today, right planned for tomorrow  Neoplastic pain  Debility  Depression and Anxiety    Plan:   -Increase fentanyl patch to 50mcg.  -Continue current PRN dilaudid, IV and PO available.  (Morphine was not effective outpt.)  -Increase decadron to 2mg BID breakfast and lunch.  -Increase cymbalta to 60mg.  -Add benzo.  Will try valium for anxiety and muscle spasm.  -Can d/c scalp staples placed in ED last week.  -Patient states she doesn't want to return to [facility] but suspect this is really her best option as she cannot live alone in her condition and she has no one to care for her in her home.  -Palliative Care will continue to follow to assist with symptom management and goals of care.  -Discussed with Hospitalist, Dr. Brizuela.    Lu Bradshaw MD  08/10/19  6:30 PM

## 2019-08-10 NOTE — PROCEDURES
Radiology Procedure    Pre-procedure: Moderate left pleural effusion    Procedure Performed: CT-guided left thoracentesis      IV Sedation and/or Anesthesia:  No    Complications: None    Preliminary Findings: Near-total resolution of left pleural effusion    Specimen Removed: 1L yellow fluid    Estimated Blood Loss:  0ml    Post-Procedure Diagnosis: Left pleural effusion    Post-Procedure Plan: Return to ordering physician for recommendations and orders    Standard Discharge Instructions Given:yes     Carlos Brunson DO  08/10/19  12:46 PM

## 2019-08-10 NOTE — PAYOR COMM NOTE
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   Small bilateral pleural effusions with associated   atelectasis. There is redemonstration of destructive appearing changes   involving the left lateral rib cage and right apical region corresponds   to the CT of thoracic spine performed 07/30/2019 consistent with the   known multiple metastatic lesions.       D:  08/09/2019   E:  08/09/2019            Vitals:    08/09/19 1132 08/09/19 1300 08/09/19  1527 08/09/19 1630   BP:  157/90 150/80 136/88   Patient Position:       Pulse: 81 82 80 83   Resp:   16    Temp:       TempSrc:       SpO2: 92% (!) 89% 95% 90%   Weight:       Height:         Medications   sodium chloride 0.9 % flush 10 mL (not administered)   HYDROmorphone (DILAUDID) injection 0.5 mg (0.5 mg Intravenous Given 8/9/19 1100)   methylPREDNISolone sodium succinate (SOLU-Medrol) injection 40 mg (40 mg Intravenous Given 8/9/19 1050)   sodium chloride 0.9 % bolus 1,428 mL (0 mL/kg × 47.6 kg Intravenous Stopped 8/9/19 1210)   ondansetron (ZOFRAN) injection 4 mg (4 mg Intravenous Given 8/9/19 1050)   diphenhydrAMINE (BENADRYL) injection 50 mg (50 mg Intravenous Given 8/9/19 1051)     ECG/EMG Results (last 24 hours)     Procedure Component Value Units Date/Time    ECG 12 Lead [[id number]] Collected:  08/09/19 0939     Updated:  08/09/19 0955        ECG 12 Lead   Final Result   Test Reason : 2nd set   Blood Pressure : **/** mmHG   Vent. Rate : 081 BPM     Atrial Rate : 081 BPM      P-R Int : 128 ms          QRS Dur : 076 ms       QT Int : 378 ms       P-R-T Axes : 038 067 078 degrees      QTc Int : 439 ms      Normal sinus rhythm   When compared with ECG of 09-AUG-2019 09:39,   Nonspecific T wave abnormality now evident in Lateral leads   Confirmed by JANEL EDWARD MD ([id number]) on 8/9/2019 2:16:25 PM      Referred By:  JESUS           Confirmed By:JANEL EDWARD MD      ECG 12 Lead   Final Result   Test Reason : chest pain   Blood Pressure : **/** mmHG   Vent. Rate : 088 BPM     Atrial Rate : 088 BPM      P-R Int : 128 ms          QRS Dur : 074 ms       QT Int : 356 ms       P-R-T Axes : 031 052 061 degrees      QTc Int : 430 ms      Normal sinus rhythm   ST abnormality, possible digitalis effect   Abnormal ECG   When compared with ECG of 11-DEC-2017 15:41,   No significant change was found   Confirmed by JANEL EDWARD MD ([id number]) on 8/9/2019 10:51:41 AM      Referred By:  EDMD           Confirmed By:JANEL EDWARD MD  "                 MDM      Final diagnoses:   Widespread metastatic malignant neoplastic disease (CMS/HCC)   Pathological fracture of rib, initial encounter   Bone pain   Bone metastases (CMS/HCC)   Fall, initial encounter   Pleural effusion   Dehydration   YOLETTE (acute kidney injury) (CMS/HCC)   Pressure injury of skin of sacral region, unspecified injury stage            Micah Carranza PA-C  08/09/19 1705      Electronically signed by Micah Carranza PA-C at 8/9/2019  5:05 PM     Gale Paul at 8/9/2019 12:59 PM        Pt sitting on bedpan. Offered to remove bedpan, pt declined stated \" NO, I am comfortable, I dont want you to take it out from under me.\" Notified RNGale Hernandez  08/09/19 1301      Electronically signed by Gale Paul at 8/9/2019  1:01 PM       ICU Vital Signs     Row Name 08/09/19 2021 08/09/19 1935 08/09/19 1815 08/09/19 1810 08/09/19 17:43:34       Vitals    Temp  --  98.1 °F (36.7 °C)  97.7 °F (36.5 °C)  --  --    Temp src  --  Oral  Oral  --  --    Pulse  85  92  99  --  91    Heart Rate Source  --  Monitor  Monitor  --  Monitor    Resp  --  18  18  --  16    Resp Rate Source  --  Visual  Visual  --  Visual    BP  --  148/86  134/88  --  130/81    Noninvasive MAP (mmHg)  --  109  99  --  --    BP Location  --  Left arm  Left arm  --  --    BP Method  --  Automatic  Automatic  --  --    Patient Position  --  Sitting  Sitting  --  --       Oxygen Therapy    SpO2  94 %  --  94 %  --  95 %    Pulse Oximetry Type  Continuous  --  --  --  --    Device (Oxygen Therapy)  nasal cannula  --  --  nasal cannula  nasal cannula    Flow (L/min)  2  --  --  2  2    Row Name 08/09/19 1630 08/09/19 1600 08/09/19 15:27:54 08/09/19 1300 08/09/19 1132       Vitals    Pulse  83  --  80  82  81    Resp  --  --  16  --  --    BP  136/88  144/101  (Abnormal)   150/80  157/90  --    Noninvasive MAP (mmHg)  109  123  --  114  --       Oxygen Therapy    SpO2  90 %  --  95 %  89 %  (Abnormal)   " "92 %    Row Name 08/09/19 1130 08/09/19 1124 08/09/19 1108 08/09/19 1100 08/09/19 1051       Vitals    Pulse  83  77  80  87  79    BP  132/88  --  --  124/81  97/59    Noninvasive MAP (mmHg)  102  --  --  97  73       Oxygen Therapy    SpO2  86 %  (Abnormal)   95 %  99 %  94 %  97 %    Row Name 08/09/19 0936                   Height and Weight    Height  157.5 cm (62\")        Height Method  Stated        Weight  47.6 kg (105 lb)        Ideal Body Weight (IBW) (kg)  50.43        BSA (Calculated - sq m)  1.45 sq meters        BMI (Calculated)  19.2        Weight in (lb) to have BMI = 25  136.4           Vitals    Temp  98.2 °F (36.8 °C)        Temp src  Oral        Pulse  95        Resp  18        BP  104/76        Patient Position  Lying           Oxygen Therapy    SpO2  96 % 2L/NC            Intake & Output (last day)     None        Hospital Medications (all)       Dose Frequency Start End    acetaminophen (TYLENOL) tablet 650 mg 650 mg Every 4 Hours PRN 8/9/2019     Sig - Route: Take 2 tablets by mouth Every 4 (Four) Hours As Needed for Mild Pain . - Oral    bisacodyl (DULCOLAX) suppository 10 mg 10 mg Daily PRN 8/9/2019     Sig - Route: Insert 1 suppository into the rectum Daily As Needed for Constipation. - Rectal    diphenhydrAMINE (BENADRYL) injection 50 mg 50 mg Once 8/9/2019 8/9/2019    Sig - Route: Infuse 1 mL into a venous catheter 1 (One) Time. - Intravenous    docusate sodium (COLACE) capsule 100 mg 100 mg 2 Times Daily 8/9/2019     Sig - Route: Take 1 capsule by mouth 2 (Two) Times a Day. - Oral    heparin (porcine) 5000 UNIT/ML injection 5,000 Units 5,000 Units Once 8/9/2019 8/9/2019    Sig - Route: Inject 1 mL under the skin into the appropriate area as directed 1 (One) Time. - Subcutaneous    Hold medication 1 each Continuous PRN 8/9/2019     Sig - Route: 1 each Continuous As Needed (For 24 Hours Prior to Thoracentesis). - Does not apply    HYDROmorphone (DILAUDID) injection 0.5 mg 0.5 mg Every 15 " "Minutes PRN 8/9/2019     Sig - Route: Infuse 0.5 mL into a venous catheter Every 15 (Fifteen) Minutes As Needed for Severe Pain . - Intravenous    HYDROmorphone (DILAUDID) injection 0.5 mg 0.5 mg Every 2 Hours PRN 8/9/2019 8/19/2019    Sig - Route: Infuse 0.5 mL into a venous catheter Every 2 (Two) Hours As Needed for Severe Pain . - Intravenous    Linked Group 1:  \"And\" Linked Group Details        ipratropium-albuterol (DUO-NEB) nebulizer solution 3 mL 3 mL 4 Times Daily - RT 8/9/2019     Sig - Route: Take 3 mL by nebulization 4 (Four) Times a Day. - Nebulization    methylPREDNISolone sodium succinate (SOLU-Medrol) injection 40 mg 40 mg Every 4 Hours Scheduled 8/9/2019     Sig - Route: Infuse 1 mL into a venous catheter Every 4 (Four) Hours. - Intravenous    naloxone (NARCAN) injection 0.4 mg 0.4 mg Every 5 Minutes PRN 8/9/2019     Sig - Route: Infuse 1 mL into a venous catheter Every 5 (Five) Minutes As Needed for Respiratory Depression. - Intravenous    Linked Group 1:  \"And\" Linked Group Details        ondansetron (ZOFRAN) injection 4 mg 4 mg Once 8/9/2019 8/9/2019    Sig - Route: Infuse 2 mL into a venous catheter 1 (One) Time. - Intravenous    ondansetron (ZOFRAN) injection 4 mg 4 mg Every 6 Hours PRN 8/9/2019     Sig - Route: Infuse 2 mL into a venous catheter Every 6 (Six) Hours As Needed for Nausea or Vomiting. - Intravenous    Linked Group 2:  \"Or\" Linked Group Details        ondansetron ODT (ZOFRAN-ODT) disintegrating tablet 4 mg 4 mg Every 4 Hours PRN 8/9/2019     Sig - Route: Take 1 tablet by mouth Every 4 (Four) Hours As Needed for Nausea or Vomiting. - Oral    Linked Group 2:  \"Or\" Linked Group Details        sodium chloride 0.9 % bolus 1,428 mL 30 mL/kg × 47.6 kg Once 8/9/2019 8/9/2019    Sig - Route: Infuse 1,428 mL into a venous catheter 1 (One) Time. - Intravenous    sodium chloride 0.9 % flush 10 mL 10 mL As Needed 8/9/2019     Sig - Route: Infuse 10 mL into a venous catheter As Needed for Line " "Care. - Intravenous    Cosign for Ordering: Required by Rakesh Smith MD    sodium chloride 0.9 % flush 3 mL 3 mL Every 12 Hours Scheduled 8/9/2019     Sig - Route: Infuse 3 mL into a venous catheter Every 12 (Twelve) Hours. - Intravenous    sodium chloride 0.9 % flush 3-10 mL 3-10 mL As Needed 8/9/2019     Sig - Route: Infuse 3-10 mL into a venous catheter As Needed for Line Care. - Intravenous    sodium chloride 0.9 % infusion 75 mL/hr Continuous 8/9/2019 8/10/2019    Sig - Route: Infuse 75 mL/hr into a venous catheter Continuous. - Intravenous    heparin (porcine) 5000 UNIT/ML injection 5,000 Units (Discontinued) 5,000 Units Every 12 Hours Scheduled 8/9/2019 8/9/2019    Sig - Route: Inject 1 mL under the skin into the appropriate area as directed Every 12 (Twelve) Hours. - Subcutaneous    ondansetron (ZOFRAN) injection 4 mg (Discontinued) 4 mg Every 6 Hours PRN 8/9/2019 8/9/2019    Sig - Route: Infuse 2 mL into a venous catheter Every 6 (Six) Hours As Needed for Nausea or Vomiting. - Intravenous    Linked Group 3:  \"Or\" Linked Group Details        ondansetron (ZOFRAN) injection 4 mg (Discontinued) 4 mg Every 6 Hours PRN 8/9/2019 8/9/2019    Sig - Route: Infuse 2 mL into a venous catheter Every 6 (Six) Hours As Needed for Nausea or Vomiting. - Intravenous    Linked Group 3:  \"Or\" Linked Group Details        ondansetron (ZOFRAN) tablet 4 mg (Discontinued) 4 mg Every 6 Hours PRN 8/9/2019 8/9/2019    Sig - Route: Take 1 tablet by mouth Every 6 (Six) Hours As Needed for Nausea or Vomiting. - Oral    Linked Group 3:  \"Or\" Linked Group Details        ondansetron (ZOFRAN) tablet 4 mg (Discontinued) 4 mg Every 4 Hours PRN 8/9/2019 8/9/2019    Sig - Route: Take 1 tablet by mouth Every 4 (Four) Hours As Needed for Nausea or Vomiting. - Oral    Linked Group 3:  \"Or\" Linked Group Details                Lab Results (last 24 hours)     Procedure Component Value Units Date/Time    Troponin [[id number]]  (Normal) " Collected:  08/09/19 1926    Specimen:  Blood Updated:  08/09/19 2007     Troponin T 0.025 ng/mL     Narrative:       Troponin T Reference Range:  <= 0.03 ng/mL-   Negative for AMI  >0.03 ng/mL-     Abnormal for myocardial necrosis.  Clinicians would have to utilize clinical acumen, EKG, Troponin and serial changes to determine if it is an Acute Myocardial Infarction or myocardial injury due to an underlying chronic condition.     Urinalysis With Culture If Indicated - Urine, Clean Catch [[id number]]  (Abnormal) Collected:  08/09/19 1405    Specimen:  Urine, Clean Catch Updated:  08/09/19 1424     Color, UA Delta     Appearance, UA Clear     pH, UA <=5.0     Specific Gravity, UA 1.012     Glucose, UA Negative     Ketones, UA Negative     Bilirubin, UA Negative     Blood, UA Negative     Protein, UA Negative     Leuk Esterase, UA Small (1+)     Nitrite, UA Negative     Urobilinogen, UA 0.2 E.U./dL    Urinalysis, Microscopic Only - Urine, Clean Catch [[id number]] Collected:  08/09/19 1405    Specimen:  Urine, Clean Catch Updated:  08/09/19 1424     RBC, UA 0-2 /HPF      WBC, UA 0-2 /HPF      Bacteria, UA None Seen /HPF      Squamous Epithelial Cells, UA None Seen /HPF      Hyaline Casts, UA 0-6 /LPF      Methodology Automated Microscopy    Troponin [[id number]]  (Normal) Collected:  08/09/19 1208    Specimen:  Blood Updated:  08/09/19 1249     Troponin T <0.010 ng/mL     Narrative:       Troponin T Reference Range:  <= 0.03 ng/mL-   Negative for AMI  >0.03 ng/mL-     Abnormal for myocardial necrosis.  Clinicians would have to utilize clinical acumen, EKG, Troponin and serial changes to determine if it is an Acute Myocardial Infarction or myocardial injury due to an underlying chronic condition.     [facility] Draw [[id number]] Collected:  08/09/19 1000    Specimen:  Blood Updated:  08/09/19 1214    Narrative:       The following orders were created for panel order [facility] Draw.  Procedure                                Abnormality         Status                     ---------                               -----------         ------                     Light Blue Top[[id number]]                                   Final result               Green Top (Gel)[[id number]]                                  Final result               Lavender Top[[id number]]                                     Final result               Gold Top - SST[[id number]]                                   Final result               Green Top (No Gel)[[id number]]                                                            Please view results for these tests on the individual orders.    Blood Culture - Blood, Arm, Left [[id number]] Collected:  08/09/19 1028    Specimen:  Blood from Arm, Left Updated:  08/09/19 1127    Blood Culture - Blood, Arm, Left [[id number]] Collected:  08/09/19 1028    Specimen:  Blood from Arm, Left Updated:  08/09/19 1126    Green Top (Gel) [[id number]] Collected:  08/09/19 1001    Specimen:  Blood Updated:  08/09/19 1116     Extra Tube Hold for add-ons.     Comment: Auto resulted.       Lavender Top [[id number]] Collected:  08/09/19 1001    Specimen:  Blood Updated:  08/09/19 1116     Extra Tube hold for add-on     Comment: Auto resulted       Light Blue Top [[id number]] Collected:  08/09/19 1000    Specimen:  Blood Updated:  08/09/19 1101     Extra Tube hold for add-on     Comment: Auto resulted       Gold Top - SST [[id number]] Collected:  08/09/19 1000    Specimen:  Blood Updated:  08/09/19 1101     Extra Tube Hold for add-ons.     Comment: Auto resulted.       Lactic Acid, Plasma [[id number]]  (Normal) Collected:  08/09/19 1028    Specimen:  Blood Updated:  08/09/19 1052     Lactate 2.0 mmol/L      Comment: Falsely depressed results may occur on samples drawn from patients receiving N-Acetylcysteine (NAC) or Metamizole.       Troponin [[id number]]  (Abnormal) Collected:  08/09/19 1001    Specimen:  Blood Updated:  08/09/19 1038     Troponin T 0.046  ng/mL     Narrative:       Troponin T Reference Range:  <= 0.03 ng/mL-   Negative for AMI  >0.03 ng/mL-     Abnormal for myocardial necrosis.  Clinicians would have to utilize clinical acumen, EKG, Troponin and serial changes to determine if it is an Acute Myocardial Infarction or myocardial injury due to an underlying chronic condition.     Comprehensive Metabolic Panel [[id number]]  (Abnormal) Collected:  08/09/19 1001    Specimen:  Blood Updated:  08/09/19 1027     Glucose 116 mg/dL      BUN 48 mg/dL      Creatinine 1.43 mg/dL      Sodium 141 mmol/L      Potassium 4.1 mmol/L      Chloride 98 mmol/L      CO2 31.0 mmol/L      Calcium 9.5 mg/dL      Total Protein 6.7 g/dL      Albumin 3.50 g/dL      ALT (SGPT) 39 U/L      AST (SGOT) 72 U/L      Alkaline Phosphatase 192 U/L      Total Bilirubin 0.3 mg/dL      eGFR Non African Amer 37 mL/min/1.73      Globulin 3.2 gm/dL      A/G Ratio 1.1 g/dL      BUN/Creatinine Ratio 33.6     Anion Gap 12.0 mmol/L     Narrative:       GFR Normal >60  Chronic Kidney Disease <60  Kidney Failure <15    Lipase [[id number]]  (Normal) Collected:  08/09/19 1001    Specimen:  Blood Updated:  08/09/19 1027     Lipase 27 U/L     BNP [[id number]]  (Abnormal) Collected:  08/09/19 1001    Specimen:  Blood Updated:  08/09/19 1022     proBNP 1,817.0 pg/mL     Narrative:       Among patients with dyspnea, NT-proBNP is highly sensitive for the detection of acute congestive heart failure. In addition NT-proBNP of <300 pg/ml effectively rules out acute congestive heart failure with 99% negative predictive value.    CBC & Differential [[id number]] Collected:  08/09/19 1001    Specimen:  Blood Updated:  08/09/19 1005    Narrative:       The following orders were created for panel order CBC & Differential.  Procedure                               Abnormality         Status                     ---------                               -----------         ------                     CBC Auto  Differential[[id number]]        Abnormal            Final result                 Please view results for these tests on the individual orders.    CBC Auto Differential [[id number]]  (Abnormal) Collected:  08/09/19 1001    Specimen:  Blood Updated:  08/09/19 1005     WBC 8.95 10*3/mm3      RBC 4.63 10*6/mm3      Hemoglobin 14.3 g/dL      Hematocrit 45.8 %      MCV 98.9 fL      MCH 30.9 pg      MCHC 31.2 g/dL      RDW 17.4 %      RDW-SD 62.9 fl      MPV 10.2 fL      Platelets 275 10*3/mm3      Neutrophil % 80.8 %      Lymphocyte % 10.2 %      Monocyte % 6.6 %      Eosinophil % 0.2 %      Basophil % 0.2 %      Immature Grans % 2.0 %      Neutrophils, Absolute 7.23 10*3/mm3      Lymphocytes, Absolute 0.91 10*3/mm3      Monocytes, Absolute 0.59 10*3/mm3      Eosinophils, Absolute 0.02 10*3/mm3      Basophils, Absolute 0.02 10*3/mm3      Immature Grans, Absolute 0.18 10*3/mm3      nRBC 0.0 /100 WBC         Imaging Results (last 24 hours)     Procedure Component Value Units Date/Time    CT Chest Without Contrast [[id number]] Collected:  08/09/19 1413     Updated:  08/09/19 1531    Narrative:       EXAMINATION: CT CHEST WO CONTRAST-, CT ABDOMEN PELVIS WO CONTRAST-  08/09/2019      INDICATION: Chest pain, shortness of air, stage IV breast cancer, hx  pathologic fractures     TECHNIQUE: Unenhanced CT imaging of the chest, abdomen, and pelvis was  obtained. Additional coronal and sagittal reformatted images were  obtained for review.     The radiation dose reduction device was turned on for each scan per the  ALARA (As Low as Reasonably Achievable) protocol.     COMPARISON: CT of the thoracic and lumbar spine 07/30/2019, CT of the  chest 09/12/2017 and CT abdomen 06/19/2017.     FINDINGS:      CHEST: Multiple abnormalities are identified throughout the chest and  osseous structures of the chest. For example there is osseous erosive  changes of the right first and distal right second rib with associated  soft tissue abnormality  measuring 3.0 x 4.1 cm similar when compared to  the CT of the thoracic spine performed 07/30/2019. Extensive soft tissue  abnormality identified within the anterior right chest wall adjacent to  this. Identified within the right breast is a 3.1 x 1.4 cm soft tissue  mass with associated skin retraction which may be the site of the  reported breast carcinoma.  Additionally more inferiorly there is a soft  tissue subcutaneous nodule. Additional abnormal osseous destruction  identified involving the posterior fifth, sixth, seventh, and eighth  ribs posterior and laterally concerning for metastatic lesions. Lack of  intravenous contrast limits evaluation for underlying soft tissue  lesion, however one is suspected within this region. Particularly at  (series 1/image 9) is a possible soft tissue focus measuring 5.3 cm.  Extensive multifocal osseous metastatic disease throughout the  visualized cervical, thoracic, and lumbar spine are identified.  For  example there are numerous lytic lesions throughout the lower cervical  spine as well as the upper thoracic spine with multilevel compression  fractures most pronounced at T1, T2, and T4 which appears similar to the  thoracic spine CT performed 07/30/2019. Likely underlying soft tissue is  suggested with these, although given the patient's positioning and lack  of contrast there is very limited evaluation for soft tissue within  these fractures. Additional extensive osseous destructive changes with  fracture identified involving the approximate T5 vertebral process T6  and T7 vertebral bodies with some degree of posterior retropulsion and  possible soft tissue compromise of the spinal canal at these levels,  although lack of contrast limits evaluation. Again identified at T5 is  marked kyphosis, similar to prior. Severe L1 fracture identified with  osseous destructive changes and mild retropulsion, similar to prior.      Dedicated CT imaging of the lungs demonstrates focal  airspace  disease/mass within the right lung apex adjacent to the osseous  destructive rib changes which may relate to post radiation changes,  although underlying soft tissue abnormality is likely present. Moderate  to large bilateral pleural effusions are identified. Bilateral lower  lobe airspace disease is noted with more focal consolidative changes  within the right mid lung with air bronchograms and a calcification. The  heart is not enlarged. Extensive sternal osseous metastatic disease with  extensive destruction is identified involving the inferior aspect of the  sternum below the manubrium with possible anterior mediastinal  extension. This is similar to the CT of the thoracic spine performed  07/30/2019.     ABDOMEN/PELVIS: Lack of intravenous and oral contrast limits evaluation  of abdominal structures. Identified within the liver is a 2.5 cm  hypodensity concerning for a metastatic lesion. Mild perihepatic fluid  is identified. Cholelithiasis is present. Motion artifact limits  evaluation of fine detail for the small bowel and organs within the  abdomen. The pancreas is not demonstrated acute abnormality on this  unenhanced motion limited exam. The spleen is unremarkable. No adrenal  mass is identified. Scattered enlarged retroperitoneal lymph nodes  measuring up to 8 mm are identified concerning for possible metastatic  involvement of the retroperitoneum. No free fluid or free air is  appreciated within the abdomen. Small bowel is nondilated no evidence of  bowel obstruction. Mildly infiltrated diffuse mesenteric inflammation  identified which may relate to mesenteric radiculitis. Sigmoid colon  diverticulosis without convincing evidence of diverticulitis, although  the sigmoid colon is thickened. Trace free fluid is noted in the pelvis.     There is redemonstration of pathologic destructive fracture involving L1  with mild posterior retropulsion, similar to the lumbar spine CT  performed 07/30/2019.  The remainder of the vertebral bodies appear to  maintain height. Multilevel disc degeneration and posterior disc  osteophyte complexes are suggested involving the lumbar spine.  Multifocal mottled appearance of the bony pelvis identified suggestive  of multifocal possible small lytic metastases particularly involving the  left iliac crest, similar to prior.       Impression:       1. Extensive multifocal osseous metastatic disease with associated  severe osseous destruction involving the right anterior first and second  ribs, left posterior-lateral fifth, sixth, seventh, and eighth rib  fractures, inferior sternum, and numerous multifocal lower cervical and  upper thoracic destructive lesions. Overall lack of intravenous contrast  limits evaluation for underlying soft tissue, although underlying soft  tissue abnormalities are present at all of these destructive sites with  soft tissue extension into the anterior mediastinum at the sternum, soft  tissue lesions associated with the destructive rib lesions, and likely  soft tissue lesions present involving the cervical and thoracic spine  pathologic fractures and destructive osseous changes. Evaluation of  spinal canal compromise is not evaluated on this study secondary to lack  of intravenous contrast, although these findings appear similar to  slightly worsened when compared to the CT of the thoracic spine  performed 07/30/2019. Follow-up MRI would be more definitive,  particularly for spinal canal compromise if clinically warranted.     2. Soft tissue lesions involving the right breast which may relate to  the site of primary carcinoma.     3. Moderate bilateral pleural effusions identified with associated  multifocal airspace disease throughout the lungs as described above.     4. Hypoattenuated right 2.5 cm hepatic lobe lesion concerning for  metastatic focus with additional prominent mesenteric and  retroperitoneal lymph nodes which also could relate to sites  of  metastatic disease.     5. Nonspecific diffuse mesenteric infiltration with sigmoid  diverticulosis with associated distal sigmoid wall thickening and  pericolonic fluid suggestive of possible underlying sigmoid  diverticulitis.     D:  08/09/2019  E:  08/09/2019       CT Abdomen Pelvis Without Contrast [[id number]] Collected:  08/09/19 1413     Updated:  08/09/19 1531    Narrative:       EXAMINATION: CT CHEST WO CONTRAST-, CT ABDOMEN PELVIS WO CONTRAST-  08/09/2019      INDICATION: Chest pain, shortness of air, stage IV breast cancer, hx  pathologic fractures     TECHNIQUE: Unenhanced CT imaging of the chest, abdomen, and pelvis was  obtained. Additional coronal and sagittal reformatted images were  obtained for review.     The radiation dose reduction device was turned on for each scan per the  ALARA (As Low as Reasonably Achievable) protocol.     COMPARISON: CT of the thoracic and lumbar spine 07/30/2019, CT of the  chest 09/12/2017 and CT abdomen 06/19/2017.     FINDINGS:      CHEST: Multiple abnormalities are identified throughout the chest and  osseous structures of the chest. For example there is osseous erosive  changes of the right first and distal right second rib with associated  soft tissue abnormality measuring 3.0 x 4.1 cm similar when compared to  the CT of the thoracic spine performed 07/30/2019. Extensive soft tissue  abnormality identified within the anterior right chest wall adjacent to  this. Identified within the right breast is a 3.1 x 1.4 cm soft tissue  mass with associated skin retraction which may be the site of the  reported breast carcinoma.  Additionally more inferiorly there is a soft  tissue subcutaneous nodule. Additional abnormal osseous destruction  identified involving the posterior fifth, sixth, seventh, and eighth  ribs posterior and laterally concerning for metastatic lesions. Lack of  intravenous contrast limits evaluation for underlying soft tissue  lesion, however one is  suspected within this region. Particularly at  (series 1/image 9) is a possible soft tissue focus measuring 5.3 cm.  Extensive multifocal osseous metastatic disease throughout the  visualized cervical, thoracic, and lumbar spine are identified.  For  example there are numerous lytic lesions throughout the lower cervical  spine as well as the upper thoracic spine with multilevel compression  fractures most pronounced at T1, T2, and T4 which appears similar to the  thoracic spine CT performed 07/30/2019. Likely underlying soft tissue is  suggested with these, although given the patient's positioning and lack  of contrast there is very limited evaluation for soft tissue within  these fractures. Additional extensive osseous destructive changes with  fracture identified involving the approximate T5 vertebral process T6  and T7 vertebral bodies with some degree of posterior retropulsion and  possible soft tissue compromise of the spinal canal at these levels,  although lack of contrast limits evaluation. Again identified at T5 is  marked kyphosis, similar to prior. Severe L1 fracture identified with  osseous destructive changes and mild retropulsion, similar to prior.      Dedicated CT imaging of the lungs demonstrates focal airspace  disease/mass within the right lung apex adjacent to the osseous  destructive rib changes which may relate to post radiation changes,  although underlying soft tissue abnormality is likely present. Moderate  to large bilateral pleural effusions are identified. Bilateral lower  lobe airspace disease is noted with more focal consolidative changes  within the right mid lung with air bronchograms and a calcification. The  heart is not enlarged. Extensive sternal osseous metastatic disease with  extensive destruction is identified involving the inferior aspect of the  sternum below the manubrium with possible anterior mediastinal  extension. This is similar to the CT of the thoracic spine  performed  07/30/2019.     ABDOMEN/PELVIS: Lack of intravenous and oral contrast limits evaluation  of abdominal structures. Identified within the liver is a 2.5 cm  hypodensity concerning for a metastatic lesion. Mild perihepatic fluid  is identified. Cholelithiasis is present. Motion artifact limits  evaluation of fine detail for the small bowel and organs within the  abdomen. The pancreas is not demonstrated acute abnormality on this  unenhanced motion limited exam. The spleen is unremarkable. No adrenal  mass is identified. Scattered enlarged retroperitoneal lymph nodes  measuring up to 8 mm are identified concerning for possible metastatic  involvement of the retroperitoneum. No free fluid or free air is  appreciated within the abdomen. Small bowel is nondilated no evidence of  bowel obstruction. Mildly infiltrated diffuse mesenteric inflammation  identified which may relate to mesenteric radiculitis. Sigmoid colon  diverticulosis without convincing evidence of diverticulitis, although  the sigmoid colon is thickened. Trace free fluid is noted in the pelvis.     There is redemonstration of pathologic destructive fracture involving L1  with mild posterior retropulsion, similar to the lumbar spine CT  performed 07/30/2019. The remainder of the vertebral bodies appear to  maintain height. Multilevel disc degeneration and posterior disc  osteophyte complexes are suggested involving the lumbar spine.  Multifocal mottled appearance of the bony pelvis identified suggestive  of multifocal possible small lytic metastases particularly involving the  left iliac crest, similar to prior.       Impression:       1. Extensive multifocal osseous metastatic disease with associated  severe osseous destruction involving the right anterior first and second  ribs, left posterior-lateral fifth, sixth, seventh, and eighth rib  fractures, inferior sternum, and numerous multifocal lower cervical and  upper thoracic destructive lesions.  Overall lack of intravenous contrast  limits evaluation for underlying soft tissue, although underlying soft  tissue abnormalities are present at all of these destructive sites with  soft tissue extension into the anterior mediastinum at the sternum, soft  tissue lesions associated with the destructive rib lesions, and likely  soft tissue lesions present involving the cervical and thoracic spine  pathologic fractures and destructive osseous changes. Evaluation of  spinal canal compromise is not evaluated on this study secondary to lack  of intravenous contrast, although these findings appear similar to  slightly worsened when compared to the CT of the thoracic spine  performed 07/30/2019. Follow-up MRI would be more definitive,  particularly for spinal canal compromise if clinically warranted.     2. Soft tissue lesions involving the right breast which may relate to  the site of primary carcinoma.     3. Moderate bilateral pleural effusions identified with associated  multifocal airspace disease throughout the lungs as described above.     4. Hypoattenuated right 2.5 cm hepatic lobe lesion concerning for  metastatic focus with additional prominent mesenteric and  retroperitoneal lymph nodes which also could relate to sites of  metastatic disease.     5. Nonspecific diffuse mesenteric infiltration with sigmoid  diverticulosis with associated distal sigmoid wall thickening and  pericolonic fluid suggestive of possible underlying sigmoid  diverticulitis.     D:  08/09/2019  E:  08/09/2019       XR Chest 1 View [[id number]] Collected:  08/09/19 1037     Updated:  08/09/19 1107    Narrative:       EXAMINATION: XR CHEST 1 VW- 08/09/2019      INDICATION: Chest Pain triage protocol      COMPARISON: Chest radiograph 12/11/2017     FINDINGS: There are low lung volumes noted bilaterally with associated  small bilateral pleural effusions with associated atelectasis. This  obscures evaluation of the cardiomediastinal silhouette.  Mild pulmonary  vascular congestion is identified. There is abnormal soft tissue density  involving the right lung apex with irregularity of the rib which  corresponds to a destructive process identified on this CT of the  thoracic spine performed 07/30/2019.           Impression:       Small bilateral pleural effusions with associated  atelectasis. There is redemonstration of destructive appearing changes  involving the left lateral rib cage and right apical region corresponds  to the CT of thoracic spine performed 07/30/2019 consistent with the  known multiple metastatic lesions.     D:  08/09/2019  E:  08/09/2019           ECG/EMG Results (last 24 hours)     Procedure Component Value Units Date/Time    ECG 12 Lead [[id number]] Collected:  08/09/19 0939     Updated:  08/09/19 1051    Narrative:       Test Reason : chest pain  Blood Pressure : **/** mmHG  Vent. Rate : 088 BPM     Atrial Rate : 088 BPM     P-R Int : 128 ms          QRS Dur : 074 ms      QT Int : 356 ms       P-R-T Axes : 031 052 061 degrees     QTc Int : 430 ms    Normal sinus rhythm  ST abnormality, possible digitalis effect  Abnormal ECG  When compared with ECG of 11-DEC-2017 15:41,  No significant change was found  Confirmed by JANEL EDWARD MD ([id number]) on 8/9/2019 10:51:41 AM    Referred By:  EDMD           Confirmed By:JANEL EDWARD MD    ECG 12 Lead [[id number]] Collected:  08/09/19 1203     Updated:  08/09/19 1416    Narrative:       Test Reason : 2nd set  Blood Pressure : **/** mmHG  Vent. Rate : 081 BPM     Atrial Rate : 081 BPM     P-R Int : 128 ms          QRS Dur : 076 ms      QT Int : 378 ms       P-R-T Axes : 038 067 078 degrees     QTc Int : 439 ms    Normal sinus rhythm  When compared with ECG of 09-AUG-2019 09:39,  Nonspecific T wave abnormality now evident in Lateral leads  Confirmed by JANEL EDWARD MD ([id number]) on 8/9/2019 2:16:25 PM    Referred By:  EDMD           Confirmed By:JANEL EDWARD MD          Orders (last 24 hrs)     Start      Ordered    08/10/19 0600  CBC Auto Differential  Morning Draw      08/09/19 1830    08/10/19 0600  Basic Metabolic Panel  Morning Draw      08/09/19 1830    08/10/19 0600  proBNP  Morning Draw      08/09/19 1900    08/10/19 0001  NPO Diet  Diet Effective Midnight      08/09/19 1905 08/09/19 2234  Inpatient Nutrition Consult  Once     Provider:  (Not yet assigned)    08/09/19 2233 08/09/19 2231  ondansetron ODT (ZOFRAN-ODT) disintegrating tablet 4 mg  Every 4 Hours PRN      08/09/19 2231 08/09/19 2231  ondansetron (ZOFRAN) injection 4 mg  Every 6 Hours PRN      08/09/19 2231 08/09/19 2230  ondansetron (ZOFRAN) tablet 4 mg  Every 4 Hours PRN,   Status:  Discontinued      08/09/19 2230 08/09/19 2230  ondansetron (ZOFRAN) injection 4 mg  Every 6 Hours PRN,   Status:  Discontinued      08/09/19 2230 08/09/19 2100  sodium chloride 0.9 % flush 3 mL  Every 12 Hours Scheduled      08/09/19 1830 08/09/19 2100  heparin (porcine) 5000 UNIT/ML injection 5,000 Units  Every 12 Hours Scheduled,   Status:  Discontinued      08/09/19 1830 08/09/19 2100  docusate sodium (COLACE) capsule 100 mg  2 Times Daily      08/09/19 1830 08/09/19 2030  ipratropium-albuterol (DUO-NEB) nebulizer solution 3 mL  4 Times Daily - RT      08/09/19 1900 08/09/19 2000  Vital Signs  Every 4 Hours      08/09/19 1830 08/09/19 1945  heparin (porcine) 5000 UNIT/ML injection 5,000 Units  Once      08/09/19 1854    08/09/19 1930  sodium chloride 0.9 % infusion  Continuous      08/09/19 1830 08/09/19 1900  Strict Intake & Output  Every Hour      08/09/19 1830    08/09/19 1900  Troponin  STAT      08/09/19 1900    08/09/19 1900  Wound Ostomy Eval & Treat  Once      08/09/19 1900 08/09/19 1856  Case Management  Consult  Once     Provider:  (Not yet assigned)    08/09/19 1900    08/09/19 1855  Spiritual Care Consult  Once     Provider:  (Not yet assigned)    08/09/19 1855    08/09/19 1831  Daily Weights  Daily       08/09/19 1830    08/09/19 1830  ondansetron (ZOFRAN) tablet 4 mg  Every 6 Hours PRN,   Status:  Discontinued      08/09/19 1830 08/09/19 1830  ondansetron (ZOFRAN) injection 4 mg  Every 6 Hours PRN,   Status:  Discontinued      08/09/19 1830 08/09/19 1830  bisacodyl (DULCOLAX) suppository 10 mg  Daily PRN      08/09/19 1830 08/09/19 1830  naloxone (NARCAN) injection 0.4 mg  Every 5 Minutes PRN      08/09/19 1830    08/09/19 1830  HYDROmorphone (DILAUDID) injection 0.5 mg  Every 2 Hours PRN      08/09/19 1830 08/09/19 1829  acetaminophen (TYLENOL) tablet 650 mg  Every 4 Hours PRN      08/09/19 1830 08/09/19 1828  PT Consult: Eval & Treat As Tolerated; severe weakness/debility.  Advanced cancer  Once      08/09/19 1830    08/09/19 1827  Place Venous Foot Pump  Once      08/09/19 1830 08/09/19 1827  Maintain Venous Foot Pump  Continuous      08/09/19 1830 08/09/19 1827  VTE Prophylaxis Not Indicated:  Once      08/09/19 1830 08/09/19 1827  Cardiac Monitoring  Continuous      08/09/19 1830 08/09/19 1827  Pulse Oximetry, Continuous  Continuous      08/09/19 1830 08/09/19 1827  Fall Precautions  Continuous      08/09/19 1830 08/09/19 1827  Oral Care  Once      08/09/19 1830 08/09/19 1826  Code Status and Medical Interventions:  Continuous      08/09/19 1830 08/09/19 1826  Intake & Output  Every Shift      08/09/19 1830 08/09/19 1826  Weigh Patient  Once      08/09/19 1830 08/09/19 1826  Oxygen Therapy- Nasal Cannula; Titrate for SPO2: 90% - 95%  Continuous      08/09/19 1830 08/09/19 1826  Insert Peripheral IV  Once      08/09/19 1830 08/09/19 1826  Saline Lock & Maintain IV Access  Continuous      08/09/19 1830 08/09/19 1825  sodium chloride 0.9 % flush 3-10 mL  As Needed      08/09/19 1830    08/09/19 1824  CT Guided Thoracentesis  1 Time Imaging      08/09/19 1823    08/09/19 1808  CT Guided Thoracentesis  1 Time Imaging,   Status:  Canceled      08/09/19 1811     08/09/19 1807  Hold medication  Continuous PRN      08/09/19 1811    08/09/19 1804  Inpatient Palliative Care MD Consult  Once     Specialty:  Hospice and Palliative Medicine  Provider:  (Not yet assigned)    08/09/19 1803    08/09/19 1753  Inpatient Admission  Once      08/09/19 1752    08/09/19 1703  Tele Bed Request  Once      08/09/19 1702    08/09/19 1640  Initiate Observation Status  Once      08/09/19 1639    08/09/19 1421  Urinalysis, Microscopic Only - Urine, Clean Catch  Once      08/09/19 1420    08/09/19 1301  CT Chest Without Contrast  1 Time Imaging      08/09/19 1300    08/09/19 1136  Troponin  STAT      08/09/19 1109    08/09/19 1136  ECG 12 Lead  STAT      08/09/19 1109    08/09/19 1026  methylPREDNISolone sodium succinate (SOLU-Medrol) injection 40 mg  Every 4 Hours Scheduled      08/09/19 1022    08/09/19 1024  diphenhydrAMINE (BENADRYL) injection 50 mg  Once      08/09/19 1022    08/09/19 1023  CT Abdomen Pelvis Without Contrast  1 Time Imaging      08/09/19 1022    08/09/19 1022  CT Angiogram Chest With & Without Contrast  1 Time Imaging,   Status:  Canceled      08/09/19 1022    08/09/19 1021  ondansetron (ZOFRAN) injection 4 mg  Once      08/09/19 1019    08/09/19 1020  sodium chloride 0.9 % bolus 1,428 mL  Once      08/09/19 1018    08/09/19 1019  Blood Culture - Blood, Blood, Venous Line  Once      08/09/19 1018    08/09/19 1019  Blood Culture - Blood, Blood, Venous Line  Once     Comments:  30 minutes after first collection, or from a different site      08/09/19 1018    08/09/19 1018  HYDROmorphone (DILAUDID) injection 0.5 mg  Every 15 Minutes PRN      08/09/19 1019    08/09/19 0950  Lactic Acid, Plasma  STAT      08/09/19 0952    08/09/19 0950  Urinalysis With Culture If Indicated - Urine, Clean Catch  STAT      08/09/19 0952    08/09/19 0936  NPO Diet  Diet Effective Now      08/09/19 0936    08/09/19 0936  Undress and Gown  Once      08/09/19 0936    08/09/19 0936  Cardiac monitoring   Per Hospital Policy,   Status:  Canceled      08/09/19 0936    08/09/19 0936  Continuous Pulse Oximetry  Continuous,   Status:  Canceled      08/09/19 0936    08/09/19 0936  ECG 12 Lead  Once      08/09/19 0936    08/09/19 0936  XR Chest 1 View  1 Time Imaging      08/09/19 0936    08/09/19 0936  Insert peripheral IV  Once      08/09/19 0936    08/09/19 0936  [place] Draw  Once      08/09/19 0936    08/09/19 0936  Troponin  Once      08/09/19 0936    08/09/19 0936  CBC & Differential  Once      08/09/19 0936    08/09/19 0936  Comprehensive Metabolic Panel  Once      08/09/19 0936    08/09/19 0936  Lipase  Once      08/09/19 0936    08/09/19 0936  BNP  Once      08/09/19 0936    08/09/19 0936  Light Blue Top  PROCEDURE ONCE      08/09/19 0936    08/09/19 0936  Green Top (Gel)  PROCEDURE ONCE      08/09/19 0936    08/09/19 0936  Lavender Top  PROCEDURE ONCE      08/09/19 0936    08/09/19 0936  Gold Top - SST  PROCEDURE ONCE      08/09/19 0936    08/09/19 0936  Green Top (No Gel)  PROCEDURE ONCE,   Status:  Canceled      08/09/19 0936    08/09/19 0936  CBC Auto Differential  PROCEDURE ONCE      08/09/19 0936    08/09/19 0935  Oxygen Therapy- Nasal Cannula; 2 LPM; Titrate for SPO2: equal to or greater than, 92%  Continuous PRN,   Status:  Canceled      08/09/19 0936    08/09/19 0935  sodium chloride 0.9 % flush 10 mL  As Needed      08/09/19 0936    Unscheduled  ECG 12 Lead  As Needed      08/09/19 0936    Unscheduled  Up With Assistance  As Needed      08/09/19 1830    Signed and Held  Obtain Informed Consent  Once      Signed and Held    Signed and Held  No Thoracentesis Labs Needed  Once     Comments:  No labs required.  Pt requests palliative rt thoracentesis    Signed and Held    --  cyclobenzaprine (FLEXERIL) 5 MG tablet  3 Times Daily PRN      08/09/19 2207    --  HYDROmorphone (DILAUDID) 2 MG tablet  Every 4 Hours PRN      08/09/19 2207    --  calcium carbonate (TUMS) 500 MG chewable tablet  Every 6 Hours PRN       08/09/19 2208            Physician Progress Notes (last 24 hours) (Notes from 8/8/2019 11:45 PM through 8/9/2019 11:45 PM)     No notes of this type exist for this encounter.        Consult Notes (last 24 hours) (Notes from 8/8/2019 11:45 PM through 8/9/2019 11:45 PM)     No notes of this type exist for this encounter.

## 2019-08-10 NOTE — PLAN OF CARE
Problem: Fall Risk (Adult)  Goal: Identify Related Risk Factors and Signs and Symptoms  Outcome: Ongoing (interventions implemented as appropriate)    Goal: Absence of Fall  Outcome: Ongoing (interventions implemented as appropriate)      Problem: Skin Injury Risk (Adult)  Goal: Identify Related Risk Factors and Signs and Symptoms  Outcome: Ongoing (interventions implemented as appropriate)    Goal: Skin Health and Integrity  Outcome: Ongoing (interventions implemented as appropriate)      Problem: Patient Care Overview  Goal: Plan of Care Review  Outcome: Ongoing (interventions implemented as appropriate)   08/10/19 0525   Coping/Psychosocial   Plan of Care Reviewed With patient   Plan of Care Review   Progress no change   OTHER   Outcome Summary No resp distress during shift; Pain controlled by PRN med; NPO for Thoracentesis; VSS; Will cont to monitor     Goal: Discharge Needs Assessment  Outcome: Ongoing (interventions implemented as appropriate)      Problem: Pain, Chronic (Adult)  Goal: Identify Related Risk Factors and Signs and Symptoms  Outcome: Ongoing (interventions implemented as appropriate)    Goal: Acceptable Pain/Comfort Level and Functional Ability  Outcome: Ongoing (interventions implemented as appropriate)

## 2019-08-10 NOTE — CONSULTS
Adult Nutrition  Assessment/PES    Patient Name:  Brianna Urrutia  YOB: 1950  MRN: [id number]  Admit Date:  8/9/2019    Assessment Date:  8/10/2019    Comments:  Pt does not meet criteria for malnutrition r/t GOC. Menu adjusted to pt's request and Boost Plus supplement provided as well. RD will monitor per protocol.    Reason for Assessment     Row Name 08/10/19 144          Reason for Assessment    Reason For Assessment  per organizational policy;identified at risk by screening criteria     Diagnosis  cancer diagnosis/related complications Pt adm w/ widely metstaic disease -stage IV breast CA, lymphedema R arm, dyspnea,malignant pleural effusion s/p palliative thoracentesis, bone mets, intracable pain, YOLETTE on CKD . PMH per EHR     Identified At Risk by Screening Criteria  MST SCORE 2+;unintentional loss of 10 lbs or more in the past 2 mos;reduced oral intake over the last month         Nutrition/Diet History     Row Name 08/10/19 5849          Nutrition/Diet History    Typical Food/Fluid Intake  Pt acknowledges wt loss was in NH. recently w/ home hospice service. Pt states she is a light eater.     Supplemental Drinks/Foods/Additives  Pt request [place] Boost         Anthropometrics     Row Name 08/10/19 1508          Body Mass Index (BMI)    BMI Assessment  BMI 18.5-24.9: normal         Labs/Tests/Procedures/Meds     Row Name 08/10/19 1501          Labs/Procedures/Meds    Lab Results Reviewed  reviewed, pertinent        Diagnostic Tests/Procedures    Diagnostic Test/Procedure Reviewed  reviewed, pertinent     Diagnostic Test/Procedures Comments  s/p palliative thoracentesis        Medications    Pertinent Medications Reviewed  reviewed, pertinent         Physical Findings     Row Name 08/10/19 1503          Physical Findings    Overall Physical Appearance  underweight           Nutrition Prescription Ordered     Row Name 08/10/19 1504          Nutrition Prescription PO    Current PO Diet  Regular          Problem/Interventions:  Problem 1     Row Name 08/10/19 1504          Nutrition Diagnoses Problem 1    Problem 1  No Nutrition Diagnosis at this Time     Etiology (related to)  Goals of Care                 Intervention Goal     Row Name 08/10/19 1504          Intervention Goal    General  Meet nutritional needs for age/condition;Palliative Care Pt is comfort mesures     PO  Establish PO;Tolerate PO         Nutrition Intervention     Row Name 08/10/19 1504          Nutrition Intervention    RD/Tech Action  Advise alternate selection;Menu provided;Interview for preference;Menu adjusted;Follow Tx progress;Care plan reviewd;Recommend/ordered     Recommended/Ordered  Supplement         Nutrition Prescription     Row Name 08/10/19 1505          Nutrition Prescription PO    PO Prescription  Begin/change supplement     Supplement  Boost Plus     Supplement Frequency  2 times a day     New PO Prescription Ordered?  Yes         Education/Evaluation     Row Name 08/10/19 1505          Monitor/Evaluation    Monitor  Per protocol;Symptoms           Electronically signed by:  Katherine Hooper MS,RD,LD  08/10/19 3:09 PM

## 2019-08-10 NOTE — PROGRESS NOTES
"    [facility]  PROGRESS NOTE    Patient Name: Brianna Urrutia  : 1950  MRN: [id number]    Date of Admission: 2019  Length of Stay: 1  Primary Care Physician: Christina Wade, JOHN    Subjective   Subjective     CC:  Diffuse pain, dyspnea    HPI:  Uncomfortable sitting up in chair and would like to go back to bed.  Slightly dyspneic.  She has difficulty lying flat. Got CT yest but \"it was torture\" due to bone fractures.     Review of Systems   Gen- No fevers, chills  CV- No chest pain, palpitations  Resp- No cough, mild dyspnea  GI- No N/V/D, abd pain.  Ongoing constip - small BM today  musc - would like more pain meds.  Has fentanyl patch on.   Otherwise ROS is negative except as mentioned in the HPI.    Objective   Objective     Vital Signs:   Temp:  [97.4 °F (36.3 °C)-98.2 °F (36.8 °C)] 97.4 °F (36.3 °C)  Heart Rate:  [77-99] 94  Resp:  [16-18] 18  BP: ()/() 151/94        Physical Exam:  Gen:  Frail ill appearing woman, sitting up in chair.  Alert. No family present  Neuro: alert and oriented, clear speech, follows commands  HEENT:  NC/AT PERRL, OP benign  Neck:  Supple, no LAD  Heart RRR no murmur, rub, or gallop  Lungs decreased throughout.  Abd:  Sl firm, nontender, no rebound or guarding, pos BS  Extrem:  No c/c/e    Results Reviewed:  I have personally reviewed current lab, radiology, and data and agree.    Results from last 7 days   Lab Units 19  1001   WBC 10*3/mm3 8.95   HEMOGLOBIN g/dL 14.3   HEMATOCRIT % 45.8   PLATELETS 10*3/mm3 275     Results from last 7 days   Lab Units 19  1926 19  1208 19  1001   SODIUM mmol/L  --   --  141   POTASSIUM mmol/L  --   --  4.1   CHLORIDE mmol/L  --   --  98   CO2 mmol/L  --   --  31.0*   BUN mg/dL  --   --  48*   CREATININE mg/dL  --   --  1.43*   GLUCOSE mg/dL  --   --  116*   CALCIUM mg/dL  --   --  9.5   ALT (SGPT) U/L  --   --  39*   AST (SGOT) U/L  --   --  72* "   TROPONIN T ng/mL 0.025 <0.010 0.046*   PROBNP pg/mL  --   --  1,817.0*     Estimated Creatinine Clearance: 28.7 mL/min (A) (by C-G formula based on SCr of 1.43 mg/dL (H)).    Microbiology Results Abnormal     None          Imaging Results (last 24 hours)     Procedure Component Value Units Date/Time    CT Chest Without Contrast [[id number]] Collected:  08/09/19 1413     Updated:  08/09/19 1531    Narrative:       EXAMINATION: CT CHEST WO CONTRAST-, CT ABDOMEN PELVIS WO CONTRAST-  08/09/2019      INDICATION: Chest pain, shortness of air, stage IV breast cancer, hx  pathologic fractures     TECHNIQUE: Unenhanced CT imaging of the chest, abdomen, and pelvis was  obtained. Additional coronal and sagittal reformatted images were  obtained for review.     The radiation dose reduction device was turned on for each scan per the  ALARA (As Low as Reasonably Achievable) protocol.     COMPARISON: CT of the thoracic and lumbar spine 07/30/2019, CT of the  chest 09/12/2017 and CT abdomen 06/19/2017.     FINDINGS:      CHEST: Multiple abnormalities are identified throughout the chest and  osseous structures of the chest. For example there is osseous erosive  changes of the right first and distal right second rib with associated  soft tissue abnormality measuring 3.0 x 4.1 cm similar when compared to  the CT of the thoracic spine performed 07/30/2019. Extensive soft tissue  abnormality identified within the anterior right chest wall adjacent to  this. Identified within the right breast is a 3.1 x 1.4 cm soft tissue  mass with associated skin retraction which may be the site of the  reported breast carcinoma.  Additionally more inferiorly there is a soft  tissue subcutaneous nodule. Additional abnormal osseous destruction  identified involving the posterior fifth, sixth, seventh, and eighth  ribs posterior and laterally concerning for metastatic lesions. Lack of  intravenous contrast limits evaluation for underlying soft  tissue  lesion, however one is suspected within this region. Particularly at  (series 1/image 9) is a possible soft tissue focus measuring 5.3 cm.  Extensive multifocal osseous metastatic disease throughout the  visualized cervical, thoracic, and lumbar spine are identified.  For  example there are numerous lytic lesions throughout the lower cervical  spine as well as the upper thoracic spine with multilevel compression  fractures most pronounced at T1, T2, and T4 which appears similar to the  thoracic spine CT performed 07/30/2019. Likely underlying soft tissue is  suggested with these, although given the patient's positioning and lack  of contrast there is very limited evaluation for soft tissue within  these fractures. Additional extensive osseous destructive changes with  fracture identified involving the approximate T5 vertebral process T6  and T7 vertebral bodies with some degree of posterior retropulsion and  possible soft tissue compromise of the spinal canal at these levels,  although lack of contrast limits evaluation. Again identified at T5 is  marked kyphosis, similar to prior. Severe L1 fracture identified with  osseous destructive changes and mild retropulsion, similar to prior.      Dedicated CT imaging of the lungs demonstrates focal airspace  disease/mass within the right lung apex adjacent to the osseous  destructive rib changes which may relate to post radiation changes,  although underlying soft tissue abnormality is likely present. Moderate  to large bilateral pleural effusions are identified. Bilateral lower  lobe airspace disease is noted with more focal consolidative changes  within the right mid lung with air bronchograms and a calcification. The  heart is not enlarged. Extensive sternal osseous metastatic disease with  extensive destruction is identified involving the inferior aspect of the  sternum below the manubrium with possible anterior mediastinal  extension. This is similar to the CT  of the thoracic spine performed  07/30/2019.     ABDOMEN/PELVIS: Lack of intravenous and oral contrast limits evaluation  of abdominal structures. Identified within the liver is a 2.5 cm  hypodensity concerning for a metastatic lesion. Mild perihepatic fluid  is identified. Cholelithiasis is present. Motion artifact limits  evaluation of fine detail for the small bowel and organs within the  abdomen. The pancreas is not demonstrated acute abnormality on this  unenhanced motion limited exam. The spleen is unremarkable. No adrenal  mass is identified. Scattered enlarged retroperitoneal lymph nodes  measuring up to 8 mm are identified concerning for possible metastatic  involvement of the retroperitoneum. No free fluid or free air is  appreciated within the abdomen. Small bowel is nondilated no evidence of  bowel obstruction. Mildly infiltrated diffuse mesenteric inflammation  identified which may relate to mesenteric radiculitis. Sigmoid colon  diverticulosis without convincing evidence of diverticulitis, although  the sigmoid colon is thickened. Trace free fluid is noted in the pelvis.     There is redemonstration of pathologic destructive fracture involving L1  with mild posterior retropulsion, similar to the lumbar spine CT  performed 07/30/2019. The remainder of the vertebral bodies appear to  maintain height. Multilevel disc degeneration and posterior disc  osteophyte complexes are suggested involving the lumbar spine.  Multifocal mottled appearance of the bony pelvis identified suggestive  of multifocal possible small lytic metastases particularly involving the  left iliac crest, similar to prior.       Impression:       1. Extensive multifocal osseous metastatic disease with associated  severe osseous destruction involving the right anterior first and second  ribs, left posterior-lateral fifth, sixth, seventh, and eighth rib  fractures, inferior sternum, and numerous multifocal lower cervical and  upper  thoracic destructive lesions. Overall lack of intravenous contrast  limits evaluation for underlying soft tissue, although underlying soft  tissue abnormalities are present at all of these destructive sites with  soft tissue extension into the anterior mediastinum at the sternum, soft  tissue lesions associated with the destructive rib lesions, and likely  soft tissue lesions present involving the cervical and thoracic spine  pathologic fractures and destructive osseous changes. Evaluation of  spinal canal compromise is not evaluated on this study secondary to lack  of intravenous contrast, although these findings appear similar to  slightly worsened when compared to the CT of the thoracic spine  performed 07/30/2019. Follow-up MRI would be more definitive,  particularly for spinal canal compromise if clinically warranted.     2. Soft tissue lesions involving the right breast which may relate to  the site of primary carcinoma.     3. Moderate bilateral pleural effusions identified with associated  multifocal airspace disease throughout the lungs as described above.     4. Hypoattenuated right 2.5 cm hepatic lobe lesion concerning for  metastatic focus with additional prominent mesenteric and  retroperitoneal lymph nodes which also could relate to sites of  metastatic disease.     5. Nonspecific diffuse mesenteric infiltration with sigmoid  diverticulosis with associated distal sigmoid wall thickening and  pericolonic fluid suggestive of possible underlying sigmoid  diverticulitis.     D:  08/09/2019  E:  08/09/2019       CT Abdomen Pelvis Without Contrast [[id number]] Collected:  08/09/19 1413     Updated:  08/09/19 1531    Narrative:       EXAMINATION: CT CHEST WO CONTRAST-, CT ABDOMEN PELVIS WO CONTRAST-  08/09/2019      INDICATION: Chest pain, shortness of air, stage IV breast cancer, hx  pathologic fractures     TECHNIQUE: Unenhanced CT imaging of the chest, abdomen, and pelvis was  obtained. Additional coronal  and sagittal reformatted images were  obtained for review.     The radiation dose reduction device was turned on for each scan per the  ALARA (As Low as Reasonably Achievable) protocol.     COMPARISON: CT of the thoracic and lumbar spine 07/30/2019, CT of the  chest 09/12/2017 and CT abdomen 06/19/2017.     FINDINGS:      CHEST: Multiple abnormalities are identified throughout the chest and  osseous structures of the chest. For example there is osseous erosive  changes of the right first and distal right second rib with associated  soft tissue abnormality measuring 3.0 x 4.1 cm similar when compared to  the CT of the thoracic spine performed 07/30/2019. Extensive soft tissue  abnormality identified within the anterior right chest wall adjacent to  this. Identified within the right breast is a 3.1 x 1.4 cm soft tissue  mass with associated skin retraction which may be the site of the  reported breast carcinoma.  Additionally more inferiorly there is a soft  tissue subcutaneous nodule. Additional abnormal osseous destruction  identified involving the posterior fifth, sixth, seventh, and eighth  ribs posterior and laterally concerning for metastatic lesions. Lack of  intravenous contrast limits evaluation for underlying soft tissue  lesion, however one is suspected within this region. Particularly at  (series 1/image 9) is a possible soft tissue focus measuring 5.3 cm.  Extensive multifocal osseous metastatic disease throughout the  visualized cervical, thoracic, and lumbar spine are identified.  For  example there are numerous lytic lesions throughout the lower cervical  spine as well as the upper thoracic spine with multilevel compression  fractures most pronounced at T1, T2, and T4 which appears similar to the  thoracic spine CT performed 07/30/2019. Likely underlying soft tissue is  suggested with these, although given the patient's positioning and lack  of contrast there is very limited evaluation for soft tissue  within  these fractures. Additional extensive osseous destructive changes with  fracture identified involving the approximate T5 vertebral process T6  and T7 vertebral bodies with some degree of posterior retropulsion and  possible soft tissue compromise of the spinal canal at these levels,  although lack of contrast limits evaluation. Again identified at T5 is  marked kyphosis, similar to prior. Severe L1 fracture identified with  osseous destructive changes and mild retropulsion, similar to prior.      Dedicated CT imaging of the lungs demonstrates focal airspace  disease/mass within the right lung apex adjacent to the osseous  destructive rib changes which may relate to post radiation changes,  although underlying soft tissue abnormality is likely present. Moderate  to large bilateral pleural effusions are identified. Bilateral lower  lobe airspace disease is noted with more focal consolidative changes  within the right mid lung with air bronchograms and a calcification. The  heart is not enlarged. Extensive sternal osseous metastatic disease with  extensive destruction is identified involving the inferior aspect of the  sternum below the manubrium with possible anterior mediastinal  extension. This is similar to the CT of the thoracic spine performed  07/30/2019.     ABDOMEN/PELVIS: Lack of intravenous and oral contrast limits evaluation  of abdominal structures. Identified within the liver is a 2.5 cm  hypodensity concerning for a metastatic lesion. Mild perihepatic fluid  is identified. Cholelithiasis is present. Motion artifact limits  evaluation of fine detail for the small bowel and organs within the  abdomen. The pancreas is not demonstrated acute abnormality on this  unenhanced motion limited exam. The spleen is unremarkable. No adrenal  mass is identified. Scattered enlarged retroperitoneal lymph nodes  measuring up to 8 mm are identified concerning for possible metastatic  involvement of the  retroperitoneum. No free fluid or free air is  appreciated within the abdomen. Small bowel is nondilated no evidence of  bowel obstruction. Mildly infiltrated diffuse mesenteric inflammation  identified which may relate to mesenteric radiculitis. Sigmoid colon  diverticulosis without convincing evidence of diverticulitis, although  the sigmoid colon is thickened. Trace free fluid is noted in the pelvis.     There is redemonstration of pathologic destructive fracture involving L1  with mild posterior retropulsion, similar to the lumbar spine CT  performed 07/30/2019. The remainder of the vertebral bodies appear to  maintain height. Multilevel disc degeneration and posterior disc  osteophyte complexes are suggested involving the lumbar spine.  Multifocal mottled appearance of the bony pelvis identified suggestive  of multifocal possible small lytic metastases particularly involving the  left iliac crest, similar to prior.       Impression:       1. Extensive multifocal osseous metastatic disease with associated  severe osseous destruction involving the right anterior first and second  ribs, left posterior-lateral fifth, sixth, seventh, and eighth rib  fractures, inferior sternum, and numerous multifocal lower cervical and  upper thoracic destructive lesions. Overall lack of intravenous contrast  limits evaluation for underlying soft tissue, although underlying soft  tissue abnormalities are present at all of these destructive sites with  soft tissue extension into the anterior mediastinum at the sternum, soft  tissue lesions associated with the destructive rib lesions, and likely  soft tissue lesions present involving the cervical and thoracic spine  pathologic fractures and destructive osseous changes. Evaluation of  spinal canal compromise is not evaluated on this study secondary to lack  of intravenous contrast, although these findings appear similar to  slightly worsened when compared to the CT of the thoracic  spine  performed 07/30/2019. Follow-up MRI would be more definitive,  particularly for spinal canal compromise if clinically warranted.     2. Soft tissue lesions involving the right breast which may relate to  the site of primary carcinoma.     3. Moderate bilateral pleural effusions identified with associated  multifocal airspace disease throughout the lungs as described above.     4. Hypoattenuated right 2.5 cm hepatic lobe lesion concerning for  metastatic focus with additional prominent mesenteric and  retroperitoneal lymph nodes which also could relate to sites of  metastatic disease.     5. Nonspecific diffuse mesenteric infiltration with sigmoid  diverticulosis with associated distal sigmoid wall thickening and  pericolonic fluid suggestive of possible underlying sigmoid  diverticulitis.     D:  08/09/2019  E:  08/09/2019       XR Chest 1 View [[id number]] Collected:  08/09/19 1037     Updated:  08/09/19 1107    Narrative:       EXAMINATION: XR CHEST 1 VW- 08/09/2019      INDICATION: Chest Pain triage protocol      COMPARISON: Chest radiograph 12/11/2017     FINDINGS: There are low lung volumes noted bilaterally with associated  small bilateral pleural effusions with associated atelectasis. This  obscures evaluation of the cardiomediastinal silhouette. Mild pulmonary  vascular congestion is identified. There is abnormal soft tissue density  involving the right lung apex with irregularity of the rib which  corresponds to a destructive process identified on this CT of the  thoracic spine performed 07/30/2019.           Impression:       Small bilateral pleural effusions with associated  atelectasis. There is redemonstration of destructive appearing changes  involving the left lateral rib cage and right apical region corresponds  to the CT of thoracic spine performed 07/30/2019 consistent with the  known multiple metastatic lesions.     D:  08/09/2019  E:  08/09/2019             Results for orders placed during  the hospital encounter of 06/16/17   Adult Transthoracic Echo Complete    Narrative · Left ventricular systolic function is normal. Estimated EF = 70%.  · Left ventricular diastolic dysfunction (grade I) consistent with   impaired relaxation.  · The cardiac valves are normal.          I have reviewed the medications:  Scheduled Meds:  docusate sodium 100 mg Oral BID   ipratropium-albuterol 3 mL Nebulization 4x Daily - RT   sodium chloride 3 mL Intravenous Q12H     Continuous Infusions:  hold 1 each     PRN Meds:.•  acetaminophen  •  bisacodyl  •  hold  •  HYDROmorphone  •  HYDROmorphone **AND** naloxone  •  ondansetron ODT **OR** ondansetron  •  sodium chloride  •  sodium chloride      Assessment/Plan   Assessment / Plan     Active Hospital Problems    Diagnosis  POA   • **Widespread metastatic malignant neoplastic disease (CMS/HCC) [C80.0]  Yes   • Pleural effusion [J90]  Yes   • Lymphedema [I89.0]  Unknown   • Intractable pain [R52]  Unknown   • Shortness of breath [R06.02]  Unknown   • Acute-on-chronic kidney injury (CMS/HCC) [N17.9, N18.9]  Unknown   • Edema of both lower extremities [R60.0]  Unknown   • Chronic respiratory failure with hypoxia, on home oxygen therapy (CMS/HCC) [J96.11, Z99.81]  Not Applicable   • Fall [W19.XXXA]  Yes   • JACQUI (generalized anxiety disorder) [F41.1]  Yes      Resolved Hospital Problems   No resolved problems to display.        Brief Hospital Course to date:  Brianna Urrutia is a 69 y.o. female debilitated from widely metastatic breast cancer, recently on hospice at home, here for progressive chest and back pain and worsening shortness of air.  She wears 2 LNC oxygen at baseline.      Bone mets, bilat malignant effusion   - Symptom control  - therapeutic thoracentesis   - known to XRT and Dr. Ibarra.   - patient declines hospice care at this time due to past experience.  - will need pretreatment with pain meds/ativan prior to thoracentesis.      YOLETTE 1.4  - hydrate gently    DVT  [facility]    Disposition: I expect the patient to be discharged tbd.    CODE STATUS:   Code Status and Medical Interventions:   Ordered at: 08/09/19 1830     Level Of Support Discussed With:    Patient     Code Status:    No CPR     Medical Interventions (Level of Support Prior to Arrest):    Comfort Measures         Electronically signed by Tran Brizuela MD, 08/10/19, 8:23 AM.

## 2019-08-10 NOTE — PLAN OF CARE
Problem: Patient Care Overview  Goal: Plan of Care Review  Outcome: Ongoing (interventions implemented as appropriate)   08/10/19 1648   Coping/Psychosocial   Plan of Care Reviewed With patient   Plan of Care Review   Progress no change   OTHER   Outcome Summary new palliative consult. Pt is alert and oriented although states she feels hammered or fuzzy related to pain meds. Pt denies pain at present. denies dyspnea with 02 pnc after having 1 liter of fluid removed. Pt has some anxiety and depression and verbalized her worries about her son aniket who is her hcs if she is unable to make decisions. She shared her struggles with son. Dr Bradshaw saw pt and will make some adjustments to her pain regimen. Pt is followed by palliative care at [facility] and states she is not going back there due to poor care. Pt also struggles with constipation. Will continue to work on symptom issues and offer emotional support for the pt

## 2019-08-11 NOTE — PLAN OF CARE
Problem: Patient Care Overview  Goal: Plan of Care Review  Outcome: Ongoing (interventions implemented as appropriate)   08/11/19 1836   Coping/Psychosocial   Plan of Care Reviewed With patient   Plan of Care Review   Progress no change   OTHER   Outcome Summary Pt went for right thoracentesis today. Somnolent after return. Had taken some valium prior to leaving the room. Oxygenation much improved and oxygen was decreased to 3 L Nasal cannula--saturations in the mid 90s. Pleasant anc cooperative later this afternoon after sedation had apparently worn off. Pt denies any SOA, unusual pain other than what previously complainede of to the thoracentesis sites and back. Feeding self dinner well tonight and tolerating. Not requesting any pain medication after returning to room about 1215pm. Patient transfered to [facility] about 5pm today and tolerated well.      Goal: Individualization and Mutuality  Outcome: Ongoing (interventions implemented as appropriate)   08/11/19 1836   Individualization   Patient Specific Preferences comfort    Patient Specific Goals (Include Timeframe) assist with ADL's while encouraging independence as patient wishes   Patient Specific Interventions assisting pt with all ADL's as needed.     Goal: Discharge Needs Assessment  Outcome: Ongoing (interventions implemented as appropriate)   08/11/19 1836   Discharge Needs Assessment   Discharge Coordination/Progress continue comfort measures as ordered and according to patient's wishes.      Goal: Interprofessional Rounds/Family Conf  Outcome: Ongoing (interventions implemented as appropriate)   08/11/19 1836   Interdisciplinary Rounds/Family Conf   Participants   (hospice/palliative care)

## 2019-08-11 NOTE — PROGRESS NOTES
Brief Palliative Care Note:    Consult received.  Chart reviewed.  Pt seen and examined with Palliative RN Esperanza.    Patient well known to outpatient Palliative Care services (both from home and LTC facility visits) as well as home hospice care (with 2 prior admissions).    Hospice RN has seen patient this admission and offered Hospice admission.  Patient declines.   (Was also seen in ED last week x2 with falls.  Hospice RN visited at that time as well and patient deferred Hospice services.)    Recs today:  -Increase fentanyl patch to 50mcg.  -Continue current PRN dilaudid.  (Morphine was not effective outpt.)  -Increase dex to BID.  -Increase cymbalta to 60mg.  -Add benzo.  Will try valium for anxiety and muscle spasm.  -Can d/c scalp staples placed in ED last week.    *FULL CONSULT NOTE TO FOLLOW  (Discussed w/ hospitalist, Dr. Brizuela.)    Lu Bradshaw MD  8/10/19

## 2019-08-11 NOTE — PLAN OF CARE
Problem: Skin Injury Risk (Adult)  Goal: Identify Related Risk Factors and Signs and Symptoms  Outcome: Ongoing (interventions implemented as appropriate)  No new wounds/breakdown noted. Dolphin bed ordered and pt will be transferred over.   08/11/19 9416   Skin Injury Risk (Adult)   Related Risk Factors (Skin Injury Risk) fluid intake inadequate;mobility impaired;nutritional deficiencies     Goal: Skin Health and Integrity  Outcome: Ongoing (interventions implemented as appropriate)   08/11/19 0955   Skin Injury Risk (Adult)   Skin Health and Integrity making progress toward outcome

## 2019-08-11 NOTE — PROGRESS NOTES
[facility]  PROGRESS NOTE    Patient Name: Brianna Urrutia  : 1950  MRN: [id number]    Date of Admission: 2019  Length of Stay: 2  Primary Care Physician: Christina Wade, JOHN    Subjective   Subjective     CC:  Diffuse pain, dyspnea    HPI:  S/p R effusion tap for 750ml.  Curently sleeping; not roused.  Per RN, she has been stable.     Review of Systems  Asleep, not woken.   Otherwise ROS is negative except as mentioned in the HPI.    Objective   Objective     Vital Signs:   Temp:  [97.2 °F (36.2 °C)-97.5 °F (36.4 °C)] 97.3 °F (36.3 °C)  Heart Rate:  [64-88] 66  Resp:  [16-18] 16  BP: ()/(60-87) 110/76        Physical Exam:  Gen:  Frail ill appearing woman, asleep.  NAD  Neuro: asleep  HEENT:  NC/AT PERRL, OP benign  Heart RRR no murmur, rub, or gallop  Lungs decreased throughout. Shallow resps, nonlabored   Abd:  Sl firm, nontender, no rebound or guarding, pos BS  Extrem:  No c/c/e    Results Reviewed:  I have personally reviewed current lab, radiology, and data and agree.    Results from last 7 days   Lab Units 08/10/19  0809 19  1001   WBC 10*3/mm3 8.56 8.95   HEMOGLOBIN g/dL 15.4 14.3   HEMATOCRIT % 48.5* 45.8   PLATELETS 10*3/mm3 311 275   INR  0.99  --      Results from last 7 days   Lab Units 08/10/19  0809 19  1926 19  1208 19  1001   SODIUM mmol/L 142  --   --  141   POTASSIUM mmol/L 4.6  --   --  4.1   CHLORIDE mmol/L 104  --   --  98   CO2 mmol/L 26.0  --   --  31.0*   BUN mg/dL 38*  --   --  48*   CREATININE mg/dL 1.26*  --   --  1.43*   GLUCOSE mg/dL 119*  --   --  116*   CALCIUM mg/dL 9.6  --   --  9.5   ALT (SGPT) U/L  --   --   --  39*   AST (SGOT) U/L  --   --   --  72*   TROPONIN T ng/mL  --  0.025 <0.010 0.046*   PROBNP pg/mL 4,775.0*  --   --  1,817.0*     Estimated Creatinine Clearance: 34.6 mL/min (A) (by C-G formula based on SCr of 1.26 mg/dL (H)).    Microbiology Results Abnormal     Procedure  Component Value - Date/Time    Blood Culture - Blood, Arm, Left [[id number]] Collected:  08/09/19 1028    Lab Status:  Preliminary result Specimen:  Blood from Arm, Left Updated:  08/11/19 1131     Blood Culture No growth at 2 days    Blood Culture - Blood, Arm, Left [[id number]] Collected:  08/09/19 1028    Lab Status:  Preliminary result Specimen:  Blood from Arm, Left Updated:  08/11/19 1131     Blood Culture No growth at 2 days    Body Fluid Culture - Body Fluid, Pleural Cavity [[id number]] Collected:  08/10/19 1300    Lab Status:  Preliminary result Specimen:  Body Fluid from Pleural Cavity Updated:  08/11/19 0904     Body Fluid Culture No growth at 24 hours     Gram Stain No WBCs or organisms seen          Imaging Results (last 24 hours)     Procedure Component Value Units Date/Time    XR Chest 1 View [[id number]] Collected:  08/11/19 1003     Updated:  08/11/19 1003    Narrative:          EXAMINATION: XR CHEST 1 VW-      INDICATION: assess right pleural effusion; C80.0-Disseminated malignant  neoplasm, unspecified; M84.48XA-Pathological fracture, other site,  initial encounter for fracture; M89.8X9-Other specified disorders of  bone, unspecified site; C79.51-Secondary malignant neoplasm of bone;  W19.XXXA-Unspecified fall, initial encounter; R40-Hfzlnfx effusion, not  elsewhere classified; E86.0-Dehydration; N17.9-Acute kidney fa      COMPARISON: Chest x-ray 08/09/2019     FINDINGS: Increase in right pleural effusion now moderate to large  volume with adjacent atelectasis and/or opacifications. Left pleural  effusion is decreased from prior with left basilar atelectasis and/or  airspace disease status post thoracentesis on the left. Cardiac  silhouette largely obscured.           Impression:       Increasing right pleural effusion now moderate to large  volume. Interval decrease in left pleural effusion status post recent  thoracentesis. Bibasilar opacifications adjacent to these pleural  effusions of atelectasis  versus airspace disease.          CT Guided Thoracentesis [[id number]] Collected:  08/10/19 1244     Updated:  08/10/19 1247    Narrative:       EXAMINATION: CT GUIDED THORACENTESIS-      INDICATION: pleural effusion, suspect malignant; C80.0-Disseminated  malignant neoplasm, unspecified; M84.48XA-Pathological fracture, other  site, initial encounter for fracture; M89.8X9-Other specified disorders  of bone, unspecified site; C79.51-Secondary malignant neoplasm of bone;  W19.XXXA-Unspecified fall, initial encounter; H73-Pzwjlgb effusion, not  elsewhere classified; E86.0-Dehydration; N17.9-Acute kid      TECHNIQUE: Limited helical CT scanning of the chest without intravenous  contrast     The radiation dose reduction device was turned on for each scan per the  ALARA (As Low as Reasonably Achievable) protocol.     COMPARISON: CT chest 08/09/2019     FINDINGS: Bilateral pleural effusions (right with moderate volume left  effusion and adjacent atelectasis.     Patient referred for CT-guided thoracentesis. Informed consent obtained  and signed. Consent. Patient chart. Patient placed in left anterior  oblique positioning for examination and procedure. Patient prepped and  draped in typical sterile fashion. 1% lidocaine used for local  anesthetic of left chest wall. Under meticulous CT guidance 8.5 Greek  curve catheter placed in the left pleural fluid collection with  confirmation of location upon imaging and with fluid return upon  stylette removal. Subsequent 1 L of pleural fluid drained without  abnormality. Patient tolerated well without complication.             Impression:       Satisfactory CT-guided left thoracentesis.                Results for orders placed during the hospital encounter of 06/16/17   Adult Transthoracic Echo Complete    Narrative · Left ventricular systolic function is normal. Estimated EF = 70%.  · Left ventricular diastolic dysfunction (grade I) consistent with   impaired relaxation.  · The  cardiac valves are normal.          I have reviewed the medications:  Scheduled Meds:    dexamethasone 2 mg Oral Daily With Breakfast & Lunch   docusate sodium 100 mg Oral BID   DULoxetine 60 mg Oral Daily   fentaNYL 1 patch Transdermal Q72H   fentaNYL 1 patch Transdermal Q72H   [START ON 8/13/2019] fentaNYL 1 patch Transdermal Q72H   ipratropium-albuterol 3 mL Nebulization 4x Daily - RT   metoprolol tartrate 25 mg Oral Q12H   mirtazapine 15 mg Oral Nightly   pantoprazole 40 mg Oral Q AM   sennosides-docusate sodium 2 tablet Oral BID   sodium chloride 3 mL Intravenous Q12H     Continuous Infusions:    hold 1 each    sodium chloride 75 mL/hr Last Rate: 75 mL/hr (08/11/19 1223)     PRN Meds:.•  acetaminophen  •  acetaminophen  •  albuterol  •  bisacodyl  •  bisacodyl  •  calcium carbonate  •  cyclobenzaprine  •  diazePAM  •  hold  •  HYDROmorphone **AND** naloxone  •  HYDROmorphone  •  lactulose  •  ondansetron ODT **OR** ondansetron  •  sodium chloride  •  sodium chloride      Assessment/Plan   Assessment / Plan     Active Hospital Problems    Diagnosis  POA   • **Widespread metastatic malignant neoplastic disease (CMS/HCC) [C80.0]  Yes   • Pleural effusion [J90]  Yes   • Lymphedema [I89.0]  Unknown   • Intractable pain [R52]  Unknown   • Shortness of breath [R06.02]  Unknown   • Acute-on-chronic kidney injury (CMS/HCC) [N17.9, N18.9]  Unknown   • Edema of both lower extremities [R60.0]  Unknown   • Chronic respiratory failure with hypoxia, on home oxygen therapy (CMS/HCC) [J96.11, Z99.81]  Not Applicable   • Fall [W19.XXXA]  Yes   • JACQUI (generalized anxiety disorder) [F41.1]  Yes      Resolved Hospital Problems   No resolved problems to display.        Brief Hospital Course to date:  Brianna Urrutia is a 69 y.o. female debilitated from widely metastatic breast cancer, recently on hospice at home, here for progressive chest and back pain and worsening shortness of air.  She wears 2 LNC oxygen at baseline.       Bone mets, bilat malignant effusion   - Symptom control  - therapeutic thoracentesis on 8/10 and 8/11.   - known to XRT and Dr. Ibarra.   - patient declines hospice care at this time due to past experience.  - repeat CXR in AM    YOLETTE 1.4, now 1.2.  - hydrate gently    DVT Prophylaxis: [facility]    Disposition: I expect the patient to be discharged tbd.  prob back to [facility].    CODE STATUS:   Code Status and Medical Interventions:   Ordered at: 08/09/19 1830     Level Of Support Discussed With:    Patient     Code Status:    No CPR     Medical Interventions (Level of Support Prior to Arrest):    Comfort Measures         Electronically signed by Tran Brizuela MD, 08/11/19, 12:36 PM.

## 2019-08-11 NOTE — PLAN OF CARE
Problem: Patient Care Overview  Goal: Plan of Care Review  Outcome: Ongoing (interventions implemented as appropriate)   08/11/19 1002   Coping/Psychosocial   Plan of Care Reviewed With patient   OTHER   Outcome Summary Pt presents in debilitated state with metastatic process and resultant poor functional mobility. Pt to benefit from skilled svcs to improve mobility and safety. Recommend skilled svcs at d/c, setting to be determined, likely snf and hospice involvement if pt agreeable

## 2019-08-11 NOTE — PLAN OF CARE
Problem: Pain, Chronic (Adult)  Goal: Identify Related Risk Factors and Signs and Symptoms  Outcome: Ongoing (interventions implemented as appropriate)   08/11/19 1456   Pain, Chronic (Adult)   Related Risk Factors (Chronic Pain) disease process;psychosocial factor;procedures/treatments;tumor progression   Signs and Symptoms (Chronic Pain) fatigue/weakness;verbalization of pain descriptors     Goal: Acceptable Pain/Comfort Level and Functional Ability  Outcome: Ongoing (interventions implemented as appropriate)  Pt requesting  Pain medication and getting some relief after their administration. Took some PO dilaudid today, asked for more less than 2 hours later but too early for next dose--Valium offered as patient was on doorstep to be taken to radiology for thoracentesis. Pt stated yes that she would try the valium. Pt tolerated procedure well   08/11/19 1456   Pain, Chronic (Adult)   Acceptable Pain/Comfort Level and Functional Ability making progress toward outcome

## 2019-08-11 NOTE — PLAN OF CARE
Problem: Patient Care Overview  Goal: Plan of Care Review  Outcome: Ongoing (interventions implemented as appropriate)   08/11/19 1015   Coping/Psychosocial   Plan of Care Reviewed With patient   OTHER   Outcome Summary WOC Nurse for PI on Sacrum POA. Pt is sitting in chair leaning over a pillow with feet on the floor at this time. Per RN Zaira she is to have another thoracentesis today. She has pain and breathing issues when laying down.  Will order a Dolphin mattress from [employer] for comfort. I was not able to assess sacrum at this time, WOC nurse will follow up at a later time after patient is in bed. Use z guard and dry foam dressing to cover body prominence.

## 2019-08-11 NOTE — PROCEDURES
Radiology Procedure    Pre-procedure: Small to moderate right pleural effusion     Procedure Performed: CT-guided right thoracentesis      IV Sedation and/or Anesthesia:  No    Complications: None    Preliminary Findings: Significant reduction in size of right pleural effusion s/p drainage    Specimen Removed: 720ml yellow-brown fluid    Estimated Blood Loss:  0ml    Post-Procedure Diagnosis: Right pleural effusion    Post-Procedure Plan: Return to ordering service for further management     Standard Discharge Instructions Given:yes     Carlos Brunson DO  08/11/19  2:50 PM

## 2019-08-11 NOTE — PLAN OF CARE
Problem: Patient Care Overview  Goal: Plan of Care Review  Outcome: Ongoing (interventions implemented as appropriate)   08/11/19 1305   Plan of Care Review   Progress no change   OTHER   Outcome Summary pt returned from thoracentesis and is asleep and comfortable. Pt had anxiety and pain earlier today and was medicated with dilaudid and valium 720 ml removed from right lung. resp unlabored.

## 2019-08-11 NOTE — PLAN OF CARE
Problem: Fall Risk (Adult)  Goal: Identify Related Risk Factors and Signs and Symptoms  Outcome: Ongoing (interventions implemented as appropriate)   08/11/19 1455   Fall Risk (Adult)   Related Risk Factors (Fall Risk) age-related changes;bladder function altered;gait/mobility problems;environment unfamiliar;slippery/uneven surfaces;homeostatic imbalance   Signs and Symptoms (Fall Risk) presence of risk factors     Goal: Absence of Fall  Outcome: Ongoing (interventions implemented as appropriate)   08/11/19 1455   Fall Risk (Adult)   Absence of Fall achieves outcome

## 2019-08-11 NOTE — PLAN OF CARE
Problem: Fall Risk (Adult)  Goal: Identify Related Risk Factors and Signs and Symptoms  Outcome: Ongoing (interventions implemented as appropriate)   08/11/19 0403   Fall Risk (Adult)   Related Risk Factors (Fall Risk) fatigue/slow reaction;gait/mobility problems;history of falls;polypharmacy;sleep pattern alteration   Signs and Symptoms (Fall Risk) presence of risk factors       Problem: Skin Injury Risk (Adult)  Goal: Identify Related Risk Factors and Signs and Symptoms  Outcome: Ongoing (interventions implemented as appropriate)   08/11/19 0403   Skin Injury Risk (Adult)   Related Risk Factors (Skin Injury Risk) body weight extremes;cognitive impairment;mechanical forces;medication;mobility impaired;nutritional deficiencies       Problem: Patient Care Overview  Goal: Plan of Care Review  Outcome: Ongoing (interventions implemented as appropriate)   08/11/19 0403   Coping/Psychosocial   Plan of Care Reviewed With patient   Plan of Care Review   Progress declining   OTHER   Outcome Summary Pt confused, words slurred, worse after pain meds. denies SOB but sats drop to high 70's with minimal exertion. sats stay 89-% 92 on 4L NC at rest. NSR, poor po intake, gets up to BSC w asst 1 (because she is small) IVF at 75ml/hr. Spinal areas protruding, covered w sacral border foam, Broken skin on coccyx w foam dressing.        Problem: Palliative Care (Adult)  Goal: Identify Related Risk Factors and Signs and Symptoms  Outcome: Ongoing (interventions implemented as appropriate)   08/11/19 0403   Palliative Care (Adult)   Palliative Care: Related Risk Factors end-stage disease;injury (severe and disabling);worsening symptoms   Palliative Care: Signs and Symptoms anxiety;apprehension/being worried;breathlessness;confusion;loss of appetite;pain

## 2019-08-11 NOTE — NURSING NOTE
Patient sent to CT3 for right sided thoracentesis.  Patient placed on monitor.  Vitals stable.  NSR.  Dr. Brunson at bedside and consent signed/timeout complete.  Thoracentesis done without incident.  720ml of straw colored fluid removed.  Patient tolerated well.  Report called to Zaira on 6B.  Patient sent back to floor via transport.
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
## 2019-08-12 NOTE — PLAN OF CARE
Problem: Patient Care Overview  Goal: Interprofessional Rounds/Family Conf   08/12/19 1345   Interdisciplinary Rounds/Family Conf   Summary Patient very drowsy and lethargic when seen by Palliative nurse practitioner and  earlier today. Palliative APRN decreased scheduled valium. Palliative Care continues to follow for support and assist with symptom management and plan of care.   Participants advanced practice nurse;nursing;pastoral care;physician;social work/services   Palliative Care Interdisciplinary Rounds - Palliative Care Team members present:  MELISSA Gregory DO; JENNY Espinosa APRN; ARIAS Rodarte RN, CHPN; [facility]; DONOVAN Garcia MDiv, [facility]

## 2019-08-12 NOTE — PLAN OF CARE
Problem: Patient Care Overview  Goal: Plan of Care Review  Outcome: Ongoing (interventions implemented as appropriate)   08/12/19 4451   Coping/Psychosocial   Plan of Care Reviewed With patient   Plan of Care Review   Progress no change   OTHER   Outcome Summary Patient presents with a chronic stage 2 pressure injury to her left and medial coccyx. Wound beds are pink/yellow and non-blanching. Periwound is pink and blanching. Patient also presents with a posterior left elbow skin tear. Cleaned and applied Xeroform and foam dressing. To be change changed daily. Will order Venelex ointment for topical care BID. Patient currently on a Dolphin mattress for comfort and pressure redistribution. Will continue to follow. Please contact if needs arise. Thanks

## 2019-08-12 NOTE — PLAN OF CARE
Problem: Patient Care Overview  Goal: Plan of Care Review   08/12/19 7005   Coping/Psychosocial   Plan of Care Reviewed With patient   Plan of Care Review   Progress no change   OTHER   Outcome Summary Pt c/o pain & anxiety this AM, PRN Dilaudid and Valium given and pt slept most of morning/early afternoon. Maintaining sats on 3LNC, states breathing feels much better today. RUE elevated & compression sleeve in place. Wound care completed, proper precautions in place. Metoprolol held this AM r/t borderline BP. Good PO intake today. Confused conversation at times. No s/sx distress. Up to BSC x2, max assist. Will monitor.

## 2019-08-12 NOTE — PROGRESS NOTES
[facility] Medicine Services  PROGRESS NOTE    Patient Name: Brianna Urrutia  : 1950  MRN: [id number]    Date of Admission: 2019  Length of Stay: 3  Primary Care Physician: Christina Wade, JOHN    Subjective   Subjective     CC:  Diffuse pain, dyspnea    HPI:  Had hypotension yest with systolics in 70s but not symptomatic.  BP nl today.  Currenly sleeping.  Per RN, she has reported good pain control and improved breathing after bilat thoracenteses.     Review of Systems  Asleep, not woken.   Otherwise ROS is negative except as mentioned in the HPI.    Objective   Objective     Vital Signs:   Temp:  [97.4 °F (36.3 °C)-98.2 °F (36.8 °C)] 97.4 °F (36.3 °C)  Heart Rate:  [62-95] 85  Resp:  [16-18] 18  BP: ()/(38-87) 106/67        Physical Exam:  Gen:  Frail ill appearing woman, asleep.  NAD  Neuro: asleep  HEENT:  NC/AT PERRL, OP benign  Heart RRR no murmur, rub, or gallop  Lungs decreased throughout. Nonlabored.   Abd:  Sl firm, nontender, no rebound or guarding, pos BS  Extrem:  No c/c/e    Results Reviewed:  I have personally reviewed current lab, radiology, and data and agree.    Results from last 7 days   Lab Units 08/10/19  0809 19  1001   WBC 10*3/mm3 8.56 8.95   HEMOGLOBIN g/dL 15.4 14.3   HEMATOCRIT % 48.5* 45.8   PLATELETS 10*3/mm3 311 275   INR  0.99  --      Results from last 7 days   Lab Units 08/10/19  0809 19  1926 19  1208 19  1001   SODIUM mmol/L 142  --   --  141   POTASSIUM mmol/L 4.6  --   --  4.1   CHLORIDE mmol/L 104  --   --  98   CO2 mmol/L 26.0  --   --  31.0*   BUN mg/dL 38*  --   --  48*   CREATININE mg/dL 1.26*  --   --  1.43*   GLUCOSE mg/dL 119*  --   --  116*   CALCIUM mg/dL 9.6  --   --  9.5   ALT (SGPT) U/L  --   --   --  39*   AST (SGOT) U/L  --   --   --  72*   TROPONIN T ng/mL  --  0.025 <0.010 0.046*   PROBNP pg/mL 4,775.0*  --   --  1,817.0*     Estimated Creatinine Clearance: 34.6 mL/min (A) (by C-G formula  based on SCr of 1.26 mg/dL (H)).    Microbiology Results Abnormal     Procedure Component Value - Date/Time    Body Fluid Culture - Body Fluid, Pleural Cavity [[id number]] Collected:  08/10/19 1300    Lab Status:  Preliminary result Specimen:  Body Fluid from Pleural Cavity Updated:  08/12/19 0844     Body Fluid Culture No growth at 2 days     Gram Stain No WBCs or organisms seen    Blood Culture - Blood, Arm, Left [[id number]] Collected:  08/09/19 1028    Lab Status:  Preliminary result Specimen:  Blood from Arm, Left Updated:  08/11/19 1131     Blood Culture No growth at 2 days    Blood Culture - Blood, Arm, Left [[id number]] Collected:  08/09/19 1028    Lab Status:  Preliminary result Specimen:  Blood from Arm, Left Updated:  08/11/19 1131     Blood Culture No growth at 2 days          Imaging Results (last 24 hours)     Procedure Component Value Units Date/Time    XR Chest 1 View [[id number]] Collected:  08/12/19 0842     Updated:  08/12/19 0843    Narrative:          EXAMINATION: XR CHEST 1 VW-      INDICATION: follow up bilat thoracenteses; C80.0-Disseminated malignant  neoplasm, unspecified; M84.48XA-Pathological fracture, other site,  initial encounter for fracture; M89.8X9-Other specified disorders of  bone, unspecified site; C79.51-Secondary malignant neoplasm of bone;  W19.XXXA-Unspecified fall, initial encounter; U35-Vhnbfvi effusion, not  elsewhere classified; E86.0-Dehydration; N17.9-Acute kidney fa      COMPARISON: 1119     FINDINGS: The heart is at the upper limits of normal. There is bibasilar  airspace disease and there are bilateral pleural effusions.           Impression:       Bibasilar airspace disease and effusion, insignificantly  changed when compared with 08/11/2019.          CT Guided Thoracentesis [[id number]] Collected:  08/10/19 1244     Updated:  08/12/19 0834    Narrative:       EXAMINATION: CT GUIDED THORACENTESIS-      INDICATION: Pleural effusion, suspect malignant;  C80.0-Disseminated  malignant neoplasm, unspecified; M84.48XA-Pathological fracture, other  site, initial encounter for fracture; M89.8X9-Other specified disorders  of bone, unspecified site; C79.51-Secondary malignant neoplasm of bone;  W19.XXXA-Unspecified fall, initial encounter; R86-Yjljloa effusion, not  elsewhere classified; E86.0-Dehydration.     TECHNIQUE: Limited helical CT scan of the chest without intravenous  contrast.     The radiation dose reduction device was turned on for each scan per the  ALARA (As Low as Reasonably Achievable) protocol.     COMPARISON: CT chest 08/09/2019.     FINDINGS: Bilateral pleural effusions left greater than right with  moderate volume left effusion and adjacent atelectasis.     Patient referred for CT-guided thoracentesis. Informed consent obtained  and signed. Consent placed on patient's chart. Patient placed in left  anterior oblique positioning for examination and procedure. Patient  prepped and draped in typical sterile fashion. 1% lidocaine used for  local anesthetic of the left chest wall. Under meticulous CT guidance  8.5 Lithuanian curve catheter placed into the left pleural fluid collection  with confirmation of location upon imaging and with fluid return upon  stylette removal. Subsequent 1 L of pleural fluid drained without  abnormality. Patient tolerated well without complication.       Impression:       Satisfactory CT-guided left thoracentesis.     D:  08/10/2019  E:  08/12/2019       CT Guided Thoracentesis [[id number]] Collected:  08/11/19 1449     Updated:  08/11/19 1451    Narrative:       EXAMINATION: CT GUIDED THORACENTESIS-      INDICATION: dyspnea.  Left side done today with partial improvement.;  C80.0-Disseminated malignant neoplasm, unspecified;  M84.48XA-Pathological fracture, other site, initial encounter for  fracture; M89.8X9-Other specified disorders of bone, unspecified site;  C79.51-Secondary malignant neoplasm of bone; W19.XXXA-Unspecified  fall,  initial encounter; V01-Gzvhoom effusion, not elsewhere classified;  E86.0-Dehydra      TECHNIQUE: Limited helical CT scan of the chest without intravenous  contrast     The radiation dose reduction device was turned on for each scan per the  ALARA (As Low as Reasonably Achievable) protocol.     COMPARISON: CT one-day prior     FINDINGS: Small moderate right pleural effusion with adjacent  atelectasis.     Patient referred for CT-guided thoracentesis. Informed consent obtained  and signed. Patient placed in right anterior oblique positioning for  examination and procedure. Patient prepped and draped in typical sterile  fashion. 1% lidocaine loss for local anesthetic of the right lateral  chest soft tissues with anesthetic provided to the level of the pleural  surface. Under meticulous CT guidance 8.5 Cymraes curve catheter placed  into the right pleural fluid collection with confirmation of location  upon imaging and with fluid return. Subsequent 720 mL of fluid drained.  Catheter withdrawn. Patient tolerated well without complication.             Impression:       Satisfactory CT-guided right thoracentesis.          XR Chest 1 View [[id number]] Collected:  08/11/19 1003     Updated:  08/11/19 1003    Narrative:          EXAMINATION: XR CHEST 1 VW-      INDICATION: assess right pleural effusion; C80.0-Disseminated malignant  neoplasm, unspecified; M84.48XA-Pathological fracture, other site,  initial encounter for fracture; M89.8X9-Other specified disorders of  bone, unspecified site; C79.51-Secondary malignant neoplasm of bone;  W19.XXXA-Unspecified fall, initial encounter; J10-Ltbnpmk effusion, not  elsewhere classified; E86.0-Dehydration; N17.9-Acute kidney fa      COMPARISON: Chest x-ray 08/09/2019     FINDINGS: Increase in right pleural effusion now moderate to large  volume with adjacent atelectasis and/or opacifications. Left pleural  effusion is decreased from prior with left basilar atelectasis  and/or  airspace disease status post thoracentesis on the left. Cardiac  silhouette largely obscured.           Impression:       Increasing right pleural effusion now moderate to large  volume. Interval decrease in left pleural effusion status post recent  thoracentesis. Bibasilar opacifications adjacent to these pleural  effusions of atelectasis versus airspace disease.                Results for orders placed during the hospital encounter of 06/16/17   Adult Transthoracic Echo Complete    Narrative · Left ventricular systolic function is normal. Estimated EF = 70%.  · Left ventricular diastolic dysfunction (grade I) consistent with   impaired relaxation.  · The cardiac valves are normal.          I have reviewed the medications:  Scheduled Meds:    castor oil-balsam peru  Topical Q12H   dexamethasone 2 mg Oral Daily With Breakfast & Lunch   docusate sodium 100 mg Oral BID   DULoxetine 60 mg Oral Daily   fentaNYL 1 patch Transdermal Q72H   fentaNYL 1 patch Transdermal Q72H   [START ON 8/13/2019] fentaNYL 1 patch Transdermal Q72H   ipratropium-albuterol 3 mL Nebulization 4x Daily - RT   lidocaine PF 1% 10 mL Injection Once   metoprolol tartrate 25 mg Oral Q12H   mirtazapine 15 mg Oral Nightly   pantoprazole 40 mg Oral Q AM   sennosides-docusate sodium 2 tablet Oral BID   sodium chloride 250 mL Intravenous Once   sodium chloride 3 mL Intravenous Q12H     Continuous Infusions:    hold 1 each    sodium chloride 75 mL/hr Last Rate: 75 mL/hr (08/11/19 1811)     PRN Meds:.•  acetaminophen  •  acetaminophen  •  albuterol  •  bisacodyl  •  bisacodyl  •  calcium carbonate  •  cyclobenzaprine  •  diazePAM  •  hold  •  HYDROmorphone **AND** naloxone  •  HYDROmorphone  •  lactulose  •  ondansetron ODT **OR** ondansetron  •  sodium chloride  •  sodium chloride      Assessment/Plan   Assessment / Plan     Active Hospital Problems    Diagnosis  POA   • **Widespread metastatic malignant neoplastic disease (CMS/HCC) [C80.0]  Yes    • Pleural effusion [J90]  Yes   • Lymphedema [I89.0]  Unknown   • Intractable pain [R52]  Unknown   • Shortness of breath [R06.02]  Unknown   • Acute-on-chronic kidney injury (CMS/HCC) [N17.9, N18.9]  Unknown   • Edema of both lower extremities [R60.0]  Unknown   • Chronic respiratory failure with hypoxia, on home oxygen therapy (CMS/HCC) [J96.11, Z99.81]  Not Applicable   • Fall [W19.XXXA]  Yes   • JACQUI (generalized anxiety disorder) [F41.1]  Yes      Resolved Hospital Problems   No resolved problems to display.        Brief Hospital Course to date:  Brianna Urrutia is a 69 y.o. female debilitated from widely metastatic breast cancer, recently on hospice at home, here for progressive chest and back pain and worsening shortness of air.  She wears 2 LNC oxygen at baseline.      Bone mets, bilat malignant effusion   - Symptom control  - therapeutic thoracentesis on 8/10 and 8/11.   - known to XRT and Dr. Ibarra.   - patient declines hospice care at this time due to past experience.  - repeat CXR noted - no change from yest.     YOLETTE 1.4, now .8.  Resolved.   - hydrate gently    DVT Prophylaxis: [facility]    Disposition: I expect the patient to be discharged tbd.  prob back to [facility].    CODE STATUS:   Code Status and Medical Interventions:   Ordered at: 08/09/19 1830     Level Of Support Discussed With:    Patient     Code Status:    No CPR     Medical Interventions (Level of Support Prior to Arrest):    Comfort Measures         Electronically signed by Tran Brizuela MD, 08/12/19, 10:03 AM.

## 2019-08-12 NOTE — PROGRESS NOTES
Discharge Planning Assessment  [facility]     Patient Name: Brianna Urrutia  MRN: [id number]  Today's Date: 8/12/2019    Admit Date: 8/9/2019    Discharge Needs Assessment     Row Name 08/12/19 1342       Living Environment    Lives With  alone    Current Living Arrangements  extended care facility [facility]    Primary Care Provided by  other (see comments) [facility]    Provides Primary Care For  no one, unable/limited ability to care for self    Family Caregiver if Needed  none    Quality of Family Relationships  unable to assess Patient states she does not have family    Able to Return to Prior Arrangements  no    Living Arrangement Comments  Patient has been at [facility] (skilled) but will not be able to return due to insurance issues.       Resource/Environmental Concerns    Resource/Environmental Concerns  none    Transportation Concerns  other (see comments) Patient will likely need ambulance transport       Transition Planning    Patient/Family Anticipates Transition to  inpatient rehabilitation facility    Patient/Family Anticipated Services at Transition  skilled nursing    Transportation Anticipated  other (see comments) Patient will likely need ambulance transport       Discharge Needs Assessment    Readmission Within the Last 30 Days  no previous admission in last 30 days    Concerns to be Addressed  discharge planning    Equipment Currently Used at Home  -- Patient using equipment at [facility]    Anticipated Changes Related to Illness  inability to care for self    Equipment Needed After Discharge  none    Outpatient/Agency/Support Group Needs  skilled nursing facility    Discharge Facility/Level of Care Needs  nursing facility, skilled    Patient's Choice of Community Agency(s)  Patient does not want to go back to [facility] and agreed to us reaching out to facilities that are in network with her insurance.    Current Discharge Risk  chronically ill;physical impairment;dependent  with mobility/activities of daily living        Discharge Plan     Row Name 08/12/19 1346       Plan    Plan  SNF    Patient/Family in Agreement with Plan  yes    Plan Comments  Spoke with patient briefly. She was so drowsy that she had difficulty engaging in converstation. She did confirm that she has been at [facility]. She states that she does not have family to assist her. She also stated that she does not want to go back to [facility]. Called Rosana with [facility]. Rosana said that the patient has been in a skilled bed at their facility. She was under the impression that the patient had traditional Medicare A/B. I asked her to confirm the patient's benefits because we have that the patient has [employer]. After confirming benefits, the patient does indeed have [employer] Medicare and Rosana cannot take her back because they are not in network with [employer]. [facility] take [employer]. Called the referral to [facility] with [facility]. Ms. Urrutia will have to be able to participate with PT/OT in order for her insurance to pay for her to go to a skilled nursing facility. PT/OT evals have been ordered. Of significant note, patient is refusing hospice services at this time.     Final Discharge Disposition Code  03 - skilled nursing facility (SNF)        Expected Discharge Date and Time     Expected Discharge Date Expected Discharge Time    Aug 15, 2019         Demographic Summary     Row Name 08/12/19 1338       General Information    Admission Type  inpatient    Arrived From  subacute/long term acute care [facility]    Referral Source  admission list    Reason for Consult  discharge planning        Functional Status     Row Name 08/12/19 1340       Functional Status    Usual Activity Tolerance  poor    Current Activity Tolerance  poor       Functional Status, IADL    Medications  completely dependent    Meal Preparation  completely dependent    Housekeeping  completely dependent     Laundry  completely dependent    Shopping  completely dependent    IADL Comments  Patient has been at [facility]       Mental Status Summary    Recent Changes in Mental Status/Cognitive Functioning  mental status    Mental Status Comments  Patient is very drowsy        Svetlana Bond RN

## 2019-08-12 NOTE — PROGRESS NOTES
"Palliative Care Consult Note    Patient Name: Brianna Urrutia   : 1950  Sex: female    Date of Admission: 2019    Subjective:  69 y.o. Female with extensive breast cancer. Admitted on  with increased soa/uncontrolled pain. Left thoracentesis on 08/10; right thoracentesis .     On visit today pt is resting in bed, very sleepy and unable to answer questions appropriately.  She denies pain, has marked lymphadenopathy RUE and noted mottling of knees.     VS += 90-21 106/67 sats 94% on 4L.     ROS:  Review of Systems   Unable to perform ROS: Mental status change       Reviewed current scheduled and prn medications for route, type, dose and frequency.    hold 1 each    sodium chloride 75 mL/hr Last Rate: 75 mL/hr (19)     •  acetaminophen  •  acetaminophen  •  albuterol  •  bisacodyl  •  bisacodyl  •  calcium carbonate  •  cyclobenzaprine  •  diazePAM  •  hold  •  HYDROmorphone **AND** naloxone  •  HYDROmorphone  •  lactulose  •  ondansetron ODT **OR** ondansetron  •  sodium chloride  •  sodium chloride    Objective:   /67   Pulse 83   Temp 97.4 °F (36.3 °C) (Oral)   Resp 18   Ht 157.5 cm (62\")   Wt 50.6 kg (111 lb 8 oz)   SpO2 91%   BMI 20.39 kg/m²    Intake & Output (last day)        0701 -  0700  07 -  0700    P.O. 420     I.V. (mL/kg)      IV Piggyback 100     Total Intake(mL/kg) 520 (10.3)     Urine (mL/kg/hr) 450 (0.4)     Total Output 450     Net +70               Lab Results (last 24 hours)     Procedure Component Value Units Date/Time    Body Fluid Culture - Body Fluid, Pleural Cavity [[id number]] Collected:  08/10/19 1300    Specimen:  Body Fluid from Pleural Cavity Updated:  19 0814     Body Fluid Culture No growth at 2 days     Gram Stain No WBCs or organisms seen    Non-gynecologic Cytology [[id number]] Collected:  19 0759    Specimen:  Pleural Fluid from Pleural Cavity Updated:  19 0758    Blood Culture - Blood, Arm, Left " [[id number]] Collected:  08/09/19 1028    Specimen:  Blood from Arm, Left Updated:  08/11/19 1131     Blood Culture No growth at 2 days    Blood Culture - Blood, Arm, Left [[id number]] Collected:  08/09/19 1028    Specimen:  Blood from Arm, Left Updated:  08/11/19 1131     Blood Culture No growth at 2 days        Imaging Results (last 24 hours)     Procedure Component Value Units Date/Time    XR Chest 1 View [[id number]] Collected:  08/12/19 0842     Updated:  08/12/19 0843    Narrative:          EXAMINATION: XR CHEST 1 VW-      INDICATION: follow up bilat thoracenteses; C80.0-Disseminated malignant  neoplasm, unspecified; M84.48XA-Pathological fracture, other site,  initial encounter for fracture; M89.8X9-Other specified disorders of  bone, unspecified site; C79.51-Secondary malignant neoplasm of bone;  W19.XXXA-Unspecified fall, initial encounter; K32-Vgpjhcw effusion, not  elsewhere classified; E86.0-Dehydration; N17.9-Acute kidney fa      COMPARISON: 1119     FINDINGS: The heart is at the upper limits of normal. There is bibasilar  airspace disease and there are bilateral pleural effusions.           Impression:       Bibasilar airspace disease and effusion, insignificantly  changed when compared with 08/11/2019.          CT Guided Thoracentesis [[id number]] Collected:  08/10/19 1244     Updated:  08/12/19 0834    Narrative:       EXAMINATION: CT GUIDED THORACENTESIS-      INDICATION: Pleural effusion, suspect malignant; C80.0-Disseminated  malignant neoplasm, unspecified; M84.48XA-Pathological fracture, other  site, initial encounter for fracture; M89.8X9-Other specified disorders  of bone, unspecified site; C79.51-Secondary malignant neoplasm of bone;  W19.XXXA-Unspecified fall, initial encounter; K18-Bgqmoyz effusion, not  elsewhere classified; E86.0-Dehydration.     TECHNIQUE: Limited helical CT scan of the chest without intravenous  contrast.     The radiation dose reduction device was turned on for each scan  per the  ALARA (As Low as Reasonably Achievable) protocol.     COMPARISON: CT chest 08/09/2019.     FINDINGS: Bilateral pleural effusions left greater than right with  moderate volume left effusion and adjacent atelectasis.     Patient referred for CT-guided thoracentesis. Informed consent obtained  and signed. Consent placed on patient's chart. Patient placed in left  anterior oblique positioning for examination and procedure. Patient  prepped and draped in typical sterile fashion. 1% lidocaine used for  local anesthetic of the left chest wall. Under meticulous CT guidance  8.5 Vietnamese curve catheter placed into the left pleural fluid collection  with confirmation of location upon imaging and with fluid return upon  stylette removal. Subsequent 1 L of pleural fluid drained without  abnormality. Patient tolerated well without complication.       Impression:       Satisfactory CT-guided left thoracentesis.     D:  08/10/2019  E:  08/12/2019       CT Guided Thoracentesis [[id number]] Collected:  08/11/19 1449     Updated:  08/11/19 1451    Narrative:       EXAMINATION: CT GUIDED THORACENTESIS-      INDICATION: dyspnea.  Left side done today with partial improvement.;  C80.0-Disseminated malignant neoplasm, unspecified;  M84.48XA-Pathological fracture, other site, initial encounter for  fracture; M89.8X9-Other specified disorders of bone, unspecified site;  C79.51-Secondary malignant neoplasm of bone; W19.XXXA-Unspecified fall,  initial encounter; X68-Ifigufq effusion, not elsewhere classified;  E86.0-Dehydra      TECHNIQUE: Limited helical CT scan of the chest without intravenous  contrast     The radiation dose reduction device was turned on for each scan per the  ALARA (As Low as Reasonably Achievable) protocol.     COMPARISON: CT one-day prior     FINDINGS: Small moderate right pleural effusion with adjacent  atelectasis.     Patient referred for CT-guided thoracentesis. Informed consent obtained  and signed. Patient  placed in right anterior oblique positioning for  examination and procedure. Patient prepped and draped in typical sterile  fashion. 1% lidocaine loss for local anesthetic of the right lateral  chest soft tissues with anesthetic provided to the level of the pleural  surface. Under meticulous CT guidance 8.5 Arabic curve catheter placed  into the right pleural fluid collection with confirmation of location  upon imaging and with fluid return. Subsequent 720 mL of fluid drained.  Catheter withdrawn. Patient tolerated well without complication.             Impression:       Satisfactory CT-guided right thoracentesis.          XR Chest 1 View [[id number]] Collected:  08/11/19 1003     Updated:  08/11/19 1003    Narrative:          EXAMINATION: XR CHEST 1 VW-      INDICATION: assess right pleural effusion; C80.0-Disseminated malignant  neoplasm, unspecified; M84.48XA-Pathological fracture, other site,  initial encounter for fracture; M89.8X9-Other specified disorders of  bone, unspecified site; C79.51-Secondary malignant neoplasm of bone;  W19.XXXA-Unspecified fall, initial encounter; C94-Wveevlv effusion, not  elsewhere classified; E86.0-Dehydration; N17.9-Acute kidney fa      COMPARISON: Chest x-ray 08/09/2019     FINDINGS: Increase in right pleural effusion now moderate to large  volume with adjacent atelectasis and/or opacifications. Left pleural  effusion is decreased from prior with left basilar atelectasis and/or  airspace disease status post thoracentesis on the left. Cardiac  silhouette largely obscured.           Impression:       Increasing right pleural effusion now moderate to large  volume. Interval decrease in left pleural effusion status post recent  thoracentesis. Bibasilar opacifications adjacent to these pleural  effusions of atelectasis versus airspace disease.                PPS:   30% based on the following measures:   Ambulation: Totally bed bound  Activity and Evidence of Disease: Unable to do any  work, extensive evidence of disease  Self-Care: Total care  Intake: Reduced   LOC: Full, drowsy or confusion    Physical Exam   Constitutional:   Very frail, ill appearing, intermittent moaning    HENT:   Head: Normocephalic and atraumatic.   Eyes: Conjunctivae are normal.   Neck:   Hyperflexion forward    Cardiovascular: Normal rate, regular rhythm and intact distal pulses.   Pulmonary/Chest:   abd breathing, Decreased bases, NCO2@4L,   Abdominal: Soft. Bowel sounds are normal.   Genitourinary:   Genitourinary Comments: deferred   Musculoskeletal:   Bedrest, very weak    Neurological:   Sleepy, confused    Skin: Skin is warm and dry. Capillary refill takes 2 to 3 seconds. There is pallor.   Psychiatric: She has a normal mood and affect.   Nursing note reviewed.      Widespread metastatic malignant neoplastic disease (CMS/HCC)    JACQUI (generalized anxiety disorder)    Fall    Pleural effusion    Lymphedema    Intractable pain    Shortness of breath    Acute-on-chronic kidney injury (CMS/HCC)    Edema of both lower extremities    Chronic respiratory failure with hypoxia, on home oxygen therapy (CMS/HCC)      Assessment/Plan:  69 y.o. Female with extensive breast cancer. Admitted on 08/09 with increased soa/uncontrolled pain. Left thoracentesis on 08/10; right thoracentesis 08/11. Continues on decadron 2 mg q day, very sleepy today with noted mottling bilateral knees. Feet are slightly cool.     Pain: fentanyl 50 mcg q 72 hrs. Tylenol 500 mg q 4 hrs, flexiril 5 mg TID,  Dilaudid 0.5 mg q 2 hr PRN,   Dyspnea: duonebs QID,   Nausea/Vomiting: zofran 4 mg q 4 hr PRN   Anxiety/Agitation:  Confusion/Delerium: valium 5 mg q 6 hrs PRN,   Depression: cymbalta 30 mg q day,   Bowel/bladder: no BM since admit, colace 100 mg BID; bisacodyl 5 mg q day PRN,   Nutrition:  Sleep: mirtazapine 15 mg q HS  ADLs:  Terminal Fevers:   Termianl Secretions:    Other:   2) HTN: metoprolol 25 mg q 12 hrs,   3) GERD: protonix EC 40 mg q AM      Fentanyl increased yesterday to 50 mcg q 72 hrs, PRN Valium 5 mg added for muscle spasms/high anxiety.  Per chart review she has had Valium @ 1043, 2228 and 0855; mirtazepine 15 mg @ HS, PRN Dilaudid 2 mg @ 0854, 2050, 0517 and 0855. Will decrease PRN Valium to 2 mg q 6 hrs, continue to monitor for comfort.      Repeat f/u CXR tomorrow and then back to [facility], patient continues to refuse hospice care although she is very much appropriate at this time. Palliative can continue to follow at [facility]. Pt has verbally identified son Dominick Phillip,  as HCS during  visit yesterday and may need to default to him for hospice decision as condition declines.      Total Visit Time: 45  Face to Face Time:  30    RATNA Montoya  [phone]  08/12/19  9:29 AM

## 2019-08-12 NOTE — PLAN OF CARE
Problem: Fall Risk (Adult)  Goal: Identify Related Risk Factors and Signs and Symptoms  Outcome: Ongoing (interventions implemented as appropriate)    Goal: Absence of Fall  Outcome: Ongoing (interventions implemented as appropriate)      Problem: Skin Injury Risk (Adult)  Goal: Identify Related Risk Factors and Signs and Symptoms  Outcome: Ongoing (interventions implemented as appropriate)    Goal: Skin Health and Integrity  Outcome: Ongoing (interventions implemented as appropriate)      Problem: Patient Care Overview  Goal: Plan of Care Review  Outcome: Ongoing (interventions implemented as appropriate)   08/12/19 0337   Coping/Psychosocial   Plan of Care Reviewed With patient   Plan of Care Review   Progress declining   OTHER   Outcome Summary Pt hypotensive into the 70s/40s this shift. Improved with 1x dose albumin. now 110s/70s. Other vital signs stable. No acute events overnight. Continue current POC.       Problem: Pain, Chronic (Adult)  Goal: Identify Related Risk Factors and Signs and Symptoms  Outcome: Ongoing (interventions implemented as appropriate)    Goal: Acceptable Pain/Comfort Level and Functional Ability  Outcome: Ongoing (interventions implemented as appropriate)      Problem: Palliative Care (Adult)  Goal: Identify Related Risk Factors and Signs and Symptoms  Outcome: Ongoing (interventions implemented as appropriate)    Goal: Maximized Comfort  Outcome: Ongoing (interventions implemented as appropriate)    Goal: Enhanced Quality of Life  Outcome: Ongoing (interventions implemented as appropriate)

## 2019-08-13 NOTE — PLAN OF CARE
Problem: Fall Risk (Adult)  Goal: Identify Related Risk Factors and Signs and Symptoms  Outcome: Ongoing (interventions implemented as appropriate)      Problem: Skin Injury Risk (Adult)  Goal: Skin Health and Integrity  Outcome: Ongoing (interventions implemented as appropriate)      Problem: Patient Care Overview  Goal: Plan of Care Review  Outcome: Ongoing (interventions implemented as appropriate)   08/13/19 9022   Coping/Psychosocial   Plan of Care Reviewed With patient   Plan of Care Review   Progress no change   OTHER   Outcome Summary Pt remains on 3L NC. VSS this shift. HS dose of metoprolol held due to the patient's hypotension in past 24 hours. Pt continues to c/o pain in her chest and exhibiting panic-like symptoms (pulling gown off, hyperventilating) PRN Dilaudid and Valium given. Continue current POC-- awaiting placement.       Problem: Pain, Chronic (Adult)  Goal: Identify Related Risk Factors and Signs and Symptoms  Outcome: Ongoing (interventions implemented as appropriate)    Goal: Acceptable Pain/Comfort Level and Functional Ability  Outcome: Ongoing (interventions implemented as appropriate)      Problem: Palliative Care (Adult)  Goal: Identify Related Risk Factors and Signs and Symptoms  Outcome: Ongoing (interventions implemented as appropriate)    Goal: Maximized Comfort  Outcome: Ongoing (interventions implemented as appropriate)    Goal: Enhanced Quality of Life  Outcome: Ongoing (interventions implemented as appropriate)

## 2019-08-13 NOTE — PROGRESS NOTES
Palliative Care Consult Note    Patient Name: Brianna Urrutia   : 1950  Sex: female    Date of Admission: 2019    Subjective:  69 y.o. Female with extensive breast cancer. Admitted on  with increased soa/uncontrolled pain. Left thoracentesis on 08/10; right thoracentesis . Yesterday she was very sleepy throughout the day; Fentanyl increased on  to 50 mcg q 72 hrs, PRN Valium 5 mg added for muscle spasms/high anxiety. Valium was decreased to  2 mg q 6 hrs d/t increased sedation.       Plan to repeat f/u CXR tomorrow and then back to [facility], patient continues to refuse hospice care although she is very much appropriate at this time. Palliative can continue to follow at [facility]. Pt has verbally identified son Dominick Phillip,  as HCS during visit yesterday and may need to default to him for hospice decision as condition declines.       Per nurse report today she continues to be somnolent.  Per chart review she received PRN Valium 2 mg @ 0108, 0702; Dilaudid 0.5 mg IV at 0341, 0703; Dilaudid 2 mg po @ 0108, 0540. I have DCd Valium today, she does have Flexoril PRN for muscle spasms.     On visit today pt is very restless, folded f orward in bed with head laying on legs, we did reposition her onto her side but she immediately returned to this position.  Per report of staff RN Jazzy, the most comfortable she has been in past  24 hrs was yesterday afternoon post po dilaudid when she was alert, cooperative and able to get OOB with assist.  She contiues with RUE edema, mottling has diminished but still present on bilateral knees.      Review of Systems   Unable to perform ROS: Mental status change     Reviewed current scheduled and prn medications for route, type, dose and frequency.    hold 1 each    sodium chloride 75 mL/hr Last Rate: 75 mL/hr (19 1622)     •  acetaminophen  •  acetaminophen  •  albuterol  •  bisacodyl  •  bisacodyl  •  calcium carbonate  •   "cyclobenzaprine  •  diazePAM  •  hold  •  HYDROmorphone **AND** naloxone  •  HYDROmorphone  •  lactulose  •  ondansetron ODT **OR** ondansetron  •  sodium chloride  •  sodium chloride    Objective:   /91 (BP Location: Right arm, Patient Position: Lying)   Pulse 99   Temp 98.3 °F (36.8 °C) (Oral)   Resp 20   Ht 157.5 cm (62\")   Wt 50.6 kg (111 lb 8 oz)   SpO2 92%   BMI 20.39 kg/m²    Intake & Output (last day)       08/12 0701 - 08/13 0700 08/13 0701 - 08/14 0700    P.O. 520     IV Piggyback      Total Intake(mL/kg) 520 (10.3)     Urine (mL/kg/hr) 1025 (0.8)     Total Output 1025     Net -505               Lab Results (last 24 hours)     Procedure Component Value Units Date/Time    Basic Metabolic Panel [[id number]]  (Abnormal) Collected:  08/13/19 0525    Specimen:  Blood Updated:  08/13/19 0639     Glucose 98 mg/dL      BUN 17 mg/dL      Creatinine 0.84 mg/dL      Sodium 145 mmol/L      Potassium 4.2 mmol/L      Chloride 110 mmol/L      CO2 25.0 mmol/L      Calcium 9.1 mg/dL      eGFR Non African Amer 68 mL/min/1.73      BUN/Creatinine Ratio 20.2     Anion Gap 10.0 mmol/L     Narrative:       GFR Normal >60  Chronic Kidney Disease <60  Kidney Failure <15    Body Fluid Culture - Body Fluid, Pleural Cavity [[id number]] Collected:  08/10/19 1300    Specimen:  Body Fluid from Pleural Cavity Updated:  08/13/19 0617     Body Fluid Culture No growth at 3 days     Gram Stain No WBCs or organisms seen    Basic Metabolic Panel [[id number]]  (Abnormal) Collected:  08/12/19 1116    Specimen:  Blood Updated:  08/12/19 1219     Glucose 103 mg/dL      BUN 23 mg/dL      Creatinine 0.80 mg/dL      Sodium 143 mmol/L      Potassium 4.0 mmol/L      Chloride 108 mmol/L      CO2 26.0 mmol/L      Calcium 8.6 mg/dL      eGFR Non African Amer 72 mL/min/1.73      BUN/Creatinine Ratio 28.8     Anion Gap 9.0 mmol/L     Narrative:       GFR Normal >60  Chronic Kidney Disease <60  Kidney Failure <15    CBC (No Diff) [[id number]]  " (Abnormal) Collected:  08/12/19 1116    Specimen:  Blood Updated:  08/12/19 1152     WBC 7.80 10*3/mm3      RBC 3.81 10*6/mm3      Hemoglobin 11.9 g/dL      Hematocrit 38.6 %      .3 fL      MCH 31.2 pg      MCHC 30.8 g/dL      RDW 17.5 %      RDW-SD 65.4 fl      MPV 10.5 fL      Platelets 194 10*3/mm3     Blood Culture - Blood, Arm, Left [[id number]] Collected:  08/09/19 1028    Specimen:  Blood from Arm, Left Updated:  08/12/19 1131     Blood Culture No growth at 3 days    Blood Culture - Blood, Arm, Left [[id number]] Collected:  08/09/19 1028    Specimen:  Blood from Arm, Left Updated:  08/12/19 1131     Blood Culture No growth at 3 days        Imaging Results (last 24 hours)     Procedure Component Value Units Date/Time    CT Abdomen Pelvis Without Contrast [[id number]] Collected:  08/09/19 1413     Updated:  08/13/19 0803    Narrative:       EXAMINATION: CT CHEST WO CONTRAST-, CT ABDOMEN PELVIS WO CONTRAST-  08/09/2019      INDICATION: Chest pain, shortness of air, stage IV breast cancer, hx  pathologic fractures     TECHNIQUE: Unenhanced CT imaging of the chest, abdomen, and pelvis was  obtained. Additional coronal and sagittal reformatted images were  obtained for review.     The radiation dose reduction device was turned on for each scan per the  ALARA (As Low as Reasonably Achievable) protocol.     COMPARISON: CT of the thoracic and lumbar spine 07/30/2019, CT of the  chest 09/12/2017 and CT abdomen 06/19/2017.     FINDINGS:      CHEST: Multiple abnormalities are identified throughout the chest and  osseous structures of the chest. For example there is osseous erosive  changes of the right first and distal right second rib with associated  soft tissue abnormality measuring 3.0 x 4.1 cm similar when compared to  the CT of the thoracic spine performed 07/30/2019. Extensive soft tissue  abnormality identified within the anterior right chest wall adjacent to  this. Identified within the right breast is a  3.1 x 1.4 cm soft tissue  mass with associated skin retraction which may be the site of the  reported breast carcinoma.  Additionally more inferiorly there is a soft  tissue subcutaneous nodule. Additional abnormal osseous destruction  identified involving the posterior fifth, sixth, seventh, and eighth  ribs posterior and laterally concerning for metastatic lesions. Lack of  intravenous contrast limits evaluation for underlying soft tissue  lesion, however one is suspected within this region. Particularly at  (series 1/image 9) is a possible soft tissue focus measuring 5.3 cm.  Extensive multifocal osseous metastatic disease throughout the  visualized cervical, thoracic, and lumbar spine are identified.  For  example there are numerous lytic lesions throughout the lower cervical  spine as well as the upper thoracic spine with multilevel compression  fractures most pronounced at T1, T2, and T4 which appears similar to the  thoracic spine CT performed 07/30/2019. Likely underlying soft tissue is  suggested with these, although given the patient's positioning and lack  of contrast there is very limited evaluation for soft tissue within  these fractures. Additional extensive osseous destructive changes with  fracture identified involving the approximate T5 vertebral process T6  and T7 vertebral bodies with some degree of posterior retropulsion and  possible soft tissue compromise of the spinal canal at these levels,  although lack of contrast limits evaluation. Again identified at T5 is  marked kyphosis, similar to prior. Severe L1 fracture identified with  osseous destructive changes and mild retropulsion, similar to prior.      Dedicated CT imaging of the lungs demonstrates focal airspace  disease/mass within the right lung apex adjacent to the osseous  destructive rib changes which may relate to post radiation changes,  although underlying soft tissue abnormality is likely present. Moderate  to large bilateral  pleural effusions are identified. Bilateral lower  lobe airspace disease is noted with more focal consolidative changes  within the right mid lung with air bronchograms and a calcification. The  heart is not enlarged. Extensive sternal osseous metastatic disease with  extensive destruction is identified involving the inferior aspect of the  sternum below the manubrium with possible anterior mediastinal  extension. This is similar to the CT of the thoracic spine performed  07/30/2019.     ABDOMEN/PELVIS: Lack of intravenous and oral contrast limits evaluation  of abdominal structures. Identified within the liver is a 2.5 cm  hypodensity concerning for a metastatic lesion. Mild perihepatic fluid  is identified. Cholelithiasis is present. Motion artifact limits  evaluation of fine detail for the small bowel and organs within the  abdomen. The pancreas is not demonstrated acute abnormality on this  unenhanced motion limited exam. The spleen is unremarkable. No adrenal  mass is identified. Scattered enlarged retroperitoneal lymph nodes  measuring up to 8 mm are identified concerning for possible metastatic  involvement of the retroperitoneum. No free fluid or free air is  appreciated within the abdomen. Small bowel is nondilated no evidence of  bowel obstruction. Mildly infiltrated diffuse mesenteric inflammation  identified which may relate to mesenteric radiculitis. Sigmoid colon  diverticulosis without convincing evidence of diverticulitis, although  the sigmoid colon is thickened. Trace free fluid is noted in the pelvis.     There is redemonstration of pathologic destructive fracture involving L1  with mild posterior retropulsion, similar to the lumbar spine CT  performed 07/30/2019. The remainder of the vertebral bodies appear to  maintain height. Multilevel disc degeneration and posterior disc  osteophyte complexes are suggested involving the lumbar spine.  Multifocal mottled appearance of the bony pelvis  identified suggestive  of multifocal possible small lytic metastases particularly involving the  left iliac crest, similar to prior.       Impression:       1. Extensive multifocal osseous metastatic disease with associated  severe osseous destruction involving the right anterior first and second  ribs, left posterior-lateral fifth, sixth, seventh, and eighth rib  fractures, inferior sternum, and numerous multifocal lower cervical and  upper thoracic destructive lesions. Overall lack of intravenous contrast  limits evaluation for underlying soft tissue, although underlying soft  tissue abnormalities are present at all of these destructive sites with  soft tissue extension into the anterior mediastinum at the sternum, soft  tissue lesions associated with the destructive rib lesions, and likely  soft tissue lesions present involving the cervical and thoracic spine  pathologic fractures and destructive osseous changes. Evaluation of  spinal canal compromise is not evaluated on this study secondary to lack  of intravenous contrast, although these findings appear similar to  slightly worsened when compared to the CT of the thoracic spine  performed 07/30/2019. Follow-up MRI would be more definitive,  particularly for spinal canal compromise if clinically warranted.     2. Soft tissue lesions involving the right breast which may relate to  the site of primary carcinoma.     3. Moderate bilateral pleural effusions identified with associated  multifocal airspace disease throughout the lungs as described above.     4. Hypoattenuated right 2.5 cm hepatic lobe lesion concerning for  metastatic focus with additional prominent mesenteric and  retroperitoneal lymph nodes which also could relate to sites of  metastatic disease.     5. Nonspecific diffuse mesenteric infiltration with sigmoid  diverticulosis with associated distal sigmoid wall thickening and  pericolonic fluid suggestive of possible underlying  sigmoid  diverticulitis.     D:  08/09/2019  E:  08/09/2019     This report was finalized on 8/13/2019 8:00 AM by Dr. Sandip Camacho MD.       CT Chest Without Contrast [[id number]] Collected:  08/09/19 1413     Updated:  08/13/19 0803    Narrative:       EXAMINATION: CT CHEST WO CONTRAST-, CT ABDOMEN PELVIS WO CONTRAST-  08/09/2019      INDICATION: Chest pain, shortness of air, stage IV breast cancer, hx  pathologic fractures     TECHNIQUE: Unenhanced CT imaging of the chest, abdomen, and pelvis was  obtained. Additional coronal and sagittal reformatted images were  obtained for review.     The radiation dose reduction device was turned on for each scan per the  ALARA (As Low as Reasonably Achievable) protocol.     COMPARISON: CT of the thoracic and lumbar spine 07/30/2019, CT of the  chest 09/12/2017 and CT abdomen 06/19/2017.     FINDINGS:      CHEST: Multiple abnormalities are identified throughout the chest and  osseous structures of the chest. For example there is osseous erosive  changes of the right first and distal right second rib with associated  soft tissue abnormality measuring 3.0 x 4.1 cm similar when compared to  the CT of the thoracic spine performed 07/30/2019. Extensive soft tissue  abnormality identified within the anterior right chest wall adjacent to  this. Identified within the right breast is a 3.1 x 1.4 cm soft tissue  mass with associated skin retraction which may be the site of the  reported breast carcinoma.  Additionally more inferiorly there is a soft  tissue subcutaneous nodule. Additional abnormal osseous destruction  identified involving the posterior fifth, sixth, seventh, and eighth  ribs posterior and laterally concerning for metastatic lesions. Lack of  intravenous contrast limits evaluation for underlying soft tissue  lesion, however one is suspected within this region. Particularly at  (series 1/image 9) is a possible soft tissue focus measuring 5.3 cm.  Extensive multifocal  osseous metastatic disease throughout the  visualized cervical, thoracic, and lumbar spine are identified.  For  example there are numerous lytic lesions throughout the lower cervical  spine as well as the upper thoracic spine with multilevel compression  fractures most pronounced at T1, T2, and T4 which appears similar to the  thoracic spine CT performed 07/30/2019. Likely underlying soft tissue is  suggested with these, although given the patient's positioning and lack  of contrast there is very limited evaluation for soft tissue within  these fractures. Additional extensive osseous destructive changes with  fracture identified involving the approximate T5 vertebral process T6  and T7 vertebral bodies with some degree of posterior retropulsion and  possible soft tissue compromise of the spinal canal at these levels,  although lack of contrast limits evaluation. Again identified at T5 is  marked kyphosis, similar to prior. Severe L1 fracture identified with  osseous destructive changes and mild retropulsion, similar to prior.      Dedicated CT imaging of the lungs demonstrates focal airspace  disease/mass within the right lung apex adjacent to the osseous  destructive rib changes which may relate to post radiation changes,  although underlying soft tissue abnormality is likely present. Moderate  to large bilateral pleural effusions are identified. Bilateral lower  lobe airspace disease is noted with more focal consolidative changes  within the right mid lung with air bronchograms and a calcification. The  heart is not enlarged. Extensive sternal osseous metastatic disease with  extensive destruction is identified involving the inferior aspect of the  sternum below the manubrium with possible anterior mediastinal  extension. This is similar to the CT of the thoracic spine performed  07/30/2019.     ABDOMEN/PELVIS: Lack of intravenous and oral contrast limits evaluation  of abdominal structures. Identified within the  liver is a 2.5 cm  hypodensity concerning for a metastatic lesion. Mild perihepatic fluid  is identified. Cholelithiasis is present. Motion artifact limits  evaluation of fine detail for the small bowel and organs within the  abdomen. The pancreas is not demonstrated acute abnormality on this  unenhanced motion limited exam. The spleen is unremarkable. No adrenal  mass is identified. Scattered enlarged retroperitoneal lymph nodes  measuring up to 8 mm are identified concerning for possible metastatic  involvement of the retroperitoneum. No free fluid or free air is  appreciated within the abdomen. Small bowel is nondilated no evidence of  bowel obstruction. Mildly infiltrated diffuse mesenteric inflammation  identified which may relate to mesenteric radiculitis. Sigmoid colon  diverticulosis without convincing evidence of diverticulitis, although  the sigmoid colon is thickened. Trace free fluid is noted in the pelvis.     There is redemonstration of pathologic destructive fracture involving L1  with mild posterior retropulsion, similar to the lumbar spine CT  performed 07/30/2019. The remainder of the vertebral bodies appear to  maintain height. Multilevel disc degeneration and posterior disc  osteophyte complexes are suggested involving the lumbar spine.  Multifocal mottled appearance of the bony pelvis identified suggestive  of multifocal possible small lytic metastases particularly involving the  left iliac crest, similar to prior.       Impression:       1. Extensive multifocal osseous metastatic disease with associated  severe osseous destruction involving the right anterior first and second  ribs, left posterior-lateral fifth, sixth, seventh, and eighth rib  fractures, inferior sternum, and numerous multifocal lower cervical and  upper thoracic destructive lesions. Overall lack of intravenous contrast  limits evaluation for underlying soft tissue, although underlying soft  tissue abnormalities are present at  all of these destructive sites with  soft tissue extension into the anterior mediastinum at the sternum, soft  tissue lesions associated with the destructive rib lesions, and likely  soft tissue lesions present involving the cervical and thoracic spine  pathologic fractures and destructive osseous changes. Evaluation of  spinal canal compromise is not evaluated on this study secondary to lack  of intravenous contrast, although these findings appear similar to  slightly worsened when compared to the CT of the thoracic spine  performed 07/30/2019. Follow-up MRI would be more definitive,  particularly for spinal canal compromise if clinically warranted.     2. Soft tissue lesions involving the right breast which may relate to  the site of primary carcinoma.     3. Moderate bilateral pleural effusions identified with associated  multifocal airspace disease throughout the lungs as described above.     4. Hypoattenuated right 2.5 cm hepatic lobe lesion concerning for  metastatic focus with additional prominent mesenteric and  retroperitoneal lymph nodes which also could relate to sites of  metastatic disease.     5. Nonspecific diffuse mesenteric infiltration with sigmoid  diverticulosis with associated distal sigmoid wall thickening and  pericolonic fluid suggestive of possible underlying sigmoid  diverticulitis.     D:  08/09/2019  E:  08/09/2019     This report was finalized on 8/13/2019 8:00 AM by Dr. Sandip Camacho MD.       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 There is bibasilar  airspace disease and there are bilateral pleural effusions.           Impression:       Bibasilar airspace disease and effusions, insignificantly  changed when compared with 08/11/2019.     D:  08/12/2019  E:  08/12/2019     This report was finalized on 8/12/2019 5:39 PM by Dr. Peter Mcpherson MD.       CT Guided Thoracentesis [[id number]] Collected:  08/11/19 1449     Updated:  08/12/19 1648    Narrative:       EXAMINATION: CT-GUIDED THORACENTESIS-08/11/2019:      INDICATION: Dyspnea. 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      TECHNIQUE: Limited helical CT scan of the chest without intravenous  contrast.     The radiation dose reduction device was turned on for each scan per the  ALARA (As Low as Reasonably Achievable) protocol.     COMPARISON: CT one day prior.     FINDINGS: Small-to-moderate right pleural effusion with adjacent  atelectasis.     Patient referred for CT-guided thoracentesis. Informed consent obtained  and signed. The patient was placed in right anterior oblique positioning  for examination and procedure. The patient was prepped and draped in  typical sterile fashion. 1% lidocaine utilized for local anesthetic of  the right lateral chest soft tissues with anesthetic provided to the  level of the pleural surface. Under meticulous CT guidance, an  8.5-Maldivian curved catheter was placed into the right pleural fluid  collection with confirmation of location upon imaging and with  fluid  return. Subsequent 720 mL of fluid drained. Catheter withdrawn. The  patient tolerated the procedure well without complication.       Impression:       Satisfactory CT-guided right thoracentesis.     D:  08/11/2019  E:  08/12/2019           This report was finalized on 8/12/2019 4:45 PM by Dr. Carlos Brunson.       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      COMPARISON: Chest x-ray 08/09/2019.     FINDINGS: Increase in right pleural effusion, now moderate-to-large  volume with adjacent atelectasis and/or opacifications. Left pleural  effusion is decreased from prior with left basilar atelectasis and/or  airspace disease status post thoracentesis on the left. Cardiac  silhouette largely obscured.           Impression:       Increasing right pleural effusion now moderate-to-large  volume. Interval decrease in left pleural effusion status post recent  thoracentesis. Bibasilar opacifications adjacent to these pleural  effusions of atelectasis versus airspace disease.     D:  08/11/2019  E:  08/12/2019     This report was finalized on 8/12/2019 4:45 PM by Dr. Carlos Brunson.       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     TECHNIQUE: Limited helical CT scan of the chest without intravenous  contrast.     The radiation dose reduction device was turned on for each scan per the  ALARA (As Low as Reasonably Achievable) protocol.     COMPARISON: CT chest 08/09/2019.     FINDINGS: Bilateral pleural effusions left greater than right with  moderate volume left effusion and adjacent atelectasis.     Patient referred for CT-guided thoracentesis. Informed consent obtained  and signed. Consent placed on patient's chart. Patient placed in left  anterior oblique positioning for examination and procedure. Patient  prepped and draped in typical sterile fashion. 1% lidocaine used for  local anesthetic of the left chest wall. Under meticulous CT guidance  8.5 [place] curve catheter placed into the left pleural fluid collection  with confirmation of location upon imaging and with fluid return upon  stylette removal. Subsequent 1 L of pleural fluid drained without  abnormality. Patient tolerated well without complication.       Impression:       Satisfactory CT-guided left thoracentesis.     D:  08/10/2019  E:  08/12/2019     This report was finalized on 8/12/2019 10:23 AM by Dr. Carlos Brunson.             PPS:   30% based on the following measures:   Ambulation: Totally bed bound  Activity and Evidence of Disease: Unable to do any work, extensive evidence of disease  Self-Care: Total care  Intake: Reduced   LOC: Full, drowsy or confusion    Physical Exam  Constitutional:   Very frail, ill appearing, intermittent moaning    HENT:   Head: Normocephalic and atraumatic.   Eyes: Conjunctivae are normal.   Neck:   Hyperflexion forward    Cardiovascular: Normal rate, regular rhythm and intact distal pulses.   Pulmonary/Chest:    BBS =, < bibasilar with crackles. NCO2@4L,   Abdominal: Soft. Bowel sounds are normal.   Genitourinary:   Genitourinary Comments: deferred   Musculoskeletal:   Bedrest, very weak    Neurological:   Sleepy, confused    Skin: Skin is warm and dry. Capillary refill takes 2 to 3 seconds. There is pallor.   Psychiatric: She has a normal mood and affect.   Nursing note reviewed.       Widespread metastatic malignant neoplastic disease (CMS/HCC)    JACQUI (generalized anxiety disorder)    Fall    Pleural effusion    Lymphedema    Intractable pain    Shortness of breath    Acute-on-chronic kidney injury (CMS/HCC)    Edema of both lower extremities    Chronic respiratory failure with hypoxia, on home oxygen therapy (CMS/[facility])      Assessment/Plan: 69 y.o. Female with extensive breast cancer. Admitted on 08/09 with increased soa/uncontrolled pain. Left thoracentesis on 08/10; right thoracentesis 08/11. Continues on decadron 2 mg q day, increased restlessness today, mottling bilateral knee Is less. Feet are slightly cool.      Pain: fentanyl 50 mcg q 72 hrs. Tylenol 500 mg q 4 hrs, flexiril 5 mg TID,  Dilaudid 0.5 mg q 2 hr PRN,   Dyspnea: duonebs QID,   Nausea/Vomiting: zofran 4 mg q 4 hr PRN   Anxiety/Agitation:  Confusion/Delerium: valium 5 mg q 6 hrs PRN,   Depression: cymbalta 30 mg q day,   Bowel/bladder: no BM since admit, colace 100 mg BID; bisacodyl 5 mg q day PRN,   Nutrition:  Sleep: mirtazapine 15 mg q HS  ADLs:  Terminal Fevers:   Termianl Secretions:    Other:   2) HTN: metoprolol 25 mg q 12 hrs,   3) GERD: protonix EC 40 mg q AM      I have DCd Valium today, DCd IV Dilaudid and scheduled po Dilaudid 2 mg q 8 hrs, continue q 4 hrs PRN. I have scheduled Flexoril q 8 hrs. Continue Fetanyl patch 50 mcg q 72 hrs.  Goal is to control pain/muscle spasms, maintain comfort and decrease somnolence.  I have started conversation with pt about trying hospice care again, she is agreeable to my continuing conversation this  afternoon.       Educated patient/family about using palliative approach with advanced age and multiple chronic disease processes that cannot be reversed.     Total Visit Time:  45  Face to Face Time: 30    Rayna Espinosa, RATNA  [phone]  08/13/19  9:07 AM

## 2019-08-13 NOTE — PLAN OF CARE
Problem: Patient Care Overview  Goal: Interprofessional Rounds/Family Conf  Outcome: Ongoing (interventions implemented as appropriate)  Palliative Team Attendance 1300. Heriberto NICHOLS, Kalyani Charles RN CHPN, , Pamela Acosta RN CHPN, Yenifer Giullen [facility]   08/13/19 1300   Interdisciplinary Rounds/Family Conf   Summary pt is very lethargic and bent over in the bed with head on knees. Unable to reposition pt to comfort, pt prefers being bent over. Pt is very lethargic but will respond to name and questions. Valium d/c and will schedule po dilaudid for pain. continue goals discussion.

## 2019-08-13 NOTE — PROGRESS NOTES
Continued Stay Note  [facility]     Patient Name: Brianna Urrutia  MRN: [id number]  Today's Date: 8/13/2019    Admit Date: 8/9/2019    Discharge Plan     Row Name 08/13/19 0841       Plan    Plan  [facility] pending insurance approval    Plan Comments  Patient has been accepted into a skilled bed at [facility] pending insurance approval. Once again, Ms. Urrutia is so drowsy that she had difficulty engaging in conversation. She was able to work with PT on 8/11. Spoke with nursing who said she tends to wake up more in the afternoons. I will check back with her this afternoon to discuss the plan of care.            Expected Discharge Date and Time     Expected Discharge Date Expected Discharge Time    Aug 14, 2019             Svetlana Bond RN

## 2019-08-13 NOTE — PLAN OF CARE
Problem: Patient Care Overview  Goal: Plan of Care Review  Outcome: Ongoing (interventions implemented as appropriate)   08/13/19 6678   Coping/Psychosocial   Plan of Care Reviewed With patient   OTHER   Outcome Summary VSS; Pt irritable, confused, lethargic; presents with fxl decline from PLOF, deficits in ADL performance, fxl mobility, occupational endurance. Pt Min A supine.sit, Mod/MaxA to transfer to/from [facility]; dependent for post toilet hygiene; Min A/hand over hand for washing face/hands. Will benefit from skilled OT services to increased fxl I. Recommend SNF at discharge.

## 2019-08-13 NOTE — PROGRESS NOTES
"    [facility] Medicine Services  PROGRESS NOTE    Patient Name: Brianna Urrutia  : 1950  MRN: [id number]    Date of Admission: 2019  Length of Stay: 4  Primary Care Physician: Christina Wade, PADrakeC    Subjective   Subjective     CC:  Diffuse pain, dyspnea    HPI:  Lethargic this afternoon, but says breathing is \"better.\"  Otherwise HPI diff to obtain due to sedation.    Review of Systems  UTO Otherwise ROS is negative except as mentioned in the HPI.    Objective   Objective     Vital Signs:   Temp:  [97.7 °F (36.5 °C)-98.3 °F (36.8 °C)] 98.3 °F (36.8 °C)  Heart Rate:  [81-99] 99  Resp:  [17-20] 20  BP: ()/(64-92) 150/91        Physical Exam:  Gen:  Frail ill appearing woman, awake propped up in bed.  NAD, appears sedated.  Answers questions in a mumble that's hard to understand  Neuro: appears sedated, nonfocal  HEENT:  NC/AT PERRL, OP benign  Heart RRR no murmur, rub, or gallop  Lungs  Shallow breaths, nonlabored.  On NC ox.  Abd:  Sl firm, nontender, no rebound or guarding, pos BS  Extrem:  No c/c/e    Results Reviewed:  I have personally reviewed current lab, radiology, and data and agree.    Results from last 7 days   Lab Units 19  1116 08/10/19  0809 19  1001   WBC 10*3/mm3 7.80 8.56 8.95   HEMOGLOBIN g/dL 11.9* 15.4 14.3   HEMATOCRIT % 38.6 48.5* 45.8   PLATELETS 10*3/mm3 194 311 275   INR   --  0.99  --      Results from last 7 days   Lab Units 19  0525 19  1116 08/10/19  0809 19  1926 19  1208 19  1001   SODIUM mmol/L 145 143 142  --   --  141   POTASSIUM mmol/L 4.2 4.0 4.6  --   --  4.1   CHLORIDE mmol/L 110* 108* 104  --   --  98   CO2 mmol/L 25.0 26.0 26.0  --   --  31.0*   BUN mg/dL 17 23 38*  --   --  48*   CREATININE mg/dL 0.84 0.80 1.26*  --   --  1.43*   GLUCOSE mg/dL 98 103* 119*  --   --  116*   CALCIUM mg/dL 9.1 8.6 9.6  --   --  9.5   ALT (SGPT) U/L  --   --   --   --   --  39*   AST (SGOT) U/L  --   --   -- "   --   --  72*   TROPONIN T ng/mL  --   --   --  0.025 <0.010 0.046*   PROBNP pg/mL  --   --  4,775.0*  --   --  1,817.0*     Estimated Creatinine Clearance: 51.9 mL/min (by C-G formula based on SCr of 0.84 mg/dL).    Microbiology Results Abnormal     Procedure Component Value - Date/Time    Body Fluid Culture - Body Fluid, Pleural Cavity [[id number]] Collected:  08/10/19 1300    Lab Status:  Final result Specimen:  Body Fluid from Pleural Cavity Updated:  08/13/19 0617     Body Fluid Culture No growth at 3 days     Gram Stain No WBCs or organisms seen    Blood Culture - Blood, Arm, Left [[id number]] Collected:  08/09/19 1028    Lab Status:  Preliminary result Specimen:  Blood from Arm, Left Updated:  08/12/19 1131     Blood Culture No growth at 3 days    Blood Culture - Blood, Arm, Left [[id number]] Collected:  08/09/19 1028    Lab Status:  Preliminary result Specimen:  Blood from Arm, Left Updated:  08/12/19 1131     Blood Culture No growth at 3 days          Imaging Results (last 24 hours)     Procedure Component Value Units Date/Time    CT Abdomen Pelvis Without Contrast [[id number]] Collected:  08/09/19 1413     Updated:  08/13/19 0803    Narrative:       EXAMINATION: CT CHEST WO CONTRAST-, CT ABDOMEN PELVIS WO CONTRAST-  08/09/2019      INDICATION: Chest pain, shortness of air, stage IV breast cancer, hx  pathologic fractures     TECHNIQUE: Unenhanced CT imaging of the chest, abdomen, and pelvis was  obtained. Additional coronal and sagittal reformatted images were  obtained for review.     The radiation dose reduction device was turned on for each scan per the  ALARA (As Low as Reasonably Achievable) protocol.     COMPARISON: CT of the thoracic and lumbar spine 07/30/2019, CT of the  chest 09/12/2017 and CT abdomen 06/19/2017.     FINDINGS:      CHEST: Multiple abnormalities are identified throughout the chest and  osseous structures of the chest. For example there is osseous erosive  changes of the right first  and distal right second rib with associated  soft tissue abnormality measuring 3.0 x 4.1 cm similar when compared to  the CT of the thoracic spine performed 07/30/2019. Extensive soft tissue  abnormality identified within the anterior right chest wall adjacent to  this. Identified within the right breast is a 3.1 x 1.4 cm soft tissue  mass with associated skin retraction which may be the site of the  reported breast carcinoma.  Additionally more inferiorly there is a soft  tissue subcutaneous nodule. Additional abnormal osseous destruction  identified involving the posterior fifth, sixth, seventh, and eighth  ribs posterior and laterally concerning for metastatic lesions. Lack of  intravenous contrast limits evaluation for underlying soft tissue  lesion, however one is suspected within this region. Particularly at  (series 1/image 9) is a possible soft tissue focus measuring 5.3 cm.  Extensive multifocal osseous metastatic disease throughout the  visualized cervical, thoracic, and lumbar spine are identified.  For  example there are numerous lytic lesions throughout the lower cervical  spine as well as the upper thoracic spine with multilevel compression  fractures most pronounced at T1, T2, and T4 which appears similar to the  thoracic spine CT performed 07/30/2019. Likely underlying soft tissue is  suggested with these, although given the patient's positioning and lack  of contrast there is very limited evaluation for soft tissue within  these fractures. Additional extensive osseous destructive changes with  fracture identified involving the approximate T5 vertebral process T6  and T7 vertebral bodies with some degree of posterior retropulsion and  possible soft tissue compromise of the spinal canal at these levels,  although lack of contrast limits evaluation. Again identified at T5 is  marked kyphosis, similar to prior. Severe L1 fracture identified with  osseous destructive changes and mild retropulsion,  similar to prior.      Dedicated CT imaging of the lungs demonstrates focal airspace  disease/mass within the right lung apex adjacent to the osseous  destructive rib changes which may relate to post radiation changes,  although underlying soft tissue abnormality is likely present. Moderate  to large bilateral pleural effusions are identified. Bilateral lower  lobe airspace disease is noted with more focal consolidative changes  within the right mid lung with air bronchograms and a calcification. The  heart is not enlarged. Extensive sternal osseous metastatic disease with  extensive destruction is identified involving the inferior aspect of the  sternum below the manubrium with possible anterior mediastinal  extension. This is similar to the CT of the thoracic spine performed  07/30/2019.     ABDOMEN/PELVIS: Lack of intravenous and oral contrast limits evaluation  of abdominal structures. Identified within the liver is a 2.5 cm  hypodensity concerning for a metastatic lesion. Mild perihepatic fluid  is identified. Cholelithiasis is present. Motion artifact limits  evaluation of fine detail for the small bowel and organs within the  abdomen. The pancreas is not demonstrated acute abnormality on this  unenhanced motion limited exam. The spleen is unremarkable. No adrenal  mass is identified. Scattered enlarged retroperitoneal lymph nodes  measuring up to 8 mm are identified concerning for possible metastatic  involvement of the retroperitoneum. No free fluid or free air is  appreciated within the abdomen. Small bowel is nondilated no evidence of  bowel obstruction. Mildly infiltrated diffuse mesenteric inflammation  identified which may relate to mesenteric radiculitis. Sigmoid colon  diverticulosis without convincing evidence of diverticulitis, although  the sigmoid colon is thickened. Trace free fluid is noted in the pelvis.     There is redemonstration of pathologic destructive fracture involving L1  with mild  posterior retropulsion, similar to the lumbar spine CT  performed 07/30/2019. The remainder of the vertebral bodies appear to  maintain height. Multilevel disc degeneration and posterior disc  osteophyte complexes are suggested involving the lumbar spine.  Multifocal mottled appearance of the bony pelvis identified suggestive  of multifocal possible small lytic metastases particularly involving the  left iliac crest, similar to prior.       Impression:       1. Extensive multifocal osseous metastatic disease with associated  severe osseous destruction involving the right anterior first and second  ribs, left posterior-lateral fifth, sixth, seventh, and eighth rib  fractures, inferior sternum, and numerous multifocal lower cervical and  upper thoracic destructive lesions. Overall lack of intravenous contrast  limits evaluation for underlying soft tissue, although underlying soft  tissue abnormalities are present at all of these destructive sites with  soft tissue extension into the anterior mediastinum at the sternum, soft  tissue lesions associated with the destructive rib lesions, and likely  soft tissue lesions present involving the cervical and thoracic spine  pathologic fractures and destructive osseous changes. Evaluation of  spinal canal compromise is not evaluated on this study secondary to lack  of intravenous contrast, although these findings appear similar to  slightly worsened when compared to the CT of the thoracic spine  performed 07/30/2019. Follow-up MRI would be more definitive,  particularly for spinal canal compromise if clinically warranted.     2. Soft tissue lesions involving the right breast which may relate to  the site of primary carcinoma.     3. Moderate bilateral pleural effusions identified with associated  multifocal airspace disease throughout the lungs as described above.     4. Hypoattenuated right 2.5 cm hepatic lobe lesion concerning for  metastatic focus with additional prominent  mesenteric and  retroperitoneal lymph nodes which also could relate to sites of  metastatic disease.     5. Nonspecific diffuse mesenteric infiltration with sigmoid  diverticulosis with associated distal sigmoid wall thickening and  pericolonic fluid suggestive of possible underlying sigmoid  diverticulitis.     D:  08/09/2019  E:  08/09/2019     This report was finalized on 8/13/2019 8:00 AM by Dr. Sandip Camacho MD.       CT Chest Without Contrast [[id number]] Collected:  08/09/19 1413     Updated:  08/13/19 0803    Narrative:       EXAMINATION: CT CHEST WO CONTRAST-, CT ABDOMEN PELVIS WO CONTRAST-  08/09/2019      INDICATION: Chest pain, shortness of air, stage IV breast cancer, hx  pathologic fractures     TECHNIQUE: Unenhanced CT imaging of the chest, abdomen, and pelvis was  obtained. Additional coronal and sagittal reformatted images were  obtained for review.     The radiation dose reduction device was turned on for each scan per the  ALARA (As Low as Reasonably Achievable) protocol.     COMPARISON: CT of the thoracic and lumbar spine 07/30/2019, CT of the  chest 09/12/2017 and CT abdomen 06/19/2017.     FINDINGS:      CHEST: Multiple abnormalities are identified throughout the chest and  osseous structures of the chest. For example there is osseous erosive  changes of the right first and distal right second rib with associated  soft tissue abnormality measuring 3.0 x 4.1 cm similar when compared to  the CT of the thoracic spine performed 07/30/2019. Extensive soft tissue  abnormality identified within the anterior right chest wall adjacent to  this. Identified within the right breast is a 3.1 x 1.4 cm soft tissue  mass with associated skin retraction which may be the site of the  reported breast carcinoma.  Additionally more inferiorly there is a soft  tissue subcutaneous nodule. Additional abnormal osseous destruction  identified involving the posterior fifth, sixth, seventh, and eighth  ribs posterior  and laterally concerning for metastatic lesions. Lack of  intravenous contrast limits evaluation for underlying soft tissue  lesion, however one is suspected within this region. Particularly at  (series 1/image 9) is a possible soft tissue focus measuring 5.3 cm.  Extensive multifocal osseous metastatic disease throughout the  visualized cervical, thoracic, and lumbar spine are identified.  For  example there are numerous lytic lesions throughout the lower cervical  spine as well as the upper thoracic spine with multilevel compression  fractures most pronounced at T1, T2, and T4 which appears similar to the  thoracic spine CT performed 07/30/2019. Likely underlying soft tissue is  suggested with these, although given the patient's positioning and lack  of contrast there is very limited evaluation for soft tissue within  these fractures. Additional extensive osseous destructive changes with  fracture identified involving the approximate T5 vertebral process T6  and T7 vertebral bodies with some degree of posterior retropulsion and  possible soft tissue compromise of the spinal canal at these levels,  although lack of contrast limits evaluation. Again identified at T5 is  marked kyphosis, similar to prior. Severe L1 fracture identified with  osseous destructive changes and mild retropulsion, similar to prior.      Dedicated CT imaging of the lungs demonstrates focal airspace  disease/mass within the right lung apex adjacent to the osseous  destructive rib changes which may relate to post radiation changes,  although underlying soft tissue abnormality is likely present. Moderate  to large bilateral pleural effusions are identified. Bilateral lower  lobe airspace disease is noted with more focal consolidative changes  within the right mid lung with air bronchograms and a calcification. The  heart is not enlarged. Extensive sternal osseous metastatic disease with  extensive destruction is identified involving the  inferior aspect of the  sternum below the manubrium with possible anterior mediastinal  extension. This is similar to the CT of the thoracic spine performed  07/30/2019.     ABDOMEN/PELVIS: Lack of intravenous and oral contrast limits evaluation  of abdominal structures. Identified within the liver is a 2.5 cm  hypodensity concerning for a metastatic lesion. Mild perihepatic fluid  is identified. Cholelithiasis is present. Motion artifact limits  evaluation of fine detail for the small bowel and organs within the  abdomen. The pancreas is not demonstrated acute abnormality on this  unenhanced motion limited exam. The spleen is unremarkable. No adrenal  mass is identified. Scattered enlarged retroperitoneal lymph nodes  measuring up to 8 mm are identified concerning for possible metastatic  involvement of the retroperitoneum. No free fluid or free air is  appreciated within the abdomen. Small bowel is nondilated no evidence of  bowel obstruction. Mildly infiltrated diffuse mesenteric inflammation  identified which may relate to mesenteric radiculitis. Sigmoid colon  diverticulosis without convincing evidence of diverticulitis, although  the sigmoid colon is thickened. Trace free fluid is noted in the pelvis.     There is redemonstration of pathologic destructive fracture involving L1  with mild posterior retropulsion, similar to the lumbar spine CT  performed 07/30/2019. The remainder of the vertebral bodies appear to  maintain height. Multilevel disc degeneration and posterior disc  osteophyte complexes are suggested involving the lumbar spine.  Multifocal mottled appearance of the bony pelvis identified suggestive  of multifocal possible small lytic metastases particularly involving the  left iliac crest, similar to prior.       Impression:       1. Extensive multifocal osseous metastatic disease with associated  severe osseous destruction involving the right anterior first and second  ribs, left posterior-lateral  fifth, sixth, seventh, and eighth rib  fractures, inferior sternum, and numerous multifocal lower cervical and  upper thoracic destructive lesions. Overall lack of intravenous contrast  limits evaluation for underlying soft tissue, although underlying soft  tissue abnormalities are present at all of these destructive sites with  soft tissue extension into the anterior mediastinum at the sternum, soft  tissue lesions associated with the destructive rib lesions, and likely  soft tissue lesions present involving the cervical and thoracic spine  pathologic fractures and destructive osseous changes. Evaluation of  spinal canal compromise is not evaluated on this study secondary to lack  of intravenous contrast, although these findings appear similar to  slightly worsened when compared to the CT of the thoracic spine  performed 07/30/2019. Follow-up MRI would be more definitive,  particularly for spinal canal compromise if clinically warranted.     2. Soft tissue lesions involving the right breast which may relate to  the site of primary carcinoma.     3. Moderate bilateral pleural effusions identified with associated  multifocal airspace disease throughout the lungs as described above.     4. Hypoattenuated right 2.5 cm hepatic lobe lesion concerning for  metastatic focus with additional prominent mesenteric and  retroperitoneal lymph nodes which also could relate to sites of  metastatic disease.     5. Nonspecific diffuse mesenteric infiltration with sigmoid  diverticulosis with associated distal sigmoid wall thickening and  pericolonic fluid suggestive of possible underlying sigmoid  diverticulitis.     D:  08/09/2019  E:  08/09/2019     This report was finalized on 8/13/2019 8:00 AM by Dr. Sandip Camacho MD.       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 There is bibasilar  airspace disease and there are bilateral pleural effusions.           Impression:       Bibasilar airspace disease and effusions, insignificantly  changed when compared with 08/11/2019.     D:  08/12/2019  E:  08/12/2019     This report was finalized on 8/12/2019 5:39 PM by Dr. Peter Mcpherson MD.       CT Guided Thoracentesis [[id number]] Collected:  08/11/19 1449     Updated:  08/12/19 1648    Narrative:       EXAMINATION: CT-GUIDED THORACENTESIS-08/11/2019:      INDICATION: Dyspnea. 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      TECHNIQUE: Limited helical CT scan of the chest without intravenous  contrast.     The radiation dose reduction device was turned on for each scan per the  ALARA (As Low as Reasonably Achievable) protocol.     COMPARISON: CT one day prior.     FINDINGS: Small-to-moderate right pleural effusion with adjacent  atelectasis.     Patient referred for CT-guided thoracentesis. Informed consent obtained  and signed. The patient was placed in right anterior oblique positioning  for examination and procedure. The patient was prepped and draped in  typical sterile fashion. 1% lidocaine utilized for local  anesthetic of  the right lateral chest soft tissues with anesthetic provided to the  level of the pleural surface. Under meticulous CT guidance, an  8.5-Chinese curved catheter was placed into the right pleural fluid  collection with confirmation of location upon imaging and with fluid  return. Subsequent 720 mL of fluid drained. Catheter withdrawn. The  patient tolerated the procedure well without complication.       Impression:       Satisfactory CT-guided right thoracentesis.     D:  08/11/2019  E:  08/12/2019           This report was finalized on 8/12/2019 4:45 PM by Dr. Carlos Brunson.       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      COMPARISON: Chest x-ray 08/09/2019.     FINDINGS: Increase in right pleural effusion, now moderate-to-large  volume with adjacent atelectasis and/or opacifications. Left pleural  effusion is decreased from prior with left basilar atelectasis and/or  airspace disease status post thoracentesis on the left. Cardiac  silhouette largely obscured.           Impression:       Increasing right pleural effusion now moderate-to-large  volume. Interval decrease in left pleural effusion status post recent  thoracentesis. Bibasilar opacifications adjacent to these pleural  effusions of atelectasis versus airspace disease.     D:  08/11/2019  E:  08/12/2019     This report was finalized on 8/12/2019 4:45 PM by Dr. Carlos Brunson.       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     TECHNIQUE: Limited helical CT scan of the chest without intravenous  contrast.     The radiation dose reduction device was turned on for each scan per the  ALARA (As Low as Reasonably Achievable) protocol.     COMPARISON: CT chest 08/09/2019.     FINDINGS: Bilateral pleural effusions left greater than right with  moderate volume left effusion and adjacent atelectasis.     Patient referred for CT-guided thoracentesis. Informed consent obtained  and signed. Consent placed on patient's chart. Patient placed in left  anterior oblique positioning for examination and procedure. Patient  prepped and draped in typical sterile fashion. 1% lidocaine used for  local anesthetic of the left chest wall. Under meticulous CT guidance  8.5 [place] curve catheter placed into the left pleural fluid collection  with confirmation of location upon imaging and with fluid return upon  stylette removal. Subsequent 1 L of pleural fluid drained without  abnormality. Patient tolerated well without complication.       Impression:       Satisfactory CT-guided left thoracentesis.     D:  08/10/2019  E:  08/12/2019     This report was finalized on 8/12/2019 10:23 AM by Dr. Carlos Brunson.             Results for orders placed during the hospital encounter of 06/16/17   Adult Transthoracic Echo Complete    Narrative · Left ventricular systolic function is normal. Estimated EF = 70%.  · Left ventricular diastolic dysfunction (grade I)  consistent with   impaired relaxation.  · The cardiac valves are normal.          I have reviewed the medications:  Scheduled Meds:    castor oil-balsam peru  Topical Q12H   dexamethasone 2 mg Oral Daily With Breakfast & Lunch   docusate sodium 100 mg Oral BID   DULoxetine 60 mg Oral Daily   fentaNYL 1 patch Transdermal Q72H   ipratropium-albuterol 3 mL Nebulization 4x Daily - RT   lidocaine PF 1% 10 mL Injection Once   metoprolol tartrate 25 mg Oral Q12H   mirtazapine 15 mg Oral Nightly   pantoprazole 40 mg Oral Q AM   sennosides-docusate sodium 2 tablet Oral BID   sodium chloride 250 mL Intravenous Once   sodium chloride 3 mL Intravenous Q12H     Continuous Infusions:    hold 1 each    sodium chloride 75 mL/hr Last Rate: 75 mL/hr (08/12/19 1622)     PRN Meds:.•  acetaminophen  •  acetaminophen  •  albuterol  •  bisacodyl  •  bisacodyl  •  calcium carbonate  •  cyclobenzaprine  •  diazePAM  •  hold  •  HYDROmorphone **AND** naloxone  •  HYDROmorphone  •  lactulose  •  ondansetron ODT **OR** ondansetron  •  sodium chloride  •  sodium chloride      Assessment/Plan   Assessment / Plan     Active Hospital Problems    Diagnosis  POA   • **Widespread metastatic malignant neoplastic disease (CMS/HCC) [C80.0]  Yes   • Pleural effusion [J90]  Yes   • Lymphedema [I89.0]  Unknown   • Intractable pain [R52]  Unknown   • Shortness of breath [R06.02]  Unknown   • Acute-on-chronic kidney injury (CMS/HCC) [N17.9, N18.9]  Unknown   • Edema of both lower extremities [R60.0]  Unknown   • Chronic respiratory failure with hypoxia, on home oxygen therapy (CMS/HCC) [J96.11, Z99.81]  Not Applicable   • Fall [W19.XXXA]  Yes   • JACQUI (generalized anxiety disorder) [F41.1]  Yes      Resolved Hospital Problems   No resolved problems to display.        Brief Hospital Course to date:  Brianna Urrutia is a 69 y.o. female debilitated from widely metastatic breast cancer, recently on hospice at home, here for progressive chest and back pain and  worsening shortness of air.  She wears 2 LNC oxygen at baseline.      Bone mets, bilat malignant effusion   - Symptom control  - therapeutic thoracentesis on 8/10 and 8/11.   - known to XRT and Dr. Ibarra.   - patient declines hospice care at this time due to past experience.  - repeat CXR noted.     YOLETTE 1.4, now .8.  Resolved.   - hydrate gently    Palliative adjusting pain meds.    DVT Prophylaxis: [facility]    Disposition: I expect the patient to be discharged to SNF when insurance approves.     CODE STATUS:   Code Status and Medical Interventions:   Ordered at: 08/09/19 1830     Level Of Support Discussed With:    Patient     Code Status:    No CPR     Medical Interventions (Level of Support Prior to Arrest):    Comfort Measures         Electronically signed by Tran Brizuela MD, 08/13/19, 8:54 AM.
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
## 2019-08-14 NOTE — SIGNIFICANT NOTE
home here to  pt. Pt belongings wanted by family taken with son, Kenji, instructed to dispose of anything left in room. Tele box cleaned and returned to med room. SCD pump sent to security to be picked up by [employer], per hospice RN.

## 2019-08-14 NOTE — NURSING NOTE
RN at bedside with pt and family. Pt with long period of apnea, upon auscultation no audible heart tones. [facility], , palliative care team & hospitalist notified. Support provided to family at bedside.

## 2019-08-14 NOTE — PLAN OF CARE
Problem: Fall Risk (Adult)  Goal: Identify Related Risk Factors and Signs and Symptoms  Outcome: Outcome(s) achieved Date Met: 08/14/19    Goal: Absence of Fall  Outcome: Outcome(s) achieved Date Met: 08/14/19      Problem: Skin Injury Risk (Adult)  Goal: Identify Related Risk Factors and Signs and Symptoms  Outcome: Outcome(s) achieved Date Met: 08/14/19    Goal: Skin Health and Integrity  Outcome: Outcome(s) achieved Date Met: 08/14/19      Problem: Patient Care Overview  Goal: Plan of Care Review  Outcome: Outcome(s) achieved Date Met: 08/14/19    Goal: Individualization and Mutuality  Outcome: Outcome(s) achieved Date Met: 08/14/19    Goal: Discharge Needs Assessment  Outcome: Outcome(s) achieved Date Met: 08/14/19    Goal: Interprofessional Rounds/Family Conf  Outcome: Outcome(s) achieved Date Met: 08/14/19      Problem: Pain, Chronic (Adult)  Goal: Identify Related Risk Factors and Signs and Symptoms  Outcome: Outcome(s) achieved Date Met: 08/14/19    Goal: Acceptable Pain/Comfort Level and Functional Ability  Outcome: Outcome(s) achieved Date Met: 08/14/19      Problem: Palliative Care (Adult)  Goal: Identify Related Risk Factors and Signs and Symptoms  Outcome: Outcome(s) achieved Date Met: 08/14/19    Goal: Maximized Comfort  Outcome: Outcome(s) achieved Date Met: 08/14/19    Goal: Enhanced Quality of Life  Outcome: Outcome(s) achieved Date Met: 08/14/19

## 2019-08-14 NOTE — NURSING NOTE
Pt with very labored breathing and unable to maintain sats on 5LNC. Unable to arouse. MD paged, ordered for stat ABG & CXR, MD en route to see pt. Respiratory at bedside, pt placed on 80% non-rebreather with sats 90% and ABG drawn. CXR completed. MD at bedside and contacting family to decide further treatment. Pt is currently No CPR. RN to remain at bedside and await further orders.

## 2019-08-14 NOTE — SIGNIFICANT NOTE
Exam confirms wi/th auscultation zero audible heart tones and zero audible respirations. Ms.Sandra Buckley Renan wa/s pronounced dead 8/14/2019 at 11:48am.  Son at bedside.  MD notified by Patient's RN.    Seth Palumbo RN  Clinical House Supervisor  8/14/2019 12:20 PM

## 2019-08-14 NOTE — PROGRESS NOTES
Continued Stay Note  [facility]     Patient Name: Brianna Urrutia  MRN: [id number]  Today's Date: 8/14/2019    Admit Date: 8/9/2019    Discharge Plan     Row Name 08/14/19 1009       Plan    Plan  Change in condition    Plan Comments  Patient is now comfort measures and is actively dying. Referral cancelled to [facility].               Svetlana Bond RN

## 2019-08-14 NOTE — PROGRESS NOTES
[facility] Medicine Services  PROGRESS NOTE    Patient Name: Brianna Urrutia  : 1950  MRN: [id number]    Date of Admission: 2019  Length of Stay: 5  Primary Care Physician: Christina Wade, JOHN    Subjective   Subjective     CC:  Diffuse pain, dyspnea    HPI:  CTSP this morning for acute change in status.  RN reports that patient has labored breathing and is barely rousable.  Reportedly she was comfortable during the night, got up to commode this morning, but has since declined.    At bedside:  Patient slumped forward, now on NRB mask.  Not responding to voice or touch.      Review of Systems  UTO Otherwise ROS is negative except as mentioned in the HPI.    Objective   Objective     Vital Signs:   Temp:  [97 °F (36.1 °C)-98.2 °F (36.8 °C)] 97 °F (36.1 °C)  Heart Rate:  [78-97] 93  Resp:  [17-24] 18  BP: (104-137)/(70-91) 137/76        Physical Exam:  Gen:  Frail ill appearing woman, slumped forward in bed, NRB mask on.  Unresponsive.  Neuro:nonfocal  HEENT:  NC/AT   Heart RRR   Lungs  Shallow rapid breaths with accessory muscle use.  labored.  Abd:  Sl firm, nontender, no rebound or guarding, pos BS  Extrem:  No c/c.  Edema RUE.     Results Reviewed:    ABG shows pH 7.15 and pCO2 89  Stat CXR shows large bilat effusions  I have personally reviewed current lab, radiology, and data and agree.    Results from last 7 days   Lab Units 19  1116 08/10/19  0809 19  1001   WBC 10*3/mm3 7.80 8.56 8.95   HEMOGLOBIN g/dL 11.9* 15.4 14.3   HEMATOCRIT % 38.6 48.5* 45.8   PLATELETS 10*3/mm3 194 311 275   INR   --  0.99  --      Results from last 7 days   Lab Units 19  0525 19  1116 08/10/19  0809 19  1926 19  1208 19  1001   SODIUM mmol/L 145 143 142  --   --  141   POTASSIUM mmol/L 4.2 4.0 4.6  --   --  4.1   CHLORIDE mmol/L 110* 108* 104  --   --  98   CO2 mmol/L 25.0 26.0 26.0  --   --  31.0*   BUN mg/dL 17 23 38*  --   --  48*   CREATININE  mg/dL 0.84 0.80 1.26*  --   --  1.43*   GLUCOSE mg/dL 98 103* 119*  --   --  116*   CALCIUM mg/dL 9.1 8.6 9.6  --   --  9.5   ALT (SGPT) U/L  --   --   --   --   --  39*   AST (SGOT) U/L  --   --   --   --   --  72*   TROPONIN T ng/mL  --   --   --  0.025 <0.010 0.046*   PROBNP pg/mL  --   --  4,775.0*  --   --  1,817.0*     Estimated Creatinine Clearance: 51.9 mL/min (by C-G formula based on SCr of 0.84 mg/dL).    Microbiology Results Abnormal     Procedure Component Value - Date/Time    Anaerobic Culture - Pleural Fluid, Pleural Cavity [[id number]] Collected:  08/10/19 1300    Lab Status:  Preliminary result Specimen:  Pleural Fluid from Pleural Cavity Updated:  08/13/19 1257     Anaerobic Culture No anaerobes isolated at 3 days    Blood Culture - Blood, Arm, Left [[id number]] Collected:  08/09/19 1028    Lab Status:  Preliminary result Specimen:  Blood from Arm, Left Updated:  08/13/19 1130     Blood Culture No growth at 4 days    Blood Culture - Blood, Arm, Left [[id number]] Collected:  08/09/19 1028    Lab Status:  Preliminary result Specimen:  Blood from Arm, Left Updated:  08/13/19 1130     Blood Culture No growth at 4 days    Body Fluid Culture - Body Fluid, Pleural Cavity [[id number]] Collected:  08/10/19 1300    Lab Status:  Final result Specimen:  Body Fluid from Pleural Cavity Updated:  08/13/19 0617     Body Fluid Culture No growth at 3 days     Gram Stain No WBCs or organisms seen          Imaging Results (last 24 hours)     Procedure Component Value Units Date/Time    XR Chest 1 View [[id number]] Updated:  08/14/19 0757    CT Abdomen Pelvis Without Contrast [[id number]] Collected:  08/09/19 1413     Updated:  08/13/19 0803    Narrative:       EXAMINATION: CT CHEST WO CONTRAST-, CT ABDOMEN PELVIS WO CONTRAST-  08/09/2019      INDICATION: Chest pain, shortness of air, stage IV breast cancer, hx  pathologic fractures     TECHNIQUE: Unenhanced CT imaging of the chest, abdomen, and pelvis was  obtained.  Additional coronal and sagittal reformatted images were  obtained for review.     The radiation dose reduction device was turned on for each scan per the  ALARA (As Low as Reasonably Achievable) protocol.     COMPARISON: CT of the thoracic and lumbar spine 07/30/2019, CT of the  chest 09/12/2017 and CT abdomen 06/19/2017.     FINDINGS:      CHEST: Multiple abnormalities are identified throughout the chest and  osseous structures of the chest. For example there is osseous erosive  changes of the right first and distal right second rib with associated  soft tissue abnormality measuring 3.0 x 4.1 cm similar when compared to  the CT of the thoracic spine performed 07/30/2019. Extensive soft tissue  abnormality identified within the anterior right chest wall adjacent to  this. Identified within the right breast is a 3.1 x 1.4 cm soft tissue  mass with associated skin retraction which may be the site of the  reported breast carcinoma.  Additionally more inferiorly there is a soft  tissue subcutaneous nodule. Additional abnormal osseous destruction  identified involving the posterior fifth, sixth, seventh, and eighth  ribs posterior and laterally concerning for metastatic lesions. Lack of  intravenous contrast limits evaluation for underlying soft tissue  lesion, however one is suspected within this region. Particularly at  (series 1/image 9) is a possible soft tissue focus measuring 5.3 cm.  Extensive multifocal osseous metastatic disease throughout the  visualized cervical, thoracic, and lumbar spine are identified.  For  example there are numerous lytic lesions throughout the lower cervical  spine as well as the upper thoracic spine with multilevel compression  fractures most pronounced at T1, T2, and T4 which appears similar to the  thoracic spine CT performed 07/30/2019. Likely underlying soft tissue is  suggested with these, although given the patient's positioning and lack  of contrast there is very limited  evaluation for soft tissue within  these fractures. Additional extensive osseous destructive changes with  fracture identified involving the approximate T5 vertebral process T6  and T7 vertebral bodies with some degree of posterior retropulsion and  possible soft tissue compromise of the spinal canal at these levels,  although lack of contrast limits evaluation. Again identified at T5 is  marked kyphosis, similar to prior. Severe L1 fracture identified with  osseous destructive changes and mild retropulsion, similar to prior.      Dedicated CT imaging of the lungs demonstrates focal airspace  disease/mass within the right lung apex adjacent to the osseous  destructive rib changes which may relate to post radiation changes,  although underlying soft tissue abnormality is likely present. Moderate  to large bilateral pleural effusions are identified. Bilateral lower  lobe airspace disease is noted with more focal consolidative changes  within the right mid lung with air bronchograms and a calcification. The  heart is not enlarged. Extensive sternal osseous metastatic disease with  extensive destruction is identified involving the inferior aspect of the  sternum below the manubrium with possible anterior mediastinal  extension. This is similar to the CT of the thoracic spine performed  07/30/2019.     ABDOMEN/PELVIS: Lack of intravenous and oral contrast limits evaluation  of abdominal structures. Identified within the liver is a 2.5 cm  hypodensity concerning for a metastatic lesion. Mild perihepatic fluid  is identified. Cholelithiasis is present. Motion artifact limits  evaluation of fine detail for the small bowel and organs within the  abdomen. The pancreas is not demonstrated acute abnormality on this  unenhanced motion limited exam. The spleen is unremarkable. No adrenal  mass is identified. Scattered enlarged retroperitoneal lymph nodes  measuring up to 8 mm are identified concerning for possible  metastatic  involvement of the retroperitoneum. No free fluid or free air is  appreciated within the abdomen. Small bowel is nondilated no evidence of  bowel obstruction. Mildly infiltrated diffuse mesenteric inflammation  identified which may relate to mesenteric radiculitis. Sigmoid colon  diverticulosis without convincing evidence of diverticulitis, although  the sigmoid colon is thickened. Trace free fluid is noted in the pelvis.     There is redemonstration of pathologic destructive fracture involving L1  with mild posterior retropulsion, similar to the lumbar spine CT  performed 07/30/2019. The remainder of the vertebral bodies appear to  maintain height. Multilevel disc degeneration and posterior disc  osteophyte complexes are suggested involving the lumbar spine.  Multifocal mottled appearance of the bony pelvis identified suggestive  of multifocal possible small lytic metastases particularly involving the  left iliac crest, similar to prior.       Impression:       1. Extensive multifocal osseous metastatic disease with associated  severe osseous destruction involving the right anterior first and second  ribs, left posterior-lateral fifth, sixth, seventh, and eighth rib  fractures, inferior sternum, and numerous multifocal lower cervical and  upper thoracic destructive lesions. Overall lack of intravenous contrast  limits evaluation for underlying soft tissue, although underlying soft  tissue abnormalities are present at all of these destructive sites with  soft tissue extension into the anterior mediastinum at the sternum, soft  tissue lesions associated with the destructive rib lesions, and likely  soft tissue lesions present involving the cervical and thoracic spine  pathologic fractures and destructive osseous changes. Evaluation of  spinal canal compromise is not evaluated on this study secondary to lack  of intravenous contrast, although these findings appear similar to  slightly worsened when  compared to the CT of the thoracic spine  performed 07/30/2019. Follow-up MRI would be more definitive,  particularly for spinal canal compromise if clinically warranted.     2. Soft tissue lesions involving the right breast which may relate to  the site of primary carcinoma.     3. Moderate bilateral pleural effusions identified with associated  multifocal airspace disease throughout the lungs as described above.     4. Hypoattenuated right 2.5 cm hepatic lobe lesion concerning for  metastatic focus with additional prominent mesenteric and  retroperitoneal lymph nodes which also could relate to sites of  metastatic disease.     5. Nonspecific diffuse mesenteric infiltration with sigmoid  diverticulosis with associated distal sigmoid wall thickening and  pericolonic fluid suggestive of possible underlying sigmoid  diverticulitis.     D:  08/09/2019  E:  08/09/2019     This report was finalized on 8/13/2019 8:00 AM by Dr. Sandip Camacho MD.       CT Chest Without Contrast [[id number]] Collected:  08/09/19 1413     Updated:  08/13/19 0803    Narrative:       EXAMINATION: CT CHEST WO CONTRAST-, CT ABDOMEN PELVIS WO CONTRAST-  08/09/2019      INDICATION: Chest pain, shortness of air, stage IV breast cancer, hx  pathologic fractures     TECHNIQUE: Unenhanced CT imaging of the chest, abdomen, and pelvis was  obtained. Additional coronal and sagittal reformatted images were  obtained for review.     The radiation dose reduction device was turned on for each scan per the  ALARA (As Low as Reasonably Achievable) protocol.     COMPARISON: CT of the thoracic and lumbar spine 07/30/2019, CT of the  chest 09/12/2017 and CT abdomen 06/19/2017.     FINDINGS:      CHEST: Multiple abnormalities are identified throughout the chest and  osseous structures of the chest. For example there is osseous erosive  changes of the right first and distal right second rib with associated  soft tissue abnormality measuring 3.0 x 4.1 cm similar  when compared to  the CT of the thoracic spine performed 07/30/2019. Extensive soft tissue  abnormality identified within the anterior right chest wall adjacent to  this. Identified within the right breast is a 3.1 x 1.4 cm soft tissue  mass with associated skin retraction which may be the site of the  reported breast carcinoma.  Additionally more inferiorly there is a soft  tissue subcutaneous nodule. Additional abnormal osseous destruction  identified involving the posterior fifth, sixth, seventh, and eighth  ribs posterior and laterally concerning for metastatic lesions. Lack of  intravenous contrast limits evaluation for underlying soft tissue  lesion, however one is suspected within this region. Particularly at  (series 1/image 9) is a possible soft tissue focus measuring 5.3 cm.  Extensive multifocal osseous metastatic disease throughout the  visualized cervical, thoracic, and lumbar spine are identified.  For  example there are numerous lytic lesions throughout the lower cervical  spine as well as the upper thoracic spine with multilevel compression  fractures most pronounced at T1, T2, and T4 which appears similar to the  thoracic spine CT performed 07/30/2019. Likely underlying soft tissue is  suggested with these, although given the patient's positioning and lack  of contrast there is very limited evaluation for soft tissue within  these fractures. Additional extensive osseous destructive changes with  fracture identified involving the approximate T5 vertebral process T6  and T7 vertebral bodies with some degree of posterior retropulsion and  possible soft tissue compromise of the spinal canal at these levels,  although lack of contrast limits evaluation. Again identified at T5 is  marked kyphosis, similar to prior. Severe L1 fracture identified with  osseous destructive changes and mild retropulsion, similar to prior.      Dedicated CT imaging of the lungs demonstrates focal airspace  disease/mass within  the right lung apex adjacent to the osseous  destructive rib changes which may relate to post radiation changes,  although underlying soft tissue abnormality is likely present. Moderate  to large bilateral pleural effusions are identified. Bilateral lower  lobe airspace disease is noted with more focal consolidative changes  within the right mid lung with air bronchograms and a calcification. The  heart is not enlarged. Extensive sternal osseous metastatic disease with  extensive destruction is identified involving the inferior aspect of the  sternum below the manubrium with possible anterior mediastinal  extension. This is similar to the CT of the thoracic spine performed  07/30/2019.     ABDOMEN/PELVIS: Lack of intravenous and oral contrast limits evaluation  of abdominal structures. Identified within the liver is a 2.5 cm  hypodensity concerning for a metastatic lesion. Mild perihepatic fluid  is identified. Cholelithiasis is present. Motion artifact limits  evaluation of fine detail for the small bowel and organs within the  abdomen. The pancreas is not demonstrated acute abnormality on this  unenhanced motion limited exam. The spleen is unremarkable. No adrenal  mass is identified. Scattered enlarged retroperitoneal lymph nodes  measuring up to 8 mm are identified concerning for possible metastatic  involvement of the retroperitoneum. No free fluid or free air is  appreciated within the abdomen. Small bowel is nondilated no evidence of  bowel obstruction. Mildly infiltrated diffuse mesenteric inflammation  identified which may relate to mesenteric radiculitis. Sigmoid colon  diverticulosis without convincing evidence of diverticulitis, although  the sigmoid colon is thickened. Trace free fluid is noted in the pelvis.     There is redemonstration of pathologic destructive fracture involving L1  with mild posterior retropulsion, similar to the lumbar spine CT  performed 07/30/2019. The remainder of the vertebral  bodies appear to  maintain height. Multilevel disc degeneration and posterior disc  osteophyte complexes are suggested involving the lumbar spine.  Multifocal mottled appearance of the bony pelvis identified suggestive  of multifocal possible small lytic metastases particularly involving the  left iliac crest, similar to prior.       Impression:       1. Extensive multifocal osseous metastatic disease with associated  severe osseous destruction involving the right anterior first and second  ribs, left posterior-lateral fifth, sixth, seventh, and eighth rib  fractures, inferior sternum, and numerous multifocal lower cervical and  upper thoracic destructive lesions. Overall lack of intravenous contrast  limits evaluation for underlying soft tissue, although underlying soft  tissue abnormalities are present at all of these destructive sites with  soft tissue extension into the anterior mediastinum at the sternum, soft  tissue lesions associated with the destructive rib lesions, and likely  soft tissue lesions present involving the cervical and thoracic spine  pathologic fractures and destructive osseous changes. Evaluation of  spinal canal compromise is not evaluated on this study secondary to lack  of intravenous contrast, although these findings appear similar to  slightly worsened when compared to the CT of the thoracic spine  performed 07/30/2019. Follow-up MRI would be more definitive,  particularly for spinal canal compromise if clinically warranted.     2. Soft tissue lesions involving the right breast which may relate to  the site of primary carcinoma.     3. Moderate bilateral pleural effusions identified with associated  multifocal airspace disease throughout the lungs as described above.     4. Hypoattenuated right 2.5 cm hepatic lobe lesion concerning for  metastatic focus with additional prominent mesenteric and  retroperitoneal lymph nodes which also could relate to sites of  metastatic disease.     5.  Nonspecific diffuse mesenteric infiltration with sigmoid  diverticulosis with associated distal sigmoid wall thickening and  pericolonic fluid suggestive of possible underlying sigmoid  diverticulitis.     D:  08/09/2019  E:  08/09/2019     This report was finalized on 8/13/2019 8:00 AM by Dr. Sandip Camacho MD.             Results for orders placed during the hospital encounter of 06/16/17   Adult Transthoracic Echo Complete    Narrative · Left ventricular systolic function is normal. Estimated EF = 70%.  · Left ventricular diastolic dysfunction (grade I) consistent with   impaired relaxation.  · The cardiac valves are normal.          I have reviewed the medications:  Scheduled Meds:    cyclobenzaprine 5 mg Oral Q8H   fentaNYL 1 patch Transdermal Q72H   ipratropium-albuterol 3 mL Nebulization 4x Daily - RT   sodium chloride 250 mL Intravenous Once   sodium chloride 3 mL Intravenous Q12H     Continuous Infusions:    hold 1 each     PRN Meds:.•  albuterol  •  calcium carbonate  •  glycopyrrolate  •  hold  •  HYDROmorphone  •  lactulose  •  LORazepam  •  Morphine  •  ondansetron ODT **OR** ondansetron  •  sodium chloride  •  sodium chloride      Assessment/Plan   Assessment / Plan     Active Hospital Problems    Diagnosis  POA   • **Widespread metastatic malignant neoplastic disease (CMS/HCC) [C80.0]  Yes   • Pleural effusion [J90]  Yes   • Lymphedema [I89.0]  Unknown   • Intractable pain [R52]  Unknown   • Shortness of breath [R06.02]  Unknown   • Acute-on-chronic kidney injury (CMS/HCC) [N17.9, N18.9]  Unknown   • Edema of both lower extremities [R60.0]  Unknown   • Chronic respiratory failure with hypoxia, on home oxygen therapy (CMS/HCC) [J96.11, Z99.81]  Not Applicable   • Fall [W19.XXXA]  Yes   • JACQUI (generalized anxiety disorder) [F41.1]  Yes      Resolved Hospital Problems   No resolved problems to display.        Brief Hospital Course to date:  Brianna Urrutia is a 69 y.o. female debilitated from  widely metastatic breast cancer, recently on hospice at home, here for progressive chest and back pain and worsening shortness of air.  She wears 2 LNC oxygen at baseline.      Acute resp failure  Actively dying.  - given overall picture and now with hypercapnic resp failure and bilat reaccumulation of effusions, situation is grave  - discussed with brother Arnoldo by phone.  He is understanding and agrees with my recommendation for full comfort focus.  He will notify the patient's son.   - adjust meds for iv administration of morphine, ativan.  - continue oxygen prn for comfort.       Bone mets, bilat malignant effusion    YOLETTE 1.4, now .8.  Resolved.   - hydrate gently    DVT Prophylaxis: [facility]    Disposition: I expect the patient to  today.    CODE STATUS:   Code Status and Medical Interventions:   Ordered at: 19 1830     Level Of Support Discussed With:    Patient     Code Status:    No CPR     Medical Interventions (Level of Support Prior to Arrest):    Comfort Measures         Electronically signed by Tran Brizuela MD, 19, 7:59 AM.

## 2019-08-14 NOTE — PLAN OF CARE
Problem: Fall Risk (Adult)  Goal: Identify Related Risk Factors and Signs and Symptoms  Outcome: Ongoing (interventions implemented as appropriate)    Goal: Absence of Fall  Outcome: Ongoing (interventions implemented as appropriate)      Problem: Skin Injury Risk (Adult)  Goal: Identify Related Risk Factors and Signs and Symptoms  Outcome: Ongoing (interventions implemented as appropriate)    Goal: Skin Health and Integrity  Outcome: Ongoing (interventions implemented as appropriate)      Problem: Patient Care Overview  Goal: Plan of Care Review  Outcome: Ongoing (interventions implemented as appropriate)   08/14/19 0321   Coping/Psychosocial   Plan of Care Reviewed With patient   Plan of Care Review   Progress no change   OTHER   Outcome Summary VSS throughout the shift. Pt remains lethargic. No PRN meds given.        Problem: Pain, Chronic (Adult)  Goal: Identify Related Risk Factors and Signs and Symptoms  Outcome: Ongoing (interventions implemented as appropriate)    Goal: Acceptable Pain/Comfort Level and Functional Ability  Outcome: Ongoing (interventions implemented as appropriate)      Problem: Palliative Care (Adult)  Goal: Identify Related Risk Factors and Signs and Symptoms  Outcome: Ongoing (interventions implemented as appropriate)    Goal: Maximized Comfort  Outcome: Ongoing (interventions implemented as appropriate)    Goal: Enhanced Quality of Life  Outcome: Ongoing (interventions implemented as appropriate)

## 2019-08-14 NOTE — PROGRESS NOTES
"Palliative Care Consult Note    Patient Name: Brianna Urrutia   : 1950  Sex: female    Date of Admission: 2019    Subjective:  69 y.o. Female with extensive breast cancer. Admitted on  with increased soa/uncontrolled pain. Left thoracentesis on 08/10; right thoracentesis . Per nurse report pt had significant decline early this am with increased dyspnea, hypoxia, somnolence.  Dr. Jenkins was called to bedside, she was changed to NRB mask O2, Morphine IV PRN was started for dyspnea/pain.  CXR showed \"Persistent/recurrent large bilateral pleural effusions with compressive atelectasis of the mid and lower lungs. These have increased in size since 2019\".  DX breast cancer with bone mets, bilateral malignant effusion, now in state of active dying.  Chata Stroud had a conversation with brother Arnoldo per phone who was in agreement with change to comfort plan of care, he stated he would notify her son Kenji.     On visit today pt is unrespopnsive, NRB mask in place, RR 20 with abd breathing, all ext are cold to touch with noted mottling BLE.  RUE continues edematous.  HR 94, latest B/P 127/36. There is no family at bedside.  Code status has  been changed to NPO CPR/Comfort Measures.     ROS:  Review of Systems   Unable to perform ROS: Patient unresponsive     Reviewed current scheduled and prn medications for route, type, dose and frequency.    hold 1 each     •  albuterol  •  calcium carbonate  •  glycopyrrolate  •  hold  •  HYDROmorphone  •  lactulose  •  LORazepam  •  Morphine  •  ondansetron ODT **OR** ondansetron  •  sodium chloride  •  sodium chloride    Objective:   /76 (BP Location: Right arm, Patient Position: Lying)   Pulse 89   Temp 97 °F (36.1 °C) (Axillary)   Resp 18   Ht 157.5 cm (62\")   Wt 50.6 kg (111 lb 8 oz)   SpO2 (!) 85%   BMI 20.39 kg/m²    Intake & Output (last day)        07 -  0700  07 - 08/15 0700    P.O. 0     Total Intake(mL/kg) 0 (0)     Urine " (mL/kg/hr) 400 (0.3)     Total Output 400     Net -400               Lab Results (last 24 hours)     Procedure Component Value Units Date/Time    Blood Gas, Arterial With Co-Ox [[id number]]  (Abnormal) Collected:  08/14/19 0741    Specimen:  Arterial Blood Updated:  08/14/19 0750     Site Left Radial     Milton's Test N/A     pH, Arterial 7.150 pH units      Comment: 85 Value below critical limit        pCO2, Arterial 88.8 mm Hg      Comment: 86 Value above critical limit        pO2, Arterial 60.4 mm Hg      Comment: 84 Value below reference range        HCO3, Arterial 30.9 mmol/L      Base Excess, Arterial -0.8 mmol/L      Hemoglobin, Blood Gas 14.2 g/dL      Hematocrit, Blood Gas 43.5 %      Oxyhemoglobin 86.9 %      Comment: 84 Value below reference range        Methemoglobin 0.80 %      Carboxyhemoglobin 1.1 %      CO2 Content 33.6 mmol/L      Temperature 98.6 C      Barometric Pressure for Blood Gas -- mmHg      Comment: N/A        Modality PRB     FIO2 80 %      Ventilator Mode NA     Comment: Meter: [id number]     :  [id number]        Note --     Notified Who DR. ANNA CESAR     Notified By [id number]     Notified Time 08/14/2019 07:49     pH, Temp Corrected 7.150 pH Units      pCO2, Temperature Corrected 88.8 mm Hg      pO2, Temperature Corrected 60.4 mm Hg     Non-gynecologic Cytology [[id number]] Collected:  08/12/19 0757    Specimen:  Pleural Fluid from Pleural Cavity Updated:  08/13/19 1420     Case Report --     Non-gynecologic Cytology                          Case: [id number]                                  Authorizing Provider:  Hoang Moreno MD        Collected:           08/12/2019 07:57 AM          Ordering Location:     [facility]   Received:            08/12/2019 07:58 AM                                 6B                                                                           Pathologist:           Roxy Tan DO                                                         Specimen:    Pleural Cavity                                                                              Final Diagnosis --      PLEURAL FLUID:  Negative for malignant cells.    This diagnosis was rendered by Roxy Tan D.O. at [facility]. See scanned report for full consultative opinion.        Triglycerides, Body Fluid - Pleural Fluid, Pleural Cavity [[id number]] Collected:  08/10/19 1409    Specimen:  Pleural Fluid from Pleural Cavity Updated:  08/13/19 1309     Triglycerides, Fluid 24 mg/dL      Comment:  ________________________________________________________  :  Peritoneal  :       Pleural          :   Synovial     :  :______________:________________________:________________:  :              : Transudate :  Exudate  :                :  :______________:____________:___________:________________:  :  Not Estab.  : Not Estab. : Not Estab.:   Not Estab.   :  :______________:____________:___________:________________:   The method performance specifications have not been   established for this test in body fluid. The test result   should be integrated into the clinical context for   interpretation.  The reference intervals and other method performance specifications  have not been established for this test. The test result should be  integrated into the clinical context for interpretation.       Narrative:       Performed at:  [address]  [id number]  : Daniel Guallpa PhD, Phone:  [phone]    Anaerobic Culture - Pleural Fluid, Pleural Cavity [[id number]] Collected:  08/10/19 1300    Specimen:  Pleural Fluid from Pleural Cavity Updated:  08/13/19 1257     Anaerobic Culture No anaerobes isolated at 3 days    Blood Culture - Blood, Arm, Left [[id number]] Collected:  08/09/19 1028    Specimen:  Blood from Arm, Left Updated:  08/13/19 1130     Blood Culture No growth at 4 days    Blood Culture - Blood, Arm, Left [[id number]] Collected:   08/09/19 1028    Specimen:  Blood from Arm, Left Updated:  08/13/19 1130     Blood Culture No growth at 4 days        Imaging Results (last 24 hours)     Procedure Component Value Units Date/Time    XR Chest 1 View [[id number]] Collected:  08/14/19 0804     Updated:  08/14/19 0807    Narrative:       CHEST X-RAY, 8/14/2019      HISTORY:    69-year-old female hospital inpatient with labored breathing. Metastatic breast cancer. Large pleural effusions status post recent thoracentesis procedures      TECHNIQUE:  AP portable chest x-ray.    COMPARISON:  *  Chest x-ray, 8/12/2019.    FINDINGS:  Large bilateral pleural effusions with compressive atelectasis of the mid and lower lungs. These appear enlarged since the last chest x-ray of 8/12/2019.    Probable interstitial edema within the aerated upper lungs appears increased. Skeletal metastatic disease with lytic left side rib lesions.      Impression:       Persistent/recurrent large bilateral pleural effusions with compressive atelectasis of the mid and lower lungs. These have increased in size since 8/12/2019.    Signer Name: Eze Junior MD   Signed: 8/14/2019 8:04 AM   Workstation Name: [email or web address]    [facility]          PPS:   10% based on the following measures:   Ambulation: Totally bed bound  Activity and Evidence of Disease: Unable to do any work, extensive evidence of disease  Self-Care: Total care  Intake:  Mouth care only  LOC: Drowsy or coma    Physical Exam:  Physical Exam  Constitutional:   Unresponsive, Very frail, ill appearing, intermittent moaning    HENT:   Head: Normocephalic and atraumatic.   Eyes: pupils small, slow reactive. Conjunctivae are normal.   Neck:   Hyperextension.    Cardiovascular: Normal rate, regular rhythm, all extremities cold to touch with pulses faint only.    Pulmonary/Chest:   BBS =, < bibasilar, abd breathing. NRB mask patent.   Abdominal: Soft. Bowel sounds faint.    Genitourinary:    Genitourinary Comments: deferred   Musculoskeletal:   unresponsive   Neurological:   unresponsive   Skin: cool to touch,  pallor.   Psychiatric: unresponsive    Nursing note reviewed.       Widespread metastatic malignant neoplastic disease (CMS/HCC)    JACQUI (generalized anxiety disorder)    Fall    Pleural effusion    Lymphedema    Intractable pain    Shortness of breath    Acute-on-chronic kidney injury (CMS/HCC)    Edema of both lower extremities    Chronic respiratory failure with hypoxia, on home oxygen therapy (CMS/HCC)      Assessment/Plan:   69 y.o. Female with extensive breast cancer with bone mets, bilateral malignant effusion, now in state of active dying.        Pain: fentanyl 50 mcg q 72 hrs.   Morphine 1 mg IVP q 1 hr PRN pain.dyspnea/agitation.    Dyspnea: NRB mask,   Nausea/Vomiting: zofran 4 mg IV  q 4 hr PRN   Anxiety/Agitation:Lorazepam 1 mg q 2 hr PRN   Confusion/Delerium:    Depression:   Bowel/bladder: bisacodyl 10 mg supp PRN    Nutrition:NPO, os care   Sleep:  ADLs: total care   Terminal Fevers: Tylenol 650 mg supp q 4 hr PRN  Termianl Secretions:  glycopyrolate 0.4 mg q 4 hr PRN      I have DCd all po meds, added tylenol supp for terminal fevers. Prognosis is just hours. May place evans cath for comfort. Have placed consult for inpatient hospice although we have initiated comfort plan of care. Palliative is available to meet with family if/when they arrive.     Educated patient/family about using palliative approach with advanced age and multiple chronic disease processes that cannot be reversed.     Total Visit Time: 60  Face to Face Time: 30    Rayna Espinosa, RATNA  [phone]  08/14/19  8:30 AM

## 2019-08-14 NOTE — PAYOR COMM NOTE
"Id # [id number]  Eloisa Maciel RN, BSN  Phone # [phone]  Fax # [phone]  Brianna Jang (Dcsd. Female)     Date of Birth Social Security Number Address Home Phone MRN    1950  [address]  [place] [address] [phone] [id number]    Buddhism Marital Status          Jew        Admission Date Admission Type Admitting Provider Attending Provider Department, Room/Bed    19 Emergency Tran Brizuela MD  [facility] 6B, N643/1    Discharge Date Discharge Disposition Discharge Destination        2019               Attending Provider:  (none)   Allergies:  Iodinated Diagnostic Agents, Penicillins    Isolation:  None   Infection:  None   Code Status:  Prior    Ht:  157.5 cm (62\")   Wt:  50.6 kg (111 lb 8 oz)    Admission Cmt:  None   Principal Problem:  Widespread metastatic malignant neoplastic disease (CMS/HCC) [C80.0]                 Active Insurance as of 2019     Primary Coverage     Payor Plan Insurance Group Employer/Plan Group    [facility] MEDICARE REPLACEMENT [facility] REPL      Payor Plan Address Payor Plan Phone Number Payor Plan Fax Number Effective Dates    [address] [address] [phone]  2018 - None Entered    [facility] [id number]       Subscriber Name Subscriber Birth Date Member ID       BRIANNA JANG 1950 [id number]                 Emergency Contacts      (Rel.) Home Phone Work Phone Mobile Phone    AMAN ESPARZA (Other) [phone] -- --    LADY JANG (Son) -- -- [phone]    TATYANA SOTO (Brother) -- -- [phone]               Operative/Procedure Notes (last 7 days) (Notes from 2019  4:11 PM through 2019  4:11 PM)      Carlos Brunson DO at 8/10/2019 12:46 PM      Procedures    1. CT THORACENTESIS NEEDLE OR CATHETER WITH IMAGE [[id number] (Custom)]             Radiology Procedure    Pre-procedure: Moderate left pleural effusion    Procedure Performed: CT-guided left " "thoracentesis      IV Sedation and/or Anesthesia:  No    Complications: None    Preliminary Findings: Near-total resolution of left pleural effusion    Specimen Removed: 1L yellow fluid    Estimated Blood Loss:  0ml    Post-Procedure Diagnosis: Left pleural effusion    Post-Procedure Plan: Return to ordering physician for recommendations and orders    Standard Discharge Instructions Given:yes     Carlos Brunson DO  08/10/19  12:46 PM        Electronically signed by Carlos Brunson DO at 8/10/2019 12:47 PM     Carlos Brunson DO at 2019  2:49 PM        Radiology Procedure    Pre-procedure: Small to moderate right pleural effusion     Procedure Performed: CT-guided right thoracentesis      IV Sedation and/or Anesthesia:  No    Complications: None    Preliminary Findings: Significant reduction in size of right pleural effusion s/p drainage    Specimen Removed: 720ml yellow-brown fluid    Estimated Blood Loss:  0ml    Post-Procedure Diagnosis: Right pleural effusion    Post-Procedure Plan: Return to ordering service for further management     Standard Discharge Instructions Given:yes     Carlos Brunson DO  19  2:50 PM        Electronically signed by Carlos Brunson DO at 2019  2:51 PM          Physician Progress Notes (last 7 days) (Notes from 2019  4:11 PM through 2019  4:11 PM)      Rayna Espinosa APRN at 2019  8:29 AM          Palliative Care Consult Note    Patient Name: Brianna Urrutia   : 1950  Sex: female    Date of Admission: 2019    Subjective:  69 y.o. Female with extensive breast cancer. Admitted on  with increased soa/uncontrolled pain. Left thoracentesis on 08/10; right thoracentesis . Per nurse report pt had significant decline early this am with increased dyspnea, hypoxia, somnolence.  Dr. Jenkins was called to bedside, she was changed to NRB mask O2, Morphine IV PRN was started for dyspnea/pain.  CXR showed \"Persistent/recurrent large " "bilateral pleural effusions with compressive atelectasis of the mid and lower lungs. These have increased in size since 8/12/2019\".  DX breast cancer with bone mets, bilateral malignant effusion, now in state of active dying.  Chata Stroud had a conversation with brother Arnoldo per phone who was in agreement with change to comfort plan of care, he stated he would notify her son Kenji.     On visit today pt is  unrespopnsive, NRB mask in place, RR 20 with abd breathing, all ext are cold to touch with noted mottling BLE.  RUE continues edematous.  HR 94, latest B/P 127/36. There is no family at bedside.  Code status has  been changed to NPO CPR/Comfort Measures.     ROS:  Review of Systems   Unable to perform ROS: Patient unresponsive     Reviewed current scheduled and prn medications for route, type, dose and frequency.    hold 1 each     •  albuterol  •  calcium carbonate  •  glycopyrrolate  •  hold  •  HYDROmorphone  •  lactulose  •  LORazepam  •  Morphine  •  ondansetron ODT **OR** ondansetron  •  sodium chloride  •  sodium chloride    Objective:   /76 (BP Location: Right arm, Patient Position: Lying)   Pulse 89   Temp 97 °F (36.1 °C) (Axillary)   Resp 18   Ht 157.5 cm (62\")   Wt 50.6 kg (111 lb 8 oz)   SpO2 (!) 85%   BMI 20.39 kg/m²     Intake & Output (last day)       08/13 0701 - 08/14 0700 08/14 0701 - 08/15 0700    P.O. 0     Total Intake(mL/kg) 0 (0)     Urine (mL/kg/hr) 400 (0.3)     Total Output 400     Net -400               Lab Results (last 24 hours)     Procedure Component Value Units Date/Time    Blood Gas, Arterial With Co-Ox [[id number]]  (Abnormal) Collected:  08/14/19 0741    Specimen:  Arterial Blood Updated:  08/14/19 0750     Site Left Radial     Milton's Test N/A     pH, Arterial 7.150 pH units      Comment: 85 Value below critical limit        pCO2, Arterial 88.8 mm Hg      Comment: 86 Value above critical limit        pO2, Arterial 60.4 mm Hg      Comment: 84 Value below reference range "        HCO3, Arterial 30.9 mmol/L      Base Excess, Arterial -0.8 mmol/L      Hemoglobin, Blood Gas 14.2 g/dL      Hematocrit, Blood Gas 43.5 %      Oxyhemoglobin 86.9 %      Comment: 84 Value below reference range        Methemoglobin 0.80 %      Carboxyhemoglobin 1.1 %      CO2 Content 33.6 mmol/L      Temperature 98.6 C      Barometric Pressure for Blood Gas -- mmHg      Comment: N/A        Modality PRB     FIO2 80 %      Ventilator Mode NA     Comment: Meter: [id number]     :  [id number]        Note --     Notified Who DR. ANNA CESAR     Notified By [id number]     Notified Time 08/14/2019 07:49     pH, Temp Corrected 7.150 pH Units      pCO2, Temperature Corrected 88.8 mm Hg      pO2, Temperature Corrected 60.4 mm Hg     Non-gynecologic Cytology [[id number]] Collected:  08/12/19 0757    Specimen:  Pleural Fluid from Pleural Cavity Updated:  08/13/19 1420     Case Report --     Non-gynecologic Cytology                          Case: [id number]                                  Authorizing Provider:  Hoang Moreno MD        Collected:           08/12/2019 07:57 AM          Ordering Location:     [facility]   Received:            08/12/2019 07:58 AM                                 6B                                                                           Pathologist:           Roxy Tan DO                                                        Specimen:    Pleural Cavity                                                                              Final Diagnosis --      PLEURAL FLUID:  Negative for malignant cells.    This diagnosis was rendered by Roxy Tan D.O. at [facility]. See scanned report for full consultative opinion.        Triglycerides, Body Fluid - Pleural Fluid, Pleural Cavity [[id number]] Collected:  08/10/19 1409    Specimen:  Pleural Fluid from Pleural Cavity Updated:  08/13/19 1309     Triglycerides, Fluid 24 mg/dL      Comment:   ________________________________________________________  :  Peritoneal  :       Pleural          :   Synovial     :  :______________:________________________:________________:  :              : Transudate :  Exudate  :                :  :______________:____________:___________:________________:  :  Not Estab.  : Not Estab. : Not Estab.:   Not Estab.   :  :______________:____________:___________:________________:   The method performance specifications have not been   established for this test in body fluid. The test result   should be integrated into the clinical context for   interpretation.  The reference intervals and other method performance specifications  have not been established for this test. The test result should be  integrated into the clinical context for interpretation.       Narrative:       Performed at:  [address] [place] [address]  [id number]  : Daniel Guallpa PhD, Phone:  [phone]    Anaerobic Culture - Pleural Fluid, Pleural Cavity [[id number]] Collected:  08/10/19 1300    Specimen:  Pleural Fluid from Pleural Cavity Updated:  08/13/19 1257     Anaerobic Culture No anaerobes isolated at 3 days    Blood Culture - Blood, Arm, Left [[id number]] Collected:  08/09/19 1028    Specimen:  Blood from Arm, Left Updated:  08/13/19 1130     Blood Culture No growth at 4 days    Blood Culture - Blood, Arm, Left [[id number]] Collected:  08/09/19 1028    Specimen:  Blood from Arm, Left Updated:  08/13/19 1130     Blood Culture No growth at 4 days        Imaging Results (last 24 hours)     Procedure Component Value Units Date/Time    XR Chest 1 View [[id number]] Collected:  08/14/19 0804     Updated:  08/14/19 0807    Narrative:       CHEST X-RAY, 8/14/2019      HISTORY:    69-year-old female hospital inpatient with labored breathing. Metastatic breast cancer. Large pleural effusions status post recent thoracentesis procedures      TECHNIQUE:  AP portable chest  x-ray.    COMPARISON:  *  Chest x-ray, 8/12/2019.    FINDINGS:  Large bilateral pleural effusions with compressive atelectasis of the mid and lower lungs. These appear enlarged since the last chest x-ray of 8/12/2019.    Probable interstitial edema within the aerated upper lungs appears increased. Skeletal metastatic disease with lytic left side rib lesions.      Impression:       Persistent/recurrent large bilateral pleural effusions with compressive atelectasis of the mid and lower lungs. These have increased in size since 8/12/2019.    Signer Name: Eze Junior MD   Signed: 8/14/2019 8:04 AM   Workstation Name: [id number]    [facility]          PPS:   10% based on the following measures:   Ambulation: Totally bed bound  Activity and Evidence of Disease: Unable to do any work, extensive evidence of disease  Self-Care: Total care  Intake:  Mouth care only  LOC: Drowsy or coma    Physical Exam:  Physical Exam  Constitutional:   Unresponsive, Very frail, ill appearing, intermittent moaning    HENT:   Head: Normocephalic and atraumatic.   Eyes: pupils small, slow reactive. Conjunctivae are normal.   Neck:   Hyperextension.    Cardiovascular: Normal rate, regular rhythm, all extremities cold to touch with pulses faint only.    Pulmonary/Chest:   BBS =, < bibasilar, abd breathing. NRB mask patent.   Abdominal: Soft. Bowel sounds faint.    Genitourinary:   Genitourinary Comments: deferred   Musculoskeletal:   unresponsive   Neurological:   unresponsive   Skin: cool to touch,  pallor.   Psychiatric: unresponsive    Nursing note reviewed.       Widespread metastatic malignant neoplastic disease (CMS/HCC)    JACQUI (generalized anxiety disorder)    Fall    Pleural effusion    Lymphedema    Intractable pain    Shortness of breath    Acute-on-chronic kidney injury (CMS/HCC)    Edema of both lower extremities    Chronic respiratory failure with hypoxia, on home oxygen therapy  (CMS/[facility])      Assessment/Plan:   69 y.o. Female with extensive breast cancer with bone mets, bilateral malignant effusion, now in state of active dying.        Pain: fentanyl 50 mcg q 72 hrs.   Morphine 1 mg IVP q 1 hr PRN pain.dyspnea/agitation.    Dyspnea: NRB mask,   Nausea/Vomiting: zofran 4 mg IV  q 4 hr PRN   Anxiety/Agitation:Lorazepam 1 mg q 2 hr PRN   Confusion/Delerium:    Depression:   Bowel/bladder: bisacodyl 10 mg supp PRN    Nutrition:NPO, os care   Sleep:  ADLs: total care   Terminal Fevers: Tylenol 650 mg supp q 4 hr PRN  Termianl Secretions:  glycopyrolate 0.4 mg q 4 hr PRN      I have DCd all po meds, added tylenol supp for terminal fevers. Prognosis is just hours. May place evans cath for comfort. Have placed consult for inpatient hospice although we have initiated comfort plan of care. Palliative is available to meet with family if/when they arrive.     Educated patient/family about using palliative approach with advanced age and multiple chronic disease processes that cannot be reversed.     Total Visit Time: 60  Face to Face Time: 30    RATNA Montoya  [phone]  19  8:30 AM    Electronically signed by Rayna Espinosa APRN at 2019  9:47 AM     Tran Brizuela MD at 2019  7:59 AM              [facility] Medicine Services  PROGRESS NOTE    Patient Name: Brianna Urrutia  : 1950  MRN: [id number]    Date of Admission: 2019  Length of Stay: 5  Primary Care Physician: Christina Wade, PAUMA    Subjective   Subjective     CC:  Diffuse pain, dyspnea    HPI:  CTSP this morning for acute change in status.  RN reports that patient has labored breathing and is barely rousable.  Reportedly she was comfortable during the night, got up to commode this morning, but has since declined.    At bedside:  Patient slumped forward, now on NRB mask.  Not responding to voice or touch.      Review of Systems  UTO Otherwise ROS is negative except as  mentioned in the HPI.    Objective   Objective     Vital Signs:   Temp:  [97 °F (36.1 °C)-98.2 °F (36.8 °C)] 97 °F (36.1 °C)  Heart Rate:  [78-97] 93  Resp:  [17-24] 18  BP: (104-137)/(70-91) 137/76        Physical Exam:  Gen:  Frail ill appearing woman, slumped forward in bed, NRB mask on.  Unresponsive.  Neuro:nonfocal  HEENT:  NC/AT   Heart RRR   Lungs  Shallow rapid breaths with accessory muscle use.  labored.  Abd:  Sl firm, nontender, no rebound or guarding, pos BS  Extrem:  No c/c.  Edema RUE.     Results Reviewed:    ABG shows pH 7.15 and pCO2 89  Stat CXR shows large bilat effusions  I have personally reviewed current lab, radiology, and data and agree.    Results from last 7 days   Lab Units 08/12/19  1116 08/10/19  0809 08/09/19  1001   WBC 10*3/mm3 7.80 8.56 8.95   HEMOGLOBIN g/dL 11.9* 15.4 14.3   HEMATOCRIT % 38.6 48.5* 45.8   PLATELETS 10*3/mm3 194 311 275   INR   --  0.99  --      Results from last 7 days   Lab Units 08/13/19  0525 08/12/19  1116 08/10/19  0809 08/09/19  1926 08/09/19  1208 08/09/19  1001   SODIUM mmol/L 145 143 142  --   --  141   POTASSIUM mmol/L 4.2 4.0 4.6  --   --  4.1   CHLORIDE mmol/L 110* 108* 104  --   --  98   CO2 mmol/L 25.0 26.0 26.0  --   --  31.0*   BUN mg/dL 17 23 38*  --   --  48*   CREATININE mg/dL 0.84 0.80 1.26*  --   --  1.43*   GLUCOSE mg/dL 98 103* 119*  --   --  116*   CALCIUM mg/dL 9.1 8.6 9.6  --   --  9.5   ALT (SGPT) U/L  --   --   --   --   --  39*   AST (SGOT) U/L  --   --   --   --   --  72*   TROPONIN T ng/mL  --   --   --  0.025 <0.010 0.046*   PROBNP pg/mL  --   --  4,775.0*  --   --  1,817.0*     Estimated Creatinine Clearance: 51.9 mL/min (by C-G formula based on SCr of 0.84 mg/dL).    Microbiology Results Abnormal     Procedure Component Value - Date/Time    Anaerobic Culture - Pleural Fluid, Pleural Cavity [[id number]] Collected:  08/10/19 1300    Lab Status:  Preliminary result Specimen:  Pleural Fluid from Pleural Cavity Updated:  08/13/19 1257      Anaerobic Culture No anaerobes isolated at 3 days    Blood Culture - Blood, Arm, Left [[id number]] Collected:  08/09/19 1028    Lab Status:  Preliminary result Specimen:  Blood from Arm, Left Updated:  08/13/19 1130     Blood Culture No growth at 4 days    Blood Culture - Blood, Arm, Left [[id number]] Collected:  08/09/19 1028    Lab Status:  Preliminary result Specimen:  Blood from Arm, Left Updated:  08/13/19 1130     Blood Culture No growth at 4 days    Body Fluid Culture - Body Fluid, Pleural Cavity [[id number]] Collected:  08/10/19 1300    Lab Status:  Final result Specimen:  Body Fluid from Pleural Cavity Updated:  08/13/19 0617     Body Fluid Culture No growth at 3 days     Gram Stain No WBCs or organisms seen          Imaging Results (last 24 hours)     Procedure Component Value Units Date/Time    XR Chest 1 View [[id number]] Updated:  08/14/19 0757    CT Abdomen Pelvis Without Contrast [[id number]] Collected:  08/09/19 1413     Updated:  08/13/19 0803    Narrative:       EXAMINATION: CT CHEST WO CONTRAST-, CT ABDOMEN PELVIS WO CONTRAST-  08/09/2019      INDICATION: Chest pain, shortness of air, stage IV breast cancer, hx  pathologic fractures     TECHNIQUE: Unenhanced CT imaging of the chest, abdomen, and pelvis was  obtained. Additional coronal and sagittal reformatted images were  obtained for review.     The radiation dose reduction device was turned on for each scan per the  ALARA (As Low as Reasonably Achievable) protocol.     COMPARISON: CT of the thoracic and lumbar spine 07/30/2019, CT of the  chest 09/12/2017 and CT abdomen 06/19/2017.     FINDINGS:      CHEST: Multiple abnormalities are identified throughout the chest and  osseous structures of the chest. For example there is osseous erosive  changes of the right first and distal right second rib with associated  soft tissue abnormality measuring 3.0 x 4.1 cm similar when compared to  the CT of the thoracic spine performed 07/30/2019. Extensive  soft tissue  abnormality identified within the anterior right chest wall adjacent to  this. Identified within the right breast is a 3.1 x 1.4 cm soft tissue  mass with associated skin retraction which may be the site of the  reported breast carcinoma.  Additionally more inferiorly there is a soft  tissue subcutaneous nodule. Additional abnormal osseous destruction  identified involving the posterior fifth, sixth, seventh, and eighth  ribs posterior and laterally concerning for metastatic lesions. Lack of  intravenous contrast limits evaluation for underlying soft tissue  lesion, however one is suspected within this region. Particularly at  (series 1/image 9) is a possible soft tissue focus measuring 5.3 cm.  Extensive multifocal osseous metastatic disease throughout the  visualized cervical, thoracic, and lumbar spine are identified.  For  example there are numerous lytic lesions throughout the lower cervical  spine as well as the upper thoracic spine with multilevel compression  fractures most pronounced at T1, T2, and T4 which appears similar to the  thoracic spine CT performed 07/30/2019. Likely underlying soft tissue is  suggested with these, although given the patient's positioning and lack  of contrast there is very limited evaluation for soft tissue within  these fractures. Additional extensive osseous destructive changes with  fracture identified involving the approximate T5 vertebral process T6  and T7 vertebral bodies with some degree of posterior retropulsion and  possible soft tissue compromise of the spinal canal at these levels,  although lack of contrast limits evaluation. Again identified at T5 is  marked kyphosis, similar to prior. Severe L1 fracture identified with  osseous destructive changes and mild retropulsion, similar to prior.      Dedicated CT imaging of the lungs demonstrates focal airspace  disease/mass within the right lung apex adjacent to the osseous  destructive rib changes which may  relate to post radiation changes,  although underlying soft tissue abnormality is likely present. Moderate  to large bilateral pleural effusions are identified. Bilateral lower  lobe airspace disease is noted with more focal consolidative changes  within the right mid lung with air bronchograms and a calcification. The  heart is not enlarged. Extensive sternal osseous metastatic disease with  extensive destruction is identified involving the inferior aspect of the  sternum below the manubrium with possible anterior mediastinal  extension. This is similar to the CT of the thoracic spine performed  07/30/2019.     ABDOMEN/PELVIS: Lack of intravenous and oral contrast limits evaluation  of abdominal structures. Identified within the liver is a 2.5 cm  hypodensity concerning for a metastatic lesion. Mild perihepatic fluid  is identified. Cholelithiasis is present. Motion artifact limits  evaluation of fine detail for the small bowel and organs within the  abdomen. The pancreas is not demonstrated acute abnormality on this  unenhanced motion limited exam. The spleen is unremarkable. No adrenal  mass is identified. Scattered enlarged retroperitoneal lymph nodes  measuring up to 8 mm are identified concerning for possible metastatic  involvement of the retroperitoneum. No free fluid or free air is  appreciated within the abdomen. Small bowel is nondilated no evidence of  bowel obstruction. Mildly infiltrated diffuse mesenteric inflammation  identified which may relate to mesenteric radiculitis. Sigmoid colon  diverticulosis without convincing evidence of diverticulitis, although  the sigmoid colon is thickened. Trace free fluid is noted in the pelvis.     There is redemonstration of pathologic destructive fracture involving L1  with mild posterior retropulsion, similar to the lumbar spine CT  performed 07/30/2019. The remainder of the vertebral bodies appear to  maintain height. Multilevel disc degeneration and posterior  disc  osteophyte complexes are suggested involving the lumbar spine.  Multifocal mottled appearance of the bony pelvis identified suggestive  of multifocal possible small lytic metastases particularly involving the  left iliac crest, similar to prior.       Impression:       1. Extensive multifocal osseous metastatic disease with associated  severe osseous destruction involving the right anterior first and second  ribs, left posterior-lateral fifth, sixth, seventh, and eighth rib  fractures, inferior sternum, and numerous multifocal lower cervical and  upper thoracic destructive lesions. Overall lack of intravenous contrast  limits evaluation for underlying soft tissue, although underlying soft  tissue abnormalities are present at all of these destructive sites with  soft tissue extension into the anterior mediastinum at the sternum, soft  tissue lesions associated with the destructive rib lesions, and likely  soft tissue lesions present involving the cervical and thoracic spine  pathologic fractures and destructive osseous changes. Evaluation of  spinal canal compromise is not evaluated on this study secondary to lack  of intravenous contrast, although these findings appear similar to  slightly worsened when compared to the CT of the thoracic spine  performed 07/30/2019. Follow-up MRI would be more definitive,  particularly for spinal canal compromise if clinically warranted.     2. Soft tissue lesions involving the right breast which may relate to  the site of primary carcinoma.     3. Moderate bilateral pleural effusions identified with associated  multifocal airspace disease throughout the lungs as described above.     4. Hypoattenuated right 2.5 cm hepatic lobe lesion concerning for  metastatic focus with additional prominent mesenteric and  retroperitoneal lymph nodes which also could relate to sites of  metastatic disease.     5. Nonspecific diffuse mesenteric infiltration with sigmoid  diverticulosis with  associated distal sigmoid wall thickening and  pericolonic fluid suggestive of possible underlying sigmoid  diverticulitis.     D:  08/09/2019  E:  08/09/2019     This report was finalized on 8/13/2019 8:00 AM by Dr. Sandip Camacho MD.       CT Chest Without Contrast [[id number]] Collected:  08/09/19 1413     Updated:  08/13/19 0803    Narrative:       EXAMINATION: CT CHEST WO CONTRAST-, CT ABDOMEN PELVIS WO CONTRAST-  08/09/2019      INDICATION: Chest pain, shortness of air, stage IV breast cancer, hx  pathologic fractures     TECHNIQUE: Unenhanced CT imaging of the chest, abdomen, and pelvis was  obtained. Additional coronal and sagittal reformatted images were  obtained for review.     The radiation dose reduction device was turned on for each scan per the  ALARA (As Low as Reasonably Achievable) protocol.     COMPARISON: CT of the thoracic and lumbar spine 07/30/2019, CT of the  chest 09/12/2017 and CT abdomen 06/19/2017.     FINDINGS:      CHEST: Multiple abnormalities are identified throughout the chest and  osseous structures of the chest. For example there is osseous erosive  changes of the right first and distal right second rib with associated  soft tissue abnormality measuring 3.0 x 4.1 cm similar when compared to  the CT of the thoracic spine performed 07/30/2019. Extensive soft tissue  abnormality identified within the anterior right chest wall adjacent to  this. Identified within the right breast is a 3.1 x 1.4 cm soft tissue  mass with associated skin retraction which may be the site of the  reported breast carcinoma.  Additionally more inferiorly there is a soft  tissue subcutaneous nodule. Additional abnormal osseous destruction  identified involving the posterior fifth, sixth, seventh, and eighth  ribs posterior and laterally concerning for metastatic lesions. Lack of  intravenous contrast limits evaluation for underlying soft tissue  lesion, however one is suspected within this region.  Particularly at  (series 1/image 9) is a possible soft tissue focus measuring 5.3 cm.  Extensive multifocal osseous metastatic disease throughout the  visualized cervical, thoracic, and lumbar spine are identified.  For  example there are numerous lytic lesions throughout the lower cervical  spine as well as the upper thoracic spine with multilevel compression  fractures most pronounced at T1, T2, and T4 which appears similar to the  thoracic spine CT performed 07/30/2019. Likely underlying soft tissue is  suggested with these, although given the patient's positioning and lack  of contrast there is very limited evaluation for soft tissue within  these fractures. Additional extensive osseous destructive changes with  fracture identified involving the approximate T5 vertebral process T6  and T7 vertebral bodies with some degree of posterior retropulsion and  possible soft tissue compromise of the spinal canal at these levels,  although lack of contrast limits evaluation. Again identified at T5 is  marked kyphosis, similar to prior. Severe L1 fracture identified with  osseous destructive changes and mild retropulsion, similar to prior.      Dedicated CT imaging of the lungs demonstrates focal airspace  disease/mass within the right lung apex adjacent to the osseous  destructive rib changes which may relate to post radiation changes,  although underlying soft tissue abnormality is likely present. Moderate  to large bilateral pleural effusions are identified. Bilateral lower  lobe airspace disease is noted with more focal consolidative changes  within the right mid lung with air bronchograms and a calcification. The  heart is not enlarged. Extensive sternal osseous metastatic disease with  extensive destruction is identified involving the inferior aspect of the  sternum below the manubrium with possible anterior mediastinal  extension. This is similar to the CT of the thoracic spine performed  07/30/2019.      ABDOMEN/PELVIS: Lack of intravenous and oral contrast limits evaluation  of abdominal structures. Identified within the liver is a 2.5 cm  hypodensity concerning for a metastatic lesion. Mild perihepatic fluid  is identified. Cholelithiasis is present. Motion artifact limits  evaluation of fine detail for the small bowel and organs within the  abdomen. The pancreas is not demonstrated acute abnormality on this  unenhanced motion limited exam. The spleen is unremarkable. No adrenal  mass is identified. Scattered enlarged retroperitoneal lymph nodes  measuring up to 8 mm are identified concerning for possible metastatic  involvement of the retroperitoneum. No free fluid or free air is  appreciated within the abdomen. Small bowel is nondilated no evidence of  bowel obstruction. Mildly infiltrated diffuse mesenteric inflammation  identified which may relate to mesenteric radiculitis. Sigmoid colon  diverticulosis without convincing evidence of diverticulitis, although  the sigmoid colon is thickened. Trace free fluid is noted in the pelvis.     There is redemonstration of pathologic destructive fracture involving L1  with mild posterior retropulsion, similar to the lumbar spine CT  performed 07/30/2019. The remainder of the vertebral bodies appear to  maintain height. Multilevel disc degeneration and posterior disc  osteophyte complexes are suggested involving the lumbar spine.  Multifocal mottled appearance of the bony pelvis identified suggestive  of multifocal possible small lytic metastases particularly involving the  left iliac crest, similar to prior.       Impression:       1. Extensive multifocal osseous metastatic disease with associated  severe osseous destruction involving the right anterior first and second  ribs, left posterior-lateral fifth, sixth, seventh, and eighth rib  fractures, inferior sternum, and numerous multifocal lower cervical and  upper thoracic destructive lesions. Overall lack of  intravenous contrast  limits evaluation for underlying soft tissue, although underlying soft  tissue abnormalities are present at all of these destructive sites with  soft tissue extension into the anterior mediastinum at the sternum, soft  tissue lesions associated with the destructive rib lesions, and likely  soft tissue lesions present involving the cervical and thoracic spine  pathologic fractures and destructive osseous changes. Evaluation of  spinal canal compromise is not evaluated on this study secondary to lack  of intravenous contrast, although these findings appear similar to  slightly worsened when compared to the CT of the thoracic spine  performed 07/30/2019. Follow-up MRI would be more definitive,  particularly for spinal canal compromise if clinically warranted.     2. Soft tissue lesions involving the right breast which may relate to  the site of primary carcinoma.     3. Moderate bilateral pleural effusions identified with associated  multifocal airspace disease throughout the lungs as described above.     4. Hypoattenuated right 2.5 cm hepatic lobe lesion concerning for  metastatic focus with additional prominent mesenteric and  retroperitoneal lymph nodes which also could relate to sites of  metastatic disease.     5. Nonspecific diffuse mesenteric infiltration with sigmoid  diverticulosis with associated distal sigmoid wall thickening and  pericolonic fluid suggestive of possible underlying sigmoid  diverticulitis.     D:  08/09/2019  E:  08/09/2019     This report was finalized on 8/13/2019 8:00 AM by Dr. Sandip Camacho MD.             Results for orders placed during the hospital encounter of 06/16/17   Adult Transthoracic Echo Complete    Narrative · Left ventricular systolic function is normal. Estimated EF = 70%.  · Left ventricular diastolic dysfunction (grade I) consistent with   impaired relaxation.  · The cardiac valves are normal.          I have reviewed the medications:  Scheduled  Meds:    cyclobenzaprine 5 mg Oral Q8H   fentaNYL 1 patch Transdermal Q72H   ipratropium-albuterol 3 mL Nebulization 4x Daily - RT   sodium chloride 250 mL Intravenous Once   sodium chloride 3 mL Intravenous Q12H     Continuous Infusions:    hold 1 each     PRN Meds:.•  albuterol  •  calcium carbonate  •  glycopyrrolate  •  hold  •  HYDROmorphone  •  lactulose  •  LORazepam  •  Morphine  •  ondansetron ODT **OR** ondansetron  •  sodium chloride  •  sodium chloride      Assessment/Plan   Assessment / Plan     Active Hospital Problems    Diagnosis  POA   • **Widespread metastatic malignant neoplastic disease (CMS/HCC) [C80.0]  Yes   • Pleural effusion [J90]  Yes   • Lymphedema [I89.0]  Unknown   • Intractable pain [R52]  Unknown   • Shortness of breath [R06.02]  Unknown   • Acute-on-chronic kidney injury (CMS/HCC) [N17.9, N18.9]  Unknown   • Edema of both lower extremities [R60.0]  Unknown   • Chronic respiratory failure with hypoxia, on home oxygen therapy (CMS/HCC) [J96.11, Z99.81]  Not Applicable   • Fall [W19.XXXA]  Yes   • JACQUI (generalized anxiety disorder) [F41.1]  Yes      Resolved Hospital Problems   No resolved problems to display.        Brief Hospital Course to date:  Brianna Urrutia is a 69 y.o. female debilitated from widely metastatic breast cancer, recently on hospice at home, here for progressive chest and back pain and worsening shortness of air.  She wears 2 LNC oxygen at baseline.      Acute resp failure  Actively dying.  - given overall picture and now with hypercapnic resp failure and bilat reaccumulation of effusions, situation is grave  - discussed with brother Arnoldo by phone.  He is understanding and agrees with my recommendation for full comfort focus.  He will notify the patient's son.   - adjust meds for iv administration of morphine, ativan.  - continue oxygen prn for comfort.       Bone mets, bilat malignant effusion    YOLETTE 1.4, now .8.  Resolved.   - hydrate gently    DVT Prophylaxis:  [facility]    Disposition: I expect the patient to  today.    CODE STATUS:   Code Status and Medical Interventions:   Ordered at: 19 1830     Level Of Support Discussed With:    Patient     Code Status:    No CPR     Medical Interventions (Level of Support Prior to Arrest):    Comfort Measures         Electronically signed by Tran Brizuela MD, 19, 7:59 AM.        Electronically signed by Tran Brizuela MD at 2019  8:06 AM     Rayna Espionsa APRN at 2019  9:07 AM          Palliative Care Consult Note    Patient Name: Brianna Urrutia   : 1950  Sex: female    Date of Admission: 2019    Subjective:  69 y.o. Female with extensive breast cancer. Admitted on  with increased soa/uncontrolled pain. Left thoracentesis on 08/10; right thoracentesis .  Yesterday she was very sleepy throughout the day; Fentanyl increased on  to 50 mcg q 72 hrs, PRN Valium 5 mg added for muscle spasms/high anxiety. Valium was decreased to  2 mg q 6 hrs d/t increased sedation.       Plan to repeat f/u CXR tomorrow and then back to [facility], patient continues to refuse hospice care although she is very much appropriate at this time. Palliative can continue to follow at [facility]. Pt has verbally identified son Dominick Phillip,  as HCS during visit yesterday and may need to default to him for hospice decision as condition declines.       Per nurse report today she continues to be somnolent.  Per chart review she received PRN Valium 2 mg @ 0108, 0702; Dilaudid 0.5 mg IV at 0341, 0703; Dilaudid 2 mg po @ 0108, 0540. I have DCd Valium today, she does have Flexoril PRN for muscle spasms.     On visit today pt is very restless, folded f orward in bed with head laying on legs, we did reposition her onto her side but she immediately returned to this position.  Per report of staff RN Jazzy, the most comfortable she has been in past  24 hrs was yesterday afternoon post po dilaudid when she  "was alert, cooperative and able to get OOB with assist.  She contiues with RUE edema, mottling has diminished but still present on bilateral knees.      Review of Systems   Unable to perform ROS: Mental status change     Reviewed current scheduled and prn medications for route, type, dose and frequency.    hold 1 each    sodium chloride 75 mL/hr Last Rate: 75 mL/hr (08/12/19 1622)     •  acetaminophen  •  acetaminophen  •  albuterol  •  bisacodyl  •  bisacodyl  •  calcium carbonate  •  cyclobenzaprine  •  diazePAM  •  hold  •  HYDROmorphone **AND** naloxone  •  HYDROmorphone  •  lactulose  •  ondansetron ODT **OR** ondansetron  •  sodium chloride  •  sodium chloride    Objective:   /91 (BP Location: Right arm, Patient Position: Lying)   Pulse 99   Temp 98.3 °F (36.8 °C) (Oral)   Resp 20   Ht 157.5 cm (62\")   Wt 50.6 kg (111 lb 8 oz)   SpO2 92%   BMI 20.39 kg/m²     Intake & Output (last day)       08/12 0701 - 08/13 0700 08/13 0701 - 08/14 0700    P.O. 520     IV Piggyback      Total Intake(mL/kg) 520 (10.3)     Urine (mL/kg/hr) 1025 (0.8)     Total Output 1025     Net -505               Lab Results (last 24 hours)     Procedure Component Value Units Date/Time    Basic Metabolic Panel [[id number]]  (Abnormal) Collected:  08/13/19 0525    Specimen:  Blood Updated:  08/13/19 0639     Glucose 98 mg/dL      BUN 17 mg/dL      Creatinine 0.84 mg/dL      Sodium 145 mmol/L      Potassium 4.2 mmol/L      Chloride 110 mmol/L      CO2 25.0 mmol/L      Calcium 9.1 mg/dL      eGFR Non African Amer 68 mL/min/1.73      BUN/Creatinine Ratio 20.2     Anion Gap 10.0 mmol/L     Narrative:       GFR Normal >60  Chronic Kidney Disease <60  Kidney Failure <15    Body Fluid Culture - Body Fluid, Pleural Cavity [[id number]] Collected:  08/10/19 1300    Specimen:  Body Fluid from Pleural Cavity Updated:  08/13/19 0617     Body Fluid Culture No growth at 3 days     Gram Stain No WBCs or organisms seen    Basic Metabolic Panel " [[id number]]  (Abnormal) Collected:  08/12/19 1116    Specimen:  Blood Updated:  08/12/19 1219     Glucose 103 mg/dL      BUN 23 mg/dL      Creatinine 0.80 mg/dL      Sodium 143 mmol/L      Potassium 4.0 mmol/L      Chloride 108 mmol/L      CO2 26.0 mmol/L      Calcium 8.6 mg/dL      eGFR Non African Amer 72 mL/min/1.73      BUN/Creatinine Ratio 28.8     Anion Gap 9.0 mmol/L     Narrative:       GFR Normal >60  Chronic Kidney Disease <60  Kidney Failure <15    CBC (No Diff) [[id number]]  (Abnormal) Collected:  08/12/19 1116    Specimen:  Blood Updated:  08/12/19 1152     WBC 7.80 10*3/mm3      RBC 3.81 10*6/mm3      Hemoglobin 11.9 g/dL      Hematocrit 38.6 %      .3 fL      MCH 31.2 pg      MCHC 30.8 g/dL      RDW 17.5 %      RDW-SD 65.4 fl      MPV 10.5 fL      Platelets 194 10*3/mm3     Blood Culture - Blood, Arm, Left [[id number]] Collected:  08/09/19 1028    Specimen:  Blood from Arm, Left Updated:  08/12/19 1131     Blood Culture No growth at 3 days    Blood Culture - Blood, Arm, Left [[id number]] Collected:  08/09/19 1028    Specimen:  Blood from Arm, Left Updated:  08/12/19 1131     Blood Culture No growth at 3 days        Imaging Results (last 24 hours)     Procedure Component Value Units Date/Time    CT Abdomen Pelvis Without Contrast [[id number]] Collected:  08/09/19 1413     Updated:  08/13/19 0803    Narrative:       EXAMINATION: CT CHEST WO CONTRAST-, CT ABDOMEN PELVIS WO CONTRAST-  08/09/2019      INDICATION: Chest pain, shortness of air, stage IV breast cancer, hx  pathologic fractures     TECHNIQUE: Unenhanced CT imaging of the chest, abdomen, and pelvis was  obtained. Additional coronal and sagittal reformatted images were  obtained for review.     The radiation dose reduction device was turned on for each scan per the  ALARA (As Low as Reasonably Achievable) protocol.     COMPARISON: CT of the thoracic and lumbar spine 07/30/2019, CT of the  chest 09/12/2017 and CT abdomen 06/19/2017.      FINDINGS:      CHEST: Multiple abnormalities are identified throughout the chest and  osseous structures of the chest. For example there is osseous erosive  changes of the right first and distal right second rib with associated  soft tissue abnormality measuring 3.0 x 4.1 cm similar when compared to  the CT of the thoracic spine performed 07/30/2019. Extensive soft tissue  abnormality identified within the anterior right chest wall adjacent to  this. Identified within the right breast is a 3.1 x 1.4 cm soft tissue  mass with associated skin retraction which may be the site of the  reported breast carcinoma.  Additionally more inferiorly there is a soft  tissue subcutaneous nodule. Additional abnormal osseous destruction  identified involving the posterior fifth, sixth, seventh, and eighth  ribs posterior and laterally concerning for metastatic lesions. Lack of  intravenous contrast limits evaluation for underlying soft tissue  lesion, however one is suspected within this region. Particularly at  (series 1/image 9) is a possible soft tissue focus measuring 5.3 cm.  Extensive multifocal osseous metastatic disease throughout the  visualized cervical, thoracic, and lumbar spine are identified.  For  example there are numerous lytic lesions throughout the lower cervical  spine as well as the upper thoracic spine with multilevel compression  fractures most pronounced at T1, T2, and T4 which appears similar to the  thoracic spine CT performed 07/30/2019. Likely underlying soft tissue is  suggested with these, although given the patient's positioning and lack  of contrast there is very limited evaluation for soft tissue within  these fractures. Additional extensive osseous destructive changes with  fracture identified involving the approximate T5 vertebral process T6  and T7 vertebral bodies with some degree of posterior retropulsion and  possible soft tissue compromise of the spinal canal at these levels,  although lack  of contrast limits evaluation. Again identified at T5 is  marked kyphosis, similar to prior. Severe L1 fracture identified with  osseous destructive changes and mild retropulsion, similar to prior.      Dedicated CT imaging of the lungs demonstrates focal airspace  disease/mass within the right lung apex adjacent to the osseous  destructive rib changes which may relate to post radiation changes,  although underlying soft tissue abnormality is likely present. Moderate  to large bilateral pleural effusions are identified. Bilateral lower  lobe airspace disease is noted with more focal consolidative changes  within the right mid lung with air bronchograms and a calcification. The  heart is not enlarged. Extensive sternal osseous metastatic disease with  extensive destruction is identified involving the inferior aspect of the  sternum below the manubrium with possible anterior mediastinal  extension. This is similar to the CT of the thoracic spine performed  07/30/2019.     ABDOMEN/PELVIS: Lack of intravenous and oral contrast limits evaluation  of abdominal structures. Identified within the liver is a 2.5 cm  hypodensity concerning for a metastatic lesion. Mild perihepatic fluid  is identified. Cholelithiasis is present. Motion artifact limits  evaluation of fine detail for the small bowel and organs within the  abdomen. The pancreas is not demonstrated acute abnormality on this  unenhanced motion limited exam. The spleen is unremarkable. No adrenal  mass is identified. Scattered enlarged retroperitoneal lymph nodes  measuring up to 8 mm are identified concerning for possible metastatic  involvement of the retroperitoneum. No free fluid or free air is  appreciated within the abdomen. Small bowel is nondilated no evidence of  bowel obstruction. Mildly infiltrated diffuse mesenteric inflammation  identified which may relate to mesenteric radiculitis. Sigmoid colon  diverticulosis without convincing evidence of  diverticulitis, although  the sigmoid colon is thickened. Trace free fluid is noted in the pelvis.     There is redemonstration of pathologic destructive fracture involving L1  with mild posterior retropulsion, similar to the lumbar spine CT  performed 07/30/2019. The remainder of the vertebral bodies appear to  maintain height. Multilevel disc degeneration and posterior disc  osteophyte complexes are suggested involving the lumbar spine.  Multifocal mottled appearance of the bony pelvis identified suggestive  of multifocal possible small lytic metastases particularly involving the  left iliac crest, similar to prior.       Impression:       1. Extensive multifocal osseous metastatic disease with associated  severe osseous destruction involving the right anterior first and second  ribs, left posterior-lateral fifth, sixth, seventh, and eighth rib  fractures, inferior sternum, and numerous multifocal lower cervical and  upper thoracic destructive lesions. Overall lack of intravenous contrast  limits evaluation for underlying soft tissue, although underlying soft  tissue abnormalities are present at all of these destructive sites with  soft tissue extension into the anterior mediastinum at the sternum, soft  tissue lesions associated with the destructive rib lesions, and likely  soft tissue lesions present involving the cervical and thoracic spine  pathologic fractures and destructive osseous changes. Evaluation of  spinal canal compromise is not evaluated on this study secondary to lack  of intravenous contrast, although these findings appear similar to  slightly worsened when compared to the CT of the thoracic spine  performed 07/30/2019. Follow-up MRI would be more definitive,  particularly for spinal canal compromise if clinically warranted.     2. Soft tissue lesions involving the right breast which may relate to  the site of primary carcinoma.     3. Moderate bilateral pleural effusions identified with  associated  multifocal airspace disease throughout the lungs as described above.     4. Hypoattenuated right 2.5 cm hepatic lobe lesion concerning for  metastatic focus with additional prominent mesenteric and  retroperitoneal lymph nodes which also could relate to sites of  metastatic disease.     5. Nonspecific diffuse mesenteric infiltration with sigmoid  diverticulosis with associated distal sigmoid wall thickening and  pericolonic fluid suggestive of possible underlying sigmoid  diverticulitis.     D:  08/09/2019  E:  08/09/2019     This report was finalized on 8/13/2019 8:00 AM by Dr. Sandip Camacho MD.       CT Chest Without Contrast [[id number]] Collected:  08/09/19 1413     Updated:  08/13/19 0803    Narrative:       EXAMINATION: CT CHEST WO CONTRAST-, CT ABDOMEN PELVIS WO CONTRAST-  08/09/2019      INDICATION: Chest pain, shortness of air, stage IV breast cancer, hx  pathologic fractures     TECHNIQUE: Unenhanced CT imaging of the chest, abdomen, and pelvis was  obtained. Additional coronal and sagittal reformatted images were  obtained for review.     The radiation dose reduction device was turned on for each scan per the  ALARA (As Low as Reasonably Achievable) protocol.     COMPARISON: CT of the thoracic and lumbar spine 07/30/2019, CT of the  chest 09/12/2017 and CT abdomen 06/19/2017.     FINDINGS:      CHEST: Multiple abnormalities are identified throughout the chest and  osseous structures of the chest. For example there is osseous erosive  changes of the right first and distal right second rib with associated  soft tissue abnormality measuring 3.0 x 4.1 cm similar when compared to  the CT of the thoracic spine performed 07/30/2019. Extensive soft tissue  abnormality identified within the anterior right chest wall adjacent to  this. Identified within the right breast is a 3.1 x 1.4 cm soft tissue  mass with associated skin retraction which may be the site of the  reported breast carcinoma.   Additionally more inferiorly there is a soft  tissue subcutaneous nodule. Additional abnormal osseous destruction  identified involving the posterior fifth, sixth, seventh, and eighth  ribs posterior and laterally concerning for metastatic lesions. Lack of  intravenous contrast limits evaluation for underlying soft tissue  lesion, however one is suspected within this region. Particularly at  (series 1/image 9) is a possible soft tissue focus measuring 5.3 cm.  Extensive multifocal osseous metastatic disease throughout the  visualized cervical, thoracic, and lumbar spine are identified.  For  example there are numerous lytic lesions throughout the lower cervical  spine as well as the upper thoracic spine with multilevel compression  fractures most pronounced at T1, T2, and T4 which appears similar to the  thoracic spine CT performed 07/30/2019. Likely underlying soft tissue is  suggested with these, although given the patient's positioning and lack  of contrast there is very limited evaluation for soft tissue within  these fractures. Additional extensive osseous destructive changes with  fracture identified involving the approximate T5 vertebral process T6  and T7 vertebral bodies with some degree of posterior retropulsion and  possible soft tissue compromise of the spinal canal at these levels,  although lack of contrast limits evaluation. Again identified at T5 is  marked kyphosis, similar to prior. Severe L1 fracture identified with  osseous destructive changes and mild retropulsion, similar to prior.      Dedicated CT imaging of the lungs demonstrates focal airspace  disease/mass within the right lung apex adjacent to the osseous  destructive rib changes which may relate to post radiation changes,  although underlying soft tissue abnormality is likely present. Moderate  to large bilateral pleural effusions are identified. Bilateral lower  lobe airspace disease is noted with more focal consolidative  changes  within the right mid lung with air bronchograms and a calcification. The  heart is not enlarged. Extensive sternal osseous metastatic disease with  extensive destruction is identified involving the inferior aspect of the  sternum below the manubrium with possible anterior mediastinal  extension. This is similar to the CT of the thoracic spine performed  07/30/2019.     ABDOMEN/PELVIS: Lack of intravenous and oral contrast limits evaluation  of abdominal structures. Identified within the liver is a 2.5 cm  hypodensity concerning for a metastatic lesion. Mild perihepatic fluid  is identified. Cholelithiasis is present. Motion artifact limits  evaluation of fine detail for the small bowel and organs within the  abdomen. The pancreas is not demonstrated acute abnormality on this  unenhanced motion limited exam. The spleen is unremarkable. No adrenal  mass is identified. Scattered enlarged retroperitoneal lymph nodes  measuring up to 8 mm are identified concerning for possible metastatic  involvement of the retroperitoneum. No free fluid or free air is  appreciated within the abdomen. Small bowel is nondilated no evidence of  bowel obstruction. Mildly infiltrated diffuse mesenteric inflammation  identified which may relate to mesenteric radiculitis. Sigmoid colon  diverticulosis without convincing evidence of diverticulitis, although  the sigmoid colon is thickened. Trace free fluid is noted in the pelvis.     There is redemonstration of pathologic destructive fracture involving L1  with mild posterior retropulsion, similar to the lumbar spine CT  performed 07/30/2019. The remainder of the vertebral bodies appear to  maintain height. Multilevel disc degeneration and posterior disc  osteophyte complexes are suggested involving the lumbar spine.  Multifocal mottled appearance of the bony pelvis identified suggestive  of multifocal possible small lytic metastases particularly involving the  left iliac crest,  similar to prior.       Impression:       1. Extensive multifocal osseous metastatic disease with associated  severe osseous destruction involving the right anterior first and second  ribs, left posterior-lateral fifth, sixth, seventh, and eighth rib  fractures, inferior sternum, and numerous multifocal lower cervical and  upper thoracic destructive lesions. Overall lack of intravenous contrast  limits evaluation for underlying soft tissue, although underlying soft  tissue abnormalities are present at all of these destructive sites with  soft tissue extension into the anterior mediastinum at the sternum, soft  tissue lesions associated with the destructive rib lesions, and likely  soft tissue lesions present involving the cervical and thoracic spine  pathologic fractures and destructive osseous changes. Evaluation of  spinal canal compromise is not evaluated on this study secondary to lack  of intravenous contrast, although these findings appear similar to  slightly worsened when compared to the CT of the thoracic spine  performed 07/30/2019. Follow-up MRI would be more definitive,  particularly for spinal canal compromise if clinically warranted.     2. Soft tissue lesions involving the right breast which may relate to  the site of primary carcinoma.     3. Moderate bilateral pleural effusions identified with associated  multifocal airspace disease throughout the lungs as described above.     4. Hypoattenuated right 2.5 cm hepatic lobe lesion concerning for  metastatic focus with additional prominent mesenteric and  retroperitoneal lymph nodes which also could relate to sites of  metastatic disease.     5. Nonspecific diffuse mesenteric infiltration with sigmoid  diverticulosis with associated distal sigmoid wall thickening and  pericolonic fluid suggestive of possible underlying sigmoid  diverticulitis.     D:  08/09/2019  E:  08/09/2019     This report was finalized on 8/13/2019 8:00 AM by Dr. Oropeza  MD Doug.       XR Chest 1 View [[id number]] Collected:  08/12/19 0842     Updated:  08/12/19 1742    Narrative:       EXAMINATION: XR CHEST 1 VW- 08/12/2019      INDICATION: C80.0-Disseminated malignant neoplasm, unspecified;  M84.48XA-Pathological fracture, other site, initial encounter for  fracture; M89.8X9-Other specified disorders of bone, unspecified site;  C79.51-Secondary malignant neoplasm of bone; W19.XXXA-Unspecified fall,  initial encounter; N05-Axblcue effusion, not elsewhere classified;  E86.0-Dehydration; N17.9-Acute kidney failure      COMPARISON: 08/11/2019     FINDINGS: The heart is at the upper limits of normal. There is bibasilar  airspace disease and there are bilateral pleural effusions.           Impression:       Bibasilar airspace disease and effusions, insignificantly  changed when compared with 08/11/2019.     D:  08/12/2019  E:  08/12/2019     This report was finalized on 8/12/2019 5:39 PM by Dr. Peter Mcpherson MD.       CT Guided Thoracentesis [[id number]] Collected:  08/11/19 1449     Updated:  08/12/19 1648    Narrative:       EXAMINATION: CT-GUIDED THORACENTESIS-08/11/2019:      INDICATION: Dyspnea. Left side done today with partial improvement.;  C80.0-Disseminated malignant neoplasm, unspecified;  M84.48XA-Pathological fracture, other site, initial encounter for  fracture; M89.8X9-Other specified disorders of bone, unspecified site;  C79.51-Secondary malignant neoplasm of bone; W19.XXXA-Unspecified fall,  initial encounter; V29-[place] effusion, not elsewhere classified;  E86.0-Dehydration.      TECHNIQUE: Limited helical CT scan of the chest without intravenous  contrast.     The radiation dose reduction device was turned on for each scan per the  ALARA (As Low as Reasonably Achievable) protocol.     COMPARISON: CT one day prior.     FINDINGS: Small-to-moderate right pleural effusion with adjacent  atelectasis.     Patient referred for CT-guided thoracentesis. Informed consent  obtained  and signed. The patient was placed in right anterior oblique positioning  for examination and procedure. The patient was prepped and draped in  typical sterile fashion. 1% lidocaine utilized for local anesthetic of  the right lateral chest soft tissues with anesthetic provided to the  level of the pleural surface. Under meticulous CT guidance, an  8.5-Citizen of [place] curved catheter was placed into the right pleural fluid  collection with confirmation of location upon imaging and with fluid  return. Subsequent 720 mL of fluid drained. Catheter withdrawn. The  patient tolerated the procedure well without complication.       Impression:       Satisfactory CT-guided right thoracentesis.     D:  08/11/2019  E:  08/12/2019           This report was finalized on 8/12/2019 4:45 PM by Dr. Carlos Brunson.       XR Chest 1 View [[id number]] Collected:  08/11/19 1003     Updated:  08/12/19 1648    Narrative:       EXAMINATION: XR CHEST 1 VW-08/11/2019:      INDICATION: Assess right pleural effusion; C80.0-Disseminated malignant  neoplasm, unspecified; M84.48XA-Pathological fracture, other site,  initial encounter for fracture; M89.8X9-Other specified disorders of  bone, unspecified site; C79.51-Secondary malignant neoplasm of bone;  W19.XXXA-Unspecified fall, initial encounter; A06-Evftvca effusion, not  elsewhere classified; E86.0-Dehydration; N17.9-Acute kidney failure.      COMPARISON: Chest x-ray 08/09/2019.     FINDINGS: Increase in right pleural effusion, now moderate-to-large  volume with adjacent atelectasis and/or opacifications. Left pleural  effusion is decreased from prior with left basilar atelectasis and/or  airspace disease status post thoracentesis on the left. Cardiac  silhouette largely obscured.           Impression:       Increasing right pleural effusion now moderate-to-large  volume. Interval decrease in left pleural effusion status post recent  thoracentesis. Bibasilar opacifications adjacent to these  pleural  effusions of atelectasis versus airspace disease.     D:  08/11/2019  E:  08/12/2019     This report was finalized on 8/12/2019 4:45 PM by Dr. Carlos Brunson.       CT Guided Thoracentesis [[id number]] Collected:  08/10/19 1244     Updated:  08/12/19 1026    Narrative:       EXAMINATION: CT GUIDED THORACENTESIS-      INDICATION: Pleural effusion, suspect malignant; C80.0-Disseminated  malignant neoplasm, unspecified; M84.48XA-Pathological fracture, other  site, initial encounter for fracture; M89.8X9-Other specified disorders  of bone, unspecified site; C79.51-Secondary malignant neoplasm of bone;  W19.XXXA-Unspecified fall, initial encounter; K87-[place] effusion, not  elsewhere classified; E86.0-Dehydration.     TECHNIQUE: Limited helical CT scan of the chest without intravenous  contrast.     The radiation dose reduction device was turned on for each scan per the  ALARA (As Low as Reasonably Achievable) protocol.     COMPARISON: CT chest 08/09/2019.     FINDINGS: Bilateral pleural effusions left greater than right with  moderate volume left effusion and adjacent atelectasis.     Patient referred for CT-guided thoracentesis. Informed consent obtained  and signed. Consent placed on patient's chart. Patient placed in left  anterior oblique positioning for examination and procedure. Patient  prepped and draped in typical sterile fashion. 1% lidocaine used for  local anesthetic of the left chest wall. Under meticulous CT guidance  8.5 [place] curve catheter placed into the left pleural fluid collection  with confirmation of location upon imaging and with fluid return upon  stylette removal. Subsequent 1 L of pleural fluid drained without  abnormality. Patient tolerated well without complication.       Impression:       Satisfactory CT-guided left thoracentesis.     D:  08/10/2019  E:  08/12/2019     This report was finalized on 8/12/2019 10:23 AM by Dr. Carlos Brunson.             PPS:   30% based on the following  measures:   Ambulation: Totally bed bound  Activity and Evidence of Disease: Unable to do any work, extensive evidence of disease  Self-Care: Total care  Intake: Reduced   LOC: Full, drowsy or confusion    Physical Exam  Constitutional:   Very frail, ill appearing, intermittent moaning    HENT:   Head: Normocephalic and atraumatic.   Eyes: Conjunctivae are normal.   Neck:   Hyperflexion forward    Cardiovascular: Normal rate, regular rhythm and intact distal pulses.   Pulmonary/Chest:   BBS =, < bibasilar with crackles. NCO2@4L,   Abdominal: Soft. Bowel sounds are normal.   Genitourinary:   Genitourinary Comments: deferred   Musculoskeletal:   Bedrest, very weak    Neurological:   Sleepy, confused    Skin: Skin is warm and dry. Capillary refill takes 2 to 3 seconds. There is pallor.   Psychiatric: She has a normal mood and affect.   Nursing note reviewed.       Widespread metastatic malignant neoplastic disease (CMS/HCC)    JACQUI (generalized anxiety disorder)    Fall    Pleural effusion    Lymphedema    Intractable pain    Shortness of breath    Acute-on-chronic kidney injury (CMS/HCC)    Edema of both lower extremities    Chronic respiratory failure with hypoxia, on home oxygen therapy (CMS/HCC)      Assessment/Plan: 69 y.o. Female with extensive breast cancer. Admitted on 08/09 with increased soa/uncontrolled pain. Left thoracentesis on 08/10; right thoracentesis 08/11. Continues on decadron 2 mg q day,  increased restlessness today, mottling bilateral knee Is less. Feet are slightly cool.      Pain: fentanyl 50 mcg q 72 hrs. Tylenol 500 mg q 4 hrs, flexiril 5 mg TID,  Dilaudid 0.5 mg q 2 hr PRN,   Dyspnea: duonebs QID,   Nausea/Vomiting: zofran 4 mg q 4 hr PRN   Anxiety/Agitation:  Confusion/Delerium: valium 5 mg q 6 hrs PRN,   Depression: cymbalta 30 mg q day,   Bowel/bladder: no BM since admit, colace 100 mg BID; bisacodyl 5 mg q day PRN,   Nutrition:  Sleep: mirtazapine 15 mg q HS  ADLs:  Terminal Fevers:  "  Termianl Secretions:    Other:   2) HTN: metoprolol 25 mg q 12 hrs,   3) GERD: protonix EC 40 mg q AM      I have DCd Valium today, DCd IV Dilaudid and scheduled po Dilaudid 2 mg q 8 hrs, continue q 4 hrs PRN. I have scheduled Flexoril q 8 hrs. Continue Fetanyl patch 50 mcg q 72 hrs.  Goal is to control pain/muscle spasms, maintain comfort and decrease somnolence.  I have started conversation with pt about trying hospice care again, she is agreeable to my continuing conversation this afternoon.       Educated patient/family about using palliative approach with advanced age and multiple chronic disease processes that cannot be reversed.     Total Visit Time:  45  Face to Face Time: 30    RATNA Montoya  [phone]  19  9:07 AM    Electronically signed by Rayna Espinosa APRN at 2019 10:57 AM     Tran Brizuela MD at 2019  8:54 AM              [facility] Medicine Services  PROGRESS NOTE    Patient Name: Brianna Urrutia  : 1950  MRN: [id number]    Date of Admission: 2019  Length of Stay: 4  Primary Care Physician: Christina Wdae, PADrakeC    Subjective   Subjective     CC:  Diffuse pain, dyspnea    HPI:  Lethargic this afternoon, but says breathing is \"better.\"  Otherwise HPI diff to obtain due to sedation.    Review of Systems  UTO Otherwise ROS is negative except as mentioned in the HPI.    Objective   Objective     Vital Signs:   Temp:  [97.7 °F (36.5 °C)-98.3 °F (36.8 °C)] 98.3 °F (36.8 °C)  Heart Rate:  [81-99] 99  Resp:  [17-20] 20  BP: ()/(64-92) 150/91        Physical Exam:  Gen:  Frail ill appearing woman, awake propped up in bed.  NAD, appears sedated.  Answers questions in a mumble that's hard to understand  Neuro: appears sedated, nonfocal  HEENT:  NC/AT PERRL, OP benign  Heart RRR no murmur, rub, or gallop  Lungs  Shallow breaths, nonlabored.  On NC ox.  Abd:  Sl firm, nontender, no rebound or guarding, pos BS  Extrem:  No " c/c/e    Results Reviewed:  I have personally reviewed current lab, radiology, and data and agree.    Results from last 7 days   Lab Units 08/12/19  1116 08/10/19  0809 08/09/19  1001   WBC 10*3/mm3 7.80 8.56 8.95   HEMOGLOBIN g/dL 11.9* 15.4 14.3   HEMATOCRIT % 38.6 48.5* 45.8   PLATELETS 10*3/mm3 194 311 275   INR   --  0.99  --      Results from last 7 days   Lab Units 08/13/19  0525 08/12/19  1116 08/10/19  0809 08/09/19  1926 08/09/19  1208 08/09/19  1001   SODIUM mmol/L 145 143 142  --   --  141   POTASSIUM mmol/L 4.2 4.0 4.6  --   --  4.1   CHLORIDE mmol/L 110* 108* 104  --   --  98   CO2 mmol/L 25.0 26.0 26.0  --   --  31.0*   BUN mg/dL 17 23 38*  --   --  48*   CREATININE mg/dL 0.84 0.80 1.26*  --   --  1.43*   GLUCOSE mg/dL 98 103* 119*  --   --  116*   CALCIUM mg/dL 9.1 8.6 9.6  --   --  9.5   ALT (SGPT) U/L  --   --   --   --   --  39*   AST (SGOT) U/L  --   --   --   --   --  72*   TROPONIN T ng/mL  --   --   --  0.025 <0.010 0.046*   PROBNP pg/mL  --   --  4,775.0*  --   --  1,817.0*     Estimated Creatinine Clearance: 51.9 mL/min (by C-G formula based on SCr of 0.84 mg/dL).    Microbiology Results Abnormal     Procedure Component Value - Date/Time    Body Fluid Culture - Body Fluid, Pleural Cavity [[id number]] Collected:  08/10/19 1300    Lab Status:  Final result Specimen:  Body Fluid from Pleural Cavity Updated:  08/13/19 0617     Body Fluid Culture No growth at 3 days     Gram Stain No WBCs or organisms seen    Blood Culture - Blood, Arm, Left [[id number]] Collected:  08/09/19 1028    Lab Status:  Preliminary result Specimen:  Blood from Arm, Left Updated:  08/12/19 1131     Blood Culture No growth at 3 days    Blood Culture - Blood, Arm, Left [[id number]] Collected:  08/09/19 1028    Lab Status:  Preliminary result Specimen:  Blood from Arm, Left Updated:  08/12/19 1131     Blood Culture No growth at 3 days          Imaging Results (last 24 hours)     Procedure Component Value Units Date/Time    CT  Abdomen Pelvis Without Contrast [[id number]] Collected:  08/09/19 1413     Updated:  08/13/19 0803    Narrative:       EXAMINATION: CT CHEST WO CONTRAST-, CT ABDOMEN PELVIS WO CONTRAST-  08/09/2019      INDICATION: Chest pain, shortness of air, stage IV breast cancer, hx  pathologic fractures     TECHNIQUE: Unenhanced CT imaging of the chest, abdomen, and pelvis was  obtained. Additional coronal and sagittal reformatted images were  obtained for review.     The radiation dose reduction device was turned on for each scan per the  ALARA (As Low as Reasonably Achievable) protocol.     COMPARISON: CT of the thoracic and lumbar spine 07/30/2019, CT of the  chest 09/12/2017 and CT abdomen 06/19/2017.     FINDINGS:      CHEST: Multiple abnormalities are identified throughout the chest and  osseous structures of the chest. For example there is osseous erosive  changes of the right first and distal right second rib with associated  soft tissue abnormality measuring 3.0 x 4.1 cm similar when compared to  the CT of the thoracic spine performed 07/30/2019. Extensive soft tissue  abnormality identified within the anterior right chest wall adjacent to  this. Identified within the right breast is a 3.1 x 1.4 cm soft tissue  mass with associated skin retraction which may be the site of the  reported breast carcinoma.  Additionally more inferiorly there is a soft  tissue subcutaneous nodule. Additional abnormal osseous destruction  identified involving the posterior fifth, sixth, seventh, and eighth  ribs posterior and laterally concerning for metastatic lesions. Lack of  intravenous contrast limits evaluation for underlying soft tissue  lesion, however one is suspected within this region. Particularly at  (series 1/image 9) is a possible soft tissue focus measuring 5.3 cm.  Extensive multifocal osseous metastatic disease throughout the  visualized cervical, thoracic, and lumbar spine are identified.  For  example there are numerous  lytic lesions throughout the lower cervical  spine as well as the upper thoracic spine with multilevel compression  fractures most pronounced at T1, T2, and T4 which appears similar to the  thoracic spine CT performed 07/30/2019. Likely underlying soft tissue is  suggested with these, although given the patient's positioning and lack  of contrast there is very limited evaluation for soft tissue within  these fractures. Additional extensive osseous destructive changes with  fracture identified involving the approximate T5 vertebral process T6  and T7 vertebral bodies with some degree of posterior retropulsion and  possible soft tissue compromise of the spinal canal at these levels,  although lack of contrast limits evaluation. Again identified at T5 is  marked kyphosis, similar to prior. Severe L1 fracture identified with  osseous destructive changes and mild retropulsion, similar to prior.      Dedicated CT imaging of the lungs demonstrates focal airspace  disease/mass within the right lung apex adjacent to the osseous  destructive rib changes which may relate to post radiation changes,  although underlying soft tissue abnormality is likely present. Moderate  to large bilateral pleural effusions are identified. Bilateral lower  lobe airspace disease is noted with more focal consolidative changes  within the right mid lung with air bronchograms and a calcification. The  heart is not enlarged. Extensive sternal osseous metastatic disease with  extensive destruction is identified involving the inferior aspect of the  sternum below the manubrium with possible anterior mediastinal  extension. This is similar to the CT of the thoracic spine performed  07/30/2019.     ABDOMEN/PELVIS: Lack of intravenous and oral contrast limits evaluation  of abdominal structures. Identified within the liver is a 2.5 cm  hypodensity concerning for a metastatic lesion. Mild perihepatic fluid  is identified. Cholelithiasis is present. Motion  artifact limits  evaluation of fine detail for the small bowel and organs within the  abdomen. The pancreas is not demonstrated acute abnormality on this  unenhanced motion limited exam. The spleen is unremarkable. No adrenal  mass is identified. Scattered enlarged retroperitoneal lymph nodes  measuring up to 8 mm are identified concerning for possible metastatic  involvement of the retroperitoneum. No free fluid or free air is  appreciated within the abdomen. Small bowel is nondilated no evidence of  bowel obstruction. Mildly infiltrated diffuse mesenteric inflammation  identified which may relate to mesenteric radiculitis. Sigmoid colon  diverticulosis without convincing evidence of diverticulitis, although  the sigmoid colon is thickened. Trace free fluid is noted in the pelvis.     There is redemonstration of pathologic destructive fracture involving L1  with mild posterior retropulsion, similar to the lumbar spine CT  performed 07/30/2019. The remainder of the vertebral bodies appear to  maintain height. Multilevel disc degeneration and posterior disc  osteophyte complexes are suggested involving the lumbar spine.  Multifocal mottled appearance of the bony pelvis identified suggestive  of multifocal possible small lytic metastases particularly involving the  left iliac crest, similar to prior.       Impression:       1. Extensive multifocal osseous metastatic disease with associated  severe osseous destruction involving the right anterior first and second  ribs, left posterior-lateral fifth, sixth, seventh, and eighth rib  fractures, inferior sternum, and numerous multifocal lower cervical and  upper thoracic destructive lesions. Overall lack of intravenous contrast  limits evaluation for underlying soft tissue, although underlying soft  tissue abnormalities are present at all of these destructive sites with  soft tissue extension into the anterior mediastinum at the sternum, soft  tissue lesions associated  with the destructive rib lesions, and likely  soft tissue lesions present involving the cervical and thoracic spine  pathologic fractures and destructive osseous changes. Evaluation of  spinal canal compromise is not evaluated on this study secondary to lack  of intravenous contrast, although these findings appear similar to  slightly worsened when compared to the CT of the thoracic spine  performed 07/30/2019. Follow-up MRI would be more definitive,  particularly for spinal canal compromise if clinically warranted.     2. Soft tissue lesions involving the right breast which may relate to  the site of primary carcinoma.     3. Moderate bilateral pleural effusions identified with associated  multifocal airspace disease throughout the lungs as described above.     4. Hypoattenuated right 2.5 cm hepatic lobe lesion concerning for  metastatic focus with additional prominent mesenteric and  retroperitoneal lymph nodes which also could relate to sites of  metastatic disease.     5. Nonspecific diffuse mesenteric infiltration with sigmoid  diverticulosis with associated distal sigmoid wall thickening and  pericolonic fluid suggestive of possible underlying sigmoid  diverticulitis.     D:  08/09/2019  E:  08/09/2019     This report was finalized on 8/13/2019 8:00 AM by Dr. Sandip Camacho MD.       CT Chest Without Contrast [[id number]] Collected:  08/09/19 1413     Updated:  08/13/19 0803    Narrative:       EXAMINATION: CT CHEST WO CONTRAST-, CT ABDOMEN PELVIS WO CONTRAST-  08/09/2019      INDICATION: Chest pain, shortness of air, stage IV breast cancer, hx  pathologic fractures     TECHNIQUE: Unenhanced CT imaging of the chest, abdomen, and pelvis was  obtained. Additional coronal and sagittal reformatted images were  obtained for review.     The radiation dose reduction device was turned on for each scan per the  ALARA (As Low as Reasonably Achievable) protocol.     COMPARISON: CT of the thoracic and lumbar spine  07/30/2019, CT of the  chest 09/12/2017 and CT abdomen 06/19/2017.     FINDINGS:      CHEST: Multiple abnormalities are identified throughout the chest and  osseous structures of the chest. For example there is osseous erosive  changes of the right first and distal right second rib with associated  soft tissue abnormality measuring 3.0 x 4.1 cm similar when compared to  the CT of the thoracic spine performed 07/30/2019. Extensive soft tissue  abnormality identified within the anterior right chest wall adjacent to  this. Identified within the right breast is a 3.1 x 1.4 cm soft tissue  mass with associated skin retraction which may be the site of the  reported breast carcinoma.  Additionally more inferiorly there is a soft  tissue subcutaneous nodule. Additional abnormal osseous destruction  identified involving the posterior fifth, sixth, seventh, and eighth  ribs posterior and laterally concerning for metastatic lesions. Lack of  intravenous contrast limits evaluation for underlying soft tissue  lesion, however one is suspected within this region. Particularly at  (series 1/image 9) is a possible soft tissue focus measuring 5.3 cm.  Extensive multifocal osseous metastatic disease throughout the  visualized cervical, thoracic, and lumbar spine are identified.  For  example there are numerous lytic lesions throughout the lower cervical  spine as well as the upper thoracic spine with multilevel compression  fractures most pronounced at T1, T2, and T4 which appears similar to the  thoracic spine CT performed 07/30/2019. Likely underlying soft tissue is  suggested with these, although given the patient's positioning and lack  of contrast there is very limited evaluation for soft tissue within  these fractures. Additional extensive osseous destructive changes with  fracture identified involving the approximate T5 vertebral process T6  and T7 vertebral bodies with some degree of posterior retropulsion and  possible soft  tissue compromise of the spinal canal at these levels,  although lack of contrast limits evaluation. Again identified at T5 is  marked kyphosis, similar to prior. Severe L1 fracture identified with  osseous destructive changes and mild retropulsion, similar to prior.      Dedicated CT imaging of the lungs demonstrates focal airspace  disease/mass within the right lung apex adjacent to the osseous  destructive rib changes which may relate to post radiation changes,  although underlying soft tissue abnormality is likely present. Moderate  to large bilateral pleural effusions are identified. Bilateral lower  lobe airspace disease is noted with more focal consolidative changes  within the right mid lung with air bronchograms and a calcification. The  heart is not enlarged. Extensive sternal osseous metastatic disease with  extensive destruction is identified involving the inferior aspect of the  sternum below the manubrium with possible anterior mediastinal  extension. This is similar to the CT of the thoracic spine performed  07/30/2019.     ABDOMEN/PELVIS: Lack of intravenous and oral contrast limits evaluation  of abdominal structures. Identified within the liver is a 2.5 cm  hypodensity concerning for a metastatic lesion. Mild perihepatic fluid  is identified. Cholelithiasis is present. Motion artifact limits  evaluation of fine detail for the small bowel and organs within the  abdomen. The pancreas is not demonstrated acute abnormality on this  unenhanced motion limited exam. The spleen is unremarkable. No adrenal  mass is identified. Scattered enlarged retroperitoneal lymph nodes  measuring up to 8 mm are identified concerning for possible metastatic  involvement of the retroperitoneum. No free fluid or free air is  appreciated within the abdomen. Small bowel is nondilated no evidence of  bowel obstruction. Mildly infiltrated diffuse mesenteric inflammation  identified which may relate to mesenteric radiculitis.  Sigmoid colon  diverticulosis without convincing evidence of diverticulitis, although  the sigmoid colon is thickened. Trace free fluid is noted in the pelvis.     There is redemonstration of pathologic destructive fracture involving L1  with mild posterior retropulsion, similar to the lumbar spine CT  performed 07/30/2019. The remainder of the vertebral bodies appear to  maintain height. Multilevel disc degeneration and posterior disc  osteophyte complexes are suggested involving the lumbar spine.  Multifocal mottled appearance of the bony pelvis identified suggestive  of multifocal possible small lytic metastases particularly involving the  left iliac crest, similar to prior.       Impression:       1. Extensive multifocal osseous metastatic disease with associated  severe osseous destruction involving the right anterior first and second  ribs, left posterior-lateral fifth, sixth, seventh, and eighth rib  fractures, inferior sternum, and numerous multifocal lower cervical and  upper thoracic destructive lesions. Overall lack of intravenous contrast  limits evaluation for underlying soft tissue, although underlying soft  tissue abnormalities are present at all of these destructive sites with  soft tissue extension into the anterior mediastinum at the sternum, soft  tissue lesions associated with the destructive rib lesions, and likely  soft tissue lesions present involving the cervical and thoracic spine  pathologic fractures and destructive osseous changes. Evaluation of  spinal canal compromise is not evaluated on this study secondary to lack  of intravenous contrast, although these findings appear similar to  slightly worsened when compared to the CT of the thoracic spine  performed 07/30/2019. Follow-up MRI would be more definitive,  particularly for spinal canal compromise if clinically warranted.     2. Soft tissue lesions involving the right breast which may relate to  the site of primary carcinoma.     3.  Moderate bilateral pleural effusions identified with associated  multifocal airspace disease throughout the lungs as described above.     4. Hypoattenuated right 2.5 cm hepatic lobe lesion concerning for  metastatic focus with additional prominent mesenteric and  retroperitoneal lymph nodes which also could relate to sites of  metastatic disease.     5. Nonspecific diffuse mesenteric infiltration with sigmoid  diverticulosis with associated distal sigmoid wall thickening and  pericolonic fluid suggestive of possible underlying sigmoid  diverticulitis.     D:  08/09/2019  E:  08/09/2019     This report was finalized on 8/13/2019 8:00 AM by Dr. Sandip Camacho MD.       XR Chest 1 View [[id number]] Collected:  08/12/19 0842     Updated:  08/12/19 1742    Narrative:       EXAMINATION: XR CHEST 1 VW- 08/12/2019      INDICATION: C80.0-Disseminated malignant neoplasm, unspecified;  M84.48XA-Pathological fracture, other site, initial encounter for  fracture; M89.8X9-Other specified disorders of bone, unspecified site;  C79.51-Secondary malignant neoplasm of bone; W19.XXXA-Unspecified fall,  initial encounter; V91-Meqbyln effusion, not elsewhere classified;  E86.0-Dehydration; N17.9-Acute kidney failure      COMPARISON: 08/11/2019     FINDINGS: The heart is at the upper limits of normal. There is bibasilar  airspace disease and there are bilateral pleural effusions.           Impression:       Bibasilar airspace disease and effusions, insignificantly  changed when compared with 08/11/2019.     D:  08/12/2019  E:  08/12/2019     This report was finalized on 8/12/2019 5:39 PM by Dr. Peter Mcpherson MD.       CT Guided Thoracentesis [[id number]] Collected:  08/11/19 1449     Updated:  08/12/19 1648    Narrative:       EXAMINATION: CT-GUIDED THORACENTESIS-08/11/2019:      INDICATION: Dyspnea. Left side done today with partial improvement.;  C80.0-Disseminated malignant neoplasm, unspecified;  M84.48XA-Pathological fracture, other  site, initial encounter for  fracture; M89.8X9-Other specified disorders of bone, unspecified site;  C79.51-Secondary malignant neoplasm of bone; W19.XXXA-Unspecified fall,  initial encounter; O03-Zhawzdh effusion, not elsewhere classified;  E86.0-Dehydration.      TECHNIQUE: Limited helical CT scan of the chest without intravenous  contrast.     The radiation dose reduction device was turned on for each scan per the  ALARA (As Low as Reasonably Achievable) protocol.     COMPARISON: CT one day prior.     FINDINGS: Small-to-moderate right pleural effusion with adjacent  atelectasis.     Patient referred for CT-guided thoracentesis. Informed consent obtained  and signed. The patient was placed in right anterior oblique positioning  for examination and procedure. The patient was prepped and draped in  typical sterile fashion. 1% lidocaine utilized for local anesthetic of  the right lateral chest soft tissues with anesthetic provided to the  level of the pleural surface. Under meticulous CT guidance, an  8.5-Citizen of [place] curved catheter was placed into the right pleural fluid  collection with confirmation of location upon imaging and with fluid  return. Subsequent 720 mL of fluid drained. Catheter withdrawn. The  patient tolerated the procedure well without complication.       Impression:       Satisfactory CT-guided right thoracentesis.     D:  08/11/2019  E:  08/12/2019           This report was finalized on 8/12/2019 4:45 PM by Dr. Carlos Brunson.       XR Chest 1 View [[id number]] Collected:  08/11/19 1003     Updated:  08/12/19 1648    Narrative:       EXAMINATION: XR CHEST 1 VW-08/11/2019:      INDICATION: Assess right pleural effusion; C80.0-Disseminated malignant  neoplasm, unspecified; M84.48XA-Pathological fracture, other site,  initial encounter for fracture; M89.8X9-Other specified disorders of  bone, unspecified site; C79.51-Secondary malignant neoplasm of bone;  W19.XXXA-Unspecified fall, initial encounter;  W94-Akqbpzt effusion, not  elsewhere classified; E86.0-Dehydration; N17.9-Acute kidney failure.      COMPARISON: Chest x-ray 08/09/2019.     FINDINGS: Increase in right pleural effusion, now moderate-to-large  volume with adjacent atelectasis and/or opacifications. Left pleural  effusion is decreased from prior with left basilar atelectasis and/or  airspace disease status post thoracentesis on the left. Cardiac  silhouette largely obscured.           Impression:       Increasing right pleural effusion now moderate-to-large  volume. Interval decrease in left pleural effusion status post recent  thoracentesis. Bibasilar opacifications adjacent to these pleural  effusions of atelectasis versus airspace disease.     D:  08/11/2019  E:  08/12/2019     This report was finalized on 8/12/2019 4:45 PM by Dr. Carlos Brunson.       CT Guided Thoracentesis [[id number]] Collected:  08/10/19 1244     Updated:  08/12/19 1026    Narrative:       EXAMINATION: CT GUIDED THORACENTESIS-      INDICATION: Pleural effusion, suspect malignant; C80.0-Disseminated  malignant neoplasm, unspecified; M84.48XA-Pathological fracture, other  site, initial encounter for fracture; M89.8X9-Other specified disorders  of bone, unspecified site; C79.51-Secondary malignant neoplasm of bone;  W19.XXXA-Unspecified fall, initial encounter; C16-Iivcdgd effusion, not  elsewhere classified; E86.0-Dehydration.     TECHNIQUE: Limited helical CT scan of the chest without intravenous  contrast.     The radiation dose reduction device was turned on for each scan per the  ALARA (As Low as Reasonably Achievable) protocol.     COMPARISON: CT chest 08/09/2019.     FINDINGS: Bilateral pleural effusions left greater than right with  moderate volume left effusion and adjacent atelectasis.     Patient referred for CT-guided thoracentesis. Informed consent obtained  and signed. Consent placed on patient's chart. Patient placed in left  anterior oblique positioning for  examination and procedure. Patient  prepped and draped in typical sterile fashion. 1% lidocaine used for  local anesthetic of the left chest wall. Under meticulous CT guidance  8.5 [place] curve catheter placed into the left pleural fluid collection  with confirmation of location upon imaging and with fluid return upon  stylette removal. Subsequent 1 L of pleural fluid drained without  abnormality. Patient tolerated well without complication.       Impression:       Satisfactory CT-guided left thoracentesis.     D:  08/10/2019  E:  08/12/2019     This report was finalized on 8/12/2019 10:23 AM by Dr. Carlos Brunson.             Results for orders placed during the hospital encounter of 06/16/17   Adult Transthoracic Echo Complete    Narrative · Left ventricular systolic function is normal. Estimated EF = 70%.  · Left ventricular diastolic dysfunction (grade I) consistent with   impaired relaxation.  · The cardiac valves are normal.          I have reviewed the medications:  Scheduled Meds:    castor oil-balsam peru  Topical Q12H   dexamethasone 2 mg Oral Daily With Breakfast & Lunch   docusate sodium 100 mg Oral BID   DULoxetine 60 mg Oral Daily   fentaNYL 1 patch Transdermal Q72H   ipratropium-albuterol 3 mL Nebulization 4x Daily - RT   lidocaine PF 1% 10 mL Injection Once   metoprolol tartrate 25 mg Oral Q12H   mirtazapine 15 mg Oral Nightly   pantoprazole 40 mg Oral Q AM   sennosides-docusate sodium 2 tablet Oral BID   sodium chloride 250 mL Intravenous Once   sodium chloride 3 mL Intravenous Q12H     Continuous Infusions:    hold 1 each    sodium chloride 75 mL/hr Last Rate: 75 mL/hr (08/12/19 1622)     PRN Meds:.•  acetaminophen  •  acetaminophen  •  albuterol  •  bisacodyl  •  bisacodyl  •  calcium carbonate  •  cyclobenzaprine  •  diazePAM  •  hold  •  HYDROmorphone **AND** naloxone  •  HYDROmorphone  •  lactulose  •  ondansetron ODT **OR** ondansetron  •  sodium chloride  •  sodium chloride      Assessment/Plan    Assessment / Plan     Active Hospital Problems    Diagnosis  POA   • **Widespread metastatic malignant neoplastic disease (CMS/HCC) [C80.0]  Yes   • Pleural effusion [J90]  Yes   • Lymphedema [I89.0]  Unknown   • Intractable pain [R52]  Unknown   • Shortness of breath [R06.02]  Unknown   • Acute-on-chronic kidney injury (CMS/HCC) [N17.9, N18.9]  Unknown   • Edema of both lower extremities [R60.0]  Unknown   • Chronic respiratory failure with hypoxia, on home oxygen therapy (CMS/HCC) [J96.11, Z99.81]  Not Applicable   • Fall [W19.XXXA]  Yes   • JACQUI (generalized anxiety disorder) [F41.1]  Yes      Resolved Hospital Problems   No resolved problems to display.        Brief Hospital Course to date:  Brianna Urrutia is a 69 y.o. female debilitated from widely metastatic breast cancer, recently on hospice at home, here for progressive chest and back pain and worsening shortness of air.  She wears 2 LNC oxygen at baseline.      Bone mets, bilat malignant effusion   - Symptom control  - therapeutic thoracentesis on 8/10 and .   - known to XRT and Dr. Ibarra.   - patient declines hospice care at this time due to past experience.  - repeat CXR noted.     YOLETTE 1.4, now .8.  Resolved.   - hydrate gently    Palliative adjusting pain meds.    DVT Prophylaxis: [facility]    Disposition: I expect the patient to be discharged to SNF when insurance approves.     CODE STATUS:   Code Status and Medical Interventions:   Ordered at: 19 1830     Level Of Support Discussed With:    Patient     Code Status:    No CPR     Medical Interventions (Level of Support Prior to Arrest):    Comfort Measures         Electronically signed by Tran Brizuela MD, 19, 8:54 AM.        Electronically signed by Tran Brizuela MD at 2019  3:26 PM     Tran Brizuela MD at 2019 10:03 AM              [facility] Medicine Services  PROGRESS NOTE    Patient Name: Brianna Urrutia  : 1950  MRN:  [id number]    Date of Admission: 8/9/2019  Length of Stay: 3  Primary Care Physician: Christina Wade PA-C    Subjective   Subjective     CC:  Diffuse pain, dyspnea    HPI:  Had hypotension yest with systolics in 70s but not symptomatic.  BP nl today.  Currenly sleeping.  Per RN, she has reported good pain control and improved breathing after bilat thoracenteses.     Review of Systems  Asleep, not woken.   Otherwise ROS is negative except as mentioned in the HPI.    Objective   Objective     Vital Signs:   Temp:  [97.4 °F (36.3 °C)-98.2 °F (36.8 °C)] 97.4 °F (36.3 °C)  Heart Rate:  [62-95] 85  Resp:  [16-18] 18  BP: ()/(38-87) 106/67        Physical Exam:  Gen:  Frail ill appearing woman, asleep.  NAD  Neuro: asleep  HEENT:  NC/AT PERRL, OP benign  Heart RRR no murmur, rub, or gallop  Lungs decreased throughout. Nonlabored.   Abd:  Sl firm, nontender, no rebound or guarding, pos BS  Extrem:  No c/c/e    Results Reviewed:  I have personally reviewed current lab, radiology, and data and agree.    Results from last 7 days   Lab Units 08/10/19  0809 08/09/19  1001   WBC 10*3/mm3 8.56 8.95   HEMOGLOBIN g/dL 15.4 14.3   HEMATOCRIT % 48.5* 45.8   PLATELETS 10*3/mm3 311 275   INR  0.99  --      Results from last 7 days   Lab Units 08/10/19  0809 08/09/19  1926 08/09/19  1208 08/09/19  1001   SODIUM mmol/L 142  --   --  141   POTASSIUM mmol/L 4.6  --   --  4.1   CHLORIDE mmol/L 104  --   --  98   CO2 mmol/L 26.0  --   --  31.0*   BUN mg/dL 38*  --   --  48*   CREATININE mg/dL 1.26*  --   --  1.43*   GLUCOSE mg/dL 119*  --   --  116*   CALCIUM mg/dL 9.6  --   --  9.5   ALT (SGPT) U/L  --   --   --  39*   AST (SGOT) U/L  --   --   --  72*   TROPONIN T ng/mL  --  0.025 <0.010 0.046*   PROBNP pg/mL 4,775.0*  --   --  1,817.0*     Estimated Creatinine Clearance: 34.6 mL/min (A) (by C-G formula based on SCr of 1.26 mg/dL (H)).    Microbiology Results Abnormal     Procedure Component Value - Date/Time    Body Fluid  Culture - Body Fluid, Pleural Cavity [[id number]] Collected:  08/10/19 1300    Lab Status:  Preliminary result Specimen:  Body Fluid from Pleural Cavity Updated:  08/12/19 0844     Body Fluid Culture No growth at 2 days     Gram Stain No WBCs or organisms seen    Blood Culture - Blood, Arm, Left [[id number]] Collected:  08/09/19 1028    Lab Status:  Preliminary result Specimen:  Blood from Arm, Left Updated:  08/11/19 1131     Blood Culture No growth at 2 days    Blood Culture - Blood, Arm, Left [[id number]] Collected:  08/09/19 1028    Lab Status:  Preliminary result Specimen:  Blood from Arm, Left Updated:  08/11/19 1131     Blood Culture No growth at 2 days          Imaging Results (last 24 hours)     Procedure Component Value Units Date/Time    XR Chest 1 View [[id number]] Collected:  08/12/19 0842     Updated:  08/12/19 0843    Narrative:          EXAMINATION: XR CHEST 1 VW-      INDICATION: follow up bilat thoracenteses; C80.0-Disseminated malignant  neoplasm, unspecified; M84.48XA-Pathological fracture, other site,  initial encounter for fracture; M89.8X9-Other specified disorders of  bone, unspecified site; C79.51-Secondary malignant neoplasm of bone;  W19.XXXA-Unspecified fall, initial encounter; J15-Hjhawuw effusion, not  elsewhere classified; E86.0-Dehydration; N17.9-Acute kidney fa      COMPARISON: 1119     FINDINGS: The heart is at the upper limits of normal. There is bibasilar  airspace disease and there are bilateral pleural effusions.           Impression:       Bibasilar airspace disease and effusion, insignificantly  changed when compared with 08/11/2019.          CT Guided Thoracentesis [[id number]] Collected:  08/10/19 1244     Updated:  08/12/19 0834    Narrative:       EXAMINATION: CT GUIDED THORACENTESIS-      INDICATION: Pleural effusion, suspect malignant; C80.0-Disseminated  malignant neoplasm, unspecified; M84.48XA-Pathological fracture, other  site, initial encounter for fracture;  M89.8X9-Other specified disorders  of bone, unspecified site; C79.51-Secondary malignant neoplasm of bone;  W19.XXXA-Unspecified fall, initial encounter; Z72-Vtaanul effusion, not  elsewhere classified; E86.0-Dehydration.     TECHNIQUE: Limited helical CT scan of the chest without intravenous  contrast.     The radiation dose reduction device was turned on for each scan per the  ALARA (As Low as Reasonably Achievable) protocol.     COMPARISON: CT chest 08/09/2019.     FINDINGS: Bilateral pleural effusions left greater than right with  moderate volume left effusion and adjacent atelectasis.     Patient referred for CT-guided thoracentesis. Informed consent obtained  and signed. Consent placed on patient's chart. Patient placed in left  anterior oblique positioning for examination and procedure. Patient  prepped and draped in typical sterile fashion. 1% lidocaine used for  local anesthetic of the left chest wall. Under meticulous CT guidance  8.5 [place] curve catheter placed into the left pleural fluid collection  with confirmation of location upon imaging and with fluid return upon  stylette removal. Subsequent 1 L of pleural fluid drained without  abnormality. Patient tolerated well without complication.       Impression:       Satisfactory CT-guided left thoracentesis.     D:  08/10/2019  E:  08/12/2019       CT Guided Thoracentesis [[id number]] Collected:  08/11/19 1449     Updated:  08/11/19 1451    Narrative:       EXAMINATION: CT GUIDED THORACENTESIS-      INDICATION: dyspnea.  Left side done today with partial improvement.;  C80.0-Disseminated malignant neoplasm, unspecified;  M84.48XA-Pathological fracture, other site, initial encounter for  fracture; M89.8X9-Other specified disorders of bone, unspecified site;  C79.51-Secondary malignant neoplasm of bone; W19.XXXA-Unspecified fall,  initial encounter; Q78-Ckncloc effusion, not elsewhere classified;  E86.0-[place]      TECHNIQUE: Limited helical CT scan of the  chest without intravenous  contrast     The radiation dose reduction device was turned on for each scan per the  ALARA (As Low as Reasonably Achievable) protocol.     COMPARISON: CT one-day prior     FINDINGS: Small moderate right pleural effusion with adjacent  atelectasis.     Patient referred for CT-guided thoracentesis. Informed consent obtained  and signed. Patient placed in right anterior oblique positioning for  examination and procedure. Patient prepped and draped in typical sterile  fashion. 1% lidocaine loss for local anesthetic of the right lateral  chest soft tissues with anesthetic provided to the level of the pleural  surface. Under meticulous CT guidance 8.5 [place] curve catheter placed  into the right pleural fluid collection with confirmation of location  upon imaging and with fluid return. Subsequent 720 mL of fluid drained.  Catheter withdrawn. Patient tolerated well without complication.             Impression:       Satisfactory CT-guided right thoracentesis.          XR Chest 1 View [[id number]] Collected:  08/11/19 1003     Updated:  08/11/19 1003    Narrative:          EXAMINATION: XR CHEST 1 VW-      INDICATION: assess right pleural effusion; C80.0-Disseminated malignant  neoplasm, unspecified; M84.48XA-Pathological fracture, other site,  initial encounter for fracture; M89.8X9-Other specified disorders of  bone, unspecified site; C79.51-Secondary malignant neoplasm of bone;  W19.XXXA-Unspecified fall, initial encounter; M85-Gsnzvki effusion, not  elsewhere classified; E86.0-Dehydration; N17.9-Acute kidney fa      COMPARISON: Chest x-ray 08/09/2019     FINDINGS: Increase in right pleural effusion now moderate to large  volume with adjacent atelectasis and/or opacifications. Left pleural  effusion is decreased from prior with left basilar atelectasis and/or  airspace disease status post thoracentesis on the left. Cardiac  silhouette largely obscured.           Impression:       Increasing  right pleural effusion now moderate to large  volume. Interval decrease in left pleural effusion status post recent  thoracentesis. Bibasilar opacifications adjacent to these pleural  effusions of atelectasis versus airspace disease.                Results for orders placed during the hospital encounter of 06/16/17   Adult Transthoracic Echo Complete    Narrative · Left ventricular systolic function is normal. Estimated EF = 70%.  · Left ventricular diastolic dysfunction (grade I) consistent with   impaired relaxation.  · The cardiac valves are normal.          I have reviewed the medications:  Scheduled Meds:    castor oil-balsam peru  Topical Q12H   dexamethasone 2 mg Oral Daily With Breakfast & Lunch   docusate sodium 100 mg Oral BID   DULoxetine 60 mg Oral Daily   fentaNYL 1 patch Transdermal Q72H   fentaNYL 1 patch Transdermal Q72H   [START ON 8/13/2019] fentaNYL 1 patch Transdermal Q72H   ipratropium-albuterol 3 mL Nebulization 4x Daily - RT   lidocaine PF 1% 10 mL Injection Once   metoprolol tartrate 25 mg Oral Q12H   mirtazapine 15 mg Oral Nightly   pantoprazole 40 mg Oral Q AM   sennosides-docusate sodium 2 tablet Oral BID   sodium chloride 250 mL Intravenous Once   sodium chloride 3 mL Intravenous Q12H     Continuous Infusions:    hold 1 each    sodium chloride 75 mL/hr Last Rate: 75 mL/hr (08/11/19 1811)     PRN Meds:.•  acetaminophen  •  acetaminophen  •  albuterol  •  bisacodyl  •  bisacodyl  •  calcium carbonate  •  cyclobenzaprine  •  diazePAM  •  hold  •  HYDROmorphone **AND** naloxone  •  HYDROmorphone  •  lactulose  •  ondansetron ODT **OR** ondansetron  •  sodium chloride  •  sodium chloride      Assessment/Plan   Assessment / Plan     Active Hospital Problems    Diagnosis  POA   • **Widespread metastatic malignant neoplastic disease (CMS/HCC) [C80.0]  Yes   • Pleural effusion [J90]  Yes   • Lymphedema [I89.0]  Unknown   • Intractable pain [R52]  Unknown   • Shortness of breath [R06.02]  Unknown    • Acute-on-chronic kidney injury (CMS/HCC) [N17.9, N18.9]  Unknown   • Edema of both lower extremities [R60.0]  Unknown   • Chronic respiratory failure with hypoxia, on home oxygen therapy (CMS/HCC) [J96.11, Z99.81]  Not Applicable   • Fall [W19.XXXA]  Yes   • JACQUI (generalized anxiety disorder) [F41.1]  Yes      Resolved Hospital Problems   No resolved problems to display.        Brief Hospital Course to date:  Brianna Urrutia is a 69 y.o. female debilitated from widely metastatic breast cancer, recently on hospice at home, here for progressive chest and back pain and worsening shortness of air.  She wears 2 LNC oxygen at baseline.      Bone mets, bilat malignant effusion   - Symptom control  - therapeutic thoracentesis on 8/10 and .   - known to XRT and Dr. Ibarra.   - patient declines hospice care at this time due to past experience.  - repeat CXR noted - no change from yest.     YOLETTE 1.4, now .8.  Resolved.   - hydrate gently    DVT Prophylaxis: [facility]    Disposition: I expect the patient to be discharged tbd.  prob back to [facility].    CODE STATUS:   Code Status and Medical Interventions:   Ordered at: 19 1830     Level Of Support Discussed With:    Patient     Code Status:    No CPR     Medical Interventions (Level of Support Prior to Arrest):    Comfort Measures         Electronically signed by Tran Brizuela MD, 19, 10:03 AM.        Electronically signed by Tran Brizuela MD at 2019  1:45 PM     Rayna Espinosa APRN at 2019  9:28 AM          Palliative Care Consult Note    Patient Name: Brianna Urrutia   : 1950  Sex: female    Date of Admission: 2019    Subjective:  69 y.o. Female with extensive breast cancer. Admitted on  with increased soa/uncontrolled pain. Left thoracentesis on 08/10; right thoracentesis .     On visit today pt is resting in bed, very sleepy and unable to answer questions appropriately.  She denies pain, has marked lymphadenopathy  "RUE and noted mottling of knees.     VS += 90-21 106/67 sats 94% on 4L.     ROS:  Review of Systems   Unable to perform ROS: Mental status change       Reviewed current scheduled and prn medications for route, type, dose and frequency.    hold 1 each    sodium chloride 75 mL/hr Last Rate: 75 mL/hr (08/11/19 1811)     •  acetaminophen  •  acetaminophen  •  albuterol  •  bisacodyl  •  bisacodyl  •  calcium carbonate  •  cyclobenzaprine  •  diazePAM  •  hold  •  HYDROmorphone **AND** naloxone  •  HYDROmorphone  •  lactulose  •  ondansetron ODT **OR** ondansetron  •  sodium chloride  •  sodium chloride    Objective:   /67   Pulse 83   Temp 97.4 °F (36.3 °C) (Oral)   Resp 18   Ht 157.5 cm (62\")   Wt 50.6 kg (111 lb 8 oz)   SpO2 91%   BMI 20.39 kg/m²     Intake & Output (last day)       08/11 0701 - 08/12 0700 08/12 0701 - 08/13 0700    P.O. 420     I.V. (mL/kg)      IV Piggyback 100     Total Intake(mL/kg) 520 (10.3)     Urine (mL/kg/hr) 450 (0.4)     Total Output 450     Net +70               Lab Results (last 24 hours)     Procedure Component Value Units Date/Time    Body Fluid Culture - Body Fluid, Pleural Cavity [[id number]] Collected:  08/10/19 1300    Specimen:  Body Fluid from Pleural Cavity Updated:  08/12/19 0844     Body Fluid Culture No growth at 2 days     Gram Stain No WBCs or organisms seen    Non-gynecologic Cytology [[id number]] Collected:  08/12/19 0757    Specimen:  Pleural Fluid from Pleural Cavity Updated:  08/12/19 0758    Blood Culture - Blood, Arm, Left [[id number]] Collected:  08/09/19 1028    Specimen:  Blood from Arm, Left Updated:  08/11/19 1131     Blood Culture No growth at 2 days    Blood Culture - Blood, Arm, Left [[id number]] Collected:  08/09/19 1028    Specimen:  Blood from Arm, Left Updated:  08/11/19 1131     Blood Culture No growth at 2 days        Imaging Results (last 24 hours)     Procedure Component Value Units Date/Time    XR Chest 1 View [[id number]] Collected:  08/12/19 " 0842     Updated:  08/12/19 0843    Narrative:          EXAMINATION: XR CHEST 1 VW-      INDICATION: follow up bilat thoracenteses; C80.0-Disseminated malignant  neoplasm, unspecified; M84.48XA-Pathological fracture, other site,  initial encounter for fracture; M89.8X9-Other specified disorders of  bone, unspecified site; C79.51-Secondary malignant neoplasm of bone;  W19.XXXA-Unspecified fall, initial encounter; B41-Ibxygwy effusion, not  elsewhere classified; E86.0-Dehydration; N17.9-Acute kidney fa      COMPARISON: 1119     FINDINGS: The heart is at the upper limits of normal. There is bibasilar  airspace disease and there are bilateral pleural effusions.           Impression:       Bibasilar airspace disease and effusion, insignificantly  changed when compared with 08/11/2019.          CT Guided Thoracentesis [[id number]] Collected:  08/10/19 1244     Updated:  08/12/19 0834    Narrative:       EXAMINATION: CT GUIDED THORACENTESIS-      INDICATION: Pleural effusion, suspect malignant; C80.0-Disseminated  malignant neoplasm, unspecified; M84.48XA-Pathological fracture, other  site, initial encounter for fracture; M89.8X9-Other specified disorders  of bone, unspecified site; C79.51-Secondary malignant neoplasm of bone;  W19.XXXA-Unspecified fall, initial encounter; O99-Zavezta effusion, not  elsewhere classified; E86.0-Dehydration.     TECHNIQUE: Limited helical CT scan of the chest without intravenous  contrast.     The radiation dose reduction device was turned on for each scan per the  ALARA (As Low as Reasonably Achievable) protocol.     COMPARISON: CT chest 08/09/2019.     FINDINGS: Bilateral pleural effusions left greater than right with  moderate volume left effusion and adjacent atelectasis.     Patient referred for CT-guided thoracentesis. Informed consent obtained  and signed. Consent placed on patient's chart. Patient placed in left  anterior oblique positioning for examination and procedure.  Patient  prepped and draped in typical sterile fashion. 1% lidocaine used for  local anesthetic of the left chest wall. Under meticulous CT guidance  8.5 [place]uran curve catheter placed into the left pleural fluid collection  with confirmation of location upon imaging and with fluid return upon  stylette removal. Subsequent 1 L of pleural fluid drained without  abnormality. Patient tolerated well without complication.       Impression:       Satisfactory CT-guided left thoracentesis.     D:  08/10/2019  E:  08/12/2019       CT Guided Thoracentesis [[id number]] Collected:  08/11/19 1449     Updated:  08/11/19 1451    Narrative:       EXAMINATION: CT GUIDED THORACENTESIS-      INDICATION: dyspnea.  Left side done today with partial improvement.;  C80.0-Disseminated malignant neoplasm, unspecified;  M84.48XA-Pathological fracture, other site, initial encounter for  fracture; M89.8X9-Other specified disorders of bone, unspecified site;  C79.51-Secondary malignant neoplasm of bone; W19.XXXA-Unspecified fall,  initial encounter; V47-Krgqphp effusion, not elsewhere classified;  E86.0-Dehydra      TECHNIQUE: Limited helical CT scan of the chest without intravenous  contrast     The radiation dose reduction device was turned on for each scan per the  ALARA (As Low as Reasonably Achievable) protocol.     COMPARISON: CT one-day prior     FINDINGS: Small moderate right pleural effusion with adjacent  atelectasis.     Patient referred for CT-guided thoracentesis. Informed consent obtained  and signed. Patient placed in right anterior oblique positioning for  examination and procedure. Patient prepped and draped in typical sterile  fashion. 1% lidocaine loss for local anesthetic of the right lateral  chest soft tissues with anesthetic provided to the level of the pleural  surface. Under meticulous CT guidance 8.5 [place]uran curve catheter placed  into the right pleural fluid collection with confirmation of location  upon imaging and with  fluid return. Subsequent 720 mL of fluid drained.  Catheter withdrawn. Patient tolerated well without complication.             Impression:       Satisfactory CT-guided right thoracentesis.          XR Chest 1 View [[id number]] Collected:  08/11/19 1003     Updated:  08/11/19 1003    Narrative:          EXAMINATION: XR CHEST 1 VW-      INDICATION: assess right pleural effusion; C80.0-Disseminated malignant  neoplasm, unspecified; M84.48XA-Pathological fracture, other site,  initial encounter for fracture; M89.8X9-Other specified disorders of  bone, unspecified site; C79.51-Secondary malignant neoplasm of bone;  W19.XXXA-Unspecified fall, initial encounter; F87-Hjlgyed effusion, not  elsewhere classified; E86.0-Dehydration; N17.9-Acute kidney fa      COMPARISON: Chest x-ray 08/09/2019     FINDINGS: Increase in right pleural effusion now moderate to large  volume with adjacent atelectasis and/or opacifications. Left pleural  effusion is decreased from prior with left basilar atelectasis and/or  airspace disease status post thoracentesis on the left. Cardiac  silhouette largely obscured.           Impression:       Increasing right pleural effusion now moderate to large  volume. Interval decrease in left pleural effusion status post recent  thoracentesis. Bibasilar opacifications adjacent to these pleural  effusions of atelectasis versus airspace disease.                PPS:   30% based on the following measures:   Ambulation: Totally bed bound  Activity and Evidence of Disease: Unable to do any work, extensive evidence of disease  Self-Care: Total care  Intake: Reduced   LOC: Full, drowsy or confusion    Physical Exam   Constitutional:   Very frail, ill appearing, intermittent moaning    HENT:   Head: Normocephalic and atraumatic.   Eyes: Conjunctivae are normal.   Neck:   Hyperflexion forward    Cardiovascular: Normal rate, regular rhythm and intact distal pulses.   Pulmonary/Chest:   abd breathing, Decreased bases,  NCO2@4L,   Abdominal: Soft. Bowel sounds are normal.   Genitourinary:   Genitourinary Comments: deferred   Musculoskeletal:   Bedrest, very weak    Neurological:   Sleepy, confused    Skin: Skin is warm and dry. Capillary refill takes 2 to 3 seconds. There is pallor.   Psychiatric: She has a normal mood and affect.   Nursing note reviewed.      Widespread metastatic malignant neoplastic disease (CMS/HCC)    JACQUI (generalized anxiety disorder)    Fall    Pleural effusion    Lymphedema    Intractable pain    Shortness of breath    Acute-on-chronic kidney injury (CMS/HCC)    Edema of both lower extremities    Chronic respiratory failure with hypoxia, on home oxygen therapy (CMS/HCC)      Assessment/Plan:  69 y.o. Female with extensive breast cancer. Admitted on 08/09 with increased soa/uncontrolled pain. Left thoracentesis on 08/10; right thoracentesis 08/11. Continues on decadron 2 mg q day, very sleepy today with noted mottling bilateral knees. Feet are slightly cool.     Pain: fentanyl 50 mcg q 72 hrs. Tylenol 500 mg q 4 hrs, flexiril 5 mg TID,  Dilaudid 0.5 mg q 2 hr PRN,   Dyspnea: duonebs QID,   Nausea/Vomiting: zofran 4 mg q 4 hr PRN   Anxiety/Agitation:  Confusion/Delerium: valium 5 mg q 6 hrs PRN,   Depression: cymbalta 30 mg q day,   Bowel/bladder: no BM since admit, colace 100 mg BID; bisacodyl 5 mg q day PRN,   Nutrition:  Sleep: mirtazapine 15 mg q HS  ADLs:  Terminal Fevers:   Termianl Secretions:    Other:   2) HTN: metoprolol 25 mg q 12 hrs,   3) GERD: protonix EC 40 mg q AM     Fentanyl increased yesterday to 50 mcg q 72 hrs, PRN Valium 5 mg added for muscle spasms/high anxiety.  Per chart review she has had Valium @ 1043, 2228 and 0855; mirtazepine 15 mg @ HS, PRN Dilaudid 2 mg @ 0854, 2050, 0517 and 0855. Will decrease PRN Valium to 2 mg q 6 hrs, continue to monitor for comfort.      Repeat f/u CXR tomorrow and then back to [facility], patient continues to refuse hospice care although she is  very much appropriate at this time. Palliative can continue to follow at [facility]. Pt has verbally identified son Dominick Phillip,  as HCS during  visit yesterday and may need to default to him for hospice decision as condition declines.      Total Visit Time: 45  Face to Face Time:  30    RATNA Montoya  [phone]  19  9:29 AM    Electronically signed by Rayna Espinosa APRN at 2019 11:34 AM     Tran Brizuela MD at 2019 12:36 PM              [facility] Medicine Services  PROGRESS NOTE    Patient Name: Brianna Urrutia  : 1950  MRN: [id number]    Date of Admission: 2019  Length of Stay: 2  Primary Care Physician: Christina Wade, PAUMA    Subjective   Subjective     CC:  Diffuse pain, dyspnea    HPI:  S/p R effusion tap for 750ml.  Curently sleeping; not roused.  Per RN, she has been stable.     Review of Systems  Asleep, not woken.   Otherwise ROS is negative except as mentioned in the HPI.    Objective   Objective     Vital Signs:   Temp:  [97.2 °F (36.2 °C)-97.5 °F (36.4 °C)] 97.3 °F (36.3 °C)  Heart Rate:  [64-88] 66  Resp:  [16-18] 16  BP: ()/(60-87) 110/76        Physical Exam:  Gen:  Frail ill appearing woman, asleep.  NAD  Neuro: asleep  HEENT:  NC/AT PERRL, OP benign  Heart RRR no murmur, rub, or gallop  Lungs decreased throughout. Shallow resps, nonlabored   Abd:  Sl firm, nontender, no rebound or guarding, pos BS  Extrem:  No c/c/e    Results Reviewed:  I have personally reviewed current lab, radiology, and data and agree.    Results from last 7 days   Lab Units 08/10/19  0809 19  1001   WBC 10*3/mm3 8.56 8.95   HEMOGLOBIN g/dL 15.4 14.3   HEMATOCRIT % 48.5* 45.8   PLATELETS 10*3/mm3 311 275   INR  0.99  --      Results from last 7 days   Lab Units 08/10/19  0809 19  1926 19  1208 19  1001   SODIUM mmol/L 142  --   --  141   POTASSIUM mmol/L 4.6  --   --  4.1   CHLORIDE mmol/L 104  --   --  98   CO2  mmol/L 26.0  --   --  31.0*   BUN mg/dL 38*  --   --  48*   CREATININE mg/dL 1.26*  --   --  1.43*   GLUCOSE mg/dL 119*  --   --  116*   CALCIUM mg/dL 9.6  --   --  9.5   ALT (SGPT) U/L  --   --   --  39*   AST (SGOT) U/L  --   --   --  72*   TROPONIN T ng/mL  --  0.025 <0.010 0.046*   PROBNP pg/mL 4,775.0*  --   --  1,817.0*     Estimated Creatinine Clearance: 34.6 mL/min (A) (by C-G formula based on SCr of 1.26 mg/dL (H)).    Microbiology Results Abnormal     Procedure Component Value - Date/Time    Blood Culture - Blood, Arm, Left [[id number]] Collected:  08/09/19 1028    Lab Status:  Preliminary result Specimen:  Blood from Arm, Left Updated:  08/11/19 1131     Blood Culture No growth at 2 days    Blood Culture - Blood, Arm, Left [[id number]] Collected:  08/09/19 1028    Lab Status:  Preliminary result Specimen:  Blood from Arm, Left Updated:  08/11/19 1131     Blood Culture No growth at 2 days    Body Fluid Culture - Body Fluid, Pleural Cavity [[id number]] Collected:  08/10/19 1300    Lab Status:  Preliminary result Specimen:  Body Fluid from Pleural Cavity Updated:  08/11/19 0904     Body Fluid Culture No growth at 24 hours     Gram Stain No WBCs or organisms seen          Imaging Results (last 24 hours)     Procedure Component Value Units Date/Time    XR Chest 1 View [[id number]] Collected:  08/11/19 1003     Updated:  08/11/19 1003    Narrative:          EXAMINATION: XR CHEST 1 VW-      INDICATION: assess right pleural effusion; C80.0-Disseminated malignant  neoplasm, unspecified; M84.48XA-Pathological fracture, other site,  initial encounter for fracture; M89.8X9-Other specified disorders of  bone, unspecified site; C79.51-Secondary malignant neoplasm of bone;  W19.XXXA-Unspecified fall, initial encounter; R25-Jvmglny effusion, not  elsewhere classified; E86.0-Dehydration; N17.9-Acute kidney fa      COMPARISON: Chest x-ray 08/09/2019     FINDINGS: Increase in right pleural effusion now moderate to large  volume  with adjacent atelectasis and/or opacifications. Left pleural  effusion is decreased from prior with left basilar atelectasis and/or  airspace disease status post thoracentesis on the left. Cardiac  silhouette largely obscured.           Impression:       Increasing right pleural effusion now moderate to large  volume. Interval decrease in left pleural effusion status post recent  thoracentesis. Bibasilar opacifications adjacent to these pleural  effusions of atelectasis versus airspace disease.          CT Guided Thoracentesis [[id number]] Collected:  08/10/19 1244     Updated:  08/10/19 1247    Narrative:       EXAMINATION: CT GUIDED THORACENTESIS-      INDICATION: pleural effusion, suspect malignant; C80.0-Disseminated  malignant neoplasm, unspecified; M84.48XA-Pathological fracture, other  site, initial encounter for fracture; M89.8X9-Other specified disorders  of bone, unspecified site; C79.51-Secondary malignant neoplasm of bone;  W19.XXXA-Unspecified fall, initial encounter; K75-Dwqzxza effusion, not  elsewhere classified; E86.0-Dehydration; N17.9-Acute kid      TECHNIQUE: Limited helical CT scanning of the chest without intravenous  contrast     The radiation dose reduction device was turned on for each scan per the  ALARA (As Low as Reasonably Achievable) protocol.     COMPARISON: CT chest 08/09/2019     FINDINGS: Bilateral pleural effusions (right with moderate volume left  effusion and adjacent atelectasis.     Patient referred for CT-guided thoracentesis. Informed consent obtained  and signed. Consent. Patient chart. Patient placed in left anterior  oblique positioning for examination and procedure. Patient prepped and  draped in typical sterile fashion. 1% lidocaine used for local  anesthetic of left chest wall. Under meticulous CT guidance 8.5 [place]ruguayan  curve catheter placed in the left pleural fluid collection with  confirmation of location upon imaging and with fluid return upon  stylette removal.  Subsequent 1 L of pleural fluid drained without  abnormality. Patient tolerated well without complication.             Impression:       Satisfactory CT-guided left thoracentesis.                Results for orders placed during the hospital encounter of 06/16/17   Adult Transthoracic Echo Complete    Narrative · Left ventricular systolic function is normal. Estimated EF = 70%.  · Left ventricular diastolic dysfunction (grade I) consistent with   impaired relaxation.  · The cardiac valves are normal.          I have reviewed the medications:  Scheduled Meds:    dexamethasone 2 mg Oral Daily With Breakfast & Lunch   docusate sodium 100 mg Oral BID   DULoxetine 60 mg Oral Daily   fentaNYL 1 patch Transdermal Q72H   fentaNYL 1 patch Transdermal Q72H   [START ON 8/13/2019] fentaNYL 1 patch Transdermal Q72H   ipratropium-albuterol 3 mL Nebulization 4x Daily - RT   metoprolol tartrate 25 mg Oral Q12H   mirtazapine 15 mg Oral Nightly   pantoprazole 40 mg Oral Q AM   sennosides-docusate sodium 2 tablet Oral BID   sodium chloride 3 mL Intravenous Q12H     Continuous Infusions:    hold 1 each    sodium chloride 75 mL/hr Last Rate: 75 mL/hr (08/11/19 1223)     PRN Meds:.•  acetaminophen  •  acetaminophen  •  albuterol  •  bisacodyl  •  bisacodyl  •  calcium carbonate  •  cyclobenzaprine  •  diazePAM  •  hold  •  HYDROmorphone **AND** naloxone  •  HYDROmorphone  •  lactulose  •  ondansetron ODT **OR** ondansetron  •  sodium chloride  •  sodium chloride      Assessment/Plan   Assessment / Plan     Active Hospital Problems    Diagnosis  POA   • **Widespread metastatic malignant neoplastic disease (CMS/HCC) [C80.0]  Yes   • Pleural effusion [J90]  Yes   • Lymphedema [I89.0]  Unknown   • Intractable pain [R52]  Unknown   • Shortness of breath [R06.02]  Unknown   • Acute-on-chronic kidney injury (CMS/HCC) [N17.9, N18.9]  Unknown   • Edema of both lower extremities [R60.0]  Unknown   • Chronic respiratory failure with hypoxia, on home  oxygen therapy (CMS/[facility]) [J96.11, Z99.81]  Not Applicable   • Fall [W19.XXXA]  Yes   • JACQUI (generalized anxiety disorder) [F41.1]  Yes      Resolved Hospital Problems   No resolved problems to display.        Brief Hospital Course to date:  Brianna Urrutia is a 69 y.o. female debilitated from widely metastatic breast cancer, recently on hospice at home, here for progressive chest and back pain and worsening shortness of air.  She wears 2 LNC oxygen at baseline.      Bone mets, bilat malignant effusion   - Symptom control  - therapeutic thoracentesis on 8/10 and 8/11.   - known to XRT and Dr. Ibarra.   - patient declines hospice care at this time due to past experience.  - repeat CXR in AM    YOLETTE 1.4, now 1.2.  - hydrate gently    DVT Prophylaxis: [facility]    Disposition: I expect the patient to be discharged tbd.  prob back to [facility].    CODE STATUS:   Code Status and Medical Interventions:   Ordered at: 08/09/19 1830     Level Of Support Discussed With:    Patient     Code Status:    No CPR     Medical Interventions (Level of Support Prior to Arrest):    Comfort Measures         Electronically signed by Tran Brizuela MD, 08/11/19, 12:36 PM.        Electronically signed by Tran Brizuela MD at 8/11/2019  2:26 PM     Lu Bradshaw MD at 8/10/2019  5:00 PM        Brief Palliative Care Note:    Consult received.  Chart reviewed.  Pt seen and examined with Palliative RN Esperanza.    Patient well known to outpatient Palliative Care services (both from home and LTC facility visits) as well as home hospice care (with 2 prior admissions).    Hospice RN has seen patient this admission and offered Hospice admission.  Patient declines.   (Was also seen in ED last week x2 with falls.  Hospice RN visited at that time as well and patient deferred Hospice services.)    Recs today:  -Increase fentanyl patch to 50mcg.  -Continue current PRN dilaudid.  (Morphine was not effective outpt.)  -Increase dex to BID.  -Increase  "cymbalta to 60mg.  -Add benzo.  Will try valium for anxiety and muscle spasm.  -Can d/c scalp staples placed in ED last week.    *FULL CONSULT NOTE TO FOLLOW  (Discussed w/ hospitalist, Dr. Brizuela.)    Lu Bradshaw MD  8/10/19      Electronically signed by Lu Bradshaw MD at 2019  8:39 AM     Tran Brizuela MD at 8/10/2019  8:23 AM              [facility] Medicine Services  PROGRESS NOTE    Patient Name: Brianna Urrutia  : 1950  MRN: [id number]    Date of Admission: 2019  Length of Stay: 1  Primary Care Physician: Christina Wade, JOHN    Subjective   Subjective     CC:  Diffuse pain, dyspnea    HPI:  Uncomfortable sitting up in chair and would like to go back to bed.  Slightly dyspneic.  She has difficulty lying flat. Got CT yest but \"it was torture\" due to bone fractures.     Review of Systems   Gen- No fevers, chills  CV- No chest pain, palpitations  Resp- No cough, mild dyspnea  GI- No N/V/D, abd pain.  Ongoing constip - small BM today  musc - would like more pain meds.  Has fentanyl patch on.   Otherwise ROS is negative except as mentioned in the HPI.    Objective   Objective     Vital Signs:   Temp:  [97.4 °F (36.3 °C)-98.2 °F (36.8 °C)] 97.4 °F (36.3 °C)  Heart Rate:  [77-99] 94  Resp:  [16-18] 18  BP: ()/() 151/94        Physical Exam:  Gen:  Frail ill appearing woman, sitting up in chair.  Alert. No family present  Neuro: alert and oriented, clear speech, follows commands  HEENT:  NC/AT PERRL, OP benign  Neck:  Supple, no LAD  Heart RRR no murmur, rub, or gallop  Lungs decreased throughout.  Abd:  Sl firm, nontender, no rebound or guarding, pos BS  Extrem:  No c/c/e    Results Reviewed:  I have personally reviewed current lab, radiology, and data and agree.    Results from last 7 days   Lab Units 19  1001   WBC 10*3/mm3 8.95   HEMOGLOBIN g/dL 14.3   HEMATOCRIT % 45.8   PLATELETS 10*3/mm3 275     Results from last 7 days   Lab Units " 08/09/19  1926 08/09/19  1208 08/09/19  1001   SODIUM mmol/L  --   --  141   POTASSIUM mmol/L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 disintegrating tablet 4 mg  4 mg Oral Q4H PRN Shawn Gonsalez IV, RPH   4 mg at 08/09/19 2255    Or   • ondansetron (ZOFRAN) injection 4 mg  4 mg Intravenous Q6H PRN Shawn Gonsalez IV, RPH       • pantoprazole (PROTONIX) EC tablet 40 mg  40 mg Oral Q AM Tran Brizuela MD   40 mg at 08/10/19 1052   • sennosides-docusate sodium (SENOKOT-S) 8.6-50 MG tablet 2 tablet  2 tablet Oral BID Tran Brizuela MD   2 tablet at 08/10/19 1052   • sodium chloride 0.9 % flush 10 mL  10 mL Intravenous PRN Rakesh Smith MD       • sodium chloride 0.9 % flush 3 mL  3 mL Intravenous Q12H Erin Brantley APRN   3 mL at 08/10/19 0811   • sodium chloride 0.9 % flush 3-10 mL  3-10 mL Intravenous PRN Erin Brantley APRN       • sodium chloride 0.9 % infusion  75 mL/hr Intravenous Continuous Tran Brizuela MD 75 mL/hr at 08/10/19 1114 75 mL/hr at 08/10/19 1114       hold 1 each    sodium chloride 75 mL/hr Last Rate: 75 mL/hr (08/10/19 1114)     •  acetaminophen  •  acetaminophen  •  albuterol  •   "bisacodyl  •  bisacodyl  •  calcium carbonate  •  cyclobenzaprine  •  diazePAM  •  hold  •  HYDROmorphone **AND** naloxone  •  HYDROmorphone  •  lactulose  •  ondansetron ODT **OR** ondansetron  •  sodium chloride  •  sodium chloride  Allergies   Allergen Reactions   • Iodinated Diagnostic Agents Other (See Comments)     Patient states she has swelling and pain of joints for days following injection   • Penicillins Swelling     As a child and her body had some swelling     Family History   Problem Relation Age of Onset   • Esophageal cancer Mother    • Heart disease Father    • Liver cancer Father    • Breast cancer Neg Hx    • Endometrial cancer Neg Hx    • Ovarian cancer Neg Hx      Social History     Socioeconomic History   • Marital status:      Spouse name: Not on file   • Number of children: Not on file   • Years of education: Not on file   • Highest education level: Not on file   Tobacco Use   • Smoking status: Former Smoker     Types: Cigarettes   • Smokeless tobacco: Never Used   Substance and Sexual Activity   • Alcohol use: No   • Drug use: Yes     Types: Marijuana     Comment: rare, medical    • Sexual activity: Not Currently   Social History Narrative    .  Lives now in [facility]     Wears [employer] oxygen aats.   Son Kenji, in [place].  Says she has a brother and niece in [place].  Otherwise, just has some local friends that help her some.    Review of Systems - difficult to ascertain with pt's AMS, see HPI and PMH      BP 93/60 (BP Location: Left arm, Patient Position: Lying)   Pulse 71   Temp 97.2 °F (36.2 °C) (Oral)   Resp 16   Ht 157.5 cm (62\")   Wt 47.6 kg (105 lb)   SpO2 95%   BMI 19.20 kg/m²      Intake/Output Summary (Last 24 hours) at 8/10/2019 1830  Last data filed at 8/10/2019 0500  Gross per 24 hour   Intake --   Output 400 ml   Net -400 ml     Physical Exam  Gen:   Thin, frail female, sitting up in bed.  Eating jello, ill appearing. NAD.  Head/Neck: " Normocephalic, 2 posterior scalp staples intact with lac well healed, trachea midline  Eyes: EOMI, no scleral icterus  ENT: patent nares, oral mucosa moist  Heart: RRR, no murmur  Lungs: Diminished, no wheeze.  Respirations not labored.  Abd:   nontender, active BS  Extrem:  No c/c/e  Skin: warm, dry, scalp lac healed  Neuro: awake but altered s/p dilaudid, no focal deficit, no myoclonus  Psych: talkative, animated      Results from last 7 days   Lab Units 08/10/19  0809   WBC 10*3/mm3 8.56   HEMOGLOBIN g/dL 15.4   HEMATOCRIT % 48.5*   PLATELETS 10*3/mm3 311     Results from last 7 days   Lab Units 08/10/19  0809 08/09/19  1001   SODIUM mmol/L 142 141   POTASSIUM mmol/L 4.6 4.1   CHLORIDE mmol/L 104 98   CO2 mmol/L 26.0 31.0*   BUN mg/dL 38* 48*   CREATININE mg/dL 1.26* 1.43*   CALCIUM mg/dL 9.6 9.5   BILIRUBIN mg/dL  --  0.3   ALK PHOS U/L  --  192*   ALT (SGPT) U/L  --  39*   AST (SGOT) U/L  --  72*   GLUCOSE mg/dL 119* 116*     Results from last 7 days   Lab Units 08/10/19  0809   SODIUM mmol/L 142   POTASSIUM mmol/L 4.6   CHLORIDE mmol/L 104   CO2 mmol/L 26.0   BUN mg/dL 38*   CREATININE mg/dL 1.26*   GLUCOSE mg/dL 119*   CALCIUM mg/dL 9.6     Imaging Results (last 72 hours)     Procedure Component Value Units Date/Time    CT Guided Thoracentesis [[id number]] Collected:  08/10/19 1244     Updated:  08/10/19 1247    Narrative:       EXAMINATION: CT GUIDED THORACENTESIS-      INDICATION: pleural effusion, suspect malignant; C80.0-Disseminated  malignant neoplasm, unspecified; M84.48XA-Pathological fracture, other  site, initial encounter for fracture; M89.8X9-Other specified disorders  of bone, unspecified site; C79.51-Secondary malignant neoplasm of bone;  W19.XXXA-Unspecified fall, initial encounter; E42-Drmwqzz effusion, not  elsewhere classified; E86.0-Dehydration; N17.9-Acute kid      TECHNIQUE: Limited helical CT scanning of the chest without intravenous  contrast     The radiation dose reduction device was  turned on for each scan per the  ALARA (As Low as Reasonably Achievable) protocol.     COMPARISON: CT chest 08/09/2019     FINDINGS: Bilateral pleural effusions (right with moderate volume left  effusion and adjacent atelectasis.     Patient referred for CT-guided thoracentesis. Informed consent obtained  and signed. Consent. Patient chart. Patient placed in left anterior  oblique positioning for examination and procedure. Patient prepped and  draped in typical sterile fashion. 1% lidocaine used for local  anesthetic of left chest wall. Under meticulous CT guidance 8.5 [place]ian  curve catheter placed in the left pleural fluid collection with  confirmation of location upon imaging and with fluid return upon  stylette removal. Subsequent 1 L of pleural fluid drained without  abnormality. Patient tolerated well without complication.             Impression:       Satisfactory CT-guided left thoracentesis.          XR Chest 1 View [[id number]] Collected:  08/09/19 1037     Updated:  08/10/19 0923    Narrative:       EXAMINATION: XR CHEST 1 VW- 08/09/2019      INDICATION: Chest Pain triage protocol      COMPARISON: Chest radiograph 12/11/2017     FINDINGS: There are low lung volumes noted bilaterally with associated  small bilateral pleural effusions with associated atelectasis. This  obscures evaluation of the cardiomediastinal silhouette. Mild pulmonary  vascular congestion is identified. There is abnormal soft tissue density  involving the right lung apex with irregularity of the rib which  corresponds to a destructive process identified on this CT of the  thoracic spine performed 07/30/2019.           Impression:       Small bilateral pleural effusions with associated  atelectasis. There is redemonstration of destructive appearing changes  involving the left lateral rib cage and right apical region corresponds  to the CT of thoracic spine performed 07/30/2019 consistent with the  known multiple metastatic lesions.     D:   08/09/2019  E:  08/09/2019     This report was finalized on 8/10/2019 9:20 AM by Dr. Sandip Camacho MD.       CT Chest Without Contrast [[id number]] Collected:  08/09/19 1413     Updated:  08/09/19 1531    Narrative:       EXAMINATION: CT CHEST WO CONTRAST-, CT ABDOMEN PELVIS WO CONTRAST-  08/09/2019      INDICATION: Chest pain, shortness of air, stage IV breast cancer, hx  pathologic fractures     TECHNIQUE: Unenhanced CT imaging of the chest, abdomen, and pelvis was  obtained. Additional coronal and sagittal reformatted images were  obtained for review.     The radiation dose reduction device was turned on for each scan per the  ALARA (As Low as Reasonably Achievable) protocol.     COMPARISON: CT of the thoracic and lumbar spine 07/30/2019, CT of the  chest 09/12/2017 and CT abdomen 06/19/2017.     FINDINGS:      CHEST: Multiple abnormalities are identified throughout the chest and  osseous structures of the chest. For example there is osseous erosive  changes of the right first and distal right second rib with associated  soft tissue abnormality measuring 3.0 x 4.1 cm similar when compared to  the CT of the thoracic spine performed 07/30/2019. Extensive soft tissue  abnormality identified within the anterior right chest wall adjacent to  this. Identified within the right breast is a 3.1 x 1.4 cm soft tissue  mass with associated skin retraction which may be the site of the  reported breast carcinoma.  Additionally more inferiorly there is a soft  tissue subcutaneous nodule. Additional abnormal osseous destruction  identified involving the posterior fifth, sixth, seventh, and eighth  ribs posterior and laterally concerning for metastatic lesions. Lack of  intravenous contrast limits evaluation for underlying soft tissue  lesion, however one is suspected within this region. Particularly at  (series 1/image 9) is a possible soft tissue focus measuring 5.3 cm.  Extensive multifocal osseous metastatic disease  throughout the  visualized cervical, thoracic, and lumbar spine are identified.  For  example there are numerous lytic lesions throughout the lower cervical  spine as well as the upper thoracic spine with multilevel compression  fractures most pronounced at T1, T2, and T4 which appears similar to the  thoracic spine CT performed 07/30/2019. Likely underlying soft tissue is  suggested with these, although given the patient's positioning and lack  of contrast there is very limited evaluation for soft tissue within  these fractures. Additional extensive osseous destructive changes with  fracture identified involving the approximate T5 vertebral process T6  and T7 vertebral bodies with some degree of posterior retropulsion and  possible soft tissue compromise of the spinal canal at these levels,  although lack of contrast limits evaluation. Again identified at T5 is  marked kyphosis, similar to prior. Severe L1 fracture identified with  osseous destructive changes and mild retropulsion, similar to prior.      Dedicated CT imaging of the lungs demonstrates focal airspace  disease/mass within the right lung apex adjacent to the osseous  destructive rib changes which may relate to post radiation changes,  although underlying soft tissue abnormality is likely present. Moderate  to large bilateral pleural effusions are identified. Bilateral lower  lobe airspace disease is noted with more focal consolidative changes  within the right mid lung with air bronchograms and a calcification. The  heart is not enlarged. Extensive sternal osseous metastatic disease with  extensive destruction is identified involving the inferior aspect of the  sternum below the manubrium with possible anterior mediastinal  extension. This is similar to the CT of the thoracic spine performed  07/30/2019.     ABDOMEN/PELVIS: Lack of intravenous and oral contrast limits evaluation  of abdominal structures. Identified within the liver is a 2.5  cm  hypodensity concerning for a metastatic lesion. Mild perihepatic fluid  is identified. Cholelithiasis is present. Motion artifact limits  evaluation of fine detail for the small bowel and organs within the  abdomen. The pancreas is not demonstrated acute abnormality on this  unenhanced motion limited exam. The spleen is unremarkable. No adrenal  mass is identified. Scattered enlarged retroperitoneal lymph nodes  measuring up to 8 mm are identified concerning for possible metastatic  involvement of the retroperitoneum. No free fluid or free air is  appreciated within the abdomen. Small bowel is nondilated no evidence of  bowel obstruction. Mildly infiltrated diffuse mesenteric inflammation  identified which may relate to mesenteric radiculitis. Sigmoid colon  diverticulosis without convincing evidence of diverticulitis, although  the sigmoid colon is thickened. Trace free fluid is noted in the pelvis.     There is redemonstration of pathologic destructive fracture involving L1  with mild posterior retropulsion, similar to the lumbar spine CT  performed 07/30/2019. The remainder of the vertebral bodies appear to  maintain height. Multilevel disc degeneration and posterior disc  osteophyte complexes are suggested involving the lumbar spine.  Multifocal mottled appearance of the bony pelvis identified suggestive  of multifocal possible small lytic metastases particularly involving the  left iliac crest, similar to prior.       Impression:       1. Extensive multifocal osseous metastatic disease with associated  severe osseous destruction involving the right anterior first and second  ribs, left posterior-lateral fifth, sixth, seventh, and eighth rib  fractures, inferior sternum, and numerous multifocal lower cervical and  upper thoracic destructive lesions. Overall lack of intravenous contrast  limits evaluation for underlying soft tissue, although underlying soft  tissue abnormalities are present at all of these  destructive sites with  soft tissue extension into the anterior mediastinum at the sternum, soft  tissue lesions associated with the destructive rib lesions, and likely  soft tissue lesions present involving the cervical and thoracic spine  pathologic fractures and destructive osseous changes. Evaluation of  spinal canal compromise is not evaluated on this study secondary to lack  of intravenous contrast, although these findings appear similar to  slightly worsened when compared to the CT of the thoracic spine  performed 07/30/2019. Follow-up MRI would be more definitive,  particularly for spinal canal compromise if clinically warranted.     2. Soft tissue lesions involving the right breast which may relate to  the site of primary carcinoma.     3. Moderate bilateral pleural effusions identified with associated  multifocal airspace disease throughout the lungs as described above.     4. Hypoattenuated right 2.5 cm hepatic lobe lesion concerning for  metastatic focus with additional prominent mesenteric and  retroperitoneal lymph nodes which also could relate to sites of  metastatic disease.     5. Nonspecific diffuse mesenteric infiltration with sigmoid  diverticulosis with associated distal sigmoid wall thickening and  pericolonic fluid suggestive of possible underlying sigmoid  diverticulitis.     D:  08/09/2019  E:  08/09/2019       CT Abdomen Pelvis Without Contrast [[id number]] Collected:  08/09/19 1413     Updated:  08/09/19 1531    Narrative:       EXAMINATION: CT CHEST WO CONTRAST-, CT ABDOMEN PELVIS WO CONTRAST-  08/09/2019      INDICATION: Chest pain, shortness of air, stage IV breast cancer, hx  pathologic fractures     TECHNIQUE: Unenhanced CT imaging of the chest, abdomen, and pelvis was  obtained. Additional coronal and sagittal reformatted images were  obtained for review.     The radiation dose reduction device was turned on for each scan per the  ALARA (As Low as Reasonably Achievable) protocol.      COMPARISON: CT of the thoracic and lumbar spine 07/30/2019, CT of the  chest 09/12/2017 and CT abdomen 06/19/2017.     FINDINGS:      CHEST: Multiple abnormalities are identified throughout the chest and  osseous structures of the chest. For example there is osseous erosive  changes of the right first and distal right second rib with associated  soft tissue abnormality measuring 3.0 x 4.1 cm similar when compared to  the CT of the thoracic spine performed 07/30/2019. Extensive soft tissue  abnormality identified within the anterior right chest wall adjacent to  this. Identified within the right breast is a 3.1 x 1.4 cm soft tissue  mass with associated skin retraction which may be the site of the  reported breast carcinoma.  Additionally more inferiorly there is a soft  tissue subcutaneous nodule. Additional abnormal osseous destruction  identified involving the posterior fifth, sixth, seventh, and eighth  ribs posterior and laterally concerning for metastatic lesions. Lack of  intravenous contrast limits evaluation for underlying soft tissue  lesion, however one is suspected within this region. Particularly at  (series 1/image 9) is a possible soft tissue focus measuring 5.3 cm.  Extensive multifocal osseous metastatic disease throughout the  visualized cervical, thoracic, and lumbar spine are identified.  For  example there are numerous lytic lesions throughout the lower cervical  spine as well as the upper thoracic spine with multilevel compression  fractures most pronounced at T1, T2, and T4 which appears similar to the  thoracic spine CT performed 07/30/2019. Likely underlying soft tissue is  suggested with these, although given the patient's positioning and lack  of contrast there is very limited evaluation for soft tissue within  these fractures. Additional extensive osseous destructive changes with  fracture identified involving the approximate T5 vertebral process T6  and T7 vertebral bodies with some  degree of posterior retropulsion and  possible soft tissue compromise of the spinal canal at these levels,  although lack of contrast limits evaluation. Again identified at T5 is  marked kyphosis, similar to prior. Severe L1 fracture identified with  osseous destructive changes and mild retropulsion, similar to prior.      Dedicated CT imaging of the lungs demonstrates focal airspace  disease/mass within the right lung apex adjacent to the osseous  destructive rib changes which may relate to post radiation changes,  although underlying soft tissue abnormality is likely present. Moderate  to large bilateral pleural effusions are identified. Bilateral lower  lobe airspace disease is noted with more focal consolidative changes  within the right mid lung with air bronchograms and a calcification. The  heart is not enlarged. Extensive sternal osseous metastatic disease with  extensive destruction is identified involving the inferior aspect of the  sternum below the manubrium with possible anterior mediastinal  extension. This is similar to the CT of the thoracic spine performed  07/30/2019.     ABDOMEN/PELVIS: Lack of intravenous and oral contrast limits evaluation  of abdominal structures. Identified within the liver is a 2.5 cm  hypodensity concerning for a metastatic lesion. Mild perihepatic fluid  is identified. Cholelithiasis is present. Motion artifact limits  evaluation of fine detail for the small bowel and organs within the  abdomen. The pancreas is not demonstrated acute abnormality on this  unenhanced motion limited exam. The spleen is unremarkable. No adrenal  mass is identified. Scattered enlarged retroperitoneal lymph nodes  measuring up to 8 mm are identified concerning for possible metastatic  involvement of the retroperitoneum. No free fluid or free air is  appreciated within the abdomen. Small bowel is nondilated no evidence of  bowel obstruction. Mildly infiltrated diffuse mesenteric  inflammation  identified which may relate to mesenteric radiculitis. Sigmoid colon  diverticulosis without convincing evidence of diverticulitis, although  the sigmoid colon is thickened. Trace free fluid is noted in the pelvis.     There is redemonstration of pathologic destructive fracture involving L1  with mild posterior retropulsion, similar to the lumbar spine CT  performed 07/30/2019. The remainder of the vertebral bodies appear to  maintain height. Multilevel disc degeneration and posterior disc  osteophyte complexes are suggested involving the lumbar spine.  Multifocal mottled appearance of the bony pelvis identified suggestive  of multifocal possible small lytic metastases particularly involving the  left iliac crest, similar to prior.       Impression:       1. Extensive multifocal osseous metastatic disease with associated  severe osseous destruction involving the right anterior first and second  ribs, left posterior-lateral fifth, sixth, seventh, and eighth rib  fractures, inferior sternum, and numerous multifocal lower cervical and  upper thoracic destructive lesions. Overall lack of intravenous contrast  limits evaluation for underlying soft tissue, although underlying soft  tissue abnormalities are present at all of these destructive sites with  soft tissue extension into the anterior mediastinum at the sternum, soft  tissue lesions associated with the destructive rib lesions, and likely  soft tissue lesions present involving the cervical and thoracic spine  pathologic fractures and destructive osseous changes. Evaluation of  spinal canal compromise is not evaluated on this study secondary to lack  of intravenous contrast, although these findings appear similar to  slightly worsened when compared to the CT of the thoracic spine  performed 07/30/2019. Follow-up MRI would be more definitive,  particularly for spinal canal compromise if clinically warranted.     2. Soft tissue lesions involving the  right breast which may relate to  the site of primary carcinoma.     3. Moderate bilateral pleural effusions identified with associated  multifocal airspace disease throughout the lungs as described above.     4. Hypoattenuated right 2.5 cm hepatic lobe lesion concerning for  metastatic focus with additional prominent mesenteric and  retroperitoneal lymph nodes which also could relate to sites of  metastatic disease.     5. Nonspecific diffuse mesenteric infiltration with sigmoid  diverticulosis with associated distal sigmoid wall thickening and  pericolonic fluid suggestive of possible underlying sigmoid  diverticulitis.     D:  08/09/2019  E:  08/09/2019           Active Hospital Problems     Diagnosis   POA   • **Widespread metastatic malignant neoplastic disease (CMS/HCC) [C80.0]   Yes   • Pleural effusion [J90]   Yes   • Lymphedema [I89.0]   Unknown   • Intractable pain [R52]   Unknown   • Shortness of breath [R06.02]   Unknown   • Acute-on-chronic kidney injury (CMS/HCC) [N17.9, N18.9]   Unknown   • Edema of both lower extremities [R60.0]   Unknown   • Chronic respiratory failure with hypoxia, on home oxygen therapy (CMS/HCC) [J96.11, Z99.81]   Not Applicable   • Fall [W19.XXXA]   Yes   • JACQUI (generalized anxiety disorder) [F41.1]         Impression:  70 yo female with advanced, wide spread metastatic breast cancer.  Hospitalized with uncontrolled dyspnea and pain. Prior hospice admits x2 but patient has revoked and defers Hospice Care though repeatedly offered.  Followed by Palliative Care at [facility].     Symptoms:  Dyspnea -- bilateral pleural effusions, s/p left thoracentesis today, right planned for tomorrow  Neoplastic pain  Debility  Depression and Anxiety    Plan:   -Increase fentanyl patch to 50mcg.  -Continue current PRN dilaudid, IV and PO available.  (Morphine was not effective outpt.)  -Increase decadron to 2mg BID breakfast and lunch.  -Increase cymbalta to 60mg.  -Add benzo.  Will try valium  for anxiety and muscle spasm.  -Can d/c scalp staples placed in ED last week.  -Patient states she doesn't want to return to [facility] but suspect this is really her best option as she cannot live alone in her condition and she has no one to care for her in her home.  -Palliative Care will continue to follow to assist with symptom management and goals of care.  -Discussed with Hospitalist, Dr. Brizuela.    uL Bradshaw MD  08/10/19  6:30 PM              Electronically signed by Lu Bradshaw MD at 8/12/2019 10:47 AM       Discharge Summary     No notes of this type exist for this encounter.        Discharge Order (From admission, onward)    None

## 2019-08-15 LAB — BACTERIA SPEC ANAEROBE CULT: NORMAL

## 2019-08-15 NOTE — DISCHARGE SUMMARY
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          Hospital Course:  Brianna Urrutia was a 69 y.o. female with widely metastatic breast cancer.  She was recently staying at [facility].  She had received hospice services.  She came to the ED for progressive dyspnea and was found to have bilateral pleural effusions.      She underwent thoracentesis of the left side and right side, for about 1000 ml each.  After this, she felt better.  Her pain regimen was adjusted by the Palliative Medicine service.  She did not want to stay under the care of hospice.  She wanted to move from [facility] to a different facility, and our  worked on this.      She was lethargic and hard to rouse during the last three days of her stay, and pain meds were adjusted.  On  the morning of , after a quiet night, she developed labored breathing and was not rousable.  Stat ABG showed ph 7.15 and pCO2 of 89; stat CXR showed large bilateral effusions (3-4 days after recent thoracenteses).    I contacted her brother who was in agreement with making comfort the priority and not attempting heroic measures.  She received morphine, ativan and robinul and  peacefully several hours later.      Time of death:  2019 at 1148.      Official Cause of Death and any directly related diagnoses:    Acute respiratory failure,   Due to pleural effusions,   Due to metastatic breast cancer       Electronically signed by Tran Brizuela MD, 08/15/19, 3:02 PM.

## 2019-10-21 NOTE — DISCHARGE PLACEMENT REQUEST
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 left posterior-lateral fifth, sixth, seventh, and eighth rib  fractures, inferior sternum, and numerous multifocal lower cervical and  upper thoracic destructive lesions. Overall lack of intravenous contrast  limits evaluation for underlying soft tissue, although underlying soft  tissue abnormalities are present at all of these destructive sites with  soft tissue extension into the anterior mediastinum at the sternum, soft  tissue lesions associated with the destructive rib lesions, and likely  soft tissue lesions present involving the cervical and thoracic spine  pathologic fractures and destructive osseous changes. Evaluation of  spinal canal compromise is not evaluated on this study secondary to lack  of intravenous contrast, although these findings appear similar to  slightly worsened when compared to the CT of the thoracic spine  performed 07/30/2019. Follow-up MRI would be more definitive,  particularly for spinal canal compromise if clinically warranted.     2. Soft tissue lesions involving the right breast which may relate to  the site of primary carcinoma.     3. Moderate bilateral pleural effusions identified with associated  multifocal airspace disease  throughout the lungs as described above.     4. Hypoattenuated right 2.5 cm hepatic lobe lesion concerning for  metastatic focus with additional prominent mesenteric and  retroperitoneal lymph nodes which also could relate to sites of  metastatic disease.     5. Nonspecific diffuse mesenteric infiltration with sigmoid  diverticulosis with associated distal sigmoid wall thickening and  pericolonic fluid suggestive of possible underlying sigmoid  diverticulitis.     D:  08/09/2019  E:  08/09/2019       XR Chest 1 View [[id number]] Collected:  08/09/19 1037     Updated:  08/09/19 1107    Narrative:       EXAMINATION: XR CHEST 1 VW- 08/09/2019      INDICATION: Chest Pain triage protocol      COMPARISON: Chest radiograph 12/11/2017     FINDINGS: There are low lung volumes noted bilaterally with associated  small bilateral pleural effusions with associated atelectasis. This  obscures evaluation of the cardiomediastinal silhouette. Mild pulmonary  vascular congestion is identified. There is abnormal soft tissue density  involving the right lung apex with irregularity of the rib which  corresponds to a destructive process identified on this CT of the  thoracic spine performed 07/30/2019.           Impression:       Small bilateral pleural effusions with associated  atelectasis. There is redemonstration of destructive appearing changes  involving the left lateral rib cage and right apical region corresponds  to the CT of thoracic spine performed 07/30/2019 consistent with the  known multiple metastatic lesions.     D:  08/09/2019  E:  08/09/2019           Results for orders placed during the hospital encounter of 06/16/17   Adult Transthoracic Echo Complete    Narrative · Left ventricular systolic function is normal. Estimated EF = 70%.  · Left ventricular diastolic dysfunction (grade I) consistent with   impaired relaxation.  · The cardiac valves are normal.          Assessment/Plan   Assessment / Plan     Active Hospital  Problems    Diagnosis POA   • **Widespread metastatic malignant neoplastic disease (CMS/HCC) [C80.0] Yes     Priority: High   • Pleural effusion [J90] Yes     Priority: High   • Intractable pain [R52] Unknown     Priority: High     To back, chest, arm, hip and breast      • Shortness of breath [R06.02] Unknown     Priority: High   • Acute-on-chronic kidney injury (CMS/HCC) [N17.9, N18.9] Unknown     Priority: High   • Lymphedema [I89.0] Unknown     RUE      • Edema of both lower extremities [R60.0] Unknown   • Chronic respiratory failure with hypoxia, on home oxygen therapy (CMS/HCC) [J96.11, Z99.81] Not Applicable   • Fall [W19.XXXA] Yes   • JACQUI (generalized anxiety disorder) [F41.1] Yes     69-year-old female with known history of right breast cancer approximately 2 years ago, status post patient declining surgical intervention.  Underwent 1 round of chemo and refused further.  Completed 30 treatments of palliative radiation to her chest, neck, ribs, and right hip.  Has stage IV metastatic breast cancer with multiple soft tissue and bony destructive lesions.  Had recently been at [facility] hospice inpatient unit twice and discharged to [facility].  Multiple falls recently.  Worsening debility.  Presents today to ER via EMS due to worsening chest pain, back pain, lower extremity edema and shortness of breath.  To have bilateral moderate to large pleural effusions, extensive metastatic disease, YOLETTE on CKD.  Patient was dehydrated.  Will admit to hospital medicine service.  She is requesting a palliative thoracentesis.  Agrees to palliative medicine following but declines hospice at this time.    Assessment/plan:    Chest pain/back pain  Shortness of air  Chronic respiratory failure with 2 LNC oxygen use at all times  bilateral moderate-large pleural effusions  --Oxygen, nebs, pain medication  --Palliative left thoracentesis (at patient's request)  --Initial troponin mildly elevated, trending down.  We  "will continue to trend to third value.  --Monitor a.m. labs    Widespread metastatic breast cancer  Numerous pathologic fractures of her ribs  Bony pain, intractable  Soft tissue and bony destructive lesions  --Consult palliative medicine for goals of care, pain management  --Patient declines hospice care at this time.  Reports bad experience in the past and felt they were \"no good to her\".  --Requesting palliative thoracentesis, see above  --Sees Delicia Bosch MD.  Consider consult as needed  --Followed by Dr. Gema Edmonds radiation oncology outpatient, reports she completed last palliative XRT in May of this year  -- consult    YOLETTE/CKD  Dehydration  --IVF hydration, monitor labs    Falls/debility  --Up with assist, PT/OT consult  --CM for discharge planning  --Likely in great detail at least related to her advanced metastatic disease.    Pressure ulcer sacrum (POA)  --WOC to follow    DVT prophylaxis:    Teds/seqs  Heparin for 1 dose tonight. HOLD in am for poss palliative thoracentesis     CODE STATUS:    Code Status and Medical Interventions:   Ordered at: 08/09/19 1830     Level Of Support Discussed With:    Patient     Code Status:    No CPR     Medical Interventions (Level of Support Prior to Arrest):    Comfort Measures       Admission Status:  I believe this patient meets INPATIENT status due to the need for care which can only be reasonably provided in an hospital setting requiring close clinical monitoring of shortness of breath and pleural effusions with thoracentesis scheduled this admission.  As such, I feel patient’s risk for adverse outcomes and need for care warrant INPATIENT evaluation and predict the patient’s care encounter to likely last beyond 2 midnights.        Electronically signed by RATNA Jose, 08/09/19, 6:31 PM.      Brief Attending Admission Attestation     I have seen and examined the patient, performing an independent face-to-face diagnostic evaluation with plan " of care reviewed and developed with the advanced practice clinician (APC).      Brief Summary Statement:   Brianna Urrutia is a 69 y.o. female with widely metastatic breast cancer, previously followed by Dr Ibarra and then Dr Santana with Heme Onc, Dr Edmonds with Radiation Oncology and Dr Jacobson with Neurosurgery presents with chest and back pain as well as shortness of breath. Patient states she has been followed by Palliative Care, with enrollment in Hospice twice in the past. CT scans of the chest and abdomen reveal widely metastatic osseous disease and bilateral pleural effusions.  Patient was seen by Hospice in the ED today but declines their services. She feels her pain is not adequately managed at her care facility, however.     Remainder of detailed HPI is as noted above and has been reviewed and/or edited by me for completeness.      Attending Physical Exam:  Constitutional -appears somewhat uncomfortable, in bed  HEENT-NCAT, mucous membranes moist  CV-RRR, S1 S2 normal, no m/r/g  Resp-diminished at base  Abd-soft, non-tender, non-distended, normo active bowel sounds  Ext-No lower extremity cyanosis, clubbing or edema bilaterally  Neuro-alert and oriented x 3, speech clear, moves all extremities   Psych-normal affect   Skin- No rash on exposed UE or LE bilaterally        Brief Assessment/Plan :    Dyspnea  - likely due to bilateral pleural effusions, but rib fractures may contribute -- discussed possible thoracentesis with patient, along with the likelihood these are malignant and may recur despite drainage. She would like to proceed with thoracentesis in the am.    Pleural Effusions  - suspect malignant  - left appears slightly larger to right to my eye  - left thoracentesis in am    Metastatic Breast Cancer  - multiple bone lesions with fractures  - appears some non-compliance regarding follow-up with her providers, and goals of care somewhat unclear   - Palliative Care consultation for Goals of Care and  pain control  - if needed for further clarification, consider Heme Onc consultation.        See above for further detailed assessment and plan developed with APC which I have reviewed and/or edited for completeness.      Electronically signed by Hoang Moreno MD, 08/09/19, 10:55 PM.           Electronically signed by Hoang Moreno MD at 8/9/2019 11:19 PM

## 2020-12-28 NOTE — TELEPHONE ENCOUNTER
----- Message from Katelynn Garcia sent at 7/19/2018 11:00 AM EDT -----  Contact: PT WALK-IN  NEEDS TRAMADOL REFILL SENT TO     [facility] [address] [place] [address] SHALONDA LOPESE - [phone]  - [phone] (Phone)  [phone] (Fax)       Bed: 12  Expected date:   Expected time:   Means of arrival:   Comments:     Deloras Prader, RN  12/28/20 7709
